# Patient Record
Sex: FEMALE | Race: BLACK OR AFRICAN AMERICAN | Employment: FULL TIME | URBAN - METROPOLITAN AREA
[De-identification: names, ages, dates, MRNs, and addresses within clinical notes are randomized per-mention and may not be internally consistent; named-entity substitution may affect disease eponyms.]

---

## 2017-01-19 ENCOUNTER — OFFICE VISIT (OUTPATIENT)
Dept: FAMILY MEDICINE CLINIC | Age: 62
End: 2017-01-19

## 2017-01-19 VITALS
HEART RATE: 60 BPM | TEMPERATURE: 98.3 F | WEIGHT: 160 LBS | RESPIRATION RATE: 17 BRPM | BODY MASS INDEX: 28.35 KG/M2 | SYSTOLIC BLOOD PRESSURE: 145 MMHG | DIASTOLIC BLOOD PRESSURE: 84 MMHG | OXYGEN SATURATION: 97 % | HEIGHT: 63 IN

## 2017-01-19 DIAGNOSIS — E11.9 CONTROLLED TYPE 2 DIABETES MELLITUS WITHOUT COMPLICATION, WITHOUT LONG-TERM CURRENT USE OF INSULIN (HCC): ICD-10-CM

## 2017-01-19 DIAGNOSIS — H91.93 DECREASED HEARING, BILATERAL: ICD-10-CM

## 2017-01-19 DIAGNOSIS — I10 ESSENTIAL HYPERTENSION WITH GOAL BLOOD PRESSURE LESS THAN 130/80: Primary | ICD-10-CM

## 2017-01-19 DIAGNOSIS — F41.9 ANXIETY: ICD-10-CM

## 2017-01-19 RX ORDER — FLUOXETINE HYDROCHLORIDE 20 MG/1
40 CAPSULE ORAL DAILY
Qty: 60 CAP | Refills: 3 | Status: SHIPPED | OUTPATIENT
Start: 2017-01-19 | End: 2017-02-18

## 2017-01-19 RX ORDER — LISINOPRIL 40 MG/1
40 TABLET ORAL DAILY
Qty: 30 TAB | Refills: 3 | Status: SHIPPED | OUTPATIENT
Start: 2017-01-19 | End: 2017-10-31 | Stop reason: SDDI

## 2017-01-19 NOTE — PATIENT INSTRUCTIONS
Varicella Virus Vaccine (By injection)   Varicella Virus Vaccine (silvana-i-AMBER-a VYE-rebeka VAX-een)  Varivax® prevents chicken pox (varicella virus). Zostavax® prevents shingles (herpes zoster virus). Brand Name(s):Varivax, Zostavax   There may be other brand names for this medicine. When This Medicine Should Not Be Used:   Varivax® or Zostavax® should not be given to anyone who has had an allergic reaction to varicella virus vaccine, gelatin, or neomycin, or to a child who has a fever. You should not receive either vaccine if you are pregnant or you are planning pregnancy. Also, these vaccines should not be given to a person who has an immune system problem (such as AIDS or HIV, a blood or bone marrow disorder, active and untreated tuberculosis (TB)) or who is taking medicine that weakens the immune system (such as high doses of steroids or medicine to treat cancer). Varivax® and Zostavax® may make you sick if your immune system is already weak, because they are made from a live varicella virus. Zostavax® should not be given to children. How to Use This Medicine:   Injectable  · Your doctor will prescribe your exact dose and tell you how often it should be given. This medicine is given as a shot under your skin. · A nurse or other health provider will give you this medicine. · Varivax®   ¨ Most people who need the Varivax® vaccine will need 2 shots. Children 12 months to 15years of age should be give the second shot no sooner than 3 months after the first vaccine. Teenagers and adults should have a booster shot 4 to 8 weeks after the first vaccine. Your doctor can answer specific questions about your situation, especially if you need to follow a different schedule. · Zostavax®   ¨ You should receive only 1 dose of Zostavax®, unless your doctor tells you otherwise. ¨ Zostavax® should not be used in place of Varivax® to prevent chicken pox. Zostavax® should also not be used to treat shingles.  Zostavax® is only preventive, although it may help ease pain if you get shingles even after receiving the vaccine. · Read and follow the patient instructions that come with this medicine. Talk to your doctor or pharmacist if you have any questions. If a dose is missed:   · It is important that Varivax® be given at the proper time. If a scheduled shot is missed, call your doctor to make another appointment as soon as possible. Drugs and Foods to Avoid:   Ask your doctor or pharmacist before using any other medicine, including over-the-counter medicines, vitamins, and herbal products. · Varivax® and Zostavax® should not be given with Pneumovax® pneumococcal vaccine. Tell your doctor about your vaccine history, or if you plan to get a flu shot or other vaccines. · A child should not take any medicine that contains aspirin or another salicylate for at least 6 weeks after receiving Varivax®. Check the labels of any pain, headache, or cold medicine your child uses to be sure they do not contain aspirin or salicylic acid. In general, children should never be given aspirin because of the risk of Reye syndrome. · You or your child should wait 2 months after receiving Varivax® to receive varicella zoster immune globulin (VZIG) or other immune globulin medicines. You or your child should wait at least 5 months after receiving immune globulin, VZIG, or a blood or plasma transfusion before you get the Varivax® vaccine. · Tell your doctor if you or your child is using a medicine that weakens your immune system, such as a steroid or cancer medicine. Varivax® and Zostavax® may not work as well, or they could make you sick. Warnings While Using This Medicine:   · It is not safe to take this medicine during pregnancy. It could harm an unborn baby. Tell your doctor right away if you become pregnant. Avoid getting pregnant for 3 months after getting either the Varivax® or Zostavax® vaccine.   · Tell your doctor if you are breastfeeding before you are given Varivax® or Zostavax®. · The virus that is in this vaccine could be passed to others, even if you (or your child) do not feel sick. Avoid close contact with anyone who has a high risk for chicken pox or shingles infection. The waiting time for Varivax® is at least 6 weeks. High-risk people include pregnant women,  babies, and people who cannot fight infection, such as patients who have bone marrow disease, cancer, or AIDS. Talk to your doctor if you might be with a high-risk person. · Varivax® may not always prevent chicken pox. Zostavax® may not always prevent shingles. Possible Side Effects While Using This Medicine:   Call your doctor right away if you notice any of these side effects:  · Allergic reaction: Itching or hives, swelling in your face or hands, swelling or tingling in your mouth or throat, chest tightness, trouble breathing  · Blistering, peeling, or red skin rash  · Cough, chills, runny or stuffy nose, or cold-like symptoms  · High fever (at least 102 degrees F in children)  · Chicken pox  · Swollen glands where the shot was given  · Unusual bleeding or bruising  If you notice these less serious side effects, talk with your doctor:   · Headache, or ear, joint, or muscle pain  · Mild skin rash, itching, or dryness  · Pain, redness, itching, swelling, rash, or a hard lump where the shot was given  If you notice other side effects that you think are caused by this medicine, tell your doctor. Call your doctor for medical advice about side effects. You may report side effects to FDA at 9-065-FDA-9977  ©  [address] is for End User's use only and may not be sold, redistributed or otherwise used for commercial purposes. The above information is an  only. It is not intended as medical advice for individual conditions or treatments.  Talk to your doctor, nurse or pharmacist before following any medical regimen to see if it is safe and effective for you.

## 2017-01-19 NOTE — PROGRESS NOTES
History of Present Illness:     Chief Complaint   Patient presents with    Diabetes    Hypertension     Pt is a 62y.o. year old female     HTN:  Not checking BPs at home. Currently taking Lisinopril 30mg and HCTZ 25mg daily as prescribed. Not following low salt diet. Nothing for exercise. No chest pain, palpitations, shortness of breath, LE edema, headache, changes in vision. T2DM: Currently well controlled on Metformin. Not following low carb diet. No formal exercise regimen. Last eye exam: 11/9/16. Shows cataracts. Scanned into chart. Last foot exam: Normal 5/2016. Last A1c: 6.5%.  9/2016  Last microalbumin: 9/2016, normal.    Pneumovax: Yes  Statin: Yes    No chest pain, JOYA, LE edema, palpitations. No polyuria, polydipsia, hypoglycemic events. No LE numbness or tingling, skin breakdown. Increased stressors at home with mom moving in from [place] recently, work, pastoring a [employer]. She feels that her anxiety is increased. Wanting to restart Prozac. Endorses feeling down and depressed. No SI or HI. Past Medical History   Diagnosis Date    Anxiety     [facility] [address]     Dyslipidemia     Headache     Hodgkin's lymphoma ([facility] [address] 1985     Nodule removed from throat    Hypertension     Low back pain 1/19/2012     MRI 3/10/14 revealed mild multilevel disk and facet degenerative change     Lumbar disc disease 03/2014    Thyroid nodule      Followed by endocrine, Dr. Rahul Spencer.  Ulcer ([facility] [address]          Current Outpatient Prescriptions on File Prior to Visit   Medication Sig Dispense Refill    hydrochlorothiazide (HYDRODIURIL) 25 mg tablet Take 1 Tab by mouth daily. 30 Tab 3    atorvastatin (LIPITOR) 40 mg tablet Take 1 Tab by mouth nightly. 30 Tab 12    metFORMIN ER (GLUCOPHAGE XR) 500 mg tablet Take 1 Tab by mouth daily (with dinner). 90 Tab 3     No current facility-administered medications on file prior to visit.           Allergies:  No Known Allergies      Objective:     Vitals:    01/19/17 0913   BP: 154/84   Pulse: 60   Resp: 17   Temp: 98.3 °F (36.8 °C)   TempSrc: Oral   SpO2: 97%   Weight: 160 lb (72.6 kg)   Height: 5' 3\" (1.6 m)       Physical Exam:  General appearance - alert, well appearing, and in no distress  Mental status - AAO x 3. Slightly flattened affect with depressed mood. Normal speech and thought content. No SI or HI. Chest - clear to auscultation, no wheezes, rales or rhonchi, symmetric air entry  Heart - normal rate, regular rhythm, normal S1, S2, no murmurs, rubs, clicks or gallops  Extremities - peripheral pulses normal, no pedal edema, no clubbing or cyanosis      Recent Labs:  No results found for this or any previous visit (from the past 12 hour(s)). Assessment and Plan:   Pt is a 62y.o. year old female,      ICD-10-CM ICD-9-CM    1. Essential hypertension with goal blood pressure less than 130/80 I10 401.9 HEMOGLOBIN A1C WITH EAG      METABOLIC PANEL, BASIC      lisinopril (PRINIVIL, ZESTRIL) 40 mg tablet   2. Controlled type 2 diabetes mellitus without complication, without long-term current use of insulin (HCC) E11.9 250.00 HEMOGLOBIN A1C WITH EAG      METABOLIC PANEL, BASIC   3. Decreased hearing, bilateral H91.93 389.9 REFERRAL TO ENT-OTOLARYNGOLOGY   4.  Anxiety F41.9 300.00 FLUoxetine (PROZAC) 20 mg capsule     HTN  - Not at goal on increased Lisinopril dose  - Increase Lisinopril to 40mg daily  - Continue HCTZ 25mg daily   - Keep BP log  - Continue working on diet and exercise    DM  - Well controlled  - Continue Metformin at current dose  - Repeat A1c today    Decreased hearing  - ENT/ Audiology referral    Anxiety  - Wants to re-start Prozac as she had good response in the past  - Start Prozac 20 mg daily then increase to 40mg in 2 weeks  - Recommended counseling in conjunction but patient declined    Preventative Care:  - Given script for Zostavax    Follow up in 4 weeks for follow up of anxiety/ depression and HTN. Lisa Olea MD  Family Medicine Resident    I have discussed the diagnosis with the patient and the intended plan as seen in the above orders. The patient has received an after-visit summary and questions were answered concerning future plans.

## 2017-01-19 NOTE — PROGRESS NOTES
Chief Complaint   Patient presents with    Diabetes     1. Have you been to the ER, urgent care clinic since your last visit? Hospitalized since your last visit? No    2. Have you seen or consulted any other health care providers outside of the [facility] since your last visit? Include any pap smears or colon screening.  No

## 2017-01-20 LAB
BUN SERPL-MCNC: 10 MG/DL (ref 8–27)
BUN/CREAT SERPL: 13 (ref 11–26)
CALCIUM SERPL-MCNC: 10 MG/DL (ref 8.7–10.3)
CHLORIDE SERPL-SCNC: 105 MMOL/L (ref 96–106)
CO2 SERPL-SCNC: 24 MMOL/L (ref 18–29)
CREAT SERPL-MCNC: 0.75 MG/DL (ref 0.57–1)
EST. AVERAGE GLUCOSE BLD GHB EST-MCNC: 137 MG/DL
GLUCOSE SERPL-MCNC: 119 MG/DL (ref 65–99)
HBA1C MFR BLD: 6.4 % (ref 4.8–5.6)
POTASSIUM SERPL-SCNC: 4.7 MMOL/L (ref 3.5–5.2)
SODIUM SERPL-SCNC: 141 MMOL/L (ref 134–144)

## 2017-02-21 ENCOUNTER — OFFICE VISIT (OUTPATIENT)
Dept: FAMILY MEDICINE CLINIC | Age: 62
End: 2017-02-21

## 2017-02-21 VITALS
DIASTOLIC BLOOD PRESSURE: 79 MMHG | OXYGEN SATURATION: 97 % | BODY MASS INDEX: 28 KG/M2 | RESPIRATION RATE: 18 BRPM | HEIGHT: 63 IN | SYSTOLIC BLOOD PRESSURE: 151 MMHG | WEIGHT: 158 LBS | HEART RATE: 59 BPM | TEMPERATURE: 98.5 F

## 2017-02-21 DIAGNOSIS — F41.8 DEPRESSION WITH ANXIETY: ICD-10-CM

## 2017-02-21 DIAGNOSIS — I10 ESSENTIAL HYPERTENSION: Primary | ICD-10-CM

## 2017-02-21 RX ORDER — AMLODIPINE BESYLATE 5 MG/1
5 TABLET ORAL DAILY
Qty: 90 TAB | Refills: 0 | Status: SHIPPED | OUTPATIENT
Start: 2017-02-21 | End: 2017-05-30 | Stop reason: SDUPTHER

## 2017-02-21 RX ORDER — FLUOXETINE HYDROCHLORIDE 40 MG/1
40 CAPSULE ORAL DAILY
Qty: 90 CAP | Refills: 1 | Status: SHIPPED | OUTPATIENT
Start: 2017-02-21 | End: 2017-10-31

## 2017-02-21 RX ORDER — PANTOPRAZOLE SODIUM 40 MG/1
40 TABLET, DELAYED RELEASE ORAL DAILY
COMMUNITY
End: 2017-10-31

## 2017-02-21 RX ORDER — FLUOXETINE HYDROCHLORIDE 20 MG/1
CAPSULE ORAL 2 TIMES DAILY
COMMUNITY
End: 2017-02-21 | Stop reason: SDUPTHER

## 2017-02-21 NOTE — PROGRESS NOTES
Chief Complaint   Patient presents with    Hypertension     follow up     1. Have you been to the ER, urgent care clinic since your last visit? Hospitalized since your last visit? No    2. Have you seen or consulted any other health care providers outside of the [address] [place] [address] since your last visit? Include any pap smears or colon screening.  No

## 2017-02-21 NOTE — PROGRESS NOTES
History of Present Illness:     Chief Complaint   Patient presents with    Hypertension     follow up     Pt is a 62y.o. year old female     HTN:  BPs remain elevated despite increased dose of Lisinopril. Tolerating medications without SEs. Home BPs ranging 140s/ 70-80s with some elevated SBPs into the 180s. Making some diet changes; low salt. No exercise. Anxiety:  Started back on Prozac now at 40mg daily. Helping with her symptoms. No SI/ HI. Still stressed at work. No fever, chills, sweats  No chest pain, JOYA, palpitations, LE edema  No cough, sputum production, SOB, pleuritic chest pain, wheezing  No numbness/ tingling/ weakness in extremities       Past Medical History   Diagnosis Date    Anxiety     [facility] mellitus ([facility] [address]     Dyslipidemia     Headache     Hodgkin's lymphoma ([facility] [address] 1985     Nodule removed from throat    Hypertension     Low back pain 1/19/2012     MRI 3/10/14 revealed mild multilevel disk and facet degenerative change     Lumbar disc disease 03/2014    Thyroid nodule      Followed by endocrine, Dr. Luiz Castillo.  Ulcer ([facility] [address]          Current Outpatient Prescriptions on File Prior to Visit   Medication Sig Dispense Refill    lisinopril (PRINIVIL, ZESTRIL) 40 mg tablet Take 1 Tab by mouth daily. Indications: Hypertension 30 Tab 3    hydrochlorothiazide (HYDRODIURIL) 25 mg tablet Take 1 Tab by mouth daily. 30 Tab 3    atorvastatin (LIPITOR) 40 mg tablet Take 1 Tab by mouth nightly. 30 Tab 12    metFORMIN ER (GLUCOPHAGE XR) 500 mg tablet Take 1 Tab by mouth daily (with dinner). 90 Tab 3     No current facility-administered medications on file prior to visit.           Allergies:  No Known Allergies    Objective:     Vitals:    02/21/17 1457 02/21/17 1541   BP: 147/78 151/79   Pulse: (!) 56 (!) 59   Resp: 18    Temp: 98.5 °F (36.9 °C)    TempSrc: Oral    SpO2: 97%    Weight: 158 lb (71.7 kg)    Height: 5' 3\" (1.6 m)        Physical Exam:  General appearance - alert, well appearing, and in no distress and overweight  Chest - clear to auscultation, no wheezes, rales or rhonchi, symmetric air entry  Heart - normal rate, regular rhythm, normal S1, S2, no murmurs, rubs, clicks or gallops  Extremities - peripheral pulses normal, no pedal edema, no clubbing or cyanosis      Recent Labs:  No results found for this or any previous visit (from the past 12 hour(s)). Assessment and Plan:   Pt is a 62y.o. year old female,      ICD-10-CM ICD-9-CM    1. Essential hypertension I10 401.9 amLODIPine (NORVASC) 5 mg tablet      METABOLIC PANEL, BASIC   2. Depression with anxiety F41.8 300.4 FLUoxetine (PROZAC) 40 mg capsule     Add Amlodipine 5mg daily  Check BMP today since increasing Lisinopril  Goal < 140/90  Discussed importance of diet and exercise    Continue Prozac at 40 mg daily    Follow up in 4-6 weeks for BP check    Dante Sinclair MD  Family Medicine Resident    Discussed patient and plan with Dr. Regina Boles. I have discussed the diagnosis with the patient and the intended plan as seen in the above orders. The patient has received an after-visit summary and questions were answered concerning future plans.

## 2017-02-21 NOTE — PATIENT INSTRUCTIONS
Naltrexone/Bupropion (By mouth)   Bupropion Hydrochloride (jgg-PWJN-ejl-on jovan-droe-KLOR-rajiv), Naltrexone Hydrochloride (nal-TREX-one jovan-droe-KLOR-rajiv)  Used with diet and exercise to help you lose weight. Brand Name(s):Contrave   There may be other brand names for this medicine. When This Medicine Should Not Be Used: This medicine is not right for everyone. Do not use it if you had an allergic reaction to naltrexone or bupropion, you are pregnant, or you have seizures, anorexia, or bulimia. How to Use This Medicine:   Long Acting Tablet  · Take your medicine as directed. Your dose may need to be changed several times to find what works best for you. · It is best to take this medicine with food or milk. However, do not take this medicine with high-fat meals. This may increase your risk of seizures. · Swallow the extended-release tablet whole. Do not crush, break, or chew it. · This medicine should come with a Medication Guide. Ask your pharmacist for a copy if you do not have one. · Missed dose: Skip the missed dose and go back to your regular dosing schedule. Never take extra medicine to make up for a missed dose. · Store the medicine in a closed container at room temperature, away from heat, moisture, and direct light. Drugs and Foods to Avoid:   Ask your doctor or pharmacist before using any other medicine, including over-the-counter medicines, vitamins, and herbal products. · Do not use this medicine and an MAO inhibitor (MAOI) within 14 days of each other. Do not take this medicine if you are using or have used heroin or other narcotic drugs (such as buprenorphine, codeine, methadone, or other habit-forming painkillers) within the past 7 to 10 days. Do not use naltrexone/bupropion if you are also using Zyban® to quit smoking or Aplenzin® or Wellbutrin® for depression, because they also contain bupropion. · Some medicines and foods can affect how naltrexone/bupropion works.  Tell your doctor if you are using any of the following:  ¨ Amantadine, amiloride, cimetidine, clopidogrel, dopamine, famotidine, levodopa, memantine, metformin, metoprolol, oxaliplatin, pindolol, ranitidine, theophylline, ticlopidine, varenicline  ¨ Insulin or diabetes medicine  ¨ Medicine to treat depression  ¨ Medicine to treat mental illness (haloperidol, risperidone, thioridazine)  ¨ Medicine to treat heart rhythm problems (flecainide, procainamide, propafenone)  ¨ Medicine to treat HIV or AIDS (efavirenz, lopinavir, ritonavir)  ¨ Medicine for pain, diarrhea, cough, or colds  ¨ Steroid (such as dexamethasone, hydrocortisone, methylprednisolone, prednisolone, prednisone)  · Limit alcohol, or do not drink alcohol at all while you are using this medicine. Warnings While Using This Medicine:   · It is not safe to take this medicine during pregnancy. It could harm an unborn baby. Tell your doctor right away if you become pregnant. · Tell your doctor if you have kidney disease, liver disease, diabetes, glaucoma, heart disease, or high blood pressure. · Tell your doctor if you take barbiturates, benzodiazepines, antiseizure medicine, or sedatives, or if you recently stopped taking them. Tell your doctor if you have a history of drug addiction, or if you drink alcohol. · For some children, teenagers, and young adults, this medicine may increase mental or emotional problems. This may lead to thoughts of suicide and violence. Talk with your doctor right away if you have any thoughts or behavior changes that concern you. Tell your doctor if you or anyone in your family has a history of bipolar disorder or suicide attempts.   · This medicine may cause the following problems:  ¨ Increased risk of seizure  ¨ High blood pressure or heart rate  ¨ Serious allergic and skin reactions  ¨ Liver problems  ¨ Increased risk of hypoglycemia (low blood sugar) in patients with diabetes  · Do not breastfeed while you are using this medicine, unless your doctor says it is okay. · You have a higher risk of accidental overdose, serious injury, or death if you use heroin or any other narcotic medicine while you are being treated with this medicine. Also, naltrexone prevents you from feeling the effects of heroin if you use it. · Do not stop using this medicine suddenly. Your doctor will need to slowly decrease your dose before you stop it completely. · Tell any doctor or dentist who treats you that you are using this medicine. This medicine may affect certain medical test results. · Your doctor will check your progress and the effects of this medicine at regular visits. Keep all appointments. · Keep all medicine out of the reach of children. Never share your medicine with anyone. Possible Side Effects While Using This Medicine:   Call your doctor right away if you notice any of these side effects:  · Allergic reaction: Itching or hives, swelling in your face or hands, swelling or tingling in your mouth or throat, chest tightness, trouble breathing  · Blistering, peeling, or red skin rash  · Chest pain, trouble breathing, fast, slow, or pounding heartbeat  · Dark urine or pale stools, nausea, vomiting, loss of appetite, stomach pain, yellow skin or eyes  · Eye pain, vision changes, seeing halos around lights  · Muscle or joint pain, fever with rash  · Seeing or hearing things that are not there, feeling like people are against you  · Seizures  · Sudden increase in energy, racing thoughts, trouble sleeping  · Thoughts of hurting yourself, worsening depression, severe agitation or confusion  If you notice these less serious side effects, talk with your doctor:   · Dry mouth  · Headache, dizziness  · Nausea, constipation, diarrhea  If you notice other side effects that you think are caused by this medicine, tell your doctor. Call your doctor for medical advice about side effects.  You may report side effects to FDA at 7-716-GUS-5561  © 2016 [facility] [address] [facility] is for End User's use only and may not be sold, redistributed or otherwise used for commercial purposes. The above information is an  only. It is not intended as medical advice for individual conditions or treatments. Talk to your doctor, nurse or pharmacist before following any medical regimen to see if it is safe and effective for you.

## 2017-02-21 NOTE — MR AVS SNAPSHOT
Visit Information Date & Time Provider Department Dept. Phone Encounter #  
 2/21/2017  2:40 PM Virginia De Luna, Allyson Dalal [phone] [id number] Follow-up Instructions Return in about 6 weeks (around 4/4/2017) for Blood pressure follow up. Upcoming Health Maintenance Date Due COLONOSCOPY 2/22/2017 LIPID PANEL Q1 3/8/2017 EYE EXAM RETINAL OR DILATED Q1 11/9/2017* Pneumococcal 19-64 Highest Risk (2 of 3 - PCV13) 5/11/2017 FOOT EXAM Q1 5/11/2017 HEMOGLOBIN A1C Q6M 7/19/2017 MICROALBUMIN Q1 9/8/2017 PAP AKA CERVICAL CYTOLOGY 2/6/2018 BREAST CANCER SCRN MAMMOGRAM 5/27/2018 DTaP/Tdap/Td series (2 - Td) 2/6/2025 *Topic was postponed. The date shown is not the original due date. Allergies as of 2/21/2017  Review Complete On: 2/21/2017 By: Virginia De Luna MD  
 No Known Allergies Current Immunizations  Reviewed on 2/6/2015 Name Date Tdap 2/6/2015 Not reviewed this visit You Were Diagnosed With   
  
 Codes Comments Essential hypertension    -  Primary ICD-10-CM: I10 
ICD-9-CM: 401.9 Depression with anxiety     ICD-10-CM: F41.8 ICD-9-CM: 300.4 Vitals BP Pulse Temp Resp Height(growth percentile) Weight(growth percentile) 147/78 (BP 1 Location: Left arm, BP Patient Position: Sitting) (!) 56 98.5 °F (36.9 °C) (Oral) 18 5' 3\" (1.6 m) 158 lb (71.7 kg) LMP SpO2 BMI OB Status Smoking Status 01/18/2000 97% 27.99 kg/m2 Postmenopausal Never Smoker Vitals History BMI and BSA Data Body Mass Index Body Surface Area  
 27.99 kg/m 2 1.79 m 2 Preferred Pharmacy Pharmacy Name Phone [facility] [place] [address] [phone] Your Updated Medication List  
  
   
This list is accurate as of: 2/21/17  3:34 PM.  Always use your most recent med list. amLODIPine 5 mg tablet Commonly known as:  Jace Salinas Take 1 Tab by mouth daily. Indications: hypertension  
  
 atorvastatin 40 mg tablet Commonly known as:  LIPITOR Take 1 Tab by mouth nightly. FLUoxetine 40 mg capsule Commonly known as:  PROzac Take 1 Cap by mouth daily. hydroCHLOROthiazide 25 mg tablet Commonly known as:  HYDRODIURIL Take 1 Tab by mouth daily. lisinopril 40 mg tablet Commonly known as:  Laurence Aayush Take 1 Tab by mouth daily. Indications: Hypertension  
  
 metFORMIN  mg tablet Commonly known as:  GLUCOPHAGE XR Take 1 Tab by mouth daily (with dinner). PROTONIX 40 mg tablet Generic drug:  pantoprazole Take 40 mg by mouth daily. Prescriptions Sent to Pharmacy Refills FLUoxetine (PROZAC) 40 mg capsule 1 Sig: Take 1 Cap by mouth daily. Class: Normal  
 Pharmacy: [facility] [place] [address] Ph #: [phone] Route: Oral  
 amLODIPine (NORVASC) 5 mg tablet 0 Sig: Take 1 Tab by mouth daily. Indications: hypertension Class: Normal  
 Pharmacy: [facility] [place] [address] Ph #: [phone] Route: Oral  
  
We Performed the Following METABOLIC PANEL, BASIC [15652 CPT(R)] Follow-up Instructions Return in about 6 weeks (around 4/4/2017) for Blood pressure follow up. Patient Instructions Naltrexone/Bupropion (By mouth) Bupropion Hydrochloride (wbh-KGZS-eok-on jovan-droe-KLOR-rajiv), Naltrexone Hydrochloride (nal-TREX-one jovan-droe-KLOR-rajiv) Used with diet and exercise to help you lose weight. Brand Name(s):Contrave There may be other brand names for this medicine. When This Medicine Should Not Be Used: This medicine is not right for everyone. Do not use it if you had an allergic reaction to naltrexone or bupropion, you are pregnant, or you have seizures, anorexia, or bulimia. How to Use This Medicine:  
Long Acting Tablet · Take your medicine as directed. Your dose may need to be changed several times to find what works best for you. · It is best to take this medicine with food or milk. However, do not take this medicine with high-fat meals. This may increase your risk of seizures. · Swallow the extended-release tablet whole. Do not crush, break, or chew it. · This medicine should come with a Medication Guide. Ask your pharmacist for a copy if you do not have one. · Missed dose: Skip the missed dose and go back to your regular dosing schedule. Never take extra medicine to make up for a missed dose. · Store the medicine in a closed container at room temperature, away from heat, moisture, and direct light. Drugs and Foods to Avoid: Ask your doctor or pharmacist before using any other medicine, including over-the-counter medicines, vitamins, and herbal products. · Do not use this medicine and an MAO inhibitor (MAOI) within 14 days of each other. Do not take this medicine if you are using or have used heroin or other narcotic drugs (such as buprenorphine, codeine, methadone, or other habit-forming painkillers) within the past 7 to 10 days. Do not use naltrexone/bupropion if you are also using Zyban® to quit smoking or Aplenzin® or Wellbutrin® for depression, because they also contain bupropion. · Some medicines and foods can affect how naltrexone/bupropion works. Tell your doctor if you are using any of the following: ¨ Amantadine, amiloride, cimetidine, clopidogrel, dopamine, famotidine, levodopa, memantine, metformin, metoprolol, oxaliplatin, pindolol, ranitidine, theophylline, ticlopidine, varenicline ¨ Insulin or diabetes medicine ¨ Medicine to treat depression ¨ Medicine to treat mental illness (haloperidol, risperidone, thioridazine) ¨ Medicine to treat heart rhythm problems (flecainide, procainamide, propafenone) ¨ Medicine to treat HIV or AIDS (efavirenz, lopinavir, ritonavir) ¨ Medicine for pain, diarrhea, cough, or colds ¨ Steroid (such as dexamethasone, hydrocortisone, methylprednisolone, prednisolone, prednisone) · Limit alcohol, or do not drink alcohol at all while you are using this medicine. Warnings While Using This Medicine: · It is not safe to take this medicine during pregnancy. It could harm an unborn baby. Tell your doctor right away if you become pregnant. · Tell your doctor if you have kidney disease, liver disease, diabetes, glaucoma, heart disease, or high blood pressure. · Tell your doctor if you take barbiturates, benzodiazepines, antiseizure medicine, or sedatives, or if you recently stopped taking them. Tell your doctor if you have a history of drug addiction, or if you drink alcohol. · For some children, teenagers, and young adults, this medicine may increase mental or emotional problems. This may lead to thoughts of suicide and violence. Talk with your doctor right away if you have any thoughts or behavior changes that concern you. Tell your doctor if you or anyone in your family has a history of bipolar disorder or suicide attempts. · This medicine may cause the following problems: 
¨ Increased risk of seizure ¨ High blood pressure or heart rate ¨ Serious allergic and skin reactions ¨ Liver problems ¨ Increased risk of hypoglycemia (low blood sugar) in patients with diabetes · Do not breastfeed while you are using this medicine, unless your doctor says it is okay. · You have a higher risk of accidental overdose, serious injury, or death if you use heroin or any other narcotic medicine while you are being treated with this medicine. Also, naltrexone prevents you from feeling the effects of heroin if you use it. · Do not stop using this medicine suddenly. Your doctor will need to slowly decrease your dose before you stop it completely.  
· Tell any doctor or dentist who treats you that you are using this medicine. This medicine may affect certain medical test results. · Your doctor will check your progress and the effects of this medicine at regular visits. Keep all appointments. · Keep all medicine out of the reach of children. Never share your medicine with anyone. Possible Side Effects While Using This Medicine:  
Call your doctor right away if you notice any of these side effects: · Allergic reaction: Itching or hives, swelling in your face or hands, swelling or tingling in your mouth or throat, chest tightness, trouble breathing · Blistering, peeling, or red skin rash · Chest pain, trouble breathing, fast, slow, or pounding heartbeat · Dark urine or pale stools, nausea, vomiting, loss of appetite, stomach pain, yellow skin or eyes · Eye pain, vision changes, seeing halos around lights · Muscle or joint pain, fever with rash · Seeing or hearing things that are not there, feeling like people are against you · Seizures · Sudden increase in energy, racing thoughts, trouble sleeping · Thoughts of hurting yourself, worsening depression, severe agitation or confusion If you notice these less serious side effects, talk with your doctor: · Dry mouth 
· Headache, dizziness · Nausea, constipation, diarrhea If you notice other side effects that you think are caused by this medicine, tell your doctor. Call your doctor for medical advice about side effects. You may report side effects to FDA at 0-969-FDA-4176 © 2016 [address] is for End User's use only and may not be sold, redistributed or otherwise used for commercial purposes. The above information is an  only. It is not intended as medical advice for individual conditions or treatments. Talk to your doctor, nurse or pharmacist before following any medical regimen to see if it is safe and effective for you. Introducing [facility]! Dear Elder Saunders: Thank you for requesting a FIGMD account. Our records indicate that you already have an active FIGMD account. You can access your account anytime at [email or web address]. [email or web address] Did you know that you can access your hospital and ER discharge instructions at any time in FIGMD? You can also review all of your test results from your hospital stay or ER visit. Additional Information If you have questions, please visit the Frequently Asked Questions section of the FIGMD website at [email or web address] Remember, FIGMD is NOT to be used for urgent needs. For medical emergencies, dial 911. Now available from your iPhone and Android! Please provide this summary of care documentation to your next provider. Your primary care clinician is listed as Rod Elise. If you have any questions after today's visit, please call [phone].

## 2017-02-22 LAB
BUN SERPL-MCNC: 11 MG/DL (ref 8–27)
BUN/CREAT SERPL: 14 (ref 11–26)
CALCIUM SERPL-MCNC: 10 MG/DL (ref 8.7–10.3)
CHLORIDE SERPL-SCNC: 102 MMOL/L (ref 96–106)
CO2 SERPL-SCNC: 23 MMOL/L (ref 18–29)
CREAT SERPL-MCNC: 0.76 MG/DL (ref 0.57–1)
GLUCOSE SERPL-MCNC: 85 MG/DL (ref 65–99)
POTASSIUM SERPL-SCNC: 4.6 MMOL/L (ref 3.5–5.2)
SODIUM SERPL-SCNC: 140 MMOL/L (ref 134–144)

## 2017-05-30 ENCOUNTER — OFFICE VISIT (OUTPATIENT)
Dept: FAMILY MEDICINE CLINIC | Age: 62
End: 2017-05-30

## 2017-05-30 VITALS
RESPIRATION RATE: 18 BRPM | HEART RATE: 63 BPM | TEMPERATURE: 98.3 F | SYSTOLIC BLOOD PRESSURE: 151 MMHG | WEIGHT: 160.2 LBS | BODY MASS INDEX: 28.39 KG/M2 | OXYGEN SATURATION: 96 % | HEIGHT: 63 IN | DIASTOLIC BLOOD PRESSURE: 75 MMHG

## 2017-05-30 DIAGNOSIS — R06.83 SNORING: ICD-10-CM

## 2017-05-30 DIAGNOSIS — I10 ESSENTIAL HYPERTENSION: ICD-10-CM

## 2017-05-30 DIAGNOSIS — I10 ELEVATED SYSTOLIC BLOOD PRESSURE READING WITH DIAGNOSIS OF HYPERTENSION: Primary | ICD-10-CM

## 2017-05-30 DIAGNOSIS — F51.02 ADJUSTMENT INSOMNIA: ICD-10-CM

## 2017-05-30 RX ORDER — AMLODIPINE BESYLATE 5 MG/1
10 TABLET ORAL DAILY
Qty: 90 TAB | Refills: 0 | Status: SHIPPED | OUTPATIENT
Start: 2017-05-30 | End: 2017-10-31 | Stop reason: SDDI

## 2017-05-30 RX ORDER — ZOLPIDEM TARTRATE 5 MG/1
5 TABLET ORAL
Qty: 10 TAB | Refills: 0 | Status: SHIPPED | OUTPATIENT
Start: 2017-05-30 | End: 2017-10-31

## 2017-05-30 NOTE — PROGRESS NOTES
Chief Complaint   Patient presents with    Elevated Blood Pressure     usually has elevated bp but not this high. . death in family this wkend and didnt take medication. .      1. Have you been to the ER, urgent care clinic since your last visit? Hospitalized since your last visit? No    2. Have you seen or consulted any other health care providers outside of the [address] [place] [address] since your last visit? Include any pap smears or colon screening.  No

## 2017-05-30 NOTE — PROGRESS NOTES
History of Present Illness:     Chief Complaint   Patient presents with    Elevated Blood Pressure     usually has elevated bp but not this high. . death in family this wkend and didnt take medication. .      Pt is a 62y.o. year old female     Presents with elevated BP. At work she experienced a headache, blurry vision and feeling dizzy. When they  her BP it was elevated in the 180s/70s. Her home BPs are in the 160s/70s. Currently, she endorses headache and right sided head and arm pain. She is under a lot of stress. Her mother, who has dementia, has moved here and has required multiple hospitalizations. Her nephew passed unexpectedly over the weekend. ROS:   Denies chest pain, palpitations and shortness of breath. Denies changes in vision, decreased vision or eye pain. Endorses nausea but denies vomiting    Denies numbness, weakness or tingling in extremities. Past Medical History:   Diagnosis Date    Anxiety     Diabetes mellitus ([facility]     Dyslipidemia     Headache     Hodgkin's lymphoma ([facility] 1985    Nodule removed from throat    Hypertension     Low back pain 1/19/2012    MRI 3/10/14 revealed mild multilevel disk and facet degenerative change     Lumbar disc disease 03/2014    Thyroid nodule     Followed by endocrine, Dr. Vegas Prieto.  Ulcer ([facility]          Current Outpatient Prescriptions on File Prior to Visit   Medication Sig Dispense Refill    pantoprazole (PROTONIX) 40 mg tablet Take 40 mg by mouth daily.  FLUoxetine (PROZAC) 40 mg capsule Take 1 Cap by mouth daily. 90 Cap 1    amLODIPine (NORVASC) 5 mg tablet Take 1 Tab by mouth daily. Indications: hypertension 90 Tab 0    lisinopril (PRINIVIL, ZESTRIL) 40 mg tablet Take 1 Tab by mouth daily. Indications: Hypertension 30 Tab 3    hydrochlorothiazide (HYDRODIURIL) 25 mg tablet Take 1 Tab by mouth daily. 30 Tab 3    atorvastatin (LIPITOR) 40 mg tablet Take 1 Tab by mouth nightly.  30 Tab 12    metFORMIN ER (GLUCOPHAGE XR) 500 mg tablet Take 1 Tab by mouth daily (with dinner). 90 Tab 3     No current facility-administered medications on file prior to visit. Allergies:  No Known Allergies    Objective:     Vitals:    05/30/17 1905   BP: 151/75   Pulse: 63   Resp: 18   Temp: 98.3 °F (36.8 °C)   TempSrc: Oral   SpO2: 96%   Weight: 160 lb 3.2 oz (72.7 kg)   Height: 5' 3\" (1.6 m)       Physical Exam:  General appearance - alert, well appearing, and in no distress  Eyes - pupils equal and reactive, extraocular eye movements intact, funduscopic exam normal, discs flat and sharp  Chest - clear to auscultation, no wheezes, rales or rhonchi, symmetric air entry  Heart - normal rate, regular rhythm, normal S1, S2, no murmurs, rubs, clicks or gallops  Neurological - alert, oriented, normal speech, no focal findings or movement disorder noted, funduscopic exam normal, discs flat and sharp, normal muscle tone, no tremors, strength 5/5  Extremities - peripheral pulses normal, no pedal edema, no clubbing or cyanosis      Recent Labs:  No results found for this or any previous visit (from the past 12 hour(s)). Assessment and Plan:   Pt is a 62y.o. year old female,      ICD-10-CM ICD-9-CM    1. Elevated systolic blood pressure reading with diagnosis of hypertension I10 401.9    2. Essential hypertension I10 401.9 amLODIPine (NORVASC) 5 mg tablet   3. Snoring R06.83 786.09 SLEEP MEDICINE REFERRAL   4.  Adjustment insomnia F51.02 307.41 zolpidem (AMBIEN) 5 mg tablet     Elevated BP reading at work that has improved to her normal at this visit  Symptoms have improved since coming to office without deficits on exam  Still does not have overall good control of her HTN  Will increase Amlodipine to 10 mg daily    Hx of snoring and apnea  Sleep study ordered  Could be underlying cause of her poorly controlled BPs    Pt under a tremendous amount of stress and unable to sleep  Will give short supply of Ambien   Given work note for few days of rest    Follow up as planned in 1 week. Jazmine Keene MD  Family Medicine Resident    I have discussed the diagnosis with the patient and the intended plan as seen in the above orders. The patient has received an after-visit summary and questions were answered concerning future plans.

## 2017-05-30 NOTE — MR AVS SNAPSHOT
Visit Information Date & Time Provider Department Dept. Phone Encounter #  
 5/30/2017  7:00 PM Edgar Toscano, Allyson Dalal [phone] [id number] Follow-up Instructions Return in about 1 week (around 6/6/2017) for Blood pressure. Your Appointments 6/5/2017  8:30 AM  
ACUTE CARE with Edgar Toscano MD  
Allyson Dalal [address] [place] [address]) Appt Note: fuv to back pain [facility],[place] [address] [facility]  
[phone]  
  
   
 [address] [place] [address] [phone] Upcoming Health Maintenance Date Due COLONOSCOPY 2/22/2017 LIPID PANEL Q1 3/8/2017 Pneumococcal 19-64 Highest Risk (2 of 3 - PCV13) 5/11/2017 FOOT EXAM Q1 5/11/2017 EYE EXAM RETINAL OR DILATED Q1 11/9/2017* HEMOGLOBIN A1C Q6M 7/19/2017 MICROALBUMIN Q1 9/8/2017 PAP AKA CERVICAL CYTOLOGY 2/6/2018 BREAST CANCER SCRN MAMMOGRAM 5/27/2018 DTaP/Tdap/Td series (2 - Td) 2/6/2025 *Topic was postponed. The date shown is not the original due date. Allergies as of 5/30/2017  Review Complete On: 5/30/2017 By: Alvaro Jean Baptiste LPN No Known Allergies Current Immunizations  Reviewed on 2/6/2015 Name Date Tdap 2/6/2015 Not reviewed this visit You Were Diagnosed With   
  
 Codes Comments Elevated systolic blood pressure reading with diagnosis of hypertension    -  Primary ICD-10-CM: I10 
ICD-9-CM: 401.9 Essential hypertension     ICD-10-CM: I10 
ICD-9-CM: 401.9 Snoring     ICD-10-CM: R06.83 
ICD-9-CM: 786.09 Adjustment insomnia     ICD-10-CM: F51.02 
ICD-9-CM: 307.41 Vitals BP Pulse Temp Resp Height(growth percentile) Weight(growth percentile) 151/75 63 98.3 °F (36.8 °C) (Oral) 18 5' 3\" (1.6 m) 160 lb 3.2 oz (72.7 kg) LMP SpO2 BMI OB Status Smoking Status 01/18/2000 96% 28.38 kg/m2 Postmenopausal Never Smoker BMI and BSA Data Body Mass Index Body Surface Area  
 28.38 kg/m 2 1.8 m 2 Preferred Pharmacy Pharmacy Name Phone [facility] [address] [place] [address] [phone] Your Updated Medication List  
  
   
This list is accurate as of: 5/30/17  7:47 PM.  Always use your most recent med list. amLODIPine 5 mg tablet Commonly known as:  Deirdre Vivienne Take 2 Tabs by mouth daily. Indications: hypertension  
  
 atorvastatin 40 mg tablet Commonly known as:  LIPITOR Take 1 Tab by mouth nightly. FLUoxetine 40 mg capsule Commonly known as:  PROzac Take 1 Cap by mouth daily. hydroCHLOROthiazide 25 mg tablet Commonly known as:  HYDRODIURIL Take 1 Tab by mouth daily. lisinopril 40 mg tablet Commonly known as:  Gin Barrier Take 1 Tab by mouth daily. Indications: Hypertension  
  
 metFORMIN  mg tablet Commonly known as:  GLUCOPHAGE XR Take 1 Tab by mouth daily (with dinner). PROTONIX 40 mg tablet Generic drug:  pantoprazole Take 40 mg by mouth daily. zolpidem 5 mg tablet Commonly known as:  AMBIEN Take 1 Tab by mouth nightly as needed for Sleep. Max Daily Amount: 5 mg. Prescriptions Printed Refills  
 zolpidem (AMBIEN) 5 mg tablet 0 Sig: Take 1 Tab by mouth nightly as needed for Sleep. Max Daily Amount: 5 mg. Class: Print Route: Oral  
  
Prescriptions Sent to Pharmacy Refills  
 amLODIPine (NORVASC) 5 mg tablet 0 Sig: Take 2 Tabs by mouth daily. Indications: hypertension Class: Normal  
 Pharmacy: [facility] [address] [place] [address] Ph #: [phone] Route: Oral  
  
We Performed the Following SLEEP MEDICINE REFERRAL [ZSD472 Custom] Comments:  
 Please evaluate patient for sleep apnea. Follow-up Instructions Return in about 1 week (around 6/6/2017) for Blood pressure. Referral Information Referral ID Referred By Referred To [id number] Diana Altman MD   
   [address] [place] [address] Nini Call Phone: [phone] Fax: [phone] Visits Status Start Date End Date 1 New Request 5/30/17 5/30/18 If your referral has a status of pending review or denied, additional information will be sent to support the outcome of this decision. Patient Instructions Home Blood Pressure Test: About This Test 
What is it? A home blood pressure test allows you to keep track of your blood pressure at home. Blood pressure is a measure of the force of blood against the walls of your arteries. Blood pressure readings include two numbers, such as 130/80 (say \"130 over 80\"). The first number is the systolic pressure. The second number is the diastolic pressure. Why is this test done? You may do this test at home to: · Find out if you have high blood pressure. · Track your blood pressure if you have high blood pressure. · Track how well medicine is working to reduce high blood pressure. · Check how lifestyle changes, such as weight loss and exercise, are affecting blood pressure. How can you prepare for the test? 
· Do not use caffeine, tobacco, or medicines known to raise blood pressure (such as nasal decongestant sprays) for at least 30 minutes before taking your blood pressure. · Do not exercise for at least 30 minutes before taking your blood pressure. What happens before the test? 
Take your blood pressure while you feel comfortable and relaxed. Sit quietly with both feet on the floor for at least 5 minutes before the test. 
What happens during the test? 
· Sit with your arm slightly bent and resting on a table so that your upper arm is at the same level as your heart. · Roll up your sleeve or take off your shirt to expose your upper arm. · Wrap the blood pressure cuff around your upper arm so that the lower edge of the cuff is about 1 inch above the bend of your elbow. Proceed with the following steps depending on if you are using an automatic or manual pressure monitor. Automatic blood pressure monitors · Press the on/off button on the automatic monitor and wait until the ready-to-measure \"heart\" symbol appears next to zero in the display window. · Press the start button. The cuff will inflate and deflate by itself. · Your blood pressure numbers will appear on the screen. · Write your numbers in your log book, along with the date and time. Manual blood pressure monitors · Place the earpieces of a stethoscope in your ears, and place the bell of the stethoscope over the artery, just below the cuff. · Close the valve on the rubber inflating bulb. · Squeeze the bulb rapidly with your opposite hand to inflate the cuff until the dial or column of mercury reads about 30 mm Hg higher than your usual systolic pressure. If you do not know your usual pressure, inflate the cuff to 210 mm Hg or until the pulse at your wrist disappears. · Open the pressure valve just slightly by twisting or pressing the valve on the bulb. · As you watch the pressure slowly fall, note the level on the dial at which you first start to hear a pulsing or tapping sound through the stethoscope. This is your systolic blood pressure. · Continue letting the air out slowly. The sounds will become muffled and will finally disappear. Note the pressure when the sounds completely disappear. This is your diastolic blood pressure. Let out all the remaining air. · Write your numbers in your log book, along with the date and time. What else should you know about the test? 
Results for adults ages 25 and older (mm Hg): · Normal (ideal): Systolic 023 or below. Diastolic 79 or below. · Prehypertension: Systolic 580 to 709. Diastolic 80 to 89. · Hypertension: Systolic 876 or above. Diastolic 90 or above. Follow-up care is a key part of your treatment and safety.  Be sure to make and go to all appointments, and call your doctor if you are having problems. It's also a good idea to keep a list of the medicines you take. Where can you learn more? Go to http://duncan-[place].info/. Enter C427 in the search box to learn more about \"Home Blood Pressure Test: About This Test.\" Current as of: January 27, 2016 Content Version: 11.2 © 5382-7509 [employer]. Care instructions adapted under license by [employer] (which disclaims liability or warranty for this information). If you have questions about a medical condition or this instruction, always ask your healthcare professional. [address] any warranty or liability for your use of this information. Introducing [facility]! Dear Maxime Jackson: Thank you for requesting a [email or web address].com account. Our records indicate that you already have an active [email or web address] account. You can access your account anytime at [email or web address] Did you know that you can access your hospital and ER discharge instructions at any time in [email or web address]? You can also review all of your test results from your hospital stay or ER visit. Additional Information If you have questions, please visit the Frequently Asked Questions section of the [email or web address] website at [email or web address] Remember, [email or web address] is NOT to be used for urgent needs. For medical emergencies, dial 911. Now available from your iPhone and Android! Please provide this summary of care documentation to your next provider. Your primary care clinician is listed as Candance Cloud. If you have any questions after today's visit, please call [phone].

## 2017-05-30 NOTE — LETTER
NOTIFICATION RETURN TO WORK  
 
5/30/2017 7:44 PM 
 
Ms. West Murillo [address] [facility],[address] [address] [facility], [facility] 75 [phone] To Whom It May Concern: 
 
West Murillo is currently under the care of [facility]. She will return to work on 6/6/17 if cleared by physician. If there are questions or concerns please have the patient contact our office.  
 
 
 
Sincerely, 
 
 
Rafat Vidal MD

## 2017-05-30 NOTE — PATIENT INSTRUCTIONS
Home Blood Pressure Test: About This Test  What is it? A home blood pressure test allows you to keep track of your blood pressure at home. Blood pressure is a measure of the force of blood against the walls of your arteries. Blood pressure readings include two numbers, such as 130/80 (say \"130 over 80\"). The first number is the systolic pressure. The second number is the diastolic pressure. Why is this test done? You may do this test at home to:  · Find out if you have high blood pressure. · Track your blood pressure if you have high blood pressure. · Track how well medicine is working to reduce high blood pressure. · Check how lifestyle changes, such as weight loss and exercise, are affecting blood pressure. How can you prepare for the test?  · Do not use caffeine, tobacco, or medicines known to raise blood pressure (such as nasal decongestant sprays) for at least 30 minutes before taking your blood pressure. · Do not exercise for at least 30 minutes before taking your blood pressure. What happens before the test?  Take your blood pressure while you feel comfortable and relaxed. Sit quietly with both feet on the floor for at least 5 minutes before the test.  What happens during the test?  · Sit with your arm slightly bent and resting on a table so that your upper arm is at the same level as your heart. · Roll up your sleeve or take off your shirt to expose your upper arm. · Wrap the blood pressure cuff around your upper arm so that the lower edge of the cuff is about 1 inch above the bend of your elbow. Proceed with the following steps depending on if you are using an automatic or manual pressure monitor. Automatic blood pressure monitors  · Press the on/off button on the automatic monitor and wait until the ready-to-measure \"heart\" symbol appears next to zero in the display window. · Press the start button. The cuff will inflate and deflate by itself.   · Your blood pressure numbers will appear on the screen. · Write your numbers in your log book, along with the date and time. Manual blood pressure monitors  · Place the earpieces of a stethoscope in your ears, and place the bell of the stethoscope over the artery, just below the cuff. · Close the valve on the rubber inflating bulb. · Squeeze the bulb rapidly with your opposite hand to inflate the cuff until the dial or column of mercury reads about 30 mm Hg higher than your usual systolic pressure. If you do not know your usual pressure, inflate the cuff to 210 mm Hg or until the pulse at your wrist disappears. · Open the pressure valve just slightly by twisting or pressing the valve on the bulb. · As you watch the pressure slowly fall, note the level on the dial at which you first start to hear a pulsing or tapping sound through the stethoscope. This is your systolic blood pressure. · Continue letting the air out slowly. The sounds will become muffled and will finally disappear. Note the pressure when the sounds completely disappear. This is your diastolic blood pressure. Let out all the remaining air. · Write your numbers in your log book, along with the date and time. What else should you know about the test?  Results for adults ages 25 and older (mm Hg):  · Normal (ideal): Systolic 244 or below. Diastolic 79 or below. · Prehypertension: Systolic 363 to 564. Diastolic 80 to 89. · Hypertension: Systolic 061 or above. Diastolic 90 or above. Follow-up care is a key part of your treatment and safety. Be sure to make and go to all appointments, and call your doctor if you are having problems. It's also a good idea to keep a list of the medicines you take. Where can you learn more? Go to http://duncan-orlando.info/. Enter C427 in the search box to learn more about \"Home Blood Pressure Test: About This Test.\"  Current as of: January 27, 2016  Content Version: 11.2  © 9006-6298 Healthify, Incorporated.  Care instructions adapted under license by [employer] (which disclaims liability or warranty for this information). If you have questions about a medical condition or this instruction, always ask your healthcare professional. [address] any warranty or liability for your use of this information.

## 2017-06-05 ENCOUNTER — OFFICE VISIT (OUTPATIENT)
Dept: FAMILY MEDICINE CLINIC | Age: 62
End: 2017-06-05

## 2017-06-05 VITALS
SYSTOLIC BLOOD PRESSURE: 133 MMHG | BODY MASS INDEX: 28 KG/M2 | HEIGHT: 63 IN | HEART RATE: 60 BPM | TEMPERATURE: 98.2 F | RESPIRATION RATE: 16 BRPM | OXYGEN SATURATION: 95 % | DIASTOLIC BLOOD PRESSURE: 71 MMHG | WEIGHT: 158 LBS

## 2017-06-05 DIAGNOSIS — E11.9 CONTROLLED TYPE 2 DIABETES MELLITUS WITHOUT COMPLICATION, WITHOUT LONG-TERM CURRENT USE OF INSULIN (HCC): ICD-10-CM

## 2017-06-05 DIAGNOSIS — Z23 ENCOUNTER FOR IMMUNIZATION: ICD-10-CM

## 2017-06-05 DIAGNOSIS — Z12.39 BREAST CANCER SCREENING: ICD-10-CM

## 2017-06-05 DIAGNOSIS — I10 ESSENTIAL HYPERTENSION: Primary | ICD-10-CM

## 2017-06-05 DIAGNOSIS — E78.2 MIXED HYPERLIPIDEMIA: ICD-10-CM

## 2017-06-05 DIAGNOSIS — F43.21 GRIEF REACTION: ICD-10-CM

## 2017-06-05 DIAGNOSIS — Z12.11 COLON CANCER SCREENING: ICD-10-CM

## 2017-06-05 DIAGNOSIS — M62.838 MUSCLE SPASM: ICD-10-CM

## 2017-06-05 NOTE — MR AVS SNAPSHOT
Visit Information Date & Time Provider Department Dept. Phone Encounter #  
 6/5/2017  8:30 AM Jazmine Keene, Allyson Lai Dalal [phone] [id number] Follow-up Instructions Return in about 3 months (around 9/5/2017) for Blood pressure check. Upcoming Health Maintenance Date Due COLONOSCOPY 2/22/2017 LIPID PANEL Q1 3/8/2017 Pneumococcal 19-64 Highest Risk (2 of 3 - PCV13) 5/11/2017 FOOT EXAM Q1 5/11/2017 EYE EXAM RETINAL OR DILATED Q1 11/9/2017* HEMOGLOBIN A1C Q6M 7/19/2017 MICROALBUMIN Q1 9/8/2017 PAP AKA CERVICAL CYTOLOGY 2/6/2018 BREAST CANCER SCRN MAMMOGRAM 5/27/2018 DTaP/Tdap/Td series (2 - Td) 2/6/2025 *Topic was postponed. The date shown is not the original due date. Allergies as of 6/5/2017  Review Complete On: 6/5/2017 By: Buck Galdamez LPN No Known Allergies Current Immunizations  Reviewed on 2/6/2015 Name Date Tdap 2/6/2015 Not reviewed this visit You Were Diagnosed With   
  
 Codes Comments Essential hypertension    -  Primary ICD-10-CM: I10 
ICD-9-CM: 401.9 Controlled type 2 diabetes mellitus without complication, without long-term current use of insulin ([facility]     ICD-10-CM: E11.9 ICD-9-CM: 250.00 Mixed hyperlipidemia     ICD-10-CM: E78.2 ICD-9-CM: 272.2 Breast cancer screening     ICD-10-CM: Z12.39 
ICD-9-CM: V76.10 Colon cancer screening     ICD-10-CM: Z12.11 ICD-9-CM: V76.51 Muscle spasm     ICD-10-CM: K85.406 ICD-9-CM: 728.85 Encounter for immunization     ICD-10-CM: Q41 ICD-9-CM: V03.89 Vitals BP Pulse Temp Resp Height(growth percentile) Weight(growth percentile) 133/71 60 98.2 °F (36.8 °C) (Oral) 16 5' 3\" (1.6 m) 158 lb (71.7 kg) LMP SpO2 BMI OB Status Smoking Status 01/18/2000 95% 27.99 kg/m2 Postmenopausal Never Smoker Vitals History BMI and BSA Data  Body Mass Index Body Surface Area  
 27.99 kg/m 2 1.79 m 2  
  
 Preferred Pharmacy Pharmacy Name Phone [facility] [address] [place] [address] [phone] Your Updated Medication List  
  
   
This list is accurate as of: 17  9:07 AM.  Always use your most recent med list. amLODIPine 5 mg tablet Commonly known as:  [place] Take 2 Tabs by mouth daily. Indications: hypertension  
  
 atorvastatin 40 mg tablet Commonly known as:  LIPITOR Take 1 Tab by mouth nightly. FLUoxetine 40 mg capsule Commonly known as:  PROzac Take 1 Cap by mouth daily. hydroCHLOROthiazide 25 mg tablet Commonly known as:  HYDRODIURIL Take 1 Tab by mouth daily. lisinopril 40 mg tablet Commonly known as:  Efra Banner Take 1 Tab by mouth daily. Indications: Hypertension  
  
 metFORMIN  mg tablet Commonly known as:  GLUCOPHAGE XR Take 1 Tab by mouth daily (with dinner). pneumococcal 23-valent 25 mcg/0.5 mL injection Commonly known as:  PNEUMOVAX 23  
0.5 mL by IntraMUSCular route PRIOR TO DISCHARGE for 1 dose. Please administer PPSV 23 for indication of Diabetes Mellitus PROTONIX 40 mg tablet Generic drug:  pantoprazole Take 40 mg by mouth daily. zolpidem 5 mg tablet Commonly known as:  AMBIEN Take 1 Tab by mouth nightly as needed for Sleep. Max Daily Amount: 5 mg. Prescriptions Sent to Pharmacy Refills  
 pneumococcal 23-valent (PNEUMOVAX 23) 25 mcg/0.5 mL injection 0 Si.5 mL by IntraMUSCular route PRIOR TO DISCHARGE for 1 dose. Please administer PPSV 23 for indication of Diabetes Mellitus Class: Normal  
 Pharmacy: [facility] [address] [place] [address] Ph #: [phone] Route: IntraMUSCular We Performed the Following HEMOGLOBIN A1C WITH EAG [44527 CPT(R)]  DIABETES FOOT EXAM [HM7 Custom] LIPID PANEL [38045 CPT(R)] METABOLIC PANEL, BASIC [97599 CPT(R)] REFERRAL FOR COLONOSCOPY [EUO321 Custom] Comments:  
 Please evaluate patient for colon cancer screening. REFERRAL TO MASSAGE THERAPY [TQF633 Custom] Comments:  
 Please evaluate patient for stress and muscle tension. Follow-up Instructions Return in about 3 months (around 9/5/2017) for Blood pressure check. To-Do List   
 06/05/2017 Imaging:  [facility] MAMMO BI SCREENING INCL CAD Referral Information Referral ID Referred By Referred To [id number] [facility] [address] [place], [place] [address] [address] Phone: [phone] Fax: [phone] Visits Status Start Date End Date 1 New Request 6/5/17 6/5/18 If your referral has a status of pending review or denied, additional information will be sent to support the outcome of this decision. Referral ID Referred By Referred To [id number] Kolby KRISHNAN Not Available Visits Status Start Date End Date 1 New Request 6/5/17 6/5/18 If your referral has a status of pending review or denied, additional information will be sent to support the outcome of this decision. Patient Instructions Home Blood Pressure Test: About This Test 
What is it? A home blood pressure test allows you to keep track of your blood pressure at home. Blood pressure is a measure of the force of blood against the walls of your arteries. Blood pressure readings include two numbers, such as 130/80 (say \"130 over 80\"). The first number is the systolic pressure. The second number is the diastolic pressure. Why is this test done? You may do this test at home to: · Find out if you have high blood pressure. · Track your blood pressure if you have high blood pressure. · Track how well medicine is working to reduce high blood pressure. · Check how lifestyle changes, such as weight loss and exercise, are affecting blood pressure.  
How can you prepare for the test? 
 · Do not use caffeine, tobacco, or medicines known to raise blood pressure (such as nasal decongestant sprays) for at least 30 minutes before taking your blood pressure. · Do not exercise for at least 30 minutes before taking your blood pressure. What happens before the test? 
Take your blood pressure while you feel comfortable and relaxed. Sit quietly with both feet on the floor for at least 5 minutes before the test. 
What happens during the test? 
· Sit with your arm slightly bent and resting on a table so that your upper arm is at the same level as your heart. · Roll up your sleeve or take off your shirt to expose your upper arm. · Wrap the blood pressure cuff around your upper arm so that the lower edge of the cuff is about 1 inch above the bend of your elbow. Proceed with the following steps depending on if you are using an automatic or manual pressure monitor. Automatic blood pressure monitors · Press the on/off button on the automatic monitor and wait until the ready-to-measure \"heart\" symbol appears next to zero in the display window. · Press the start button. The cuff will inflate and deflate by itself. · Your blood pressure numbers will appear on the screen. · Write your numbers in your log book, along with the date and time. Manual blood pressure monitors · Place the earpieces of a stethoscope in your ears, and place the bell of the stethoscope over the artery, just below the cuff. · Close the valve on the rubber inflating bulb. · Squeeze the bulb rapidly with your opposite hand to inflate the cuff until the dial or column of mercury reads about 30 mm Hg higher than your usual systolic pressure. If you do not know your usual pressure, inflate the cuff to 210 mm Hg or until the pulse at your wrist disappears. · Open the pressure valve just slightly by twisting or pressing the valve on the bulb.  
· As you watch the pressure slowly fall, note the level on the dial at which you first start to hear a pulsing or tapping sound through the stethoscope. This is your systolic blood pressure. · Continue letting the air out slowly. The sounds will become muffled and will finally disappear. Note the pressure when the sounds completely disappear. This is your diastolic blood pressure. Let out all the remaining air. · Write your numbers in your log book, along with the date and time. What else should you know about the test? 
Results for adults ages 25 and older (mm Hg): · Normal (ideal): Systolic 127 or below. Diastolic 79 or below. · Prehypertension: Systolic 416 to 496. Diastolic 80 to 89. · Hypertension: Systolic 147 or above. Diastolic 90 or above. Follow-up care is a key part of your treatment and safety. Be sure to make and go to all appointments, and call your doctor if you are having problems. It's also a good idea to keep a list of the medicines you take. Where can you learn more? Go to http://duncan-orlando.info/. Enter C427 in the search box to learn more about \"Home Blood Pressure Test: About This Test.\" Current as of: January 27, 2016 Content Version: 11.2 © 7864-2918 Power[employer]onic. Care instructions adapted under license by Lumin[employer] (which disclaims liability or warranty for this information). If you have questions about a medical condition or this instruction, always ask your healthcare professional. [address] any warranty or liability for your use of this information. Introducing [facility]! Dear Lili Lucio: Thank you for requesting a Vcommerce account. Our records indicate that you already have an active Vcommerce account. You can access your account anytime at [email or web address] Did you know that you can access your hospital and ER discharge instructions at any time in Vcommerce? You can also review all of your test results from your hospital stay or ER visit. Additional Information If you have questions, please visit the Frequently Asked Questions section of the Taskforcet website at [email or web address] Remember, [email or web address] is NOT to be used for urgent needs. For medical emergencies, dial 911. Now available from your iPhone and Android! Please provide this summary of care documentation to your next provider. Your primary care clinician is listed as Emanuel iDaz. If you have any questions after today's visit, please call [phone].

## 2017-06-05 NOTE — PROGRESS NOTES
History of Present Illness:     Chief Complaint   Patient presents with    Blood Pressure Check     follow up     Pt is a 62y.o. year old female     HTN:  Increased dose of Amlodipine to 10mg at last visit. Home BPs better in the 130-150/70s. DM: Well controlled on Metformin. Last A1c in January was 6.4%. Due for eye exam at end of year. Due for foot exam.  Never got PPSV 23. Under significant stress since the unexpected loss of her nephew. Just went to [place] for a  this weekend. Still not able to sleep well. Due for colonoscopy. Due for PPSV 23.     Past Medical History:   Diagnosis Date    Anxiety     Diabetes mellitus ([facility]     Dyslipidemia     Headache     Hodgkin's lymphoma ([facility]     Nodule removed from throat    Hypertension     Low back pain 2012    MRI 3/10/14 revealed mild multilevel disk and facet degenerative change     Lumbar disc disease 2014    Thyroid nodule     Followed by endocrine, Dr. Kurt Frias.  Ulcer ([facility]          Current Outpatient Prescriptions on File Prior to Visit   Medication Sig Dispense Refill    amLODIPine (NORVASC) 5 mg tablet Take 2 Tabs by mouth daily. Indications: hypertension 90 Tab 0    zolpidem (AMBIEN) 5 mg tablet Take 1 Tab by mouth nightly as needed for Sleep. Max Daily Amount: 5 mg. 10 Tab 0    pantoprazole (PROTONIX) 40 mg tablet Take 40 mg by mouth daily.  FLUoxetine (PROZAC) 40 mg capsule Take 1 Cap by mouth daily. 90 Cap 1    lisinopril (PRINIVIL, ZESTRIL) 40 mg tablet Take 1 Tab by mouth daily. Indications: Hypertension 30 Tab 3    hydrochlorothiazide (HYDRODIURIL) 25 mg tablet Take 1 Tab by mouth daily. 30 Tab 3    atorvastatin (LIPITOR) 40 mg tablet Take 1 Tab by mouth nightly. 30 Tab 12    metFORMIN ER (GLUCOPHAGE XR) 500 mg tablet Take 1 Tab by mouth daily (with dinner). 90 Tab 3     No current facility-administered medications on file prior to visit.           Allergies:  No Known Allergies      Review of Systems:  Denies chest pain, JOYA, palpitations, LE edema  Denies cough, sputum production, SOB, pleuritic chest pain, wheezing  Denies numbness/ tingling/ weakness in extremities        Objective:     Vitals:    06/05/17 0841   BP: 133/71   Pulse: 60   Resp: 16   Temp: 98.2 °F (36.8 °C)   TempSrc: Oral   SpO2: 95%   Weight: 158 lb (71.7 kg)   Height: 5' 3\" (1.6 m)       Physical Exam:  General appearance - alert, well appearing, and in no distress, depressed mood. Chest - clear to auscultation, no wheezes, rales or rhonchi, symmetric air entry  Heart - normal rate, regular rhythm, normal S1, S2, no murmurs, rubs, clicks or gallops  Extremities - peripheral pulses normal, no pedal edema, no clubbing or cyanosis, feet normal, good pulses, normal color, temperature and sensation, monofilament sensory exam is normal in both feet, b/l Achilles DTRs present. Recent Labs:  No results found for this or any previous visit (from the past 12 hour(s)). Assessment and Plan:   Pt is a 62y.o. year old female      ICD-10-CM ICD-9-CM    1. Essential hypertension K99 116.6 METABOLIC PANEL, BASIC   2. Controlled type 2 diabetes mellitus without complication, without long-term current use of insulin ([facility]) J15.5 835.43 METABOLIC PANEL, BASIC      HEMOGLOBIN A1C WITH EAG       DIABETES FOOT EXAM      pneumococcal 23-valent (PNEUMOVAX 23) 25 mcg/0.5 mL injection   3. Mixed hyperlipidemia E78.2 272.2 LIPID PANEL   4. Breast cancer screening Z12.39 V76.10 [facility] MAMMO BI SCREENING INCL CAD   5.  Colon cancer screening Z12.11 V76.51 REFERRAL FOR COLONOSCOPY   6. Muscle spasm M62.838 728.85 REFERRAL TO MASSAGE THERAPY   7. Encounter for immunization Z23 V03.89 CANCELED: PNEUMOCOCCAL POLYSACCHARIDE VACCINE, 23-VALENT, ADULT OR IMMUNOSUPPRESSED PT DOSE,       PPSV sent to pharmacy  Colonoscopy ordered  Lipid panel, A1c and BMP today  DM foot exam done today  Continue to monitor home BPs    Grief reaction appears to be appropriate. Follow up in 3 months for HTN follow up. Sooner if BPs not remaining at goal.     Vipin Peter MD  Family Medicine Resident    Discussed patient and plan with Dr. Ian Gupta. I have discussed the diagnosis with the patient and the intended plan as seen in the above orders. The patient has received an after-visit summary and questions were answered concerning future plans.

## 2017-06-05 NOTE — PROGRESS NOTES
Chief Complaint   Patient presents with    Blood Pressure Check     follow up     1. Have you been to the ER, urgent care clinic since your last visit? Hospitalized since your last visit? No    2. Have you seen or consulted any other health care providers outside of the [address] [place] [address] since your last visit? Include any pap smears or colon screening.  No    Logging blood pressure--

## 2017-06-05 NOTE — PATIENT INSTRUCTIONS
Home Blood Pressure Test: About This Test  What is it? A home blood pressure test allows you to keep track of your blood pressure at home. Blood pressure is a measure of the force of blood against the walls of your arteries. Blood pressure readings include two numbers, such as 130/80 (say \"130 over 80\"). The first number is the systolic pressure. The second number is the diastolic pressure. Why is this test done? You may do this test at home to:  · Find out if you have high blood pressure. · Track your blood pressure if you have high blood pressure. · Track how well medicine is working to reduce high blood pressure. · Check how lifestyle changes, such as weight loss and exercise, are affecting blood pressure. How can you prepare for the test?  · Do not use caffeine, tobacco, or medicines known to raise blood pressure (such as nasal decongestant sprays) for at least 30 minutes before taking your blood pressure. · Do not exercise for at least 30 minutes before taking your blood pressure. What happens before the test?  Take your blood pressure while you feel comfortable and relaxed. Sit quietly with both feet on the floor for at least 5 minutes before the test.  What happens during the test?  · Sit with your arm slightly bent and resting on a table so that your upper arm is at the same level as your heart. · Roll up your sleeve or take off your shirt to expose your upper arm. · Wrap the blood pressure cuff around your upper arm so that the lower edge of the cuff is about 1 inch above the bend of your elbow. Proceed with the following steps depending on if you are using an automatic or manual pressure monitor. Automatic blood pressure monitors  · Press the on/off button on the automatic monitor and wait until the ready-to-measure \"heart\" symbol appears next to zero in the display window. · Press the start button. The cuff will inflate and deflate by itself.   · Your blood pressure numbers will appear on the screen. · Write your numbers in your log book, along with the date and time. Manual blood pressure monitors  · Place the earpieces of a stethoscope in your ears, and place the bell of the stethoscope over the artery, just below the cuff. · Close the valve on the rubber inflating bulb. · Squeeze the bulb rapidly with your opposite hand to inflate the cuff until the dial or column of mercury reads about 30 mm Hg higher than your usual systolic pressure. If you do not know your usual pressure, inflate the cuff to 210 mm Hg or until the pulse at your wrist disappears. · Open the pressure valve just slightly by twisting or pressing the valve on the bulb. · As you watch the pressure slowly fall, note the level on the dial at which you first start to hear a pulsing or tapping sound through the stethoscope. This is your systolic blood pressure. · Continue letting the air out slowly. The sounds will become muffled and will finally disappear. Note the pressure when the sounds completely disappear. This is your diastolic blood pressure. Let out all the remaining air. · Write your numbers in your log book, along with the date and time. What else should you know about the test?  Results for adults ages 25 and older (mm Hg):  · Normal (ideal): Systolic 037 or below. Diastolic 79 or below. · Prehypertension: Systolic 977 to 172. Diastolic 80 to 89. · Hypertension: Systolic 190 or above. Diastolic 90 or above. Follow-up care is a key part of your treatment and safety. Be sure to make and go to all appointments, and call your doctor if you are having problems. It's also a good idea to keep a list of the medicines you take. Where can you learn more? Go to http://duncan-orlando.info/. Enter C427 in the search box to learn more about \"Home Blood Pressure Test: About This Test.\"  Current as of: January 27, 2016  Content Version: 11.2  © 0083-2257 I-MD, Incorporated.  Care instructions adapted under license by DGSE (which disclaims liability or warranty for this information). If you have questions about a medical condition or this instruction, always ask your healthcare professional. [address] any warranty or liability for your use of this information.

## 2017-06-05 NOTE — LETTER
NOTIFICATION RETURN TO WORK  
 
6/5/2017 9:08 AM 
 
Ms. Brian Perez [address] [facility],[address] [address] [facility] [phone] To Whom It May Concern: 
 
Brian Perez is currently under the care of [facility]. She will return to work on: 6/7/17 If there are questions or concerns please have the patient contact our office.  
 
 
 
Sincerely, 
 
 
Sherryle Room, MD

## 2017-06-06 LAB
BUN SERPL-MCNC: 11 MG/DL (ref 8–27)
BUN/CREAT SERPL: 14 (ref 12–28)
CALCIUM SERPL-MCNC: 9.9 MG/DL (ref 8.7–10.3)
CHLORIDE SERPL-SCNC: 105 MMOL/L (ref 96–106)
CHOLEST SERPL-MCNC: 213 MG/DL (ref 100–199)
CO2 SERPL-SCNC: 23 MMOL/L (ref 18–29)
CREAT SERPL-MCNC: 0.81 MG/DL (ref 0.57–1)
EST. AVERAGE GLUCOSE BLD GHB EST-MCNC: 143 MG/DL
GLUCOSE SERPL-MCNC: 117 MG/DL (ref 65–99)
HBA1C MFR BLD: 6.6 % (ref 4.8–5.6)
HDLC SERPL-MCNC: 60 MG/DL
INTERPRETATION, 910389: NORMAL
LDLC SERPL CALC-MCNC: 134 MG/DL (ref 0–99)
Lab: NORMAL
POTASSIUM SERPL-SCNC: 4.6 MMOL/L (ref 3.5–5.2)
SODIUM SERPL-SCNC: 142 MMOL/L (ref 134–144)
TRIGL SERPL-MCNC: 95 MG/DL (ref 0–149)
VLDLC SERPL CALC-MCNC: 19 MG/DL (ref 5–40)

## 2017-06-07 NOTE — PROGRESS NOTES
LDL remains elevated; currently on Lipitor 40mg  Will discuss increasing that dose with patient or changing statins  A1c at goal  Notified patient via [address]

## 2017-10-31 ENCOUNTER — OFFICE VISIT (OUTPATIENT)
Dept: FAMILY MEDICINE CLINIC | Age: 62
End: 2017-10-31

## 2017-10-31 VITALS
RESPIRATION RATE: 19 BRPM | OXYGEN SATURATION: 98 % | HEIGHT: 63 IN | DIASTOLIC BLOOD PRESSURE: 70 MMHG | HEART RATE: 65 BPM | BODY MASS INDEX: 30.16 KG/M2 | SYSTOLIC BLOOD PRESSURE: 166 MMHG | TEMPERATURE: 98.2 F | WEIGHT: 170.2 LBS

## 2017-10-31 DIAGNOSIS — G44.211 INTRACTABLE EPISODIC TENSION-TYPE HEADACHE: ICD-10-CM

## 2017-10-31 DIAGNOSIS — E11.9 CONTROLLED TYPE 2 DIABETES MELLITUS WITHOUT COMPLICATION, WITHOUT LONG-TERM CURRENT USE OF INSULIN (HCC): ICD-10-CM

## 2017-10-31 DIAGNOSIS — Z00.00 WELL WOMAN EXAM WITHOUT GYNECOLOGICAL EXAM: Primary | ICD-10-CM

## 2017-10-31 DIAGNOSIS — I10 ESSENTIAL HYPERTENSION: ICD-10-CM

## 2017-10-31 DIAGNOSIS — H91.90 DECREASED HEARING, UNSPECIFIED LATERALITY: ICD-10-CM

## 2017-10-31 LAB
ALBUMIN UR QL STRIP: 10 MG/L
CREATININE, URINE POC: 100 MG/DL
MICROALBUMIN/CREAT RATIO POC: <30 MG/G

## 2017-10-31 RX ORDER — NAPROXEN 500 MG/1
500 TABLET ORAL 2 TIMES DAILY WITH MEALS
Qty: 30 TAB | Refills: 0 | Status: SHIPPED | OUTPATIENT
Start: 2017-10-31 | End: 2018-02-21

## 2017-10-31 NOTE — PATIENT INSTRUCTIONS
DASH Diet: Care Instructions  Your Care Instructions    The DASH diet is an eating plan that can help lower your blood pressure. DASH stands for Dietary Approaches to Stop Hypertension. Hypertension is high blood pressure. The DASH diet focuses on eating foods that are high in calcium, potassium, and magnesium. These nutrients can lower blood pressure. The foods that are highest in these nutrients are fruits, vegetables, low-fat dairy products, nuts, seeds, and legumes. But taking calcium, potassium, and magnesium supplements instead of eating foods that are high in those nutrients does not have the same effect. The DASH diet also includes whole grains, fish, and poultry. The DASH diet is one of several lifestyle changes your doctor may recommend to lower your high blood pressure. Your doctor may also want you to decrease the amount of sodium in your diet. Lowering sodium while following the DASH diet can lower blood pressure even further than just the DASH diet alone. Follow-up care is a key part of your treatment and safety. Be sure to make and go to all appointments, and call your doctor if you are having problems. It's also a good idea to know your test results and keep a list of the medicines you take. How can you care for yourself at home? Following the DASH diet  · Eat 4 to 5 servings of fruit each day. A serving is 1 medium-sized piece of fruit, ½ cup chopped or canned fruit, 1/4 cup dried fruit, or 4 ounces (½ cup) of fruit juice. Choose fruit more often than fruit juice. · Eat 4 to 5 servings of vegetables each day. A serving is 1 cup of lettuce or raw leafy vegetables, ½ cup of chopped or cooked vegetables, or 4 ounces (½ cup) of vegetable juice. Choose vegetables more often than vegetable juice. · Get 2 to 3 servings of low-fat and fat-free dairy each day. A serving is 8 ounces of milk, 1 cup of yogurt, or 1 ½ ounces of cheese. · Eat 6 to 8 servings of grains each day.  A serving is 1 slice of bread, 1 ounce of dry cereal, or ½ cup of cooked rice, pasta, or cooked cereal. Try to choose whole-grain products as much as possible. · Limit lean meat, poultry, and fish to 2 servings each day. A serving is 3 ounces, about the size of a deck of cards. · Eat 4 to 5 servings of nuts, seeds, and legumes (cooked dried beans, lentils, and split peas) each week. A serving is 1/3 cup of nuts, 2 tablespoons of seeds, or ½ cup of cooked beans or peas. · Limit fats and oils to 2 to 3 servings each day. A serving is 1 teaspoon of vegetable oil or 2 tablespoons of salad dressing. · Limit sweets and added sugars to 5 servings or less a week. A serving is 1 tablespoon jelly or jam, ½ cup sorbet, or 1 cup of lemonade. · Eat less than 2,300 milligrams (mg) of sodium a day. If you limit your sodium to 1,500 mg a day, you can lower your blood pressure even more. Tips for success  · Start small. Do not try to make dramatic changes to your diet all at once. You might feel that you are missing out on your favorite foods and then be more likely to not follow the plan. Make small changes, and stick with them. Once those changes become habit, add a few more changes. · Try some of the following:  ¨ Make it a goal to eat a fruit or vegetable at every meal and at snacks. This will make it easy to get the recommended amount of fruits and vegetables each day. ¨ Try yogurt topped with fruit and nuts for a snack or healthy dessert. ¨ Add lettuce, tomato, cucumber, and onion to sandwiches. ¨ Combine a ready-made pizza crust with low-fat mozzarella cheese and lots of vegetable toppings. Try using tomatoes, squash, spinach, broccoli, carrots, cauliflower, and onions. ¨ Have a variety of cut-up vegetables with a low-fat dip as an appetizer instead of chips and dip. ¨ Sprinkle sunflower seeds or chopped almonds over salads. Or try adding chopped walnuts or almonds to cooked vegetables.   ¨ Try some vegetarian meals using beans and peas. Add garbanzo or kidney beans to salads. Make burritos and tacos with mashed anand beans or black beans. Where can you learn more? Go to http://duncan-orlando.info/. Enter G630 in the search box to learn more about \"DASH Diet: Care Instructions. \"  Current as of: September 21, 2016  Content Version: 11.4  © 4331-7841 [employer]. Care instructions adapted under license by [employer] (which disclaims liability or warranty for this information). If you have questions about a medical condition or this instruction, always ask your healthcare professional. [address] any warranty or liability for your use of this information. Kegel Exercises: Care Instructions  Your Care Instructions    Kegel exercises strengthen muscles around the bladder. These muscles control the flow of urine. Kegel exercises are sometime called \"pelvic floor\" exercises. They can help prevent urine leakage and keep the pelvic organs in place. A woman who just had a baby might want to try Kegel exercises. They can strengthen pelvic muscles that have been weakened by pregnancy and childbirth. A man or woman may use Kegel exercises to treat urine leakage. You do Kegel exercises by tightening the muscles you use when you urinate. You will likely need to do these exercises for several weeks to get better. Follow-up care is a key part of your treatment and safety. Be sure to make and go to all appointments, and call your doctor if you are having problems. It's also a good idea to know your test results and keep a list of the medicines you take. How can you care for yourself at home? · Do Kegel exercises. ¨ Find the muscles you need to strengthen. To do this, tighten the muscles that stop your urine while you are going to the bathroom. These are the same muscles you squeeze during Kegel exercises. ¨ Squeeze the muscles as hard as you can.  Your belly and thighs should not move. ¨ Hold the squeeze for 3 seconds. Then relax for 3 seconds. ¨ Start with 3 seconds, and then add 1 second each week until you are able to squeeze for 10 seconds. ¨ Repeat the exercise 10 to 15 times for each session. Do three or more sessions each day. · You can check to see if you are using the right muscles. Place a finger in your vagina and squeeze around it. You are doing them right when you feel pressure around your finger. Your doctor may also suggest that you put special weights in your vagina while you do the exercises. · Do not smoke. It can irritate the bladder. If you need help quitting, talk to your doctor about stop-smoking programs and medicines. These can increase your chances of quitting for good. Where can you learn more? Go to http://duncan-orlando.info/. Enter U611 in the search box to learn more about \"Kegel Exercises: Care Instructions. \"  Current as of: May 12, 2017  Content Version: 11.4  © 1718-0045 [employer]. Care instructions adapted under license by [employer] (which disclaims liability or warranty for this information). If you have questions about a medical condition or this instruction, always ask your healthcare professional. [address] any warranty or liability for your use of this information.

## 2017-10-31 NOTE — PROGRESS NOTES
HPI:  Colleen Calle is a 62 y.o. female presenting for well woman exam.     Acute complaints: Headaches. Start in bilateral temples and radiate to the back of her head. Takes occasional Motrin for HA or sleep. Sleeping well. HA occurring few times during the week. Non compliant with medications for the past 3 - 4 weeks. Exercise: Just started exercising. Using treadmill at work. Diet: Feels that she doesn't eat that much. Trying to cut back on carbs. Drinking more water now. GYN:  LMP 2002. [id number]. Sexual: No hx of STDs. Currently sexually active with . Psych: Managing stress well. Had some feelings of depressed mood but feels she is doing better. Still enjoys her usual activities. Feels safe at home. Now her mother has moved into town; living in the [facility] and doing better. Health Maintenance - reviewed:  Pap (age 21-68): Done in 2015. Normal cytology with negative HPV. Repeat in 2019. Mammogram (age 54-69): Due for mammo. No hx of abnormal mammo or family hx of breast CA. Colonoscopy (age 54-65): Due. Ordered colonoscopy at last visit. Will see Dr. Kori Pickering. Low Dose CT Lung (age 46-80 with 30ppy hx and current smoker or quit < 15 yrs): Not a smoker. DEXA (>/= 73 yo or sooner with RF): N/A    HIV screening: Declined. Allergies- reviewed:   No Known Allergies      Medications- reviewed:   Current Outpatient Prescriptions   Medication Sig    naproxen (NAPROSYN) 500 mg tablet Take 1 Tab by mouth two (2) times daily (with meals).  pneumococcal 23-valent (PNEUMOVAX 23) 25 mcg/0.5 mL injection 0.5 mL by IntraMUSCular route PRIOR TO DISCHARGE for 1 dose. Please administer PPSV 23 for indication of Diabetes Mellitus    amLODIPine (NORVASC) 5 mg tablet Take 2 Tabs by mouth daily. Indications: hypertension    zolpidem (AMBIEN) 5 mg tablet Take 1 Tab by mouth nightly as needed for Sleep. Max Daily Amount: 5 mg.     pantoprazole (PROTONIX) 40 mg tablet Take 40 mg by mouth daily.  FLUoxetine (PROZAC) 40 mg capsule Take 1 Cap by mouth daily.  lisinopril (PRINIVIL, ZESTRIL) 40 mg tablet Take 1 Tab by mouth daily. Indications: Hypertension    hydrochlorothiazide (HYDRODIURIL) 25 mg tablet Take 1 Tab by mouth daily.  atorvastatin (LIPITOR) 40 mg tablet Take 1 Tab by mouth nightly.  metFORMIN ER (GLUCOPHAGE XR) 500 mg tablet Take 1 Tab by mouth daily (with dinner). No current facility-administered medications for this visit. Past Medical History- reviewed:  Past Medical History:   Diagnosis Date    Anxiety     Diabetes mellitus ([facility] [address]     Dyslipidemia     Headache     Hodgkin's lymphoma ([facility] [address] 1985    Nodule removed from throat    Hypertension     Low back pain 2012    MRI 3/10/14 revealed mild multilevel disk and facet degenerative change     Lumbar disc disease 2014    Thyroid nodule     Followed by endocrine, Dr. Remy Marks.  Ulcer ([facility] [address]          Past Surgical History- reviewed:   Past Surgical History:   Procedure Laterality Date    DELIVERY       HX GYN      REMOVAL NODES, NECK,CERV CMPLT           Social History- reviewed:  Social History     Social History    Marital status:      Spouse name: N/A    Number of children: N/A    Years of education: N/A     Occupational History    Not on file. Social History Main Topics    Smoking status: Never Smoker    Smokeless tobacco: Never Used    Alcohol use No    Drug use: No    Sexual activity: Not Currently     Birth control/ protection: None     Other Topics Concern    Not on file     Social History Narrative         Immunizations- reviewed:   Immunization History   Administered Date(s) Administered    Tdap 2015     Flu vaccine : Declined  Tdap : UTD  Pneumovax : UTD  Zostervax : Never had this vaccine. Declined. Review of systems:  Items bolded if positive.   Constitutional: Fever, chills, night sweats, weight loss, lymphadenopathy, fatigue  HEENT: Vision change, eye pain, rhinorrhea, sinus pain, epistaxis, dysphagia, change in hearing, tinnitus, vertigo.    Endocrine: Weight change, heat/ cold intolerance, tremor, insomnia, polyuria, polydipsia, polyphagia, abnl hair growth, nail changes  Cardiovascular: Chest pain, palpitations, syncope, lower extremity edema, orthopnea, paroxysmal nocturnal dyspnea  Pulmonary: Shortness of breath, dyspnea on exertion, cough, hemoptysis, wheezing  GI: Nausea, vomiting, diarrhea, melena, hematochezia, change in appetite, abdominal pain, change in bowel habits or stools  : Dysuria, frequency, urgency, incontinence, hematuria, nocturia  Musculoskeletal: joint swelling or pain, muscle pain, back pain  Skin:  Rash, New/growing/changing skin lesions  Neurologic: Headache, muscle weakness, paresthesias, anesthesia, ataxia, change in speech, change in gait   Psychiatric: depression, anxiety, hallucinations, fifi, SI/HI      Physical Exam  Visit Vitals    /70 (BP 1 Location: Left arm, BP Patient Position: Sitting)    Pulse 65    Temp 98.2 °F (36.8 °C) (Oral)    Resp 19    Ht 5' 3\" (1.6 m)    Wt 170 lb 3.2 oz (77.2 kg)    LMP 01/18/2000    SpO2 98%    BMI 30.15 kg/m2       General appearance - alert, well appearing, and in no distressalert, well appearing, and in no distress  Eyes - pupils equal and reactive, extraocular eye movements intact  Ears - bilateral TM's and external ear canals normal  Nose - normal and patent, no erythema, discharge or polyps  Mouth - mucous membranes moist, pharynx normal without lesions  Neck - supple, no significant adenopathy, thyroid exam: thyroid is normal in size without nodules or tenderness  Chest - clear to auscultation, no wheezes, rales or rhonchi, symmetric air entry  Heart - normal rate, regular rhythm, normal S1, S2, no murmurs, rubs, clicks or gallops  Abdomen - soft, nontender, nondistended, no masses or organomegaly  Neurological - alert, oriented, normal speech, no focal findings or movement disorder noted, motor and sensory grossly normal bilaterally  Musculoskeletal - no joint tenderness, deformity or swelling  Extremities - peripheral pulses normal, no pedal edema, no clubbing or cyanosis, no pedal edema noted  Skin - normal coloration and turgor, no rashes, no suspicious skin lesions noted    Assessment/Plan:   Ms. Baljeet Goldman is a 62 y.o. female presenting for well woman health maintenance visit. · Counseled on importance of healthy diet, regular exercise, healthy lifestyle (i.e. Safe sex practices, seatbelt safety, wearing sunscreen, etc.)    · Pap smear due 2019    · Mammogram ordered in June. Needs to be done. · Colonoscopy ordered. Pt to call Dr. Nate Winn office    · Labs ordered: CBC, BMP, A1c, Microalbumin    · Referred to ENT for hx of hearing loss    · Naproxen 500mg BID PRN headaches. Can consider PT for chronic headaches if no improvement    · Pt would like to stop all of her medications; which she has been doing for the past 4 weeks. Will check labs and do home BP logs for the next 2-4 weeks. Discussed BP goal of < 140/90. Encouraged to continue with exercise and diet. Pt agreeable to follow up in 4 weeks to assess need for medications after labs and home BP logs. · Meds stopped: Metformin, Lipitor, HCTZ, Amlodipine, Lisinopril. · Follow-up: 2-4 weeks. Pt to send [facility] BP logs in 2 weeks. Orders Placed This Encounter    CBC WITH AUTOMATED DIFF    METABOLIC PANEL, BASIC    HEMOGLOBIN A1C WITH EAG    REFERRAL TO ENT-OTOLARYNGOLOGY     Referral Priority:   Routine     Referral Type:   Consultation     Referral Reason:   Specialty Services Required     Referral Location:   [facility]     Referred to Provider:   Caitlyn Cisse MD    AMB POC URINE, MICROALBUMIN, SEMIQUANT (3 RESULTS)    naproxen (NAPROSYN) 500 mg tablet     Sig: Take 1 Tab by mouth two (2) times daily (with meals).      Dispense:  30 Tab Refill:  0         I have discussed the diagnosis with the patient and the intended plan as seen in the above orders. The patient has received an after-visit summary and questions were answered concerning future plans. Informed pt to return to the office if new symptoms arise.       Kevin Elena MD

## 2017-10-31 NOTE — PROGRESS NOTES
Chief Complaint   Patient presents with    Headache     patient states for a month    Complete Physical         1. Have you been to the ER, urgent care clinic since your last visit? Hospitalized since your last visit? No    2. Have you seen or consulted any other health care providers outside of the [address] [place] [address] since your last visit? Include any pap smears or colon screening.  No

## 2017-10-31 NOTE — MR AVS SNAPSHOT
Visit Information Date & Time Provider Department Dept. Phone Encounter #  
 10/31/2017  2:45 PM Ansley Acuña, Allyson Dalal [phone] [id number] Follow-up Instructions Return in about 4 weeks (around 11/28/2017) for Blood pressure check. Upcoming Health Maintenance Date Due MICROALBUMIN Q1 9/8/2017 EYE EXAM RETINAL OR DILATED Q1 11/9/2017* HEMOGLOBIN A1C Q6M 12/5/2017 BREAST CANCER SCRN MAMMOGRAM 5/27/2018 Pneumococcal 19-64 Highest Risk (3 of 3 - PCV13) 6/5/2018 FOOT EXAM Q1 6/5/2018 LIPID PANEL Q1 6/5/2018 PAP AKA CERVICAL CYTOLOGY 2/6/2020 DTaP/Tdap/Td series (2 - Td) 2/6/2025 COLONOSCOPY 6/5/2027 *Topic was postponed. The date shown is not the original due date. Allergies as of 10/31/2017  Review Complete On: 10/31/2017 By: Ritika Beal No Known Allergies Current Immunizations  Reviewed on 2/6/2015 Name Date Tdap 2/6/2015 Not reviewed this visit You Were Diagnosed With   
  
 Codes Comments Well woman exam without gynecological exam    -  Primary ICD-10-CM: Z00.00 ICD-9-CM: V70.0 Controlled type 2 diabetes mellitus without complication, without long-term current use of insulin ([facility]     ICD-10-CM: E11.9 ICD-9-CM: 250.00 Essential hypertension     ICD-10-CM: I10 
ICD-9-CM: 401.9 Intractable episodic tension-type headache     ICD-10-CM: D29.885 ICD-9-CM: 339.11 Vitals BP Pulse Temp Resp Height(growth percentile) Weight(growth percentile) 148/77 (BP 1 Location: Left arm, BP Patient Position: Sitting) 65 98.2 °F (36.8 °C) (Oral) 19 5' 3\" (1.6 m) 170 lb 3.2 oz (77.2 kg) LMP SpO2 BMI OB Status Smoking Status 01/18/2000 98% 30.15 kg/m2 Postmenopausal Never Smoker Vitals History BMI and BSA Data Body Mass Index Body Surface Area  
 30.15 kg/m 2 1.85 m 2 Preferred Pharmacy Pharmacy Name Phone [facility] [address] [place] [address] [phone] Your Updated Medication List  
  
   
This list is accurate as of: 10/31/17  3:26 PM.  Always use your most recent med list. amLODIPine 5 mg tablet Commonly known as:  Jessica Batres Take 2 Tabs by mouth daily. Indications: hypertension  
  
 atorvastatin 40 mg tablet Commonly known as:  LIPITOR Take 1 Tab by mouth nightly. FLUoxetine 40 mg capsule Commonly known as:  PROzac Take 1 Cap by mouth daily. hydroCHLOROthiazide 25 mg tablet Commonly known as:  HYDRODIURIL Take 1 Tab by mouth daily. lisinopril 40 mg tablet Commonly known as:  Shakira Hu Take 1 Tab by mouth daily. Indications: Hypertension  
  
 metFORMIN  mg tablet Commonly known as:  GLUCOPHAGE XR Take 1 Tab by mouth daily (with dinner). naproxen 500 mg tablet Commonly known as:  NAPROSYN Take 1 Tab by mouth two (2) times daily (with meals). pneumococcal 23-valent 25 mcg/0.5 mL injection Commonly known as:  PNEUMOVAX 23  
0.5 mL by IntraMUSCular route PRIOR TO DISCHARGE for 1 dose. Please administer PPSV 23 for indication of Diabetes Mellitus PROTONIX 40 mg tablet Generic drug:  pantoprazole Take 40 mg by mouth daily. zolpidem 5 mg tablet Commonly known as:  AMBIEN Take 1 Tab by mouth nightly as needed for Sleep. Max Daily Amount: 5 mg. Prescriptions Sent to Pharmacy Refills  
 naproxen (NAPROSYN) 500 mg tablet 0 Sig: Take 1 Tab by mouth two (2) times daily (with meals). Class: Normal  
 Pharmacy: [facility] [address] [place] [address] Ph #: [phone] Route: Oral  
  
We Performed the Following AMB POC URINE, MICROALBUMIN, SEMIQUANT (3 RESULTS) [35661 CPT(R)] CBC WITH AUTOMATED DIFF [41509 CPT(R)] HEMOGLOBIN A1C WITH EAG [63156 CPT(R)] METABOLIC PANEL, BASIC [65443 CPT(R)] Follow-up Instructions Return in about 4 weeks (around 11/28/2017) for Blood pressure check. Patient Instructions DASH Diet: Care Instructions Your Care Instructions The DASH diet is an eating plan that can help lower your blood pressure. DASH stands for Dietary Approaches to Stop Hypertension. Hypertension is high blood pressure. The DASH diet focuses on eating foods that are high in calcium, potassium, and magnesium. These nutrients can lower blood pressure. The foods that are highest in these nutrients are fruits, vegetables, low-fat dairy products, nuts, seeds, and legumes. But taking calcium, potassium, and magnesium supplements instead of eating foods that are high in those nutrients does not have the same effect. The DASH diet also includes whole grains, fish, and poultry. The DASH diet is one of several lifestyle changes your doctor may recommend to lower your high blood pressure. Your doctor may also want you to decrease the amount of sodium in your diet. Lowering sodium while following the DASH diet can lower blood pressure even further than just the DASH diet alone. Follow-up care is a key part of your treatment and safety. Be sure to make and go to all appointments, and call your doctor if you are having problems. It's also a good idea to know your test results and keep a list of the medicines you take. How can you care for yourself at home? Following the DASH diet · Eat 4 to 5 servings of fruit each day. A serving is 1 medium-sized piece of fruit, ½ cup chopped or canned fruit, 1/4 cup dried fruit, or 4 ounces (½ cup) of fruit juice. Choose fruit more often than fruit juice. · Eat 4 to 5 servings of vegetables each day. A serving is 1 cup of lettuce or raw leafy vegetables, ½ cup of chopped or cooked vegetables, or 4 ounces (½ cup) of vegetable juice. Choose vegetables more often than vegetable juice. · Get 2 to 3 servings of low-fat and fat-free dairy each day.  A serving is 8 ounces of milk, 1 cup of yogurt, or 1 ½ ounces of cheese. · Eat 6 to 8 servings of grains each day. A serving is 1 slice of bread, 1 ounce of dry cereal, or ½ cup of cooked rice, pasta, or cooked cereal. Try to choose whole-grain products as much as possible. · Limit lean meat, poultry, and fish to 2 servings each day. A serving is 3 ounces, about the size of a deck of cards. · Eat 4 to 5 servings of nuts, seeds, and legumes (cooked dried beans, lentils, and split peas) each week. A serving is 1/3 cup of nuts, 2 tablespoons of seeds, or ½ cup of cooked beans or peas. · Limit fats and oils to 2 to 3 servings each day. A serving is 1 teaspoon of vegetable oil or 2 tablespoons of salad dressing. · Limit sweets and added sugars to 5 servings or less a week. A serving is 1 tablespoon jelly or jam, ½ cup sorbet, or 1 cup of lemonade. · Eat less than 2,300 milligrams (mg) of sodium a day. If you limit your sodium to 1,500 mg a day, you can lower your blood pressure even more. Tips for success · Start small. Do not try to make dramatic changes to your diet all at once. You might feel that you are missing out on your favorite foods and then be more likely to not follow the plan. Make small changes, and stick with them. Once those changes become habit, add a few more changes. · Try some of the following: ¨ Make it a goal to eat a fruit or vegetable at every meal and at snacks. This will make it easy to get the recommended amount of fruits and vegetables each day. ¨ Try yogurt topped with fruit and nuts for a snack or healthy dessert. ¨ Add lettuce, tomato, cucumber, and onion to sandwiches. ¨ Combine a ready-made pizza crust with low-fat mozzarella cheese and lots of vegetable toppings. Try using tomatoes, squash, spinach, broccoli, carrots, cauliflower, and onions. ¨ Have a variety of cut-up vegetables with a low-fat dip as an appetizer instead of chips and dip. ¨ Sprinkle sunflower seeds or chopped almonds over salads. Or try adding chopped walnuts or almonds to cooked vegetables. ¨ Try some vegetarian meals using beans and peas. Add garbanzo or kidney beans to salads. Make burritos and tacos with mashed anand beans or black beans. Where can you learn more? Go to http://duncan-orlando.info/. Enter Y083 in the search box to learn more about \"DASH Diet: Care Instructions. \" Current as of: September 21, 2016 Content Version: 11.4 © 9749-6553 [employer]. Care instructions adapted under license by [employer] (which disclaims liability or warranty for this information). If you have questions about a medical condition or this instruction, always ask your healthcare professional. [address] any warranty or liability for your use of this information. Kegel Exercises: Care Instructions Your Care Instructions Kegel exercises strengthen muscles around the bladder. These muscles control the flow of urine. Kegel exercises are sometime called \"pelvic floor\" exercises. They can help prevent urine leakage and keep the pelvic organs in place. A woman who just had a baby might want to try Kegel exercises. They can strengthen pelvic muscles that have been weakened by pregnancy and childbirth. A man or woman may use Kegel exercises to treat urine leakage. You do Kegel exercises by tightening the muscles you use when you urinate. You will likely need to do these exercises for several weeks to get better. Follow-up care is a key part of your treatment and safety. Be sure to make and go to all appointments, and call your doctor if you are having problems. It's also a good idea to know your test results and keep a list of the medicines you take. How can you care for yourself at home? · Do Kegel exercises. ¨ Find the muscles you need to strengthen.  To do this, tighten the muscles that stop your urine while you are going to the bathroom. These are the same muscles you squeeze during Kegel exercises. ¨ Squeeze the muscles as hard as you can. Your belly and thighs should not move. ¨ Hold the squeeze for 3 seconds. Then relax for 3 seconds. ¨ Start with 3 seconds, and then add 1 second each week until you are able to squeeze for 10 seconds. ¨ Repeat the exercise 10 to 15 times for each session. Do three or more sessions each day. · You can check to see if you are using the right muscles. Place a finger in your vagina and squeeze around it. You are doing them right when you feel pressure around your finger. Your doctor may also suggest that you put special weights in your vagina while you do the exercises. · Do not smoke. It can irritate the bladder. If you need help quitting, talk to your doctor about stop-smoking programs and medicines. These can increase your chances of quitting for good. Where can you learn more? Go to http://duncan-orlando.info/. Enter D568 in the search box to learn more about \"Kegel Exercises: Care Instructions. \" Current as of: May 12, 2017 Content Version: 11.4 © 8139-6242 [employer]. Care instructions adapted under license by [employer] (which disclaims liability or warranty for this information). If you have questions about a medical condition or this instruction, always ask your healthcare professional. [place] [address] any warranty or liability for your use of this information. Introducing [facility]! Dear Ever Fix: Thank you for requesting a [employer] account. Our records indicate that you already have an active [employer] account. You can access your account anytime at [email or web address] Did you know that you can access your hospital and ER discharge instructions at any time in [employer]?   You can also review all of your test results from your hospital stay or ER visit. Additional Information If you have questions, please visit the Frequently Asked Questions section of the [employer] website at [email or web address] Remember, [employer] is NOT to be used for urgent needs. For medical emergencies, dial 911. Now available from your iPhone and Android! Please provide this summary of care documentation to your next provider. Your primary care clinician is listed as Lavonne Garcia. If you have any questions after today's visit, please call [phone].

## 2017-11-01 LAB
BASOPHILS # BLD AUTO: 0 X10E3/UL (ref 0–0.2)
BASOPHILS NFR BLD AUTO: 0 %
BUN SERPL-MCNC: 12 MG/DL (ref 8–27)
BUN/CREAT SERPL: 17 (ref 12–28)
CALCIUM SERPL-MCNC: 10.2 MG/DL (ref 8.7–10.3)
CHLORIDE SERPL-SCNC: 105 MMOL/L (ref 96–106)
CO2 SERPL-SCNC: 26 MMOL/L (ref 18–29)
CREAT SERPL-MCNC: 0.71 MG/DL (ref 0.57–1)
EOSINOPHIL # BLD AUTO: 0.1 X10E3/UL (ref 0–0.4)
EOSINOPHIL NFR BLD AUTO: 1 %
ERYTHROCYTE [DISTWIDTH] IN BLOOD BY AUTOMATED COUNT: 14.8 % (ref 12.3–15.4)
EST. AVERAGE GLUCOSE BLD GHB EST-MCNC: 140 MG/DL
GFR SERPLBLD CREATININE-BSD FMLA CKD-EPI: 107 ML/MIN/1.73
GFR SERPLBLD CREATININE-BSD FMLA CKD-EPI: 93 ML/MIN/1.73
GLUCOSE SERPL-MCNC: 101 MG/DL (ref 65–99)
HBA1C MFR BLD: 6.5 % (ref 4.8–5.6)
HCT VFR BLD AUTO: 38.3 % (ref 34–46.6)
HGB BLD-MCNC: 12.3 G/DL (ref 11.1–15.9)
IMM GRANULOCYTES # BLD: 0 X10E3/UL (ref 0–0.1)
IMM GRANULOCYTES NFR BLD: 0 %
LYMPHOCYTES # BLD AUTO: 2.5 X10E3/UL (ref 0.7–3.1)
LYMPHOCYTES NFR BLD AUTO: 35 %
MCH RBC QN AUTO: 26.3 PG (ref 26.6–33)
MCHC RBC AUTO-ENTMCNC: 32.1 G/DL (ref 31.5–35.7)
MCV RBC AUTO: 82 FL (ref 79–97)
MONOCYTES # BLD AUTO: 0.8 X10E3/UL (ref 0.1–0.9)
MONOCYTES NFR BLD AUTO: 11 %
NEUTROPHILS # BLD AUTO: 3.9 X10E3/UL (ref 1.4–7)
NEUTROPHILS NFR BLD AUTO: 53 %
PLATELET # BLD AUTO: 251 X10E3/UL (ref 150–379)
POTASSIUM SERPL-SCNC: 4.6 MMOL/L (ref 3.5–5.2)
RBC # BLD AUTO: 4.67 X10E6/UL (ref 3.77–5.28)
SODIUM SERPL-SCNC: 141 MMOL/L (ref 134–144)
WBC # BLD AUTO: 7.3 X10E3/UL (ref 3.4–10.8)

## 2018-02-21 ENCOUNTER — APPOINTMENT (OUTPATIENT)
Dept: CT IMAGING | Age: 63
End: 2018-02-21
Attending: EMERGENCY MEDICINE
Payer: COMMERCIAL

## 2018-02-21 ENCOUNTER — APPOINTMENT (OUTPATIENT)
Dept: MRI IMAGING | Age: 63
End: 2018-02-21
Attending: STUDENT IN AN ORGANIZED HEALTH CARE EDUCATION/TRAINING PROGRAM
Payer: COMMERCIAL

## 2018-02-21 ENCOUNTER — HOSPITAL ENCOUNTER (OUTPATIENT)
Age: 63
Setting detail: OBSERVATION
Discharge: HOME | End: 2018-02-22
Attending: EMERGENCY MEDICINE | Admitting: FAMILY MEDICINE
Payer: COMMERCIAL

## 2018-02-21 ENCOUNTER — APPOINTMENT (OUTPATIENT)
Dept: GENERAL RADIOLOGY | Age: 63
End: 2018-02-21
Attending: EMERGENCY MEDICINE
Payer: COMMERCIAL

## 2018-02-21 ENCOUNTER — OFFICE VISIT (OUTPATIENT)
Dept: FAMILY MEDICINE CLINIC | Age: 63
End: 2018-02-21

## 2018-02-21 VITALS
HEIGHT: 63 IN | RESPIRATION RATE: 18 BRPM | TEMPERATURE: 98.1 F | OXYGEN SATURATION: 97 % | DIASTOLIC BLOOD PRESSURE: 75 MMHG | HEART RATE: 67 BPM | BODY MASS INDEX: 30.83 KG/M2 | SYSTOLIC BLOOD PRESSURE: 145 MMHG | WEIGHT: 174 LBS

## 2018-02-21 DIAGNOSIS — R42 LIGHTHEADEDNESS: ICD-10-CM

## 2018-02-21 DIAGNOSIS — R42 DIZZINESS: Primary | ICD-10-CM

## 2018-02-21 DIAGNOSIS — R20.2 NUMBNESS AND TINGLING OF LEFT SIDE OF FACE: ICD-10-CM

## 2018-02-21 DIAGNOSIS — G43.801 OTHER MIGRAINE WITH STATUS MIGRAINOSUS, NOT INTRACTABLE: ICD-10-CM

## 2018-02-21 DIAGNOSIS — R42 DIZZINESS: ICD-10-CM

## 2018-02-21 DIAGNOSIS — R03.0 ELEVATED BLOOD PRESSURE READING: ICD-10-CM

## 2018-02-21 DIAGNOSIS — R20.0 LEFT FACIAL NUMBNESS: Primary | ICD-10-CM

## 2018-02-21 DIAGNOSIS — Q21.12 PATENT FORAMEN OVALE: ICD-10-CM

## 2018-02-21 DIAGNOSIS — R20.0 NUMBNESS AND TINGLING OF LEFT SIDE OF FACE: ICD-10-CM

## 2018-02-21 PROBLEM — R11.0 NAUSEA: Status: ACTIVE | Noted: 2018-02-21

## 2018-02-21 LAB
ANION GAP SERPL CALC-SCNC: 7 MMOL/L (ref 5–15)
APPEARANCE UR: CLEAR
APTT PPP: 29.8 SEC (ref 22.1–32)
BACTERIA URNS QL MICRO: NEGATIVE /HPF
BASOPHILS # BLD: 0 K/UL (ref 0–0.1)
BASOPHILS NFR BLD: 0 % (ref 0–1)
BILIRUB UR QL: NEGATIVE
BUN SERPL-MCNC: 12 MG/DL (ref 6–20)
BUN/CREAT SERPL: 13 (ref 12–20)
CALCIUM SERPL-MCNC: 9.7 MG/DL (ref 8.5–10.1)
CHLORIDE SERPL-SCNC: 107 MMOL/L (ref 97–108)
CHOLEST SERPL-MCNC: 225 MG/DL
CO2 SERPL-SCNC: 28 MMOL/L (ref 21–32)
COLOR UR: NORMAL
CREAT SERPL-MCNC: 0.94 MG/DL (ref 0.55–1.02)
DIFFERENTIAL METHOD BLD: NORMAL
EOSINOPHIL # BLD: 0.1 K/UL (ref 0–0.4)
EOSINOPHIL NFR BLD: 2 % (ref 0–7)
EPITH CASTS URNS QL MICRO: NORMAL /LPF
ERYTHROCYTE [DISTWIDTH] IN BLOOD BY AUTOMATED COUNT: 13.7 % (ref 11.5–14.5)
EST. AVERAGE GLUCOSE BLD GHB EST-MCNC: 163 MG/DL
GLUCOSE BLD STRIP.AUTO-MCNC: 112 MG/DL (ref 65–100)
GLUCOSE BLD STRIP.AUTO-MCNC: 164 MG/DL (ref 65–100)
GLUCOSE SERPL-MCNC: 116 MG/DL (ref 65–100)
GLUCOSE UR STRIP.AUTO-MCNC: NEGATIVE MG/DL
HBA1C MFR BLD: 7.3 % (ref 4.2–6.3)
HCT VFR BLD AUTO: 39 % (ref 35–47)
HDLC SERPL-MCNC: 51 MG/DL
HDLC SERPL: 4.4 {RATIO} (ref 0–5)
HGB BLD-MCNC: 12.2 G/DL (ref 11.5–16)
HGB UR QL STRIP: NEGATIVE
HYALINE CASTS URNS QL MICRO: NORMAL /LPF (ref 0–5)
IMM GRANULOCYTES # BLD: 0 K/UL (ref 0–0.04)
IMM GRANULOCYTES NFR BLD AUTO: 0 % (ref 0–0.5)
INR BLD: 1.2 (ref 0.9–1.2)
INR PPP: 1 (ref 0.9–1.1)
KETONES UR QL STRIP.AUTO: NEGATIVE MG/DL
LDLC SERPL CALC-MCNC: 125.6 MG/DL (ref 0–100)
LEUKOCYTE ESTERASE UR QL STRIP.AUTO: NEGATIVE
LIPID PROFILE,FLP: ABNORMAL
LYMPHOCYTES # BLD: 2.1 K/UL (ref 0.8–3.5)
LYMPHOCYTES NFR BLD: 32 % (ref 12–49)
MAGNESIUM SERPL-MCNC: 1.9 MG/DL (ref 1.6–2.4)
MCH RBC QN AUTO: 27.2 PG (ref 26–34)
MCHC RBC AUTO-ENTMCNC: 31.3 G/DL (ref 30–36.5)
MCV RBC AUTO: 87.1 FL (ref 80–99)
MONOCYTES # BLD: 0.7 K/UL (ref 0–1)
MONOCYTES NFR BLD: 11 % (ref 5–13)
NEUTS SEG # BLD: 3.8 K/UL (ref 1.8–8)
NEUTS SEG NFR BLD: 56 % (ref 32–75)
NITRITE UR QL STRIP.AUTO: NEGATIVE
NRBC # BLD: 0 K/UL (ref 0–0.01)
NRBC BLD-RTO: 0 PER 100 WBC
PH UR STRIP: 6.5 [PH] (ref 5–8)
PHOSPHATE SERPL-MCNC: 3.6 MG/DL (ref 2.6–4.7)
PLATELET # BLD AUTO: 218 K/UL (ref 150–400)
PMV BLD AUTO: 11.8 FL (ref 8.9–12.9)
POTASSIUM SERPL-SCNC: 4.5 MMOL/L (ref 3.5–5.1)
PROT UR STRIP-MCNC: NEGATIVE MG/DL
PROTHROMBIN TIME: 9.7 SEC (ref 9–11.1)
RBC # BLD AUTO: 4.48 M/UL (ref 3.8–5.2)
RBC #/AREA URNS HPF: NORMAL /HPF (ref 0–5)
SERVICE CMNT-IMP: ABNORMAL
SERVICE CMNT-IMP: ABNORMAL
SODIUM SERPL-SCNC: 142 MMOL/L (ref 136–145)
SP GR UR REFRACTOMETRY: 1.02 (ref 1–1.03)
THERAPEUTIC RANGE,PTTT: NORMAL SECS (ref 58–77)
TRIGL SERPL-MCNC: 242 MG/DL (ref ?–150)
TSH SERPL DL<=0.05 MIU/L-ACNC: 1.55 UIU/ML (ref 0.36–3.74)
UR CULT HOLD, URHOLD: NORMAL
UROBILINOGEN UR QL STRIP.AUTO: 1 EU/DL (ref 0.2–1)
VLDLC SERPL CALC-MCNC: 48.4 MG/DL
WBC # BLD AUTO: 6.8 K/UL (ref 3.6–11)
WBC URNS QL MICRO: NORMAL /HPF (ref 0–4)

## 2018-02-21 PROCEDURE — 83036 HEMOGLOBIN GLYCOSYLATED A1C: CPT

## 2018-02-21 PROCEDURE — 81001 URINALYSIS AUTO W/SCOPE: CPT | Performed by: EMERGENCY MEDICINE

## 2018-02-21 PROCEDURE — 93005 ELECTROCARDIOGRAM TRACING: CPT

## 2018-02-21 PROCEDURE — 85610 PROTHROMBIN TIME: CPT

## 2018-02-21 PROCEDURE — 99218 HC RM OBSERVATION: CPT

## 2018-02-21 PROCEDURE — 80307 DRUG TEST PRSMV CHEM ANLYZR: CPT | Performed by: FAMILY MEDICINE

## 2018-02-21 PROCEDURE — 96374 THER/PROPH/DIAG INJ IV PUSH: CPT

## 2018-02-21 PROCEDURE — 71045 X-RAY EXAM CHEST 1 VIEW: CPT

## 2018-02-21 PROCEDURE — 99285 EMERGENCY DEPT VISIT HI MDM: CPT

## 2018-02-21 PROCEDURE — 94762 N-INVAS EAR/PLS OXIMTRY CONT: CPT

## 2018-02-21 PROCEDURE — 80048 BASIC METABOLIC PNL TOTAL CA: CPT | Performed by: EMERGENCY MEDICINE

## 2018-02-21 PROCEDURE — 85730 THROMBOPLASTIN TIME PARTIAL: CPT

## 2018-02-21 PROCEDURE — 96375 TX/PRO/DX INJ NEW DRUG ADDON: CPT

## 2018-02-21 PROCEDURE — 36415 COLL VENOUS BLD VENIPUNCTURE: CPT

## 2018-02-21 PROCEDURE — 84100 ASSAY OF PHOSPHORUS: CPT

## 2018-02-21 PROCEDURE — 85025 COMPLETE CBC W/AUTO DIFF WBC: CPT | Performed by: EMERGENCY MEDICINE

## 2018-02-21 PROCEDURE — 74011250636 HC RX REV CODE- 250/636: Performed by: EMERGENCY MEDICINE

## 2018-02-21 PROCEDURE — 83735 ASSAY OF MAGNESIUM: CPT

## 2018-02-21 PROCEDURE — 84443 ASSAY THYROID STIM HORMONE: CPT

## 2018-02-21 PROCEDURE — 70450 CT HEAD/BRAIN W/O DYE: CPT

## 2018-02-21 PROCEDURE — 70498 CT ANGIOGRAPHY NECK: CPT

## 2018-02-21 PROCEDURE — 96372 THER/PROPH/DIAG INJ SC/IM: CPT

## 2018-02-21 PROCEDURE — 74011250637 HC RX REV CODE- 250/637: Performed by: EMERGENCY MEDICINE

## 2018-02-21 PROCEDURE — 80061 LIPID PANEL: CPT

## 2018-02-21 PROCEDURE — 82962 GLUCOSE BLOOD TEST: CPT

## 2018-02-21 PROCEDURE — 74011250636 HC RX REV CODE- 250/636: Performed by: STUDENT IN AN ORGANIZED HEALTH CARE EDUCATION/TRAINING PROGRAM

## 2018-02-21 RX ORDER — PANTOPRAZOLE SODIUM 40 MG/1
40 TABLET, DELAYED RELEASE ORAL DAILY
COMMUNITY
End: 2018-06-29 | Stop reason: SDUPTHER

## 2018-02-21 RX ORDER — ASPIRIN 325 MG
325 TABLET ORAL ONCE
Status: COMPLETED | OUTPATIENT
Start: 2018-02-21 | End: 2018-02-21

## 2018-02-21 RX ORDER — PROCHLORPERAZINE EDISYLATE 5 MG/ML
10 INJECTION INTRAMUSCULAR; INTRAVENOUS
Status: COMPLETED | OUTPATIENT
Start: 2018-02-21 | End: 2018-02-21

## 2018-02-21 RX ORDER — MECLIZINE HYDROCHLORIDE 25 MG/1
25 TABLET ORAL
Status: COMPLETED | OUTPATIENT
Start: 2018-02-21 | End: 2018-02-21

## 2018-02-21 RX ORDER — ENOXAPARIN SODIUM 100 MG/ML
40 INJECTION SUBCUTANEOUS EVERY 24 HOURS
Status: DISCONTINUED | OUTPATIENT
Start: 2018-02-21 | End: 2018-02-22 | Stop reason: HOSPADM

## 2018-02-21 RX ORDER — DIPHENHYDRAMINE HYDROCHLORIDE 50 MG/ML
25 INJECTION, SOLUTION INTRAMUSCULAR; INTRAVENOUS
Status: COMPLETED | OUTPATIENT
Start: 2018-02-21 | End: 2018-02-21

## 2018-02-21 RX ORDER — INSULIN LISPRO 100 [IU]/ML
INJECTION, SOLUTION INTRAVENOUS; SUBCUTANEOUS
Status: DISCONTINUED | OUTPATIENT
Start: 2018-02-21 | End: 2018-02-22 | Stop reason: HOSPADM

## 2018-02-21 RX ORDER — MAGNESIUM SULFATE 100 %
4 CRYSTALS MISCELLANEOUS AS NEEDED
Status: DISCONTINUED | OUTPATIENT
Start: 2018-02-21 | End: 2018-02-22 | Stop reason: HOSPADM

## 2018-02-21 RX ORDER — PANTOPRAZOLE SODIUM 40 MG/1
40 TABLET, DELAYED RELEASE ORAL DAILY
Status: DISCONTINUED | OUTPATIENT
Start: 2018-02-22 | End: 2018-02-22 | Stop reason: HOSPADM

## 2018-02-21 RX ORDER — DEXTROSE 50 % IN WATER (D50W) INTRAVENOUS SYRINGE
12.5-25 AS NEEDED
Status: DISCONTINUED | OUTPATIENT
Start: 2018-02-21 | End: 2018-02-22 | Stop reason: HOSPADM

## 2018-02-21 RX ORDER — SODIUM CHLORIDE 0.9 % (FLUSH) 0.9 %
5-10 SYRINGE (ML) INJECTION AS NEEDED
Status: DISCONTINUED | OUTPATIENT
Start: 2018-02-21 | End: 2018-02-22 | Stop reason: HOSPADM

## 2018-02-21 RX ORDER — SODIUM CHLORIDE 0.9 % (FLUSH) 0.9 %
5-10 SYRINGE (ML) INJECTION EVERY 8 HOURS
Status: DISCONTINUED | OUTPATIENT
Start: 2018-02-21 | End: 2018-02-22 | Stop reason: HOSPADM

## 2018-02-21 RX ADMIN — Medication 10 ML: at 21:06

## 2018-02-21 RX ADMIN — ASPIRIN 325 MG: 325 TABLET ORAL at 17:47

## 2018-02-21 RX ADMIN — ENOXAPARIN SODIUM 40 MG: 40 INJECTION SUBCUTANEOUS at 21:06

## 2018-02-21 RX ADMIN — MECLIZINE HYDROCHLORIDE 25 MG: 25 TABLET ORAL at 17:26

## 2018-02-21 RX ADMIN — PROCHLORPERAZINE EDISYLATE 10 MG: 5 INJECTION INTRAMUSCULAR; INTRAVENOUS at 17:26

## 2018-02-21 RX ADMIN — DIPHENHYDRAMINE HYDROCHLORIDE 25 MG: 50 INJECTION, SOLUTION INTRAMUSCULAR; INTRAVENOUS at 17:26

## 2018-02-21 NOTE — H&P
[address] [place] [address]   Office [phone]  Fax [phone]       Admission H&P     Name: Gio Moran MRN: [id number]  Sex: Female   YOB: 1955  Age: 65 y.o. PCP: Suellen Coles MD     Source of Information: patient, medical records    Chief complaint: Left facial tingling and dizziness    History of Present Illness  Gio Moran is a 65 y.o. female with known HTN, DM2, HLD, non-hodgkin's lymphoma s/p chemo, hx of of TIA, multinodular goiter, depression, anxiety, migraines, and patent foramen ovale who presents to the ER complaining of left facial tingling and dizziness. Per patient's report, around noon today, she started being lightheaded(not vertigo). Few minutes later, she experienced left facial tingling. Tingling was not associated with drooping or numbness. Simultaneously, she started having H/A and nausea. Patient has hx of migraines, but states H/A were different that her usual migraines. They were frontal, dull, ~5-6/10, not associated with photophobia or phonophobia. Patient did not take any medication to alleviate Sx, as she had an appointment with her PCP this afternoon. After seeing PCP in clinic, she was sent to ED for further evaluation of Sx. Patient has hx significant for TIA, non-hodgkin's lymphoma for which she received chemotherapy,  and patent foramen ovale. Few months ago, patient stopped taking all her home medications including ASA, statin, antihypertensive, antiglycemic agent, and SSRI. She believes she can maintained good health without them. Patient currently denies speech/swallow difficulty, facial swelling, H/A, dizziness, SOB, CP, palpitations, abdominal pain, N/V, dysuria but still endorses some left facial tingling. She initially endorses nausea on arrival to ED, but was improved after receivign Compazine. In the ER:  -Vital signs: temp 98.3, HR 62, RR 18, /80, SpO2 98% on RA  -Labs were unremarkable.  Normal CBC and CMP. UA negative  -Imaging: CXR, CT head wo contrast, and CTA head and neck unremarkable for any acute process. -Treatment: Pt received Compazine 10mg x 1, benadryl 25mg x 1, and meclizine 25mg x 1    Past Medical History:   Diagnosis Date    Anxiety     Diabetes mellitus ([facility] [address]     Dyslipidemia     Headache     Hodgkin's lymphoma ([facility] [address]     Nodule removed from throat    Hypertension     Low back pain 2012    MRI 3/10/14 revealed mild multilevel disk and facet degenerative change     Lumbar disc disease 2014    Thyroid nodule     Followed by endocrine, Dr. Cassie Ferreira.  Ulcer [facility])       Patient Vitals for the past 12 hrs:   Temp Pulse Resp BP SpO2   18 1930 - 68 20 159/66 92 %   18 1915 - 64 21 169/61 93 %   18 1900 - 60 18 163/75 -   18 1845 - 64 22 151/76 96 %   18 1830 - 68 19 (!) 180/93 97 %   18 1815 - 67 19 164/82 95 %   18 1800 - 80 19 145/63 96 %   18 1745 - 71 15 (!) 177/102 97 %   18 1730 - 72 16 (!) 178/94 94 %   18 1715 - 67 16 (!) 177/105 94 %   18 1610 98.3 °F (36.8 °C) 62 18 175/80 98 %       Home Medications   Prior to Admission medications    Medication Sig Start Date End Date Taking? Authorizing Provider   pantoprazole (PROTONIX) 40 mg tablet Take 40 mg by mouth daily. Yes Historical Provider       Allergies  No Known Allergies    Past Medical History:   Diagnosis Date    Anxiety     Diabetes mellitus ([address]     Dyslipidemia     Headache     Hodgkin's lymphoma ([facility] [address]     Nodule removed from throat    Hypertension     Low back pain 2012    MRI 3/10/14 revealed mild multilevel disk and facet degenerative change     Lumbar disc disease 2014    Thyroid nodule     Followed by endocrine, Dr. Cassie Ferreira.       Ulcer ([facility] [address]        Previous Hospitalization(s)  3/2016- Numbness and tingling of right arm    Past Surgical History:   Procedure Laterality Date    DELIVERY       HX GYN      REMOVAL NODES, NECK,CERV CMPLT         Family History   Problem Relation Age of Onset    Elevated Lipids Mother     Hypertension Mother     Hypertension Father     Parkinsonism Father     Hypertension Sister     Cancer Brother     Stroke Maternal Grandmother     Hypertension Maternal Grandmother     Cancer Paternal Grandmother     Hypertension Paternal Grandmother     Cancer Paternal Grandfather     Hypertension Paternal Grandfather        Social History  Social History     Social History    Marital status:      Spouse name: N/A    Number of children: N/A    Years of education: N/A     Occupational History    Not on file. Social History Main Topics    Smoking status: Never Smoker    Smokeless tobacco: Never Used    Alcohol use No    Drug use: No    Sexual activity: Not Currently     Birth control/ protection: None     Other Topics Concern    Not on file     Social History Narrative       Alcohol history: Not at all  Smoking history: Non-smoker  Illicit drug history: Not at all    Living arrangement: patient lives with their spouse. Ambulates: Independently     Review of Systems  Review of Systems   Constitutional: Negative for chills and fever. HENT: Negative for congestion, drooling, facial swelling, hearing loss, rhinorrhea, sore throat, trouble swallowing and voice change. Left facial tingling   Eyes: Negative for photophobia and visual disturbance. Respiratory: Negative for apnea, chest tightness and shortness of breath. Cardiovascular: Negative for chest pain, palpitations and leg swelling. Gastrointestinal: Negative for abdominal distention, abdominal pain, constipation, diarrhea and vomiting. Endocrine: Negative for polyuria. Genitourinary: Negative for dysuria. Musculoskeletal: Negative for arthralgias and myalgias. Skin: Negative for rash.    Neurological: Negative for dizziness, tremors, seizures, syncope, speech difficulty, weakness, light-headedness, numbness and headaches. Psychiatric/Behavioral: Negative for agitation and confusion. Physical Exam  Objective:  General Appearance:  Comfortable, in no acute distress and not in pain. Vital signs: (most recent): Blood pressure 175/80, pulse 62, temperature 98.3 °F (36.8 °C), resp. rate 18, height 5' 3\" (1.6 m), weight 174 lb 9.7 oz (79.2 kg), last menstrual period 01/18/2000, SpO2 98 %. No fever. Output: Producing urine. HEENT: Normal HEENT exam.    Lungs:  Normal respiratory rate and normal effort. She is not in respiratory distress. Breath sounds clear to auscultation. No wheezes or rales. Heart: Normal rate. Regular rhythm. No murmur. Chest: No chest wall tenderness. Extremities: Normal range of motion. There is dependent edema (mild swelling noted on b/l LE). There is no deformity. Neurological: Patient is alert and oriented to person, place and time. (CN II-XII intact, paresthesia noted on left-sided face, but otherwise sensation intact. Normal speech, no focal findings or movement disorder noted, reflexes intact and strength 5/5 b/l on UE and LE. ). Abdomen: Abdomen is soft and non-distended. Bowel sounds are normal.   There is no abdominal tenderness. Pulses: Distal pulses are intact.         O2 Device: Room air     Laboratory Data  Recent Results (from the past 8 hour(s))   GLUCOSE, POC    Collection Time: 02/21/18  4:14 PM   Result Value Ref Range    Glucose (POC) 112 (H) 65 - 100 mg/dL    Performed by Alsysia Burton    POC INR    Collection Time: 02/21/18  4:28 PM   Result Value Ref Range    INR (POC) 1.2 (H) <1.2     PROTHROMBIN TIME + INR    Collection Time: 02/21/18  4:45 PM   Result Value Ref Range    INR 1.0 0.9 - 1.1      Prothrombin time 9.7 9.0 - 11.1 sec   PTT    Collection Time: 02/21/18  4:45 PM   Result Value Ref Range    aPTT 29.8 22.1 - 32.0 sec    aPTT, therapeutic range     58.0 - 77.0 SECS   CBC WITH AUTOMATED DIFF    Collection Time: 02/21/18  4:47 PM   Result Value Ref Range    WBC 6.8 3.6 - 11.0 K/uL    RBC 4.48 3.80 - 5.20 M/uL    HGB 12.2 11.5 - 16.0 g/dL    HCT 39.0 35.0 - 47.0 %    MCV 87.1 80.0 - 99.0 FL    MCH 27.2 26.0 - 34.0 PG    MCHC 31.3 30.0 - 36.5 g/dL    RDW 13.7 11.5 - 14.5 %    PLATELET 860 446 - 224 K/uL    MPV 11.8 8.9 - 12.9 FL    NRBC 0.0 0  WBC    ABSOLUTE NRBC 0.00 0.00 - 0.01 K/uL    NEUTROPHILS 56 32 - 75 %    LYMPHOCYTES 32 12 - 49 %    MONOCYTES 11 5 - 13 %    EOSINOPHILS 2 0 - 7 %    BASOPHILS 0 0 - 1 %    IMMATURE GRANULOCYTES 0 0.0 - 0.5 %    ABS. NEUTROPHILS 3.8 1.8 - 8.0 K/UL    ABS. LYMPHOCYTES 2.1 0.8 - 3.5 K/UL    ABS. MONOCYTES 0.7 0.0 - 1.0 K/UL    ABS. EOSINOPHILS 0.1 0.0 - 0.4 K/UL    ABS. BASOPHILS 0.0 0.0 - 0.1 K/UL    ABS. IMM.  GRANS. 0.0 0.00 - 0.04 K/UL    DF AUTOMATED     METABOLIC PANEL, BASIC    Collection Time: 02/21/18  4:47 PM   Result Value Ref Range    Sodium 142 136 - 145 mmol/L    Potassium 4.5 3.5 - 5.1 mmol/L    Chloride 107 97 - 108 mmol/L    CO2 28 21 - 32 mmol/L    Anion gap 7 5 - 15 mmol/L    Glucose 116 (H) 65 - 100 mg/dL    BUN 12 6 - 20 MG/DL    Creatinine 0.94 0.55 - 1.02 MG/DL    BUN/Creatinine ratio 13 12 - 20      GFR est AA >60 >60 ml/min/1.73m2    GFR est non-AA >60 >60 ml/min/1.73m2    Calcium 9.7 8.5 - 10.1 MG/DL   URINALYSIS W/MICROSCOPIC    Collection Time: 02/21/18  4:47 PM   Result Value Ref Range    Color YELLOW/STRAW      Appearance CLEAR CLEAR      Specific gravity 1.022 1.003 - 1.030      pH (UA) 6.5 5.0 - 8.0      Protein NEGATIVE  NEG mg/dL    Glucose NEGATIVE  NEG mg/dL    Ketone NEGATIVE  NEG mg/dL    Bilirubin NEGATIVE  NEG      Blood NEGATIVE  NEG      Urobilinogen 1.0 0.2 - 1.0 EU/dL    Nitrites NEGATIVE  NEG      Leukocyte Esterase NEGATIVE  NEG      WBC 0-4 0 - 4 /hpf    RBC 0-5 0 - 5 /hpf    Epithelial cells FEW FEW /lpf    Bacteria NEGATIVE  NEG /hpf    Hyaline cast 0-2 0 - 5 /lpf   URINE CULTURE HOLD SAMPLE    Collection Time: 02/21/18  4:47 PM   Result Value Ref Range    Urine culture hold        URINE ON HOLD IN MICROBIOLOGY DEPT FOR 3 DAYS. IF UNPRESERVED URINE IS SUBMITTED, IT CANNOT BE USED FOR ADDITIONAL TESTING AFTER 24 HRS, RECOLLECTION WILL BE REQUIRED. EKG, 12 LEAD, INITIAL    Collection Time: 02/21/18  5:16 PM   Result Value Ref Range    Ventricular Rate 63 BPM    Atrial Rate 63 BPM    P-R Interval 174 ms    QRS Duration 70 ms    Q-T Interval 432 ms    QTC Calculation (Bezet) 442 ms    Calculated P Axis 46 degrees    Calculated R Axis -2 degrees    Calculated T Axis 35 degrees    Diagnosis       Normal sinus rhythm with sinus arrhythmia  Moderate voltage criteria for LVH, may be normal variant  Borderline ECG  When compared with ECG of 08-MAR-2016 19:33,  No significant change was found         Imaging  CXR Results  (Last 48 hours)               02/21/18 1728  XR CHEST PORT Final result    Impression:  IMPRESSION: No acute process identified. Narrative:  EXAM:  XR CHEST PORT       INDICATION:  CVA       COMPARISON:  None. FINDINGS: A portable AP radiograph of the chest was obtained at 1710 hours. The   patient is on a cardiac monitor. The lungs are clear. Right paratracheal   density is most likely vascular. Heart size is upper limits of normal..  Bony   structures appear intact. .                CT Results  (Last 48 hours)               02/21/18 1642  CTA CODE NEURO HEAD AND NECK W CONT Preliminary result    Narrative:  *PRELIMINARY REPORT*       CTA head and neck demonstrates a 12 mm left thyroid nodule. Common carotid bifurcations are patent. Vertebral arteries are codominant       No intracranial aneurysm or stenosis is identified. No intracranial thrombus is   identified. There is a small left posterior communicating artery. Preliminary report was provided by Dr. Amara Darden, the on-call radiologist, at 5:15       Final report to follow.        *END PRELIMINARY REPORT*                               02/21/18 1630 CT HEAD WO CONT Final result    Impression:  IMPRESSION: No acute intracranial hemorrhage, mass or infarct. Narrative:  INDICATION: left facial numbness left arm weakness        Exam: Noncontrast CT of the brain is performed with 5 mm collimation. CT dose reduction was achieved with the use of the standardized protocol   tailored for this examination and automatic exposure control for dose   modulation. Direct comparison is made to prior CT dated March 2016. FINDINGS: There is no acute intracranial hemorrhage, mass, mass effect or   herniation. Ventricular system is normal. The gray-white matter differentiation   is well-preserved. The mastoid air cells are well pneumatized. The visualized   paranasal sinuses are normal.                 EKG: \"Normal sinus rhythm with sinus arrhythmia. Moderate voltage criteria for LVH, may be normal variant. When compared with ECG of 08-MAR-2016 19:33, no significant change was found\"      Assessment and Plan     Armida Syed is a 65 y.o. female with Pmhx of HTN, DM2, HLD, non-hodgkin's lymphoma s/p chemo, hx of of TIA, multinodular goiter, depression, anxiety, migraines, and patent foramen ovale, who is admitted for CVA/TIA workup. Left facial tingling, associated with lightheadedness- New onset, per patient's report. Patient not compliant with medications prescribed in the past such as ASA, lipitor or norvasc (only PTA med is Protonix). With hx of TIA in the past, will need CVA workup. Thus far, imaging and labwork have been unremarkable  -Admit on obs to remote tele, VS per unit, I/Os, fall precautions, neuro checks  -Neurology consulted, appreciate recs  -F/u on echo and MRI brain   -Pt with patent foramen ovale seen on last echo (3/2016), could potentially lead to brain emboli.  Will f/u on imaging and neuro recs  -F/u on lipid panel, A1c, TSH, PT/INR/PTT  -ASA 325mg today  -F/u on orthostatics VS   -Psych consulted, due to hx of psychogenic numbness and tingling, appreciate recs  -Patient passed bedside swallow, on diabetic diet  -Daily CBC and BMP    Hypertension- BP on admission 175/80. Patient not taking any antihypertensives at home  - Will allow permissive HTN; if systolic >435, will give IV hydralazine  - Will continue to monitor at this time and readjust as BP's trend. Patent Foramen Ovale- Seen on echo on 3/16/2016 during last admission. Cardiology saw pt at that time, but thought to be incidental and not related to Sx( right leg numbness and tingling)  -MRI brain pending. Pt may need Holter. Will consult cardiology in case of positive CVA    Diabetes Mellitus T2- Last HgA1c 6.5 on 10/31/2017. Patient on no home meds. -F/u on repeat A1c  - Insulin Sliding Scale normal sensitivity with AC&HS glucose checks. - Hypoglycemia protocols ordered. HLD- Last lipid panel on 6/2017. Tchol 213, TG 95, HDL 60, . Pt used to be on lipitor 40mg about a year ago, but non-compliant with meds. So currently on no statin.   -F/u on repeat lipid panel  -Will restart statin prior to discharge    Depression/Anxiety- On no home meds  -Psych consulted, due to hx of psychogenic numbness and tingling, appreciate recs    GERD- Stable  -Continue home Protonix     Multinodular goiter- pt was seen by endocrinologist, Dr. Hardeep Winn,  however no recent follow-up. Euthryroid and asymptomatic. On no home meds  -Will recommend f/u OP    Migraines- Stable. Pt on no home meds. Will continue to monitor for now    Obesity  - PT with BMI of Body mass index is 30.93 kg/(m^2). - Encouraging lifestyle modifications and further follow up outpatient. FEN/GI - Diabetic diet. Activity - Ambulate with assistance  DVT prophylaxis - Lovenox  GI prophylaxis - Protonix  Fall prophylaxis - Fall precautions ordered. Disposition - Admit to Remote Telemetry. Plan to d/c to Home. Consulting PT/OT  Code Status - Full, discussed with patient / caregivers.   Contact- , Lj Alexander, [phone]    Patient Fara Niño will be discussed Dr. Radha Contreras.     6:22 PM, 02/21/18  Clem Elise MD  Family Medicine Resident       For Billing    Chief Complaint   Patient presents with   [facility] Problems  Date Reviewed: 2/21/2018          Codes Class Noted POA    Dizziness ICD-10-CM: R42  ICD-9-CM: 780.4  2/21/2018 Unknown        Nausea ICD-10-CM: R11.0  ICD-9-CM: 787.02  2/21/2018 Unknown        Numbness and tingling of left side of face ICD-10-CM: R20.0, R20.2  ICD-9-CM: 782.0  2/21/2018 Unknown

## 2018-02-21 NOTE — ED PROVIDER NOTES
HPI Comments: 65 y.o. female with past medical history significant for HTN, anxiety, and DM who presents from home via private vehicle with chief complaint of numbness. Pt reports she has a history of migraines, and has been having them on and off over the last few days, with a severe one 5 days ago with dizziness. Pt reports today she was at work and had sudden onset L sided facial tingling, L arm numbness, pt states her L side just \"didn't feel the same\" as the R side. Pt denies any slurred speech, CP, current dizziness, or palpitations. There are no other acute medical concerns at this time. Social hx: Nonsmoker; No EtOH use  PCP: Mohamud Crane MD    Note written by Pedrito Bird, as dictated by Anne Robles MD 4:13 PM      The history is provided by the patient. No  was used. Past Medical History:   Diagnosis Date    Anxiety     [facility] mellitus [facility] [address]     Dyslipidemia     Headache     Hodgkin's lymphoma ([facility] [address] 1985    Nodule removed from throat    Hypertension     Low back pain 2012    MRI 3/10/14 revealed mild multilevel disk and facet degenerative change     Lumbar disc disease 2014    Thyroid nodule     Followed by endocrine, Dr. Ellen Martin.       Ulcer ([facility] [address]        Past Surgical History:   Procedure Laterality Date    DELIVERY       HX GYN      REMOVAL NODES, NECK,CERV CMPLT           Family History:   Problem Relation Age of Onset    Elevated Lipids Mother     Hypertension Mother     Hypertension Father     Parkinsonism Father     Hypertension Sister     Cancer Brother     Stroke Maternal Grandmother     Hypertension Maternal Grandmother     Cancer Paternal Grandmother     Hypertension Paternal Grandmother     Cancer Paternal Grandfather     Hypertension Paternal Grandfather        Social History     Social History    Marital status:      Spouse name: N/A    Number of children: N/A    Years of education: N/A Occupational History    Not on file. Social History Main Topics    Smoking status: Never Smoker    Smokeless tobacco: Never Used    Alcohol use No    Drug use: No    Sexual activity: Not Currently     Birth control/ protection: None     Other Topics Concern    Not on file     Social History Narrative         ALLERGIES: Review of patient's allergies indicates no known allergies. Review of Systems   Eyes: Negative for visual disturbance. Cardiovascular: Negative for chest pain and palpitations. Gastrointestinal: Positive for nausea. Neurological: Positive for numbness and headaches. Negative for dizziness (Resolved) and speech difficulty. All other systems reviewed and are negative. Vitals:    02/21/18 1610   BP: 175/80   Pulse: 62   Resp: 18   Temp: 98.3 °F (36.8 °C)   SpO2: 98%   Weight: 79.2 kg (174 lb 9.7 oz)   Height: 5' 3\" (1.6 m)            Physical Exam   Constitutional: She is oriented to person, place, and time. She appears well-developed and well-nourished. No distress. HENT:   Head: Normocephalic and atraumatic. Eyes: Conjunctivae and EOM are normal. Pupils are equal, round, and reactive to light. Horizontal nystagmus   Neck: Normal range of motion. Neck supple. Cardiovascular: Normal rate, regular rhythm, normal heart sounds and intact distal pulses. Exam reveals no friction rub. No murmur heard. Pulmonary/Chest: Effort normal and breath sounds normal. No respiratory distress. She has no wheezes. She has no rales. She exhibits no tenderness. Abdominal: Soft. Bowel sounds are normal. She exhibits no distension. There is no tenderness. There is no rebound and no guarding. Musculoskeletal: Normal range of motion. She exhibits no edema or tenderness. Neurological: She is alert and oriented to person, place, and time. No cranial nerve deficit. Coordination normal.   Finger to nose intact   Skin: Skin is warm and dry. She is not diaphoretic. No pallor. Psychiatric: She has a normal mood and affect. Her behavior is normal.   Nursing note and vitals reviewed. MDM  Number of Diagnoses or Management Options  Dizziness:   Elevated blood pressure reading:   Numbness and tingling of left side of face:   Other migraine with status migrainosus, not intractable:   Diagnosis management comments: ? [id number] vs vertigo vs cva though less likely stroke       Amount and/or Complexity of Data Reviewed  Clinical lab tests: ordered and reviewed  Tests in the radiology section of CPT®: ordered and reviewed  Obtain history from someone other than the patient: yes (spouse)  Discuss the patient with other providers: yes (Neuro and family practice)  Independent visualization of images, tracings, or specimens: yes (ekg)    Patient Progress  Patient progress: stable        ED Course       Procedures         CONSULT NOTE:  4:24 PM Jovani More MD spoke with Dr. Arni Man, Consult for Tele-Neurology. Discussed available diagnostic tests and clinical findings. Dr. Ruperto Katz would like to see the pt, agrees with CT/CTA, pt is most likely not a TPA candidate. 4:49 PM  Arin Man going to eval pt now      CONSULT NOTE:  5:07 PM Jovani More MD spoke with Dr. Ruperto Katz, Consult for Tele-Neurology. Discussed available diagnostic tests and clinical findings. Dr. Ruperto Katz recommends no TPA, wants admission for stroke workup. PROGRESS NOTE:  5:50 PM  Pt states her HA down to a 3/10, but is still dizzy. CONSULT NOTE:  5:47 PM Jovani More MD spoke with Dr. Jesus Robbins [facility], Consult for [facility]. Discussed available diagnostic tests and clinical findings. They will admit the pt. ED EKG interpretation: 17:16  Rhythm: normal sinus rhythm with sinus arrhythmia; and regular . Rate (approx.): 63 bpm; ST/T wave: normal; Moderate voltage criteria for LVH, may be normal variant.      Note written by Pedrito Han, as dictated by Jovani More, MD 5:50 PM

## 2018-02-21 NOTE — PROGRESS NOTES
1. Have you been to the ER, urgent care clinic since your last visit? Hospitalized since your last visit? No    2. Have you seen or consulted any other health care providers outside of the [address] [place] [address] since your last visit? Include any pap smears or colon screening. No  Patient stated she got dizzy at work and her left side of her face was tingling.

## 2018-02-21 NOTE — MR AVS SNAPSHOT
[address] [phone] Patient: Neida Johnson MRN: [id number] :1955 Visit Information Date & Time Provider Department Dept. Phone Encounter #  
 2018  2:45 PM Amy Bahena, [address] [facility] [phone] [id number] Follow-up Instructions Return for After ED/ Hospital eval. Upcoming Health Maintenance Date Due  
 EYE EXAM RETINAL OR DILATED Q1 1966 BREAST CANCER SCRN MAMMOGRAM 2018 HEMOGLOBIN A1C Q6M 2018 Pneumococcal 19-64 Highest Risk (3 of 3 - PCV13) 2018 FOOT EXAM Q1 2018 LIPID PANEL Q1 2018 MICROALBUMIN Q1 10/31/2018 PAP AKA CERVICAL CYTOLOGY 2020 DTaP/Tdap/Td series (2 - Td) 2025 COLONOSCOPY 2027 Allergies as of 2018  Review Complete On: 2018 By: Amy Bahena MD  
 No Known Allergies Current Immunizations  Reviewed on 2015 Name Date Tdap 2015 Not reviewed this visit You Were Diagnosed With   
  
 Codes Comments Left facial numbness    -  Primary ICD-10-CM: R20.0 ICD-9-CM: 782.0 Dizziness     ICD-10-CM: W05 ICD-9-CM: 780.4 Lightheadedness     ICD-10-CM: Y60 ICD-9-CM: 780.4 Patent foramen ovale     ICD-10-CM: Q21.1 ICD-9-CM: 322. 5 Vitals BP Pulse Temp Resp Height(growth percentile) Weight(growth percentile) 145/75 67 98.1 °F (36.7 °C) (Oral) 18 5' 3\" (1.6 m) 174 lb (78.9 kg) LMP SpO2 BMI OB Status Smoking Status 2000 97% 30.82 kg/m2 Postmenopausal Never Smoker Vitals History BMI and BSA Data Body Mass Index Body Surface Area  
 30.82 kg/m 2 1.87 m 2 Preferred Pharmacy Pharmacy Name Phone [facility] [place] [address] [phone] Your Updated Medication List  
  
   
This list is accurate as of 2/21/18  3:27 PM.  Always use your most recent med list.  
  
  
  
  
 naproxen 500 mg tablet Commonly known as:  NAPROSYN Take 1 Tab by mouth two (2) times daily (with meals). pantoprazole 40 mg tablet Commonly known as:  PROTONIX Take 40 mg by mouth daily. pneumococcal 23-valent 25 mcg/0.5 mL injection Commonly known as:  PNEUMOVAX 23  
0.5 mL by IntraMUSCular route PRIOR TO DISCHARGE for 1 dose. Please administer PPSV 23 for indication of Diabetes Mellitus Follow-up Instructions Return for After ED/ Hospital eval.  
  
  
Introducing [facility] & HEALTH SERVICES! Dear Florencia Pino: Thank you for requesting a [employer] account. Our records indicate that you already have an active [employer] account. You can access your account anytime at [email or web address] Did you know that you can access your hospital and ER discharge instructions at any time in [employer]? You can also review all of your test results from your hospital stay or ER visit. Additional Information If you have questions, please visit the Frequently Asked Questions section of the [employer] website at [email or web address] Remember, [employer] is NOT to be used for urgent needs. For medical emergencies, dial 911. Now available from your iPhone and Android! Please provide this summary of care documentation to your next provider. Your primary care clinician is listed as Sulaiman Flores. If you have any questions after today's visit, please call [phone].

## 2018-02-21 NOTE — PROGRESS NOTES
History of Present Illness:     Chief Complaint   Patient presents with    Foot Swelling     both x several months     Pt is a 65y.o. year old female    Presents to clinic for feeling dizzy and left side tingling in her face. 3 weeks ago, she was dizzy and fell out of the car. Today she felt dizzy as well, she is still dizzy now and feels nauseated. Describes dizziness as room spinning and got to the point where she felt like she would pass out. .  She is still having tinging in her face. Last week she suffered from migraines. They have stopped. Denies any other weakness, numbness, or tingling in extremities. +Bilateral foot swelling for 2 months. Worse in the AM. Denies pain or redness in foot or calf. Past Medical History:   Diagnosis Date    Anxiety     [facility] [address]     Dyslipidemia     Headache     Hodgkin's lymphoma ([facility] [address] 1985    Nodule removed from throat    Hypertension     Low back pain 1/19/2012    MRI 3/10/14 revealed mild multilevel disk and facet degenerative change     Lumbar disc disease 03/2014    Thyroid nodule     Followed by endocrine, Dr. Carolee Allen.  Ulcer ([facility] [address]          Current Outpatient Prescriptions on File Prior to Visit   Medication Sig Dispense Refill    naproxen (NAPROSYN) 500 mg tablet Take 1 Tab by mouth two (2) times daily (with meals). 30 Tab 0    pneumococcal 23-valent (PNEUMOVAX 23) 25 mcg/0.5 mL injection 0.5 mL by IntraMUSCular route PRIOR TO DISCHARGE for 1 dose. Please administer PPSV 23 for indication of Diabetes Mellitus 0.5 mL 0     No current facility-administered medications on file prior to visit.           Allergies:  No Known Allergies      Review of Systems:  Denies chest pain, JOYA, palpitations  +LE edema  Denies numbness/ tingling/ weakness in extremities      Objective:     Vitals:    02/21/18 1443   BP: 145/75   Pulse: 67   Resp: 18   Temp: 98.1 °F (36.7 °C)   TempSrc: Oral   SpO2: 97%   Weight: 174 lb (78.9 kg) Height: 5' 3\" (1.6 m)       Physical Exam:  General appearance - alert, well appearing, and in no distress  Eyes - pupils equal and reactive, extraocular eye movements intact  Neurological - alert, oriented, normal speech, no focal findings or movement disorder noted, normal muscle tone, no tremors, strength 5/5, Romberg sign negative, normal gait and station, +left sided facial decreased sensation to light touch and pin prick. Recent Labs:  No results found for this or any previous visit (from the past 12 hour(s)). Assessment and Plan:   Pt is a 65y.o. year old female,      ICD-10-CM ICD-9-CM    1. Left facial numbness R20.0 782.0    2. Dizziness R42 780.4    3. Lightheadedness R42 780.4    4. Patent foramen ovale Q21.1 745.5      Given dizziness and left sided facial numbness, will send to ED for further evaluation. She does have a hx of patent foramen ovale. Needs w/up for TIA vs stroke. Patient's sister will drive her to ED. ED notified of patient. Amy Bahena MD      I have discussed the diagnosis with the patient and the intended plan as seen in the above orders. The patient has received an after-visit summary and questions were answered concerning future plans.

## 2018-02-21 NOTE — IP AVS SNAPSHOT
[place] 
 
 
 [facility] [facility] [address] [facility] 
[phone] Patient: Nir Cook MRN: [id number] :1955 About your hospitalization You were admitted on:  2018 You last received care in the:  [facility] 5M1 MED SURG 1 You were discharged on:  2018 Why you were hospitalized Your primary diagnosis was:  Not on File Your diagnoses also included:  Dizziness, Nausea, Numbness And Tingling Of Left Side Of Face Follow-up Information Follow up With Details Comments Contact Info Dahlia Morse DO On 2018 PCP follow up on Monday () at 10:45am Chilango Bernard [address] [place] [address] 
[phone] Rais[place]chell [place], 4 [facility] 100[address] [facility] 
[phone] Your Scheduled Appointments 2018 10:45 AM EST TRANSITIONAL CARE MANAGEMENT with Dahlia Morse DO [facility])  
 [address] [place] [address]  
[phone] Discharge Orders None A check gab indicates which time of day the medication should be taken. My Medications START taking these medications Instructions Each Dose to Equal  
 Morning Noon Evening Bedtime  
 amLODIPine 10 mg tablet Commonly known as:  Saintclair Magda Your last dose was: Your next dose is: Take 1 Tab by mouth daily. 10 mg  
    
   
   
   
  
 aspirin 81 mg chewable tablet Start taking on:  2018 Your last dose was: Your next dose is: Take 1 Tab by mouth daily. 81 mg  
    
   
   
   
  
 atorvastatin 40 mg tablet Commonly known as:  LIPITOR Your last dose was: Your next dose is: Take 1 Tab by mouth daily. 40 mg  
    
   
   
   
  
 metFORMIN  mg tablet Commonly known as:  GLUCOPHAGE XR Your last dose was: Your next dose is: Take 1 Tab by mouth daily (with dinner). 500 mg CONTINUE taking these medications Instructions Each Dose to Equal  
 Morning Noon Evening Bedtime  
 pantoprazole 40 mg tablet Commonly known as:  PROTONIX Your last dose was: Your next dose is: Take 40 mg by mouth daily. 40 mg Where to Get Your Medications These medications were sent to [place] [address], [address] [facility] [address] Phone:  [phone]  
  aspirin 81 mg chewable tablet Information on where to get these meds will be given to you by the nurse or doctor. ! Ask your nurse or doctor about these medications  
  amLODIPine 10 mg tablet  
 atorvastatin 40 mg tablet  
 metFORMIN  mg tablet Discharge Instructions HOME DISCHARGE INSTRUCTIONS Bharat Mallory / [id number] : 1955 Admission date: 2018 Discharge date: 2018 9:54 AM  
 
Please bring this form with you to show your care provider at your follow-up appointment. Primary care provider:  Iveth Almendarez MD 
 
Discharging provider:  Ryan Ruiz MD  - Family Medicine Resident Elyssa Dong MD - Family Medicine Attending You have been admitted to the hospital with the following diagnoses: 
 
ACUTE DIAGNOSES: 
Dizziness Nausea Numbness and tingling of left side of face Yamel Puls . . . . . . . . . . . . . . . . . . . . . . . . . . . . . . . . . . . . . . . . . . . . . . . . . . . . . . . . . . . . . . . . . . . . . Medication changes: Please review your After- Visit- Summary for full details on your medications. We started you on Norvasc 10 mg every day and Lipitor 40 mg every day 
- continue aspirin 81 mg daily - Resume Metformin for control of the diabetes. FOLLOW-UP CARE RECOMMENDATIONS: 
 
Appointments Follow-up Information Follow up With Details Comments Contact Info Arturo Joshi DO On 2/26/2018 PCP follow up on Monday (2/26) at 10:45am Chilango Varsha Stella [place] Bernard 906 [address] [facility] 
[phone] Follow-up tests needed: per provider Pending test results: At the time of your discharge the following test results are still pending: Your ECHO was completed, but the final report is pending. Please follow up with your PCP. Please make sure you review these results with your outpatient follow-up provider(s). Specific symptoms to watch for: chest pain, shortness of breath, fever, chills, nausea, vomiting, diarrhea, change in mentation, falling, weakness, bleeding. DIET/what to eat:  Diabetic Diet ACTIVITY:  Activity as tolerated What to do if new or unexpected symptoms occur? If you experience any of the above symptoms (or should other concerns or questions arise after discharge) please call your primary care physician. Return to the emergency room if you cannot get hold of your doctor. · It is very important that you keep your follow-up appointment(s). · Please bring discharge papers, medication list (and/or medication bottles) to your follow-up appointments for review by your outpatient provider(s). · Please check the list of medications and be sure it includes every medication (even non-prescription medications) that your provider wants you to take. · It is important that you take the medication exactly as they are prescribed. · Keep your medication in the bottles provided by the pharmacist and keep a list of the medication names, dosages, and times to be taken in your wallet. · Do not take other medications without consulting your doctor. · If you have any questions about your medications or other instructions, please talk to your nurse or care provider before you leave the hospital.  
 
Information obtained by: I understand that if any problems occur once I am at home I am to contact my physician. These instructions were explained to me and I had the opportunity to ask questions. I understand and acknowledge receipt of the instructions indicated above. Physician's or R.N.'s Signature                                                                  Date/Time Patient or Representative Signature                                                          Date/Time [employer] Announcement We are excited to announce that we are making your provider's discharge notes available to you in [employer]. You will see these notes when they are completed and signed by the physician that discharged you from your recent hospital stay. If you have any questions or concerns about any information you see in [employer], please call the Health Information Department where you were seen or reach out to your Primary Care Provider for more information about your plan of care. Introducing [facility] & HEALTH SERVICES! Dear Juana Payan: Thank you for requesting a [employer] account. Our records indicate that you already have an active [employer] account. You can access your account anytime at [email or web address] Did you know that you can access your hospital and ER discharge instructions at any time in [employer]? You can also review all of your test results from your hospital stay or ER visit. Additional Information If you have questions, please visit the Frequently Asked Questions section of the [employer] website at [email or web address] Remember, [employer] is NOT to be used for urgent needs.  For medical emergencies, dial 911. Now available from your iPhone and Android! Providers Seen During Your Hospitalization Provider Specialty Primary office phone Marta Lockhart MD Emergency Medicine [phone] Marilyn Paez MD Family Practice [phone] Your Primary Care Physician (PCP) Primary Care Physician Office Phone Office Fax Ivan KRISHNAN [phone] You are allergic to the following No active allergies Recent Documentation Height Weight BMI OB Status Smoking Status 1.6 m 79.2 kg 30.93 kg/m2 Postmenopausal Never Smoker Emergency Contacts Name Discharge Info Relation Home Work Mobile [address] CAREGIVER [3] Spouse [3] [phone] Patient Belongings The following personal items are in your possession at time of discharge: 
  Dental Appliances: None  Visual Aid: None      Home Medications: None   Jewelry: Bracelet, Ring, With patient  Clothing: Shirt, Socks, With patient, Pants, Undergarments, At bedside    Other Valuables: Cell Phone, At bedside Please provide this summary of care documentation to your next provider. Signatures-by signing, you are acknowledging that this After Visit Summary has been reviewed with you and you have received a copy. Patient Signature:  ____________________________________________________________ Date:  ____________________________________________________________  
  
Jayde Wall Provider Signature:  ____________________________________________________________ Date:  ____________________________________________________________

## 2018-02-21 NOTE — PROGRESS NOTES
[facility]: MED RECONCILIATION    Information obtained from: patient    Significant PMH/Disease States:   Past Medical History:   Diagnosis Date    Anxiety     [facility] [address]     Dyslipidemia     Headache     Hodgkin's lymphoma ([facility] [address] 1985    Nodule removed from throat    Hypertension     Low back pain 1/19/2012    MRI 3/10/14 revealed mild multilevel disk and facet degenerative change     Lumbar disc disease 03/2014    Thyroid nodule     Followed by endocrine, Dr. Osmany Hensley.  [facility])      Chief Complaint for this Admission:   Chief Complaint   Patient presents with    Numbness     Allergies: Review of patient's allergies indicates no known allergies. Prior to Admission Medications:     Medication Documentation Review Audit       Reviewed by MARYANNE StackD (Pharmacist) on 02/21/18 at 1802         Medication Sig Documenting Provider Last Dose Status Taking? pantoprazole (PROTONIX) 40 mg tablet Take 40 mg by mouth daily. Historical Provider 2/21/2018 0800 Active Yes                  Thank you for the consult,  Brad L. Romayne Ogles D, [facility]

## 2018-02-21 NOTE — ED TRIAGE NOTES
Pt reports dizziness that began Friday. Also reports left sided facial tingling since 1230 today. Also c/o weakness to left arm since 1230 today. Denies slurred speech or blurred vision. Dr. Vidya Amato in triage to evaluate patient.

## 2018-02-21 NOTE — PROGRESS NOTES
[facility]   Senior Resident Admission Note    CC: \"I felt dizzy\"    HPI:  Dulce Roche is a 65 y.o. female with hx of CVA and multiple TIA's, migraine, HTN, PFO, diabetes, hx of Non-Hodgkin's lymphoma, multinodular goiter, abnormal T1 imaging on MRI, and non-compliance who presents to the ER complaining of dizziness and L facial tingling. Patient reports acute onset of L facial tingling, lightheadedness (specifically not vertigo) at noon today 2/21. Associated with mild frontal HA that progressively got worse up to a 5-6/10 in severity, nausea without vomiting. Went to PCP for evaluation and advised to go to ER for additional work-up. Currently reports complete resolution of HA and lightheadedness but persistent (though lessened) L facial tingling. Denies focal weakness, visual changes, dysphagia, chest pain, shortness of breath, bowel or bladder incontinence, syncope. Denies hx of TIA, CVA, HTN. Has hx of migraines but per her report not followed by neurology. Notes that headache today was less severe and not like her typical migraines. Not taking any medications. In the ER, VS significant for /80. CBC, CMP WNL. EKG with NSR with sinus arrhythmia, LVH. CT head negative. CTA head and neck with cont prelim normal. Tele-neuro recommended admission for CVA work-up. Chart reviewed. Patient seen, examined, and discussed with Dr. James Martinez (PGY-1). See her note for more details. Physical exam  Gen: Alert, oriented, in no apparent distress  CV: Regular rhythm, normal rate  Resp: CTAB, normal work of breathing  Neuro: Paresthesia in L cheek, otherwise intact sensation throughout. Strength 5/5 throughout. Alert, oriented. No movement disorders. CN II-XII intact. A/P: 64 yo with hx of CVA and multiple TIA, migraines, PFO, HTN, diabetes, hx of NHL, multinodular goiter, abnormal T1 imaging on MRI who is being admitted for CVA/TIA work-up.     1. L facial tingling, lightheadedness vs vertigo  Need to rule-out CVA. Persistent, though improving facial tingling. CT head negative, prelim CTA head and neck negative. Differential also includes psychosomatic numbness and tingling, complex migraine, embolism from unseen paroxysmal afib in setting of PFO, thyroid disorder. Discharged with diagnosis of psychosomatic numbness and tingling on 3/8/16 after CVA work-up was negative. Seen by neurology once on 5/13/16 for CVA follow-up, though it is unclear where she was diagnosed. Has had complaints of unilateral numbness that changes sides since at least 2016. - Admit under observation to remote telemetry  - MRI brain, echo  - TSH, A1c, lipid panel  - Permissive HTN, treat SBP > 220  - Neuro consult, possible Holter if MRI+ for CVA  - ASA  - Will need statin prior to discharge    2. HTN  Elevated BP on arrival. Patient denies hx of HTN but after chart review supposed to be on multiple anti-HTN. - Permissive HTN at this point due to CVA work-up  - Previously on amlodipine 10 mg, HCTZ 25 mg, lisinopril 40 mg; add back on after 24 hour window  - Hydralazine PRN for SBP > 220    3. PFO  Seen by cardiology 3/18/16 for PFO, thought to be incidental and not related to her prior admission for numbness and tingling given normal brain MRI. - Await MRI imaging, may need Holter, cardiology consult if positive for CVA      I agree with remaining assessment and plan as documented in Dr. Brandt [place] note.       Pt discussed with Dr. Marion Hensley (on-call attending physician)    Rose Mccarthy MD  Family Medicine Resident

## 2018-02-22 ENCOUNTER — APPOINTMENT (OUTPATIENT)
Dept: MRI IMAGING | Age: 63
End: 2018-02-22
Attending: STUDENT IN AN ORGANIZED HEALTH CARE EDUCATION/TRAINING PROGRAM
Payer: COMMERCIAL

## 2018-02-22 VITALS
TEMPERATURE: 98.5 F | HEIGHT: 63 IN | SYSTOLIC BLOOD PRESSURE: 179 MMHG | HEART RATE: 69 BPM | WEIGHT: 174.6 LBS | RESPIRATION RATE: 18 BRPM | BODY MASS INDEX: 30.94 KG/M2 | OXYGEN SATURATION: 97 % | DIASTOLIC BLOOD PRESSURE: 84 MMHG

## 2018-02-22 LAB
AMPHET UR QL SCN: NEGATIVE
ANION GAP SERPL CALC-SCNC: 9 MMOL/L (ref 5–15)
ATRIAL RATE: 63 BPM
BARBITURATES UR QL SCN: NEGATIVE
BASOPHILS # BLD: 0 K/UL (ref 0–0.1)
BASOPHILS NFR BLD: 0 % (ref 0–1)
BENZODIAZ UR QL: NEGATIVE
BUN SERPL-MCNC: 14 MG/DL (ref 6–20)
BUN/CREAT SERPL: 15 (ref 12–20)
CALCIUM SERPL-MCNC: 9.3 MG/DL (ref 8.5–10.1)
CALCULATED P AXIS, ECG09: 46 DEGREES
CALCULATED R AXIS, ECG10: -2 DEGREES
CALCULATED T AXIS, ECG11: 35 DEGREES
CANNABINOIDS UR QL SCN: NEGATIVE
CHLORIDE SERPL-SCNC: 107 MMOL/L (ref 97–108)
CO2 SERPL-SCNC: 26 MMOL/L (ref 21–32)
COCAINE UR QL SCN: NEGATIVE
CREAT SERPL-MCNC: 0.94 MG/DL (ref 0.55–1.02)
DIAGNOSIS, 93000: NORMAL
DIFFERENTIAL METHOD BLD: NORMAL
DRUG SCRN COMMENT,DRGCM: NORMAL
EOSINOPHIL # BLD: 0.1 K/UL (ref 0–0.4)
EOSINOPHIL NFR BLD: 2 % (ref 0–7)
ERYTHROCYTE [DISTWIDTH] IN BLOOD BY AUTOMATED COUNT: 13.5 % (ref 11.5–14.5)
GLUCOSE BLD STRIP.AUTO-MCNC: 124 MG/DL (ref 65–100)
GLUCOSE BLD STRIP.AUTO-MCNC: 172 MG/DL (ref 65–100)
GLUCOSE SERPL-MCNC: 119 MG/DL (ref 65–100)
HCT VFR BLD AUTO: 37.4 % (ref 35–47)
HGB BLD-MCNC: 11.6 G/DL (ref 11.5–16)
IMM GRANULOCYTES # BLD: 0 K/UL (ref 0–0.04)
IMM GRANULOCYTES NFR BLD AUTO: 0 % (ref 0–0.5)
LYMPHOCYTES # BLD: 2.3 K/UL (ref 0.8–3.5)
LYMPHOCYTES NFR BLD: 35 % (ref 12–49)
MCH RBC QN AUTO: 26.8 PG (ref 26–34)
MCHC RBC AUTO-ENTMCNC: 31 G/DL (ref 30–36.5)
MCV RBC AUTO: 86.4 FL (ref 80–99)
METHADONE UR QL: NEGATIVE
MONOCYTES # BLD: 0.7 K/UL (ref 0–1)
MONOCYTES NFR BLD: 11 % (ref 5–13)
NEUTS SEG # BLD: 3.5 K/UL (ref 1.8–8)
NEUTS SEG NFR BLD: 52 % (ref 32–75)
NRBC # BLD: 0 K/UL (ref 0–0.01)
NRBC BLD-RTO: 0 PER 100 WBC
OPIATES UR QL: NEGATIVE
P-R INTERVAL, ECG05: 174 MS
PCP UR QL: NEGATIVE
PLATELET # BLD AUTO: 210 K/UL (ref 150–400)
PMV BLD AUTO: 11.8 FL (ref 8.9–12.9)
POTASSIUM SERPL-SCNC: 4.2 MMOL/L (ref 3.5–5.1)
Q-T INTERVAL, ECG07: 432 MS
QRS DURATION, ECG06: 70 MS
QTC CALCULATION (BEZET), ECG08: 442 MS
RBC # BLD AUTO: 4.33 M/UL (ref 3.8–5.2)
SERVICE CMNT-IMP: ABNORMAL
SERVICE CMNT-IMP: ABNORMAL
SODIUM SERPL-SCNC: 142 MMOL/L (ref 136–145)
VENTRICULAR RATE, ECG03: 63 BPM
WBC # BLD AUTO: 6.7 K/UL (ref 3.6–11)

## 2018-02-22 PROCEDURE — 70551 MRI BRAIN STEM W/O DYE: CPT

## 2018-02-22 PROCEDURE — 82962 GLUCOSE BLOOD TEST: CPT

## 2018-02-22 PROCEDURE — 74011250637 HC RX REV CODE- 250/637: Performed by: FAMILY MEDICINE

## 2018-02-22 PROCEDURE — 97161 PT EVAL LOW COMPLEX 20 MIN: CPT

## 2018-02-22 PROCEDURE — 36415 COLL VENOUS BLD VENIPUNCTURE: CPT | Performed by: FAMILY MEDICINE

## 2018-02-22 PROCEDURE — 74011250637 HC RX REV CODE- 250/637: Performed by: STUDENT IN AN ORGANIZED HEALTH CARE EDUCATION/TRAINING PROGRAM

## 2018-02-22 PROCEDURE — 93306 TTE W/DOPPLER COMPLETE: CPT

## 2018-02-22 PROCEDURE — 99218 HC RM OBSERVATION: CPT

## 2018-02-22 PROCEDURE — 80048 BASIC METABOLIC PNL TOTAL CA: CPT | Performed by: FAMILY MEDICINE

## 2018-02-22 PROCEDURE — 74011636320 HC RX REV CODE- 636/320: Performed by: RADIOLOGY

## 2018-02-22 PROCEDURE — 85025 COMPLETE CBC W/AUTO DIFF WBC: CPT | Performed by: FAMILY MEDICINE

## 2018-02-22 RX ORDER — AMLODIPINE BESYLATE 5 MG/1
10 TABLET ORAL DAILY
Status: DISCONTINUED | OUTPATIENT
Start: 2018-02-22 | End: 2018-02-22 | Stop reason: HOSPADM

## 2018-02-22 RX ORDER — GUAIFENESIN 100 MG/5ML
162 LIQUID (ML) ORAL DAILY
Qty: 60 TAB | Refills: 0 | Status: SHIPPED | OUTPATIENT
Start: 2018-02-23 | End: 2018-02-22

## 2018-02-22 RX ORDER — ACETAMINOPHEN 325 MG/1
650 TABLET ORAL
Status: CANCELLED | OUTPATIENT
Start: 2018-02-22

## 2018-02-22 RX ORDER — METFORMIN HYDROCHLORIDE 500 MG/1
500 TABLET, EXTENDED RELEASE ORAL
Qty: 30 TAB | Refills: 0 | Status: SHIPPED | OUTPATIENT
Start: 2018-02-22 | End: 2018-03-31 | Stop reason: SDUPTHER

## 2018-02-22 RX ORDER — ATORVASTATIN CALCIUM 20 MG/1
40 TABLET, FILM COATED ORAL DAILY
Status: CANCELLED | OUTPATIENT
Start: 2018-02-22

## 2018-02-22 RX ORDER — GUAIFENESIN 100 MG/5ML
81 LIQUID (ML) ORAL DAILY
Status: DISCONTINUED | OUTPATIENT
Start: 2018-02-22 | End: 2018-02-22 | Stop reason: HOSPADM

## 2018-02-22 RX ORDER — GUAIFENESIN 100 MG/5ML
81 LIQUID (ML) ORAL DAILY
Qty: 60 TAB | Refills: 0 | Status: SHIPPED | OUTPATIENT
Start: 2018-02-23 | End: 2018-02-22

## 2018-02-22 RX ORDER — GUAIFENESIN 100 MG/5ML
81 LIQUID (ML) ORAL DAILY
Qty: 60 TAB | Refills: 0 | Status: SHIPPED | OUTPATIENT
Start: 2018-02-23 | End: 2021-02-16

## 2018-02-22 RX ORDER — AMLODIPINE BESYLATE 10 MG/1
10 TABLET ORAL DAILY
Qty: 30 TAB | Refills: 0 | Status: SHIPPED | OUTPATIENT
Start: 2018-02-22 | End: 2018-03-31 | Stop reason: SDUPTHER

## 2018-02-22 RX ORDER — ATORVASTATIN CALCIUM 40 MG/1
40 TABLET, FILM COATED ORAL DAILY
Qty: 30 TAB | Refills: 0 | Status: SHIPPED | OUTPATIENT
Start: 2018-02-22 | End: 2018-03-31 | Stop reason: SDUPTHER

## 2018-02-22 RX ADMIN — ASPIRIN 81 MG 81 MG: 81 TABLET ORAL at 09:00

## 2018-02-22 RX ADMIN — IOPAMIDOL 100 ML: 755 INJECTION, SOLUTION INTRAVENOUS at 15:00

## 2018-02-22 RX ADMIN — AMLODIPINE BESYLATE 10 MG: 5 TABLET ORAL at 10:24

## 2018-02-22 RX ADMIN — Medication 10 ML: at 06:16

## 2018-02-22 RX ADMIN — PANTOPRAZOLE SODIUM 40 MG: 40 TABLET, DELAYED RELEASE ORAL at 09:00

## 2018-02-22 NOTE — PROGRESS NOTES
[facility] MEDICINE RESIDENCY PROGRAM   Daily Progress Note    Date: 2/21/2018    Assessment/Plan:   Yulissa Perez is a 65 y.o. female Pmhx of HTN, DM2, HLD, non-hodgkin's lymphoma s/p chemo, hx of of TIA, multinodular goiter, depression, anxiety, migraines, and patent foramen ovale, who is admitted for CVA/TIA workup.        Left facial tingling, associated with lightheadedness- New onset, per patient's report. Thus far, imaging have been unremarkable. Pt with mixed hyperlipidemia and A1c 7.3. Normal TSH and PT/INR  - Neurology consulted, appreciate recs  - F/u on echo and MRI brain   - Pt with patent foramen ovale seen on last echo (3/2016), could potentially lead to brain emboli. Will f/u on imaging and neuro recs  - On ASA, will start statin prior to discharge  - F/u on orthostatics VS   - Psych consulted, due to hx of psychogenic numbness and tingling, appreciate recs  - Patient passed bedside swallow, on diabetic diet  - Daily CBC and BMP     Hypertension- BP on admission 175/80. Patient not taking any antihypertensives at home  - Will allow permissive HTN for 62UDDWS; if systolic >913, will give IV hydralazine  - Will continue to monitor at this time and readjust as BP's trend.     Patent Foramen Ovale- Seen on echo on 3/16/2016 during last admission. Cardiology saw pt at that time, but thought to be incidental and not related to Sx( right leg numbness and tingling)  - MRI brain pending. Pt may need Holter. Will consult cardiology in case of positive CVA     Diabetes Mellitus T2-  Patient on no home meds. Stopped taking metformin few months ago. - Repeat A1c 7.3 on 2/21/18 (worsened from last A1c of 6.5)  - Insulin Sliding Scale normal sensitivity with AC&HS glucose checks. - Hypoglycemia protocols ordered.     HLD-  Pt used to be on lipitor 40mg, but stopped taking Rx. So currently on no statin.    - Tchol 225, , HDL 51, .6 on 2/21/18  - Will restart statin prior to discharge     Depression/Anxiety- On no home meds  - Psych consulted, due to hx of psychogenic numbness and tingling, appreciate recs     GERD- Stable  - Continue home Protonix      Multinodular goiter- pt was seen by endocrinologist, Dr. Tacho Wheeler,  however no recent follow-up. Euthryroid and asymptomatic. On no home meds  - Will recommend f/u OP     Migraines- Stable. Pt on no home meds. Will continue to monitor for now     Obesity  - PT with BMI of Body mass index is 30.93 kg/(m^2). - Encouraging lifestyle modifications and further follow up outpatient.         FEN/GI - Diabetic diet. Activity - Ambulate with assistance  DVT prophylaxis - Lovenox  GI prophylaxis - Protonix  Fall prophylaxis - Fall precautions ordered. Disposition - Admit to Remote Telemetry. Plan to d/c to Home. Consulting PT/OT  Code Status - Full, discussed with patient / caregivers. Tyrell Melgar [address] [place] [address] [phone]  Patient will be discussed with Dr. Jessa Maldonado MD  [facility] Medicine Resident         Subjective  No acute events overnight. Patient found asleep and in bed this AM. She states that left facial tingling has resolved, and she feels well. She denies chills, headaches, chest pain, shortness of breath, palpitations, abdominal pain, nausea and vomiting. She has no complaint at the moment.      Inpatient Medications  Current Facility-Administered Medications   Medication Dose Route Frequency    aspirin (ASPIRIN) tablet 325 mg  325 mg Oral ONCE    sodium chloride (NS) flush 5-10 mL  5-10 mL IntraVENous Q8H    sodium chloride (NS) flush 5-10 mL  5-10 mL IntraVENous PRN    enoxaparin (LOVENOX) injection 40 mg  40 mg SubCUTAneous Q24H    [START ON 2/22/2018] pantoprazole (PROTONIX) tablet 40 mg  40 mg Oral DAILY    insulin lispro (HUMALOG) injection   SubCUTAneous AC&HS    glucose chewable tablet 16 g  4 Tab Oral PRN    dextrose (D50W) injection syrg 12.5-25 g  12.5-25 g IntraVENous PRN    glucagon (GLUCAGEN) injection 1 mg  1 mg IntraMUSCular PRN         Allergies  No Known Allergies      Objective  Vitals:  Patient Vitals for the past 8 hrs:   Temp Pulse Resp BP SpO2   02/21/18 1955 97.5 °F (36.4 °C) 62 18 171/82 98 %   02/21/18 1930 - 68 20 159/66 92 %   02/21/18 1915 - 64 21 169/61 93 %   02/21/18 1900 - 60 18 163/75 -   02/21/18 1845 - 64 22 151/76 96 %   02/21/18 1830 - 68 19 (!) 180/93 97 %   02/21/18 1815 - 67 19 164/82 95 %   02/21/18 1800 - 80 19 145/63 96 %   02/21/18 1745 - 71 15 (!) 177/102 97 %   02/21/18 1730 - 72 16 (!) 178/94 94 %   02/21/18 1715 - 67 16 (!) 177/105 94 %   02/21/18 1610 98.3 °F (36.8 °C) 62 18 175/80 98 %         I/O:  No intake or output data in the 24 hours ending 02/21/18 2032  Last shift:       Last 3 shifts:         Physical Exam:  General: No acute distress. Alert. Cooperative. Head: Normocephalic. Atraumatic. Eyes:  Conjunctiva pink. Sclera white. PERRL. Nose:  Septum midline. Mucosa pink. No drainage. Throat: Mucosa pink. Moist mucous membranes. No tonsillar exudates or erythema. Palate movement equal bilaterally. Respiratory: CTAB. No w/r/r/c.   Cardiovascular: RRR. Normal S1,S2. No m/r/g. Pulses 2+ throughout. GI: + bowel sounds. Nontender. No rebound tenderness or guarding. Nondistended   Extremities:  Neuro: No edema. No palpable cord. No tenderness. CN II-XII intact, no left-sided facial hemiparesis, sensation intact, 5/5 strength.      Laboratory Data  Recent Results (from the past 12 hour(s))   GLUCOSE, POC    Collection Time: 02/21/18  4:14 PM   Result Value Ref Range    Glucose (POC) 112 (H) 65 - 100 mg/dL    Performed by Ana Luisa Mcconnell    POC INR    Collection Time: 02/21/18  4:28 PM   Result Value Ref Range    INR (POC) 1.2 (H) <1.2     MAGNESIUM    Collection Time: 02/21/18  4:45 PM   Result Value Ref Range    Magnesium 1.9 1.6 - 2.4 mg/dL   PHOSPHORUS    Collection Time: 02/21/18  4:45 PM   Result Value Ref Range    Phosphorus 3.6 2.6 - 4.7 MG/DL   PROTHROMBIN TIME + INR    Collection Time: 02/21/18  4:45 PM   Result Value Ref Range    INR 1.0 0.9 - 1.1      Prothrombin time 9.7 9.0 - 11.1 sec   PTT    Collection Time: 02/21/18  4:45 PM   Result Value Ref Range    aPTT 29.8 22.1 - 32.0 sec    aPTT, therapeutic range     58.0 - 77.0 SECS   CBC WITH AUTOMATED DIFF    Collection Time: 02/21/18  4:47 PM   Result Value Ref Range    WBC 6.8 3.6 - 11.0 K/uL    RBC 4.48 3.80 - 5.20 M/uL    HGB 12.2 11.5 - 16.0 g/dL    HCT 39.0 35.0 - 47.0 %    MCV 87.1 80.0 - 99.0 FL    MCH 27.2 26.0 - 34.0 PG    MCHC 31.3 30.0 - 36.5 g/dL    RDW 13.7 11.5 - 14.5 %    PLATELET 390 415 - 180 K/uL    MPV 11.8 8.9 - 12.9 FL    NRBC 0.0 0  WBC    ABSOLUTE NRBC 0.00 0.00 - 0.01 K/uL    NEUTROPHILS 56 32 - 75 %    LYMPHOCYTES 32 12 - 49 %    MONOCYTES 11 5 - 13 %    EOSINOPHILS 2 0 - 7 %    BASOPHILS 0 0 - 1 %    IMMATURE GRANULOCYTES 0 0.0 - 0.5 %    ABS. NEUTROPHILS 3.8 1.8 - 8.0 K/UL    ABS. LYMPHOCYTES 2.1 0.8 - 3.5 K/UL    ABS. MONOCYTES 0.7 0.0 - 1.0 K/UL    ABS. EOSINOPHILS 0.1 0.0 - 0.4 K/UL    ABS. BASOPHILS 0.0 0.0 - 0.1 K/UL    ABS. IMM.  GRANS. 0.0 0.00 - 0.04 K/UL    DF AUTOMATED     METABOLIC PANEL, BASIC    Collection Time: 02/21/18  4:47 PM   Result Value Ref Range    Sodium 142 136 - 145 mmol/L    Potassium 4.5 3.5 - 5.1 mmol/L    Chloride 107 97 - 108 mmol/L    CO2 28 21 - 32 mmol/L    Anion gap 7 5 - 15 mmol/L    Glucose 116 (H) 65 - 100 mg/dL    BUN 12 6 - 20 MG/DL    Creatinine 0.94 0.55 - 1.02 MG/DL    BUN/Creatinine ratio 13 12 - 20      GFR est AA >60 >60 ml/min/1.73m2    GFR est non-AA >60 >60 ml/min/1.73m2    Calcium 9.7 8.5 - 10.1 MG/DL   URINALYSIS W/MICROSCOPIC    Collection Time: 02/21/18  4:47 PM   Result Value Ref Range    Color YELLOW/STRAW      Appearance CLEAR CLEAR      Specific gravity 1.022 1.003 - 1.030      pH (UA) 6.5 5.0 - 8.0      Protein NEGATIVE  NEG mg/dL    Glucose NEGATIVE  NEG mg/dL    Ketone NEGATIVE  NEG mg/dL    Bilirubin NEGATIVE  NEG      Blood NEGATIVE  NEG      Urobilinogen 1.0 0.2 - 1.0 EU/dL    Nitrites NEGATIVE  NEG      Leukocyte Esterase NEGATIVE  NEG      WBC 0-4 0 - 4 /hpf    RBC 0-5 0 - 5 /hpf    Epithelial cells FEW FEW /lpf    Bacteria NEGATIVE  NEG /hpf    Hyaline cast 0-2 0 - 5 /lpf   URINE CULTURE HOLD SAMPLE    Collection Time: 02/21/18  4:47 PM   Result Value Ref Range    Urine culture hold        URINE ON HOLD IN MICROBIOLOGY DEPT FOR 3 DAYS. IF UNPRESERVED URINE IS SUBMITTED, IT CANNOT BE USED FOR ADDITIONAL TESTING AFTER 24 HRS, RECOLLECTION WILL BE REQUIRED. EKG, 12 LEAD, INITIAL    Collection Time: 02/21/18  5:16 PM   Result Value Ref Range    Ventricular Rate 63 BPM    Atrial Rate 63 BPM    P-R Interval 174 ms    QRS Duration 70 ms    Q-T Interval 432 ms    QTC Calculation (Bezet) 442 ms    Calculated P Axis 46 degrees    Calculated R Axis -2 degrees    Calculated T Axis 35 degrees    Diagnosis       Normal sinus rhythm with sinus arrhythmia  Moderate voltage criteria for LVH, may be normal variant  Borderline ECG  When compared with ECG of 08-MAR-2016 19:33,  No significant change was found           Imaging  CXR- No acute process    CT head wo cont: \"IMPRESSION: No acute intracranial hemorrhage, mass or infarct. \"    CTA head and neck prelim: \"No intracranial aneurysm or stenosis is identified. No intracranial thrombus is  identified. There is a small left posterior communicating artery. \"        Hospital Problems:  Hospital Problems  Date Reviewed: 2/21/2018          Codes Class Noted POA    Dizziness ICD-10-CM: X49  ICD-9-CM: 780.4  2/21/2018 Unknown        Nausea ICD-10-CM: R11.0  ICD-9-CM: 787.02  2/21/2018 Unknown        Numbness and tingling of left side of face ICD-10-CM: R20.0, R20.2  ICD-9-CM: 782.0  2/21/2018 Unknown

## 2018-02-22 NOTE — PROGRESS NOTES
2100  Stroke Education provided to patient and spouse/SO and the following topics were discussed    1. Patients personal risk factors for stroke are hypertension, diabetes mellitus and prior stroke    2. Warning signs of Stroke:        * Sudden numbness or weakness of the face, arm or leg, especially on one side of          The body            * Sudden confusion, trouble speaking or understanding        * Sudden trouble seeing in one or both eyes        * Sudden trouble walking, dizziness, loss of balance or coordination        * Sudden severe headache with no known cause      3. Importance of activation Emergency Medical Services ( 9-1-1 ) immediately if experience any warning signs of stroke. 4. Be sure and schedule a follow-up appointment with your primary care doctor or any specialists as instructed. 5. You must take medicine every day to treat your risk factors for stroke. Be sure to take your medicines exactly as your doctor tells you: no more, no less. Know what your medicines are for , what they do. Anti-thrombotics /anticoagulants can help prevent strokes. You are taking the following medicine(s)  aspirin, lovenox     6. Smoking and second-hand smoke greatly increase your risk of stroke, cardiovascular disease and death. Smoking history never    7. Information provided was [facility] Stroke Education Fredia Heading or Verbal Education    8. Documentation of teaching completed in Patient Education Activity and on Care Plan with teaching response noted?   yes

## 2018-02-22 NOTE — PROGRESS NOTES
2030  Primary Nurse Madhuri Rivera RN and Aixa Dodd RN performed a dual skin assessment on this patient No impairment noted  Eulogio score is 555 EPina Covarrubias [place] REPORT:    Verbal report received from [facility], [facility] (name) on Heber Negrete  being received from ED (unit) for routine progression of care      Report consisted of patients Situation, Background, Assessment and   Recommendations(SBAR). Information from the following report(s) SBAR, Kardex, ED Summary, Intake/Output, MAR, Recent Results and Med Rec Status was reviewed with the receiving nurse. Opportunity for questions and clarification was provided. Assessment completed upon patients arrival to unit and care assumed.

## 2018-02-22 NOTE — DISCHARGE SUMMARY
[facility] [address] [place] [address]   Office [phone], Fax [phone]        Discharge Summary     Patient: Jason Gleason       MRN: [id number]       YOB: 1955       Age: 65 y.o. Date of admission:  2/21/2018    Date of discharge:  2/22/2018    Primary care provider:  Sulaiman Flores MD     Admitting provider:  Lakeisha Bourgeois MD    Discharging provider(s): Vince Navarro MD - Family Medicine Resident  Constantino Ventura MD - Family Medicine Attending     Consultations  · Neuro    Procedures  · none    Discharge destination: home. The patient is stable for discharge. Admission diagnosis  Dizziness  Nausea  Numbness and tingling of left side of face      Final discharge diagnoses and brief hospital course  As per admitting provider: \"  Jason Gleason is a 65 y.o. female with known HTN, DM2, HLD, non-hodgkin's lymphoma s/p chemo, hx of of TIA, multinodular goiter, depression, anxiety, migraines, and patent foramen ovale who presents to the ER complaining of left facial tingling and dizziness.       Per patient's report, around noon today, she started being lightheaded(not vertigo). Few minutes later, she experienced left facial tingling. Tingling was not associated with drooping or numbness. Simultaneously, she started having H/A and nausea. Patient has hx of migraines, but states H/A were different that her usual migraines. They were frontal, dull, ~5-6/10, not associated with photophobia or phonophobia. Patient did not take any medication to alleviate Sx, as she had an appointment with her PCP this afternoon. After seeing PCP in clinic, she was sent to ED for further evaluation of Sx. Patient has hx significant for TIA, non-hodgkin's lymphoma for which she received chemotherapy,  and patent foramen ovale. Few months ago, patient stopped taking all her home medications including ASA, statin, antihypertensive, antiglycemic agent, and SSRI.  She believes she can maintained good health without them. Patient currently denies speech/swallow difficulty, facial swelling, H/A, dizziness, SOB, CP, palpitations, abdominal pain, N/V, dysuria but still endorses some left facial tingling. She initially endorses nausea on arrival to ED, but was improved after receivign Compazine.      In the ER:  -Vital signs: temp 98.3, HR 62, RR 18, /80, SpO2 98% on RA  -Labs were unremarkable. Normal CBC and CMP. UA negative  -Imaging: CXR, CT head wo contrast, and CTA head and neck unremarkable for any acute process. -Treatment: Pt received Compazine 10mg x 1, benadryl 25mg x 1, and meclizine 25mg x 1       Conditions treated during this hospitalization:    Heber Negrete is a 65 y.o. female Pmhx of HTN, DM2, HLD, non-hodgkin's lymphoma s/p chemo, hx of of TIA, multinodular goiter, depression, anxiety, migraines, and patent foramen ovale, who is admitted for CVA/TIA workup.          TIA/ Stroke rule out: POA, Left facial tingling, associated with lightheadedness; tingling and dizziness resolved. CTA head and neck: no aneurysm or significant stenosis. MRI negative for stroke or intracranial bleed. A1c 7.3. Normal TSH and PT/INR. TTE completed, full report pending  Discharged on ASA 81mg every day and Lipitor 40 every day    Hypertension- BP on admission 175/80. Patient not taking any antihypertensives at home. BP on discharge: 139/69   - Starting Norvasc 10 every day. Follow up with PCP. Consider an ACEI. H/o Patent Foramen Ovale- Seen on echo on 3/16/2016, stable    Diabetes Mellitus T2-  Patient on no home meds. Stopped taking metformin few months ago. A1c 7.3 on 2/21/18   - Resume Metformin  every day  - f/u with PCP      HLD-  Currently on no statin.  Tchol 225, , HDL 51, .6 on 2/21/18  - discharged on Lipitor 40  - f/u with PCP      Depression/Anxiety- On no home meds, Stable  - f/u with PCP      GERD- Stable  - Continue home Protonix       Multinodular goiter- pt was seen by endocrinologist, Dr. Spear Borne no recent follow-up. Euthryroid and asymptomatic. TSH: 1.55 On no home meds  - Will recommend f/u OP    Migraines- Stable. Pt on no home meds.      Obesity  - PT with BMI of Body mass index is 30.93 kg/(m^2). - Encouraging lifestyle modifications and further follow up outpatient.             Labs/Imaging Needed on follow up: per provider    Pending test results: At the time of your discharge the following test results are still pending: Full TTE report pending. Please make sure you review these results with your outpatient follow-up provider(s). Specific symptoms to watch for: chest pain, shortness of breath, fever, chills, nausea, vomiting, diarrhea, change in mentation, falling, weakness, bleeding. DIET/what to eat:  Diabetic Diet    ACTIVITY:  Activity as tolerated        Follow-up Care:    Follow-up Information     Follow up With Details Comments Contact Info    Melvina Aleman DO On 2/26/2018 PCP follow up on Monday (2/26) at 10:45am [address]  [phone]            Physical examination at discharge  Visit Vitals    /69 (BP 1 Location: Right arm, BP Patient Position: At rest)    Pulse 76    Temp 98.5 °F (36.9 °C)    Resp 18    Ht 5' 3\" (1.6 m)    Wt 174 lb 9.7 oz (79.2 kg)    SpO2 97%    BMI 30.93 kg/m2      Physical Examination: ]  General appearance - alert, well appearing, and in no distress   Mental status - normal mood, behavior, speech, dress, motor activity, and thought processes  Chest - clear to auscultation, no wheezes, rales or rhonchi, symmetric air entry  Heart - normal rate, regular rhythm, normal S1, S2, no murmurs, rubs, clicks or gallops  Abdomen - soft, nontender, nondistended, no masses or organomegaly  Neurological - alert, oriented, normal speech, no focal findings or movement disorder noted  Extremities - peripheral pulses normal, no pedal edema, no clubbing or cyanosis    Recent Results (from the past 24 hour(s))   GLUCOSE, POC    Collection Time: 02/21/18  4:14 PM   Result Value Ref Range    Glucose (POC) 112 (H) 65 - 100 mg/dL    Performed by Franny Huston    POC INR    Collection Time: 02/21/18  4:28 PM   Result Value Ref Range    INR (POC) 1.2 (H) <1.2     LIPID PANEL    Collection Time: 02/21/18  4:45 PM   Result Value Ref Range    LIPID PROFILE          Cholesterol, total 225 (H) <200 MG/DL    Triglyceride 242 (H) <150 MG/DL    HDL Cholesterol 51 MG/DL    LDL, calculated 125.6 (H) 0 - 100 MG/DL    VLDL, calculated 48.4 MG/DL    CHOL/HDL Ratio 4.4 0 - 5.0     HEMOGLOBIN A1C WITH EAG    Collection Time: 02/21/18  4:45 PM   Result Value Ref Range    Hemoglobin A1c 7.3 (H) 4.2 - 6.3 %    Est. average glucose 163 mg/dL   MAGNESIUM    Collection Time: 02/21/18  4:45 PM   Result Value Ref Range    Magnesium 1.9 1.6 - 2.4 mg/dL   PHOSPHORUS    Collection Time: 02/21/18  4:45 PM   Result Value Ref Range    Phosphorus 3.6 2.6 - 4.7 MG/DL   PROTHROMBIN TIME + INR    Collection Time: 02/21/18  4:45 PM   Result Value Ref Range    INR 1.0 0.9 - 1.1      Prothrombin time 9.7 9.0 - 11.1 sec   PTT    Collection Time: 02/21/18  4:45 PM   Result Value Ref Range    aPTT 29.8 22.1 - 32.0 sec    aPTT, therapeutic range     58.0 - 77.0 SECS   CBC WITH AUTOMATED DIFF    Collection Time: 02/21/18  4:47 PM   Result Value Ref Range    WBC 6.8 3.6 - 11.0 K/uL    RBC 4.48 3.80 - 5.20 M/uL    HGB 12.2 11.5 - 16.0 g/dL    HCT 39.0 35.0 - 47.0 %    MCV 87.1 80.0 - 99.0 FL    MCH 27.2 26.0 - 34.0 PG    MCHC 31.3 30.0 - 36.5 g/dL    RDW 13.7 11.5 - 14.5 %    PLATELET 263 809 - 250 K/uL    MPV 11.8 8.9 - 12.9 FL    NRBC 0.0 0  WBC    ABSOLUTE NRBC 0.00 0.00 - 0.01 K/uL    NEUTROPHILS 56 32 - 75 %    LYMPHOCYTES 32 12 - 49 %    MONOCYTES 11 5 - 13 %    EOSINOPHILS 2 0 - 7 %    BASOPHILS 0 0 - 1 %    IMMATURE GRANULOCYTES 0 0.0 - 0.5 %    ABS. NEUTROPHILS 3.8 1.8 - 8.0 K/UL    ABS.  LYMPHOCYTES 2.1 0.8 - 3.5 K/UL    ABS. MONOCYTES 0.7 0.0 - 1.0 K/UL    ABS. EOSINOPHILS 0.1 0.0 - 0.4 K/UL    ABS. BASOPHILS 0.0 0.0 - 0.1 K/UL    ABS. IMM. GRANS. 0.0 0.00 - 0.04 K/UL    DF AUTOMATED     METABOLIC PANEL, BASIC    Collection Time: 02/21/18  4:47 PM   Result Value Ref Range    Sodium 142 136 - 145 mmol/L    Potassium 4.5 3.5 - 5.1 mmol/L    Chloride 107 97 - 108 mmol/L    CO2 28 21 - 32 mmol/L    Anion gap 7 5 - 15 mmol/L    Glucose 116 (H) 65 - 100 mg/dL    BUN 12 6 - 20 MG/DL    Creatinine 0.94 0.55 - 1.02 MG/DL    BUN/Creatinine ratio 13 12 - 20      GFR est AA >60 >60 ml/min/1.73m2    GFR est non-AA >60 >60 ml/min/1.73m2    Calcium 9.7 8.5 - 10.1 MG/DL   URINALYSIS W/MICROSCOPIC    Collection Time: 02/21/18  4:47 PM   Result Value Ref Range    Color YELLOW/STRAW      Appearance CLEAR CLEAR      Specific gravity 1.022 1.003 - 1.030      pH (UA) 6.5 5.0 - 8.0      Protein NEGATIVE  NEG mg/dL    Glucose NEGATIVE  NEG mg/dL    Ketone NEGATIVE  NEG mg/dL    Bilirubin NEGATIVE  NEG      Blood NEGATIVE  NEG      Urobilinogen 1.0 0.2 - 1.0 EU/dL    Nitrites NEGATIVE  NEG      Leukocyte Esterase NEGATIVE  NEG      WBC 0-4 0 - 4 /hpf    RBC 0-5 0 - 5 /hpf    Epithelial cells FEW FEW /lpf    Bacteria NEGATIVE  NEG /hpf    Hyaline cast 0-2 0 - 5 /lpf   URINE CULTURE HOLD SAMPLE    Collection Time: 02/21/18  4:47 PM   Result Value Ref Range    Urine culture hold        URINE ON HOLD IN MICROBIOLOGY DEPT FOR 3 DAYS. IF UNPRESERVED URINE IS SUBMITTED, IT CANNOT BE USED FOR ADDITIONAL TESTING AFTER 24 HRS, RECOLLECTION WILL BE REQUIRED.    TSH 3RD GENERATION    Collection Time: 02/21/18  4:47 PM   Result Value Ref Range    TSH 1.55 0.36 - 3.74 uIU/mL   DRUG SCREEN, URINE    Collection Time: 02/21/18  4:47 PM   Result Value Ref Range    AMPHETAMINES NEGATIVE  NEG      BARBITURATES NEGATIVE  NEG      BENZODIAZEPINES NEGATIVE  NEG      COCAINE NEGATIVE  NEG      METHADONE NEGATIVE  NEG      OPIATES NEGATIVE  NEG PCP(PHENCYCLIDINE) NEGATIVE  NEG      THC (TH-CANNABINOL) NEGATIVE  NEG      Drug screen comment (NOTE)    EKG, 12 LEAD, INITIAL    Collection Time: 02/21/18  5:16 PM   Result Value Ref Range    Ventricular Rate 63 BPM    Atrial Rate 63 BPM    P-R Interval 174 ms    QRS Duration 70 ms    Q-T Interval 432 ms    QTC Calculation (Bezet) 442 ms    Calculated P Axis 46 degrees    Calculated R Axis -2 degrees    Calculated T Axis 35 degrees    Diagnosis       Normal sinus rhythm with sinus arrhythmia  Moderate voltage criteria for LVH, may be normal variant  Otherwise normal ECG  When compared with ECG of 08-MAR-2016 19:33,  No significant change was found  Confirmed by KRYSTIAN Cornelius MD ([id number]) on 2/22/2018 9:10:07 AM     GLUCOSE, POC    Collection Time: 02/21/18  9:46 PM   Result Value Ref Range    Glucose (POC) 164 (H) 65 - 100 mg/dL    Performed by Stephany Vivar (PCT)    CBC WITH AUTOMATED DIFF    Collection Time: 02/22/18  2:41 AM   Result Value Ref Range    WBC 6.7 3.6 - 11.0 K/uL    RBC 4.33 3.80 - 5.20 M/uL    HGB 11.6 11.5 - 16.0 g/dL    HCT 37.4 35.0 - 47.0 %    MCV 86.4 80.0 - 99.0 FL    MCH 26.8 26.0 - 34.0 PG    MCHC 31.0 30.0 - 36.5 g/dL    RDW 13.5 11.5 - 14.5 %    PLATELET 800 976 - 090 K/uL    MPV 11.8 8.9 - 12.9 FL    NRBC 0.0 0  WBC    ABSOLUTE NRBC 0.00 0.00 - 0.01 K/uL    NEUTROPHILS 52 32 - 75 %    LYMPHOCYTES 35 12 - 49 %    MONOCYTES 11 5 - 13 %    EOSINOPHILS 2 0 - 7 %    BASOPHILS 0 0 - 1 %    IMMATURE GRANULOCYTES 0 0.0 - 0.5 %    ABS. NEUTROPHILS 3.5 1.8 - 8.0 K/UL    ABS. LYMPHOCYTES 2.3 0.8 - 3.5 K/UL    ABS. MONOCYTES 0.7 0.0 - 1.0 K/UL    ABS. EOSINOPHILS 0.1 0.0 - 0.4 K/UL    ABS. BASOPHILS 0.0 0.0 - 0.1 K/UL    ABS. IMM.  GRANS. 0.0 0.00 - 0.04 K/UL    DF AUTOMATED     METABOLIC PANEL, BASIC    Collection Time: 02/22/18  2:41 AM   Result Value Ref Range    Sodium 142 136 - 145 mmol/L    Potassium 4.2 3.5 - 5.1 mmol/L    Chloride 107 97 - 108 mmol/L    CO2 26 21 - 32 mmol/L    Anion gap 9 5 - 15 mmol/L    Glucose 119 (H) 65 - 100 mg/dL    BUN 14 6 - 20 MG/DL    Creatinine 0.94 0.55 - 1.02 MG/DL    BUN/Creatinine ratio 15 12 - 20      GFR est AA >60 >60 ml/min/1.73m2    GFR est non-AA >60 >60 ml/min/1.73m2    Calcium 9.3 8.5 - 10.1 MG/DL   GLUCOSE, POC    Collection Time: 02/22/18  7:27 AM   Result Value Ref Range    Glucose (POC) 124 (H) 65 - 100 mg/dL    Performed by Edvin Dunlap (PCT)    GLUCOSE, POC    Collection Time: 02/22/18 11:16 AM   Result Value Ref Range    Glucose (POC) 172 (H) 65 - 100 mg/dL    Performed by Da Greer (PCT)          Current Discharge Medication List      START taking these medications    Details   amLODIPine (NORVASC) 10 mg tablet Take 1 Tab by mouth daily. Qty: 30 Tab, Refills: 0      atorvastatin (LIPITOR) 40 mg tablet Take 1 Tab by mouth daily. Qty: 30 Tab, Refills: 0      aspirin 81 mg chewable tablet Take 1 Tab by mouth daily. Qty: 60 Tab, Refills: 0      metFORMIN ER (GLUCOPHAGE XR) 500 mg tablet Take 1 Tab by mouth daily (with dinner). Qty: 30 Tab, Refills: 0         CONTINUE these medications which have NOT CHANGED    Details   pantoprazole (PROTONIX) 40 mg tablet Take 40 mg by mouth daily. Admission imaging studies:      Results from Hospital Encounter encounter on 02/21/18   XR CHEST PORT   Narrative EXAM:  XR CHEST PORT    INDICATION:  CVA    COMPARISON:  None. FINDINGS: A portable AP radiograph of the chest was obtained at 1710 hours. The  patient is on a cardiac monitor. The lungs are clear. Right paratracheal  density is most likely vascular. Heart size is upper limits of normal..  Bony  structures appear intact. .          Impression IMPRESSION: No acute process identified. No results found for this or any previous visit.              Results from [place] encounter on 02/21/18   CTA CODE NEURO HEAD AND NECK W CONT   Narrative *PRELIMINARY REPORT*    CTA head and neck demonstrates a 12 mm left thyroid nodule. Common carotid bifurcations are patent. Vertebral arteries are codominant    No intracranial aneurysm or stenosis is identified. No intracranial thrombus is  identified. There is a small left posterior communicating artery. Preliminary report was provided by Dr. Luz George, the on-call radiologist, at 5:15    Final report to follow. *END PRELIMINARY REPORT*    CLINICAL HISTORY: Acute left facial numbness and left arm weakness    EXAMINATION:  CT ANGIOGRAPHY HEAD AND NECK     COMPARISON: CT head 2/21/2018, MR brain 3/9/2016    TECHNIQUE:  Following the uneventful administration of iodinated contrast  material, axial CT angiography of the head and neck was performed. Delayed axial  images through the head were also obtained. Coronal and sagittal reconstructions  were obtained. Manual postprocessing of images was performed. 3-D  Sagittal  maximal intensity projection images were obtained. 3-D Coronal maximal  intensity projections were obtained. CT dose reduction was achieved through use  of a standardized protocol tailored for this examination and automatic exposure  control for dose modulation. FINDINGS:    Delayed contrast-enhanced head CT:    The ventricles are midline without hydrocephalus. There is no acute intra or  extra-axial hemorrhage. Gray-white matter differentiation is preserved. The  basal cisterns are clear. The paranasal sinuses are clear. CTA NECK:    Great vessels: Normal arch anatomy with the origins patent. Right subclavian artery: Patent    Left subclavian artery: Patent    Right common carotid artery: Patent    Left common carotid artery: Patent    Cervical right internal carotid artery: Patent  with no significant stenosis by  NASCET criteria. Cervical left internal carotid artery: Patent  with no significant stenosis by  NASCET criteria. Right vertebral artery: Patent    Left vertebral artery: Patent    Multinodular thyroid gland.     CTA HEAD:    Right cavernous internal carotid artery: Patent    Left cavernous internal carotid artery: Patent    Anterior cerebral arteries: Patent    Anterior communicating artery: Patent with fenestration incidentally noted    Right middle cerebral artery: Patent    Left middle cerebral artery: Patent    Posterior communicating arteries: Left is patent. Right is hypoplastic    Posterior cerebral arteries: Patent    Basilar artery: Patent    Distal vertebral arteries: Patent         Impression Impression:    No evidence for intracranial aneurysm or significant stenosis.     Multinodular thyroid gland                              No procedure found.      -------------------------------------------------------------------------------------------------------------------    Chronic Diagnoses:    Problem List as of 2/22/2018  Date Reviewed: 2/21/2018          Codes Class Noted - Resolved    Dizziness ICD-10-CM: R42  ICD-9-CM: 780.4  2/21/2018 - Present        Nausea ICD-10-CM: R11.0  ICD-9-CM: 787.02  2/21/2018 - Present        Numbness and tingling of left side of face ICD-10-CM: R20.0, R20.2  ICD-9-CM: 782.0  2/21/2018 - Present        Mixed hyperlipidemia ICD-10-CM: E78.2  ICD-9-CM: 272.2  9/8/2016 - Present        Diabetes mellitus type 2, controlled ([facility] ICD-10-CM: E11.9  ICD-9-CM: 250.00  5/11/2016 - Present        Numbness and tingling of right arm and leg ICD-10-CM: R20.0, R20.2  ICD-9-CM: 782.0  4/6/2016 - Present        Patent foramen ovale ICD-10-CM: Q21.1  ICD-9-CM: 745.5  3/11/2016 - Present    Overview Signed 3/11/2016 12:21 PM by Sydna Ring     Noted on echo              Bone lesion ICD-10-CM: M89.9  ICD-9-CM: 733.90  3/10/2016 - Present        Non-Hodgkin lymphoma ([facility] ICD-10-CM: C85.90  ICD-9-CM: 202.80  3/10/2016 - Present        Multiple thyroid nodules ICD-10-CM: E04.2  ICD-9-CM: 241.1  3/9/2016 - Present        Esophageal reflux ICD-10-CM: K21.9  ICD-9-CM: 530.81  3/8/2016 - Present        Depression with anxiety ICD-10-CM: F41.8  ICD-9-CM: 300.4  2/10/2014 - Present        Hypertension ICD-10-CM: I10  ICD-9-CM: 401.9  1/19/2012 - Present        Low back pain ICD-10-CM: M54.5  ICD-9-CM: 724.2  1/19/2012 - Present    Overview Signed 3/18/2014 11:14 AM by Bella Lou MD     MRI 3/10/14 revealed mild multilevel disk and facet degenerative change             RESOLVED: PFO (patent foramen ovale) ICD-10-CM: Q21.1  ICD-9-CM: 745.5  3/10/2016 - 2/21/2018        RESOLVED: Hodgkin's lymphoma ([facility] ICD-10-CM: C81.90  ICD-9-CM: 201.90  Unknown - 3/18/2014    Overview Signed 2/10/2014 12:08 PM by Bella Lou MD     Nodule removed from throat                   Home or Self Care    Signed:      Phani Navarro MD   Family Medicine Resident      2/22/2018

## 2018-02-22 NOTE — PROGRESS NOTES
Physical Therapy Note:    PT orders acknowledged, discussed with RN. Received call to office to expedite PT evaluation with reported plans for discharge today. Per RN pt currently undergoing echo and unavailable to participate with PT. Will continue to follow and complete PT evaluation when pt available and appropriate.     Jos Huizar, PT, DPT, Belinda Rocha

## 2018-02-22 NOTE — PROGRESS NOTES
Discharge instructions and prescriptions reviewed with the patient and all questions answered. Peripheral IV removed. Patient discharged home with  via wheelchair.

## 2018-02-22 NOTE — PROGRESS NOTES
physical Therapy EVALUATION/DISCHARGE  Patient: Colin Zhu (81 y.o. female)  Date: 2018  Primary Diagnosis: Dizziness  Nausea  Numbness and tingling of left side of face        Precautions: Universal     ASSESSMENT :  Based on the objective data described below, the patient presents with independent flat surface gait, transfers, bed mobility, and stair negotiation on hospital day 1 of admission with dizziness, numbness and tingling of L side of face. Pt states only current symptom is R frontal headache rated 4/10. She presents without motor or sensory deficits, intact coordination and appropriate safety awareness with mobility. Pt appears at/near functional baseline and is cleared for discharge from PT perspective. BP elevated and RN aware. .    Skilled physical therapy is not indicated at this time. PLAN :  Discharge Recommendations: None  Further Equipment Recommendations for Discharge: none     SUBJECTIVE:   Patient stated I'm feeling alright. 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 OBJECTIVE DATA SUMMARY:   HISTORY:    Past Medical History:   Diagnosis Date    Anxiety     Diabetes mellitus ([facility]     Dyslipidemia     Headache     Hodgkin's lymphoma ([facility] 1985    Nodule removed from throat    Hypertension     Low back pain 2012    MRI 3/10/14 revealed mild multilevel disk and facet degenerative change     Lumbar disc disease 2014    Thyroid nodule     Followed by endocrine, Dr. Domenic Burton.       Ulcer ([facility]      Past Surgical History:   Procedure Laterality Date    DELIVERY       HX GYN      REMOVAL NODES, NECK,CERV CMPLT       Prior Level of Function/Home Situation: independent at baseline  Personal factors and/or comorbidities impacting plan of care: none noted    Home Situation  Home Environment: Private residence  # Steps to Enter: 6  Rails to Enter: Yes  One/Two Story Residence: (tri-level)  Interior Rails: Left  Living Alone: No  Support Systems: Family member(s)  Patient Expects to be Discharged to[de-identified] Private residence  Current DME Used/Available at Home: None    Pt received supine, agreeable to PT and cleared by RN. EXAMINATION/PRESENTATION/DECISION MAKING:   Critical Behavior:  Neurologic State: Alert  Orientation Level: Oriented X4  Cognition: Follows commands, Appropriate decision making     Hearing: Auditory  Auditory Impairment: None  Skin:  Exposed skin intact  Range Of Motion:  AROM: Within functional limits           PROM: Within functional limits           Strength:    Strength: Within functional limits                    Tone & Sensation:   Tone: Normal              Sensation: Intact               Coordination:  Coordination: Within functional limits  Vision:      Functional Mobility:  Bed Mobility:     Supine to Sit: Independent  Sit to Supine: Independent  Scooting: Independent  Transfers:  Sit to Stand: Independent  Stand to Sit: Independent                       Balance:   Sitting: Intact  Standing: Intact  Ambulation/Gait Training:  Distance (ft): 250 Feet (ft)  Assistive Device: Gait belt  Ambulation - Level of Assistance: Independent                                                 Attends to environment, negotiates obstacles appropriately, no loss of balance.      Stairs:  Number of Stairs Trained: 8  Stairs - Level of Assistance: Modified independent   Rail Use: Left        Functional Measure:  Tinetti test:    Sitting Balance: 1  Arises: 2  Attempts to Rise: 2  Immediate Standing Balance: 2  Standing Balance: 2  Nudged: 2  Eyes Closed: 1  Turn 360 Degrees - Continuous/Discontinuous: 1  Turn 360 Degrees - Steady/Unsteady: 1  Sitting Down: 2  Balance Score: 16  Indication of Gait: 1  R Step Length/Height: 1  L Step Length/Height: 1  R Foot Clearance: 1  L Foot Clearance: 1  Step Symmetry: 1  Step Continuity: 1  Path: 2  Trunk: 2  Walking Time: 1  Gait Score: 12  Total Score: 28       Tinetti Test and G-code impairment scale:  Percentage of Impairment CH    0%   CI    1-19% CJ    20-39% CK    40-59% CL    60-79% CM    80-99% CN     100%   Tinetti  Score 0-28 28 23-27 17-22 12-16 6-11 1-5 0       Tinetti Tool Score Risk of Falls  <19 = High Fall Risk  19-24 = Moderate Fall Risk  25-28 = Low Fall Risk  Tinetti ME. Performance-Oriented Assessment of Mobility Problems in Elderly Patients. Monk [employer]; [id number]. (Scoring Description: PT Bulletin Feb. 10, 1993)    Older adults: Helder Crawley et al, 2009; n = 1000 [facility] elderly evaluated with ABC, SARABJIT, ADL, and IADL)  · Mean SARABJIT score for males aged 69-68 years = 26.21(3.40)  · Mean SARABJIT score for females age 69-68 years = 25.16(4.30)  · Mean SARABJIT score for males over 80 years = 23.29(6.02)  · Mean SARABJIT score for females over 80 years = 17.20(8.32)   '      G codes: In compliance with CMSs Claims Based Outcome Reporting, the following G-code set was chosen for this patient based on their primary functional limitation being treated: The outcome measure chosen to determine the severity of the functional limitation was the Tinetti with a score of 28/28 which was correlated with the impairment scale.     ? Mobility - Walking and Moving Around:     - CURRENT STATUS: CH - 0% impaired, limited or restricted    - GOAL STATUS: CH - 0% impaired, limited or restricted    - D/C STATUS:  CH - 0% impaired, limited or restricted        Physical Therapy Evaluation Charge Determination   History Examination Presentation Decision-Making   LOW Complexity : Zero comorbidities / personal factors that will impact the outcome / POC HIGH Complexity : 4+ Standardized tests and measures addressing body structure, function, activity limitation and / or participation in recreation  LOW Complexity : Stable, uncomplicated  Other outcome measures Tinetti  LOW       Based on the above components, the patient evaluation is determined to be of the following complexity level: LOW     Pain:  Pain Scale 1: Numeric (0 - 10)  Pain Intensity 1: 0     Activity Tolerance:   Reports headache improved with activity; BP elevated and RN aware. Please refer to the flowsheet for vital signs taken during this treatment. After treatment:   []   Patient left in no apparent distress sitting up in chair  [x]   Patient left in no apparent distress in bed  [x]   Call bell left within reach  [x]   Nursing notified  [x]   Caregiver present  []   Bed alarm activated    COMMUNICATION/EDUCATION:   Communication/Collaboration:  [x]   Fall prevention education was provided and the patient/caregiver indicated understanding. [x]   Patient/family have participated as able and agree with findings and recommendations. []   Patient is unable to participate in plan of care at this time.   Findings and recommendations were discussed with: Registered Nurse    Thank you for this referral.  Savana Brody, PT, DPT   Time Calculation: 16 mins

## 2018-02-22 NOTE — PROGRESS NOTES
Verbal shift change report given to Gary Dowd RN (oncoming nurse) by Marianna Cleaning RN (offgoing nurse). Report included the following information SBAR, Kardex, Intake/Output, MAR, Recent Results and Med Rec Status.

## 2018-02-22 NOTE — DISCHARGE INSTRUCTIONS
HOME DISCHARGE Matt Hines / [id number] : 1955    Admission date: 2018 Discharge date: 2018 9:54 AM     Please bring this form with you to show your care provider at your follow-up appointment. Primary care provider:  Jarod Venegas MD    Discharging provider:  Aniya Rivas MD  - Family Medicine Resident  Stormy Harvey MD - Family Medicine Attending      You have been admitted to the hospital with the following diagnoses:    ACUTE DIAGNOSES:  Dizziness  Nausea  Numbness and tingling of left side of face  . . . . . . . . . . . . . . . . . . . . . . . . . . . . . . . . . . . . . . . . . . . . . . . . . . . . . . . . . . . . . . . . . . . . . .      Medication changes: Please review your After- Visit- Summary for full details on your medications. We started you on Norvasc 10 mg every day and Lipitor 40 mg every day  - continue aspirin 81 mg daily  - Resume Metformin 500 mg XL mg  every day  for control of the diabetes. FOLLOW-UP CARE RECOMMENDATIONS:    Appointments  Follow-up Information     Follow up With Details Comments Contact Olivia Doshi DO Nish On 2018 PCP follow up on Monday () at 10:45am [address]  [phone]               Follow-up tests needed: per provider    Pending test results: At the time of your discharge the following test results are still pending: Your ECHO was completed, but the final report is pending. Please follow up with your PCP. Please make sure you review these results with your outpatient follow-up provider(s). Specific symptoms to watch for: chest pain, shortness of breath, fever, chills, nausea, vomiting, diarrhea, change in mentation, falling, weakness, bleeding. DIET/what to eat:  Diabetic Diet    ACTIVITY:  Activity as tolerated      What to do if new or unexpected symptoms occur?     If you experience any of the above symptoms (or should other concerns or questions arise after discharge) please call your primary care physician. Return to the emergency room if you cannot get hold of your doctor. · It is very important that you keep your follow-up appointment(s). · Please bring discharge papers, medication list (and/or medication bottles) to your follow-up appointments for review by your outpatient provider(s). · Please check the list of medications and be sure it includes every medication (even non-prescription medications) that your provider wants you to take. · It is important that you take the medication exactly as they are prescribed. · Keep your medication in the bottles provided by the pharmacist and keep a list of the medication names, dosages, and times to be taken in your wallet. · Do not take other medications without consulting your doctor. · If you have any questions about your medications or other instructions, please talk to your nurse or care provider before you leave the hospital.     Information obtained by:     I understand that if any problems occur once I am at home I am to contact my physician. These instructions were explained to me and I had the opportunity to ask questions. I understand and acknowledge receipt of the instructions indicated above.                                                                                                                                                Physician's or R.N.'s Signature                                                                  Date/Time                                                                                                                                              Patient or Representative Signature                                                          Date/Time

## 2018-02-22 NOTE — ED NOTES
TRANSFER - OUT REPORT:    Verbal report given to Laurence on [address]  being transferred to [facility] for routine progression of care       Report consisted of patients Situation, Background, Assessment and   Recommendations(SBAR). Information from the following report(s) SBAR, ED Summary, [facility] and Recent Results was reviewed with the receiving nurse. Lines:       Opportunity for questions and clarification was provided.       Patient transported with:   [facility]

## 2018-03-07 ENCOUNTER — TELEPHONE (OUTPATIENT)
Dept: FAMILY MEDICINE CLINIC | Age: 63
End: 2018-03-07

## 2018-03-07 ENCOUNTER — OFFICE VISIT (OUTPATIENT)
Dept: FAMILY MEDICINE CLINIC | Age: 63
End: 2018-03-07

## 2018-03-07 VITALS
TEMPERATURE: 98.4 F | OXYGEN SATURATION: 97 % | DIASTOLIC BLOOD PRESSURE: 72 MMHG | HEART RATE: 72 BPM | WEIGHT: 173.6 LBS | BODY MASS INDEX: 30.76 KG/M2 | SYSTOLIC BLOOD PRESSURE: 135 MMHG | HEIGHT: 63 IN | RESPIRATION RATE: 18 BRPM

## 2018-03-07 DIAGNOSIS — Q21.12 PATENT FORAMEN OVALE: ICD-10-CM

## 2018-03-07 DIAGNOSIS — I10 ESSENTIAL HYPERTENSION: Primary | ICD-10-CM

## 2018-03-07 DIAGNOSIS — E04.2 MULTIPLE THYROID NODULES: ICD-10-CM

## 2018-03-07 DIAGNOSIS — G89.29 CHRONIC BILATERAL LOW BACK PAIN WITHOUT SCIATICA: ICD-10-CM

## 2018-03-07 DIAGNOSIS — E66.9 OBESITY (BMI 30-39.9): ICD-10-CM

## 2018-03-07 DIAGNOSIS — K21.9 GASTROESOPHAGEAL REFLUX DISEASE, ESOPHAGITIS PRESENCE NOT SPECIFIED: ICD-10-CM

## 2018-03-07 DIAGNOSIS — E78.2 MIXED HYPERLIPIDEMIA: ICD-10-CM

## 2018-03-07 DIAGNOSIS — E11.9 CONTROLLED TYPE 2 DIABETES MELLITUS WITHOUT COMPLICATION, WITHOUT LONG-TERM CURRENT USE OF INSULIN (HCC): ICD-10-CM

## 2018-03-07 DIAGNOSIS — M54.50 CHRONIC BILATERAL LOW BACK PAIN WITHOUT SCIATICA: ICD-10-CM

## 2018-03-07 DIAGNOSIS — C85.90 NON-HODGKIN'S LYMPHOMA, UNSPECIFIED BODY REGION, UNSPECIFIED NON-HODGKIN LYMPHOMA TYPE (HCC): ICD-10-CM

## 2018-03-07 DIAGNOSIS — M89.9 BONE LESION: ICD-10-CM

## 2018-03-07 NOTE — PROGRESS NOTES
Chief Complaint   Patient presents with    Transitions Of Care     1. Have you been to the ER, urgent care clinic since your last visit? Hospitalized since your last visit? Yes When: 2/21/18 Where: [facility] Reason for visit: Dizziness    2. Have you seen or consulted any other health care providers outside of the [address] [place] [address] since your last visit? Include any pap smears or colon screening.  No

## 2018-03-07 NOTE — MR AVS SNAPSHOT
[address] 
[phone] Patient: Nir Cook MRN: [id number] :1955 Visit Information Date & Time Provider Department Dept. Phone Encounter #  
 3/7/2018  3:00 PM Allyson Ramirez [phone] [id number] Follow-up Instructions Return in about 4 weeks (around 2018) for Medication follow up . Upcoming Health Maintenance Date Due  
 BREAST CANCER SCRN MAMMOGRAM 2018 EYE EXAM RETINAL OR DILATED Q1 3/7/2019* Pneumococcal 19-64 Highest Risk (3 of 3 - PCV13) 2018 FOOT EXAM Q1 2018 HEMOGLOBIN A1C Q6M 2018 MICROALBUMIN Q1 10/31/2018 LIPID PANEL Q1 2019 PAP AKA CERVICAL CYTOLOGY 2020 DTaP/Tdap/Td series (2 - Td) 2025 COLONOSCOPY 2027 *Topic was postponed. The date shown is not the original due date. Allergies as of 3/7/2018  Review Complete On: 3/7/2018 By: Ramon Lima No Known Allergies Current Immunizations  Reviewed on 2015 Name Date Tdap 2015 Not reviewed this visit You Were Diagnosed With   
  
 Codes Comments Essential hypertension    -  Primary ICD-10-CM: I10 
ICD-9-CM: 401.9 Patent foramen ovale     ICD-10-CM: Q21.1 ICD-9-CM: 745.5 Gastroesophageal reflux disease, esophagitis presence not specified     ICD-10-CM: K21.9 ICD-9-CM: 530.81 Multiple thyroid nodules     ICD-10-CM: E04.2 ICD-9-CM: 241.1 Controlled type 2 diabetes mellitus without complication, without long-term current use of insulin ([facility]     ICD-10-CM: E11.9 ICD-9-CM: 250.00 Mixed hyperlipidemia     ICD-10-CM: E78.2 ICD-9-CM: 272.2 Chronic bilateral low back pain without sciatica     ICD-10-CM: M54.5, G89.29 ICD-9-CM: 724.2, 338.29 Bone lesion     ICD-10-CM: M89.9 ICD-9-CM: 733.90   
 Non-Hodgkin's lymphoma, unspecified body region, unspecified non-Hodgkin lymphoma type ([facility]     ICD-10-CM: C85.90 ICD-9-CM: 202.80 Obesity (BMI 30-39. 9)     ICD-10-CM: E66.9 ICD-9-CM: 278.00 Vitals BP Pulse Temp Resp Height(growth percentile) Weight(growth percentile) 135/72 (BP 1 Location: Right arm, BP Patient Position: Sitting) 72 98.4 °F (36.9 °C) (Oral) 18 5' 3\" (1.6 m) 173 lb 9.6 oz (78.7 kg) LMP SpO2 BMI OB Status Smoking Status 01/18/2000 97% 30.75 kg/m2 Postmenopausal Never Smoker Vitals History BMI and BSA Data Body Mass Index Body Surface Area 30.75 kg/m 2 1.87 m 2 Preferred Pharmacy Pharmacy Name Phone [facility] [address] [place] [address] [phone] Your Updated Medication List  
  
   
This list is accurate as of 3/7/18  3:50 PM.  Always use your most recent med list. amLODIPine 10 mg tablet Commonly known as:  Jillyn Boast Take 1 Tab by mouth daily. aspirin 81 mg chewable tablet Take 1 Tab by mouth daily. atorvastatin 40 mg tablet Commonly known as:  LIPITOR Take 1 Tab by mouth daily. metFORMIN  mg tablet Commonly known as:  GLUCOPHAGE XR Take 1 Tab by mouth daily (with dinner). naltrexone-buPROPion 8-90 mg Tber ER tablet Commonly known as:  Sung Likes Week 1 1 tab PO QAM, Week 2 1QAM 1QHS, Week 3 2QAM 1 QHS, Week 4 & beyond 2QAM 2QHS  
  
 pantoprazole 40 mg tablet Commonly known as:  PROTONIX Take 40 mg by mouth daily. Prescriptions Sent to Pharmacy Refills  
 naltrexone-buPROPion (CONTRAVE) 8-90 mg TbER ER tablet 1 Sig: Week 1 1 tab PO QAM, Week 2 1QAM 1QHS, Week 3 2QAM 1 QHS, Week 4 & beyond 2QAM 2QHS Class: Normal  
 Pharmacy: [facility] [address] [place] [address] Ph #: [phone] Follow-up Instructions Return in about 4 weeks (around 4/4/2018) for Medication follow up . Patient Instructions One tablet (naltrexone 8 mg/bupropion 90 mg) once daily in the morning for 1 week; at week 2, increase to 1 tablet twice daily administered in the morning and evening and continue for 1 week; at week 3, increase to 2 tablets in the morning and 1 tablet in the evening and continue for 1 week; at week 4, increase to 2 tablets twice daily administered in the morning and evening and continue for the remainder of the treatment course. Naltrexone/Bupropion (By mouth) Bupropion Hydrochloride (xtv-BVFI-awr-on jovan-droe-KLOR-rajiv), Naltrexone Hydrochloride (nal-TREX-one jovan-droe-KLOR-rajiv) Used with diet and exercise to help you lose weight. Brand Name(s): Contrave There may be other brand names for this medicine. When This Medicine Should Not Be Used: This medicine is not right for everyone. Do not use it if you had an allergic reaction to naltrexone or bupropion, you are pregnant, or you have seizures, anorexia, or bulimia. How to Use This Medicine:  
Long Acting Tablet · Take your medicine as directed. Your dose may need to be changed several times to find what works best for you. · Swallow the extended-release tablet whole. Do not crush, break, or chew it. · It is best to take this medicine with food or milk. However, do not take this medicine with high-fat meals. This may increase your risk of seizures. · This medicine should come with a Medication Guide. Ask your pharmacist for a copy if you do not have one. · Missed dose: Skip the missed dose and go back to your regular dosing schedule. Never take extra medicine to make up for a missed dose. · Store the medicine in a closed container at room temperature, away from heat, moisture, and direct light. Drugs and Foods to Avoid: Ask your doctor or pharmacist before using any other medicine, including over-the-counter medicines, vitamins, and herbal products.  
· Do not use this medicine and an MAO inhibitor (MAOI) within 14 days of each other. Do not take this medicine if you are using or have used heroin or other narcotic drugs (including buprenorphine, codeine, methadone, or other habit-forming painkillers) within the past 7 to 10 days. Do not use naltrexone/bupropion if you are also using Zyban® to quit smoking or Aplenzin® or Wellbutrin® for depression, because they also contain bupropion. · Tell your doctor if you take barbiturates, benzodiazepines, antiseizure medicine, or sedatives, or if you have recently stopped taking them. · Some medicines and foods can affect how naltrexone/bupropion works. Tell your doctor if you use any of the following: ¨ Amantadine, amiloride, cimetidine, clopidogrel, dopamine, famotidine, levodopa, memantine, metformin, metoprolol, oxaliplatin, pindolol, ranitidine, theophylline, ticlopidine, varenicline ¨ Insulin or diabetes medicine ¨ Medicine to treat depression ¨ Medicine to treat mental illness (including haloperidol, risperidone, thioridazine) ¨ Medicine to treat heart rhythm problems (including flecainide, procainamide, propafenone) ¨ Medicine to treat HIV or AIDS (including efavirenz, lopinavir, ritonavir) ¨ Medicine for pain, diarrhea, cough, or colds Cookie Lard Steroid medicine · Do not drink alcohol while you are using this medicine. Warnings While Using This Medicine: · It is not safe to take this medicine during pregnancy. It could harm an unborn baby. Tell your doctor right away if you become pregnant. · Do not breastfeed while you are using this medicine, unless your doctor says it is okay. · Tell your doctor if you have kidney disease, liver disease, diabetes, glaucoma, heart disease, mental problems (including bipolar disorder), or high blood pressure. Tell your doctor if you have a history of drug addiction or if you drink alcohol. · For some children, teenagers, and young adults, this medicine may increase mental or emotional problems.  This may lead to thoughts of suicide and violence. Talk with your doctor right away if you have any thoughts or behavior changes that concern you. Tell your doctor if you or anyone in your family has a history of bipolar disorder or suicide attempts. · This medicine may cause the following problems: 
¨ Increased risk of seizures ¨ High blood pressure or heart rate ¨ Serious allergic and skin reactions ¨ Liver problems, including hepatitis ¨ Changes in mood or behavior ¨ Increased risk of hypoglycemia (low blood sugar) in patients with diabetes · You have a higher risk of accidental overdose, serious injury, or death if you use heroin or any other narcotic medicine while you are being treated with this medicine. Also, naltrexone prevents you from feeling the effects of heroin if you use it. · Do not stop using this medicine suddenly. Your doctor will need to slowly decrease your dose before you stop it completely. · Tell any doctor or dentist who treats you that you are using this medicine. This medicine may affect certain medical test results. · Your doctor will check your progress and the effects of this medicine at regular visits. Keep all appointments. · Keep all medicine out of the reach of children. Never share your medicine with anyone. Possible Side Effects While Using This Medicine:  
Call your doctor right away if you notice any of these side effects: · Allergic reaction: Itching or hives, swelling in your face or hands, swelling or tingling in your mouth or throat, chest tightness, trouble breathing · Blistering, peeling, red skin rash · Chest pain, trouble breathing, fast, slow, or pounding heartbeat · Dark urine or pale stools, nausea, vomiting, loss of appetite, stomach pain, yellow skin or eyes · Eye pain, vision changes, seeing halos around lights · Muscle or joint pain, fever with rash · Seeing or hearing things that are not there, feeling like people are against you · Seizures · Sudden increase in energy, racing thoughts, trouble sleeping · Thoughts of hurting yourself, worsening depression, severe agitation or confusion If you notice these less serious side effects, talk with your doctor: · Dry mouth 
· Headache, dizziness · Nausea, constipation, diarrhea If you notice other side effects that you think are caused by this medicine, tell your doctor. Call your doctor for medical advice about side effects. You may report side effects to FDA at 8-562-OMM-8788 © 2017 [facility] Information is for End User's use only and may not be sold, redistributed or otherwise used for commercial purposes. The above information is an  only. It is not intended as medical advice for individual conditions or treatments. Talk to your doctor, nurse or pharmacist before following any medical regimen to see if it is safe and effective for you. Introducing [facility]! Dear Heather Harper: Thank you for requesting a [email or web address] account. Our records indicate that you already have an active [email or web address] account. You can access your account anytime at [email or web address] Did you know that you can access your hospital and ER discharge instructions at any time in [email or web address]? You can also review all of your test results from your hospital stay or ER visit. Additional Information If you have questions, please visit the Frequently Asked Questions section of the [email or web address] website at [email or web address] Remember, [email or web address] is NOT to be used for urgent needs. For medical emergencies, dial 911. Now available from your iPhone and Android! Please provide this summary of care documentation to your next provider. Your primary care clinician is listed as Richelle Collins. If you have any questions after today's visit, please call [phone].

## 2018-03-07 NOTE — TELEPHONE ENCOUNTER
Per fax from pharmacy, in regards to the contrave ER 8-90 MG tablet, insurance prefers Benzphetamine HCL, Diethylpropion HCL, or Phentermine HCL. Or submit a PA at [phone].  See scanned document attached

## 2018-03-07 NOTE — PROGRESS NOTES
History of Present Illness:     Chief Complaint   Patient presents with   Violet Atkins     Pt is a 65y.o. year old female    Presents to clinic for hospital follow up. Admitted to [facility] on 2/21-2/22/18 for CVA rule out. At that time, she had a CT head and MRI that were both negative, thus supporting the diagnosis of a TIA. Also noted to have HTN during that hospitalization and she was discharged on Amlodipine. TIA - Since hospitalization, her facial numbness and tingling has resolved. She did have recurrence 1-2 times, but noted to have elevated BPs when that happened. Currently taking Amlodipine but thinks it is causing her stomach to hurt and have diarrhea. Mother had an intolerance to it. She has follow up with neurology planned. HTN - BPs now controlled. Taking Amlodipine. DM - Now taking her Metformin again. She recently resumed it at 500mg once daily. A1c was 7.3%. GERD - Taking Pantoprozole but it is not helping her upper abdominal pain. Hx of patent foramen ovale - Repeat echo in hospital was stable. HLD - Now taking statin medication and daily ASA. Obesity - Tried Bee pollen in the past that has helped. Currently going to the gym 1-2 times per week. Working on diet; less fast foods, drinking water. Thyroid nodules - Followed by endocrine. Bone lesion in spine - No additional follow up since 2016 hospitalization. Patient states it has not been a problem for her so she didn't think much of it. Past Medical History:   Diagnosis Date    Anxiety     Diabetes mellitus ([facility] [address]     Dyslipidemia     Headache     Hodgkin's lymphoma ([facility] [address] 1985    Nodule removed from throat    Hypertension     Low back pain 1/19/2012    MRI 3/10/14 revealed mild multilevel disk and facet degenerative change     Lumbar disc disease 03/2014    Thyroid nodule     Followed by endocrine, Dr. Narendra Hinds.       Ulcer [facility])          Current Outpatient Prescriptions on File Prior to Visit   Medication Sig Dispense Refill    amLODIPine (NORVASC) 10 mg tablet Take 1 Tab by mouth daily. 30 Tab 0    atorvastatin (LIPITOR) 40 mg tablet Take 1 Tab by mouth daily. 30 Tab 0    aspirin 81 mg chewable tablet Take 1 Tab by mouth daily. 60 Tab 0    metFORMIN ER (GLUCOPHAGE XR) 500 mg tablet Take 1 Tab by mouth daily (with dinner). 30 Tab 0    pantoprazole (PROTONIX) 40 mg tablet Take 40 mg by mouth daily. No current facility-administered medications on file prior to visit. Allergies:  No Known Allergies      Review of Systems:  Denies fever, chills, sweats  Denies chest pain, JOYA, palpitations, LE edema  Denies cough, sputum production, SOB, pleuritic chest pain, wheezing  Denies numbness/ tingling/ weakness in extremities      Objective:     Vitals:    03/07/18 1505   BP: 135/72   Pulse: 72   Resp: 18   Temp: 98.4 °F (36.9 °C)   TempSrc: Oral   SpO2: 97%   Weight: 173 lb 9.6 oz (78.7 kg)   Height: 5' 3\" (1.6 m)       Physical Exam:  General appearance - alert, well appearing, and in no distress  Chest - clear to auscultation, no wheezes, rales or rhonchi, symmetric air entry  Heart - normal rate, regular rhythm, normal S1, S2, no murmurs, rubs, clicks or gallops  Neurological - alert, oriented, normal speech, no focal findings or movement disorder noted, cranial nerves II through XII intact, motor and sensory grossly normal bilaterally      Recent Labs:  No results found for this or any previous visit (from the past 12 hour(s)). Assessment and Plan:   Pt is a 65y.o. year old female,      ICD-10-CM ICD-9-CM    1. Essential hypertension I10 401.9    2. Patent foramen ovale Q21.1 745.5    3. Gastroesophageal reflux disease, esophagitis presence not specified K21.9 530.81    4. Multiple thyroid nodules E04.2 241.1    5. Controlled type 2 diabetes mellitus without complication, without long-term current use of insulin (HCC) E11.9 250.00    6.  Mixed hyperlipidemia E78.2 272.2 7. Chronic bilateral low back pain without sciatica M54.5 724.2     G89.29 338.29    8. Bone lesion M89.9 733.90    9. Non-Hodgkin's lymphoma, unspecified body region, unspecified non-Hodgkin lymphoma type ([facility] [place] [address] C85.90 202.80    10. Obesity (BMI 30-39. 9) E66.9 278.00 naltrexone-buPROPion (CONTRAVE) 8-90 mg TbER ER tablet     Continue current medication regimen    Trial Contrave for weight loss  Counseled on diet and exercise    T 1 abnormality on MRI  Evaluated by Dr. Shilpi Rodriguez during 2016 hospitalization   Biopsy was never performed as it was declined by patient  Will need to follow up with Hem/Onc to discuss if further testing needs to be done    Follow up planned for 4 wks for med eval.  Will discuss spine lesion and follow up at that time. Cat Grajead MD      I have discussed the diagnosis with the patient and the intended plan as seen in the above orders. The patient has received an after-visit summary and questions were answered concerning future plans.

## 2018-03-07 NOTE — PATIENT INSTRUCTIONS
One tablet (naltrexone 8 mg/bupropion 90 mg) once daily in the morning for 1 week; at week 2, increase to 1 tablet twice daily administered in the morning and evening and continue for 1 week; at week 3, increase to 2 tablets in the morning and 1 tablet in the evening and continue for 1 week; at week 4, increase to 2 tablets twice daily administered in the morning and evening and continue for the remainder of the treatment course. Naltrexone/Bupropion (By mouth)   Bupropion Hydrochloride (wzd-JJFC-qok-on jovan-droe-KLOR-rajiv), Naltrexone Hydrochloride (nal-TREX-one jovan-droe-KLOR-rajiv)  Used with diet and exercise to help you lose weight. Brand Name(s): Contrave   There may be other brand names for this medicine. When This Medicine Should Not Be Used: This medicine is not right for everyone. Do not use it if you had an allergic reaction to naltrexone or bupropion, you are pregnant, or you have seizures, anorexia, or bulimia. How to Use This Medicine:   Long Acting Tablet  · Take your medicine as directed. Your dose may need to be changed several times to find what works best for you. · Swallow the extended-release tablet whole. Do not crush, break, or chew it. · It is best to take this medicine with food or milk. However, do not take this medicine with high-fat meals. This may increase your risk of seizures. · This medicine should come with a Medication Guide. Ask your pharmacist for a copy if you do not have one. · Missed dose: Skip the missed dose and go back to your regular dosing schedule. Never take extra medicine to make up for a missed dose. · Store the medicine in a closed container at room temperature, away from heat, moisture, and direct light. Drugs and Foods to Avoid:   Ask your doctor or pharmacist before using any other medicine, including over-the-counter medicines, vitamins, and herbal products. · Do not use this medicine and an MAO inhibitor (MAOI) within 14 days of each other.  Do not take this medicine if you are using or have used heroin or other narcotic drugs (including buprenorphine, codeine, methadone, or other habit-forming painkillers) within the past 7 to 10 days. Do not use naltrexone/bupropion if you are also using Zyban® to quit smoking or Aplenzin® or Wellbutrin® for depression, because they also contain bupropion. · Tell your doctor if you take barbiturates, benzodiazepines, antiseizure medicine, or sedatives, or if you have recently stopped taking them. · Some medicines and foods can affect how naltrexone/bupropion works. Tell your doctor if you use any of the following:  ¨ Amantadine, amiloride, cimetidine, clopidogrel, dopamine, famotidine, levodopa, memantine, metformin, metoprolol, oxaliplatin, pindolol, ranitidine, theophylline, ticlopidine, varenicline  ¨ Insulin or diabetes medicine  ¨ Medicine to treat depression  ¨ Medicine to treat mental illness (including haloperidol, risperidone, thioridazine)  ¨ Medicine to treat heart rhythm problems (including flecainide, procainamide, propafenone)  ¨ Medicine to treat HIV or AIDS (including efavirenz, lopinavir, ritonavir)  ¨ Medicine for pain, diarrhea, cough, or colds  ¨ Steroid medicine  · Do not drink alcohol while you are using this medicine. Warnings While Using This Medicine:   · It is not safe to take this medicine during pregnancy. It could harm an unborn baby. Tell your doctor right away if you become pregnant. · Do not breastfeed while you are using this medicine, unless your doctor says it is okay. · Tell your doctor if you have kidney disease, liver disease, diabetes, glaucoma, heart disease, mental problems (including bipolar disorder), or high blood pressure. Tell your doctor if you have a history of drug addiction or if you drink alcohol. · For some children, teenagers, and young adults, this medicine may increase mental or emotional problems. This may lead to thoughts of suicide and violence.  Talk with your doctor right away if you have any thoughts or behavior changes that concern you. Tell your doctor if you or anyone in your family has a history of bipolar disorder or suicide attempts. · This medicine may cause the following problems:  ¨ Increased risk of seizures  ¨ High blood pressure or heart rate  ¨ Serious allergic and skin reactions  ¨ Liver problems, including hepatitis  ¨ Changes in mood or behavior  ¨ Increased risk of hypoglycemia (low blood sugar) in patients with diabetes  · You have a higher risk of accidental overdose, serious injury, or death if you use heroin or any other narcotic medicine while you are being treated with this medicine. Also, naltrexone prevents you from feeling the effects of heroin if you use it. · Do not stop using this medicine suddenly. Your doctor will need to slowly decrease your dose before you stop it completely. · Tell any doctor or dentist who treats you that you are using this medicine. This medicine may affect certain medical test results. · Your doctor will check your progress and the effects of this medicine at regular visits. Keep all appointments. · Keep all medicine out of the reach of children. Never share your medicine with anyone.   Possible Side Effects While Using This Medicine:   Call your doctor right away if you notice any of these side effects:  · Allergic reaction: Itching or hives, swelling in your face or hands, swelling or tingling in your mouth or throat, chest tightness, trouble breathing  · Blistering, peeling, red skin rash  · Chest pain, trouble breathing, fast, slow, or pounding heartbeat  · Dark urine or pale stools, nausea, vomiting, loss of appetite, stomach pain, yellow skin or eyes  · Eye pain, vision changes, seeing halos around lights  · Muscle or joint pain, fever with rash  · Seeing or hearing things that are not there, feeling like people are against you  · Seizures  · Sudden increase in energy, racing thoughts, trouble sleeping  · Thoughts of hurting yourself, worsening depression, severe agitation or confusion  If you notice these less serious side effects, talk with your doctor:   · Dry mouth  · Headache, dizziness  · Nausea, constipation, diarrhea  If you notice other side effects that you think are caused by this medicine, tell your doctor. Call your doctor for medical advice about side effects. You may report side effects to FDA at 4-683-DBV-8153  © 2017 [address] Information is for End User's use only and may not be sold, redistributed or otherwise used for commercial purposes. The above information is an  only. It is not intended as medical advice for individual conditions or treatments. Talk to your doctor, nurse or pharmacist before following any medical regimen to see if it is safe and effective for you.

## 2018-03-12 ENCOUNTER — TELEPHONE (OUTPATIENT)
Dept: FAMILY MEDICINE CLINIC | Age: 63
End: 2018-03-12

## 2018-03-12 NOTE — TELEPHONE ENCOUNTER
Per call from [facility] pharmacy, insurance will only cover 70 pills and quantity comes as 120 pills. Asking if okay to increase?     Call [phone]            naltrexone-buPROPion (CONTRAVE) 8-90 mg TbER ER tablet      thanks

## 2018-03-13 DIAGNOSIS — E66.9 OBESITY (BMI 30-39.9): ICD-10-CM

## 2018-03-13 NOTE — TELEPHONE ENCOUNTER
Dr. Aureliano Philip  Received: Today       James Espinoza [facility]                     Patient needs a prescription for Contrave 70 sent to the [facility] pharmacy at [phone].  The patient's number is [phone].

## 2018-03-15 ENCOUNTER — TELEPHONE (OUTPATIENT)
Dept: FAMILY MEDICINE CLINIC | Age: 63
End: 2018-03-15

## 2018-03-15 NOTE — TELEPHONE ENCOUNTER
Prior Authorization completed 3/14/18, was awaiting Key number from pharmacy, they stated they didn't have a key number to do through [employer].  Completed through fax, awaiting approval or denial.

## 2018-03-15 NOTE — TELEPHONE ENCOUNTER
Prior Authorization completed 3/14/18, was awaiting Key number from pharmacy, they stated they didn't have a key number to do through [employer].  Completed through fax, awaiting approval or denial.

## 2018-03-29 ENCOUNTER — TELEPHONE (OUTPATIENT)
Dept: FAMILY MEDICINE CLINIC | Age: 63
End: 2018-03-29

## 2018-04-02 RX ORDER — ATORVASTATIN CALCIUM 40 MG/1
TABLET, FILM COATED ORAL
Qty: 30 TAB | Refills: 0 | Status: SHIPPED | OUTPATIENT
Start: 2018-04-02 | End: 2018-06-07 | Stop reason: SDUPTHER

## 2018-04-02 RX ORDER — AMLODIPINE BESYLATE 10 MG/1
TABLET ORAL
Qty: 30 TAB | Refills: 0 | Status: SHIPPED | OUTPATIENT
Start: 2018-04-02 | End: 2018-06-07 | Stop reason: SDUPTHER

## 2018-04-02 RX ORDER — METFORMIN HYDROCHLORIDE 500 MG/1
TABLET, EXTENDED RELEASE ORAL
Qty: 30 TAB | Refills: 0 | Status: SHIPPED | OUTPATIENT
Start: 2018-04-02 | End: 2018-06-07 | Stop reason: SDUPTHER

## 2018-06-04 ENCOUNTER — TELEPHONE (OUTPATIENT)
Dept: FAMILY MEDICINE CLINIC | Age: 63
End: 2018-06-04

## 2018-06-04 DIAGNOSIS — R45.89 DEPRESSED MOOD: Primary | ICD-10-CM

## 2018-06-04 NOTE — TELEPHONE ENCOUNTER
Called patient to clarify reasoning for a referral to psychiatrist. In which she states she is extremely depressed right now, she states she have a lot going on. She states she feels she needs to check herself in to the hospital and she states she don't know who to go to see or where. Informed patient I was sending Dr Elina Oates the referral request and if she ever feels she needs to be seen to go to the nearest hospital to seek assistance. Patient verbalized understanding.

## 2018-06-04 NOTE — TELEPHONE ENCOUNTER
----- Message from Chelsie Leigh sent at 6/4/2018  2:50 PM EDT -----  Regarding: Dr Marx/Phone  Pt is asking for a call back today. She needs a referral to a psychiatrist and she needs this ASAP. Can any dr give her a name? Her number is [phone].

## 2018-06-05 ENCOUNTER — TELEPHONE (OUTPATIENT)
Dept: FAMILY MEDICINE CLINIC | Age: 63
End: 2018-06-05

## 2018-06-07 ENCOUNTER — OFFICE VISIT (OUTPATIENT)
Dept: FAMILY MEDICINE CLINIC | Age: 63
End: 2018-06-07

## 2018-06-07 VITALS
HEIGHT: 63 IN | OXYGEN SATURATION: 98 % | TEMPERATURE: 98.7 F | WEIGHT: 169 LBS | RESPIRATION RATE: 16 BRPM | HEART RATE: 81 BPM | BODY MASS INDEX: 29.95 KG/M2 | SYSTOLIC BLOOD PRESSURE: 160 MMHG | DIASTOLIC BLOOD PRESSURE: 78 MMHG

## 2018-06-07 DIAGNOSIS — F41.9 ANXIETY AND DEPRESSION: Primary | ICD-10-CM

## 2018-06-07 DIAGNOSIS — E11.9 CONTROLLED TYPE 2 DIABETES MELLITUS WITHOUT COMPLICATION, WITHOUT LONG-TERM CURRENT USE OF INSULIN (HCC): ICD-10-CM

## 2018-06-07 DIAGNOSIS — I10 ESSENTIAL HYPERTENSION: ICD-10-CM

## 2018-06-07 DIAGNOSIS — Z63.6 CAREGIVER BURDEN: ICD-10-CM

## 2018-06-07 DIAGNOSIS — F32.A ANXIETY AND DEPRESSION: Primary | ICD-10-CM

## 2018-06-07 RX ORDER — AMLODIPINE BESYLATE 10 MG/1
TABLET ORAL
Qty: 90 TAB | Refills: 0 | Status: SHIPPED | OUTPATIENT
Start: 2018-06-07 | End: 2018-06-29 | Stop reason: SDUPTHER

## 2018-06-07 RX ORDER — METFORMIN HYDROCHLORIDE 500 MG/1
TABLET, EXTENDED RELEASE ORAL
Qty: 90 TAB | Refills: 0 | Status: SHIPPED | OUTPATIENT
Start: 2018-06-07 | End: 2020-03-09 | Stop reason: SDUPTHER

## 2018-06-07 RX ORDER — FLUOXETINE HYDROCHLORIDE 20 MG/1
20 CAPSULE ORAL DAILY
Qty: 30 CAP | Refills: 0 | Status: SHIPPED | OUTPATIENT
Start: 2018-06-07 | End: 2018-06-21 | Stop reason: SDUPTHER

## 2018-06-07 RX ORDER — ATORVASTATIN CALCIUM 40 MG/1
TABLET, FILM COATED ORAL
Qty: 90 TAB | Refills: 0 | Status: SHIPPED | OUTPATIENT
Start: 2018-06-07 | End: 2018-06-29 | Stop reason: SDUPTHER

## 2018-06-07 SDOH — SOCIAL STABILITY - SOCIAL INSECURITY: DEPENDENT RELATIVE NEEDING CARE AT HOME: Z63.6

## 2018-06-07 NOTE — PROGRESS NOTES
Orlando Gonzalez is an 65 y.o. female who presents for   Chief Complaint   Patient presents with    Depression     X wk. Was at \"wit's end\" a few days ago. Was looking for a place to check in to for mental health. 79 yo mother is living here and patient is sole caretaker. Lost job in March. Has marital problems. Is a  and has a lot of burdens from her Mandaen. Was previously on prozac 40 mg but stopped as she was feeling better. Now she thinks that she needs to be back on it. Reports anxiety as well -- feels like her heart races and arms feel strange. Had SI on Monday without a plan. No hx of suicide attempt. Allergies - reviewed:   No Known Allergies    Medications - reviewed:   Current Outpatient Prescriptions   Medication Sig    FLUoxetine (PROZAC) 20 mg capsule Take 1 Cap by mouth daily.  metFORMIN ER (GLUCOPHAGE XR) 500 mg tablet TAKE 1 TABLET BY MOUTH ONCE DAILY WITH DINNER    atorvastatin (LIPITOR) 40 mg tablet TAKE 1 TABLET BY MOUTH ONCE DAILY    amLODIPine (NORVASC) 10 mg tablet TAKE 1 TABLET BY MOUTH ONCE DAILY    aspirin 81 mg chewable tablet Take 1 Tab by mouth daily.  naltrexone-buPROPion (CONTRAVE) 8-90 mg TbER ER tablet Week 1 1 tab PO QAM, Week 2 1QAM 1QHS, Week 3 2QAM 1 QHS, Week 4 & beyond 2QAM 2QHS    pantoprazole (PROTONIX) 40 mg tablet Take 40 mg by mouth daily. No current facility-administered medications for this visit. Past Medical History - reviewed:  Past Medical History:   Diagnosis Date    Anxiety     Diabetes mellitus ([facility] [address]     Dyslipidemia     Headache     Hodgkin's lymphoma ([facility] [address] 1985    Nodule removed from throat    Hypertension     Low back pain 1/19/2012    MRI 3/10/14 revealed mild multilevel disk and facet degenerative change     Lumbar disc disease 03/2014    Thyroid nodule     Followed by endocrine, Dr. Kyree Stoll.       Ulcer        ROS  PSYCH: depression      Physical Exam  Visit Vitals    /78    Pulse 81    Temp 98.7 °F (37.1 °C)    Resp 16    Ht 5' 3\" (1.6 m)    Wt 169 lb (76.7 kg)    LMP 01/18/2000    SpO2 98%    BMI 29.94 kg/m2       General appearance - alert, cooperative, tearful at times  Neurological - alert, oriented, normal speech, no focal findings or movement disorder noted      Assessment/Plan    ICD-10-CM ICD-9-CM    1. Anxiety and depression F41.9 300.00 FLUoxetine (PROZAC) 20 mg capsule    F32.9 311    2. Caregiver burden Z63.6 V61.49 FLUoxetine (PROZAC) 20 mg capsule   3. Essential hypertension I10 401.9 amLODIPine (NORVASC) 10 mg tablet   4. 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 Has some marital discord as well. Had SI without plan, no hx of suicidal attempts. [facility] start prozac, and gave patient referral information for psychiatrists in the area, including a 24 hour hotline. Advised that she return to clinic in 2 weeks (if not sooner) to reevaluate. Follow-up Disposition: Not on File      I have discussed the diagnosis with the patient and the intended plan as seen in the above orders. The patient has received an after-visit summary and questions were answered concerning future plans. I have discussed medication side effects and warnings with the patient as well. Jamie Alexander MD  Family Medicine Resident    Patient discussed with Dr. Radha Mcnally, attending physician.

## 2018-06-07 NOTE — PATIENT INSTRUCTIONS
[facility] Board:  [address], [id number] [place] [address] Ln  24 hour crisis line: [phone]  Daytime number: [phone]  Psychiatry, counseling, case management, drug/alcohol addiction    [address] (Dr. Cardenas Pac): [employer]. com/  [address]. [address], 1 [facility], [address] [place] [address]  Phone: [phone])  Fax: [phone]  Office Hours:  Mon: 10:00 am to 4:00 pm  Tue: 8:00 am to 6:00 pm  Wed-Thur: 8:00 am to 7:00 pm    [facility]: [email or web address]. com/  [place] ([phone]),  [place] ([phone])  [place] ([phone])  [facility] ([phone])    [facility]  Addiction/Substance abuse   [place]: [phone]  [place]: [facility]: [employer].  [address], 29 [facility], [facility] 23  [address] [facility], [address] [place] [address]  Ph. [phone] Fax [phone]    [facility] Alfredo Burgess [place]: [email or web address]  [address] ([phone])  [address] ([phone])    [facility] Psychiatry - [address] 2718 [facility], [facility]5 [facility], [address]  Phone: [phone]  Fax: [phone]    [facility] [facility] 24  1301 [facility], 1000 [facility], [address]  Phone: [phone]  Fax: [phone]    Khloe Vizcarra M.D. 1 [facility], [address] [place] [address], [address]    Anell Leyden, M.D. Christin Cocking  [phone]    [address], [address] [place] [address], [address] [place] [address]  [phone]

## 2018-06-07 NOTE — MR AVS SNAPSHOT
[address] 
[phone] Patient: Miranda Berry MRN: [id number] :1955 Visit Information Date & Time Provider Department Dept. Phone Encounter #  
 2018  2:50 PM Lenore Padron, Allyson Dalal [phone] [id number] Upcoming Health Maintenance Date Due  
 BREAST CANCER SCRN MAMMOGRAM 2018 Pneumococcal 19-64 Highest Risk (3 of 3 - PCV13) 2018 FOOT EXAM Q1 2018 HEMOGLOBIN A1C Q6M 2018 MICROALBUMIN Q1 10/31/2018 LIPID PANEL Q1 2019 EYE EXAM RETINAL OR DILATED Q1 3/19/2019 PAP AKA CERVICAL CYTOLOGY 2020 DTaP/Tdap/Td series (2 - Td) 2025 COLONOSCOPY 2027 Allergies as of 2018  Review Complete On: 2018 By: Lenore Padron MD  
 No Known Allergies Current Immunizations  Reviewed on 2015 Name Date Tdap 2015 Not reviewed this visit You Were Diagnosed With   
  
 Codes Comments Anxiety and depression    -  Primary ICD-10-CM: F41.9, F32.9 ICD-9-CM: 300.00, 311 Caregiver burden     ICD-10-CM: Z63.6 ICD-9-CM: V61.49 Vitals BP Pulse Temp Resp Height(growth percentile) Weight(growth percentile) 160/78 81 98.7 °F (37.1 °C) 16 5' 3\" (1.6 m) 169 lb (76.7 kg) LMP SpO2 BMI OB Status Smoking Status 2000 98% 29.94 kg/m2 Postmenopausal Never Smoker Vitals History BMI and BSA Data Body Mass Index Body Surface Area  
 29.94 kg/m 2 1.85 m 2 [facility]erred [facility]acy [facility] [place] [address] [phone] Your Updated Medication List  
  
   
This list is accurate as of 18  3:52 PM.  Always use your most recent med list. amLODIPine 10 mg tablet Commonly known as:  Mary Andrew TAKE 1 TABLET BY MOUTH ONCE DAILY  
  
 aspirin 81 mg chewable tablet Take 1 Tab by mouth daily. atorvastatin 40 mg tablet Commonly known as:  LIPITOR  
TAKE 1 TABLET BY MOUTH ONCE DAILY FLUoxetine 20 mg capsule Commonly known as:  PROzac Take 1 Cap by mouth daily. metFORMIN  mg tablet Commonly known as:  GLUCOPHAGE XR  
TAKE 1 TABLET BY MOUTH ONCE DAILY WITH DINNER  
  
 naltrexone-buPROPion 8-90 mg Tber ER tablet Commonly known as:  [place] Week 1 1 tab PO QAM, Week 2 1QAM 1QHS, Week 3 2QAM 1 QHS, Week 4 & beyond 2QAM 2QHS  
  
 pantoprazole 40 mg tablet Commonly known as:  PROTONIX Take 40 mg by mouth daily. Prescriptions Sent to Pharmacy Refills FLUoxetine (PROZAC) 20 mg capsule 0 Sig: Take 1 Cap by mouth daily. Class: Normal  
 Pharmacy: [address] Ph #: [phone] Route: Oral  
  
Patient Instructions [facility] [employer] Board: 
[address] [place] [address], [address] [place] [address] 
24 hour crisis line: [phone] Daytime number: [phone] Psychiatry, counseling, case management, drug/alcohol addiction [address] (Dr. Tracy Escobar): [email or web address] [email or web address]. com/ 
[address] [address] Phone: [phone]) Fax: [phone] Office Hours: 
Mon: 10:00 am to 4:00 pm 
Tue: 8:00 am to 6:00 pm 
Wed-Thur: 8:00 am to 7:00 pm 
 
[facility] Counseling: [email or web address]/ 
[place] ([phone]), [facility] ([phone]) [place] ([phone]) [place] ([phone]) [facility] Addiction/Substance abuse [place]: [phone] [place]: [phone] [facility]: Scale Computing.Wireless Seismic.cy 
[address] , [address] [place] [address], [address] Ph. [phone] Fax [phone] [facility] Alfredo Bourgeois: [email or web address] 
[employer] ([phone]) Butchloretta do [place] ([phone]) [address] [place] [address], [address] [place], [address] Phone: [phone] Fax: [phone] [address] [facility] Medical Office Building, [address] [place], [address] Phone: [phone] Fax: [phone] Daksha Naik M.D. [place], [address] Edgar Reza M.D. [facility] [phone] [address] [place] [address], [address] [place], [address] 
[phone] Introducing [facility] SERVICES! Dear Lolis Reed: Thank you for requesting a [employer] account. Our records indicate that you already have an active [employer] account. You can access your account anytime at [email or web address] Did you know that you can access your hospital and ER discharge instructions at any time in [employer] Media Gateways? You can also review all of your test results from your hospital stay or ER visit. Additional Information If you have questions, please visit the Frequently Asked Questions section of the [employer] website at [email or web address] Remember, [employer] Gateways is NOT to be used for urgent needs. For medical emergencies, dial 911. Now available from your iPhone and Android! Please provide this summary of care documentation to your next provider. Your primary care clinician is listed as Worley Crigler. If you have any questions after today's visit, please call [phone].

## 2018-06-21 ENCOUNTER — OFFICE VISIT (OUTPATIENT)
Dept: FAMILY MEDICINE CLINIC | Age: 63
End: 2018-06-21

## 2018-06-21 VITALS
WEIGHT: 164.7 LBS | HEART RATE: 87 BPM | HEIGHT: 63 IN | TEMPERATURE: 98.4 F | DIASTOLIC BLOOD PRESSURE: 76 MMHG | SYSTOLIC BLOOD PRESSURE: 123 MMHG | OXYGEN SATURATION: 97 % | BODY MASS INDEX: 29.18 KG/M2 | RESPIRATION RATE: 18 BRPM

## 2018-06-21 DIAGNOSIS — F41.9 ANXIETY AND DEPRESSION: ICD-10-CM

## 2018-06-21 DIAGNOSIS — Z63.6 CAREGIVER BURDEN: ICD-10-CM

## 2018-06-21 DIAGNOSIS — F32.A ANXIETY AND DEPRESSION: ICD-10-CM

## 2018-06-21 RX ORDER — FLUOXETINE HYDROCHLORIDE 40 MG/1
40 CAPSULE ORAL DAILY
Qty: 30 CAP | Refills: 0 | Status: SHIPPED | OUTPATIENT
Start: 2018-06-21 | End: 2018-08-07 | Stop reason: SDUPTHER

## 2018-06-21 SDOH — SOCIAL STABILITY - SOCIAL INSECURITY: DEPENDENT RELATIVE NEEDING CARE AT HOME: Z63.6

## 2018-06-21 NOTE — PROGRESS NOTES
Identified Patient with two Patient identifiers (Name and ). Two Patient Identifiers confirmed. Reviewed record in preparation for visit and have obtained necessary documentation. Chief Complaint   Patient presents with    Anxiety     Started on Prozac     Depression       Visit Vitals    /76 (BP 1 Location: Left arm, BP Patient Position: Sitting)    Pulse 87    Temp 98.4 °F (36.9 °C) (Oral)    Resp 18    Ht 5' 3\" (1.6 m)    Wt 164 lb 11.2 oz (74.7 kg)    SpO2 97%    BMI 29.18 kg/m2       1. Have you been to the ER, urgent care clinic since your last visit? Hospitalized since your last visit? No    2. Have you seen or consulted any other health care providers outside of the [address] [place] [address] since your last visit? Include any pap smears or colon screening.  No

## 2018-06-21 NOTE — PROGRESS NOTES
Catina Ward is an 65 y.o. female who presents for   Chief Complaint   Patient presents with    Anxiety     Started on Prozac     Depression     Started PREP at [facility] SERVICES. Currently on prozac 20 mg, no side effects. Denies insomnia or GI upset. Would like to go up to 40 mg -- previously on 40. Has not contacted counseling or psychiatry. Has been taking an hour for herself since the last visit. PHQ-9:  Anhedonia: 1  Depression: 1  Sleep: 1  Energy: 3  Appetite: 0  Guilt: 1  Concentration: 0  Psychomotor: 0  SI: 1  Total: 8    Had 1 day of SI, no plan. Last Friday, no inciting event but felt overwhelmed thinking about life stressors. WALTER-7  Anxious: 3  Control worryin  Worrying too much: 2  Relaxin  Restless: 0  Annoyed: 3  Fear: 2  Total: 13    Allergies - reviewed:   No Known Allergies      Medications - reviewed:   Current Outpatient Prescriptions   Medication Sig    FLUoxetine (PROZAC) 20 mg capsule Take 1 Cap by mouth daily.  metFORMIN ER (GLUCOPHAGE XR) 500 mg tablet TAKE 1 TABLET BY MOUTH ONCE DAILY WITH DINNER    atorvastatin (LIPITOR) 40 mg tablet TAKE 1 TABLET BY MOUTH ONCE DAILY    amLODIPine (NORVASC) 10 mg tablet TAKE 1 TABLET BY MOUTH ONCE DAILY    naltrexone-buPROPion (CONTRAVE) 8-90 mg TbER ER tablet Week 1 1 tab PO QAM, Week 2 1QAM 1QHS, Week 3 2QAM 1 QHS, Week 4 & beyond 2QAM 2QHS    aspirin 81 mg chewable tablet Take 1 Tab by mouth daily.  pantoprazole (PROTONIX) 40 mg tablet Take 40 mg by mouth daily. No current facility-administered medications for this visit.         Past Medical History - reviewed:  Past Medical History:   Diagnosis Date    Anxiety     [facility]     Dyslipidemia     Headache     Hodgkin's lymphoma ([facility] 1985    Nodule removed from throat    Hypertension     Low back pain 2012    MRI 3/10/14 revealed mild multilevel disk and facet degenerative change     Lumbar disc disease 2014    Thyroid nodule     Followed by endocrine, Dr. Jessica Marlow.  Ulcer        Social History - reviewed:  Social History     Social History    Marital status:      Spouse name: N/A    Number of children: N/A    Years of education: N/A     Occupational History    Not on file. Social History Main Topics    Smoking status: Never Smoker    Smokeless tobacco: Never Used    Alcohol use No    Drug use: No    Sexual activity: Not Currently     Birth control/ protection: None     Other Topics Concern    Not on file     Social History Narrative       ROS  GI: Denies: abdominal pain  NEURO: Denies: insomnia  PSYCH: anxiety      Physical Exam  Visit Vitals    /76 (BP 1 Location: Left arm, BP Patient Position: Sitting)    Pulse 87    Temp 98.4 °F (36.9 °C) (Oral)    Resp 18    Ht 5' 3\" (1.6 m)    Wt 164 lb 11.2 oz (74.7 kg)    LMP 01/18/2000    SpO2 97%    BMI 29.18 kg/m2       General appearance - alert, well appearing, slightly depressed affect but normal emotional response  Neurological - alert, oriented, normal speech, no focal findings or movement disorder noted    Assessment/Plan    ICD-10-CM ICD-9-CM    1. Anxiety and depression F41.9 300.00 FLUoxetine (PROZAC) 40 mg capsule    F32.9 311    2. Caregiver burden Z63.6 V61.49 FLUoxetine (PROZAC) 40 mg capsule     Patient appears to have taken several steps in a positive direction by taking medication, exercising, and taking 1 hour for herself every day to help with stress/anxiety. 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 Notable decrease in SI (all without plan, only had 1 time in the past 2 weeks). Will increase fluoxetine from 20 to 40 mg today. Previously on 40 mg and tolerated well. Encouraged her to keep up with the exercise and the 1 hour for herself. Suggested that she look at her list of psychiatric/counseling resources and make an appointment. Patient to f/u in 2-4 weeks for monitoring.        Follow-up Disposition:  Return in about 2 weeks (around 7/5/2018). I have discussed the diagnosis with the patient and the intended plan as seen in the above orders. The patient has received an after-visit summary and questions were answered concerning future plans. I have discussed medication side effects and warnings with the patient as well. Magi Luciano MD  Family Medicine Resident    Patient discussed with Dr. Justin Downey, attending physician.

## 2018-06-29 ENCOUNTER — OFFICE VISIT (OUTPATIENT)
Dept: FAMILY MEDICINE CLINIC | Age: 63
End: 2018-06-29

## 2018-06-29 VITALS
RESPIRATION RATE: 16 BRPM | TEMPERATURE: 98.2 F | BODY MASS INDEX: 29.84 KG/M2 | SYSTOLIC BLOOD PRESSURE: 121 MMHG | OXYGEN SATURATION: 96 % | HEART RATE: 80 BPM | WEIGHT: 168.4 LBS | HEIGHT: 63 IN | DIASTOLIC BLOOD PRESSURE: 70 MMHG

## 2018-06-29 DIAGNOSIS — E11.9 CONTROLLED TYPE 2 DIABETES MELLITUS WITHOUT COMPLICATION, WITHOUT LONG-TERM CURRENT USE OF INSULIN (HCC): ICD-10-CM

## 2018-06-29 DIAGNOSIS — K21.9 GASTROESOPHAGEAL REFLUX DISEASE, ESOPHAGITIS PRESENCE NOT SPECIFIED: ICD-10-CM

## 2018-06-29 DIAGNOSIS — F32.A ANXIETY AND DEPRESSION: Primary | ICD-10-CM

## 2018-06-29 DIAGNOSIS — F41.9 ANXIETY AND DEPRESSION: Primary | ICD-10-CM

## 2018-06-29 DIAGNOSIS — Z12.39 BREAST CANCER SCREENING: ICD-10-CM

## 2018-06-29 DIAGNOSIS — I10 ESSENTIAL HYPERTENSION: ICD-10-CM

## 2018-06-29 RX ORDER — AMLODIPINE BESYLATE 10 MG/1
TABLET ORAL
Qty: 90 TAB | Refills: 1 | Status: SHIPPED | OUTPATIENT
Start: 2018-06-29 | End: 2019-04-08 | Stop reason: SDUPTHER

## 2018-06-29 RX ORDER — ATORVASTATIN CALCIUM 40 MG/1
TABLET, FILM COATED ORAL
Qty: 90 TAB | Refills: 1 | Status: SHIPPED | OUTPATIENT
Start: 2018-06-29 | End: 2019-04-09 | Stop reason: SDUPTHER

## 2018-06-29 RX ORDER — PANTOPRAZOLE SODIUM 40 MG/1
40 TABLET, DELAYED RELEASE ORAL DAILY
Qty: 90 TAB | Refills: 1 | Status: SHIPPED | OUTPATIENT
Start: 2018-06-29 | End: 2019-01-19 | Stop reason: SDUPTHER

## 2018-06-29 NOTE — MR AVS SNAPSHOT
[address] 
[phone] Patient: Jaylyn Pressley MRN: [id number] :1955 Visit Information Date & Time Provider Department Dept. Phone Encounter #  
 2018  3:45 PM Carol Nichols, [facility] [phone] [id number] Follow-up Instructions Return in about 3 months (around 2018) for Diabetes follow up. Upcoming Health Maintenance Date Due  
 FOOT EXAM Q1 2018* BREAST CANCER SCRN MAMMOGRAM 2018* Pneumococcal 19-64 Highest Risk (3 of 3 - PCV13) 2019* HEMOGLOBIN A1C Q6M 2018 MICROALBUMIN Q1 10/31/2018 LIPID PANEL Q1 2019 EYE EXAM RETINAL OR DILATED Q1 3/19/2019 PAP AKA CERVICAL CYTOLOGY 2020 DTaP/Tdap/Td series (2 - Td) 2025 COLONOSCOPY 2027 *Topic was postponed. The date shown is not the original due date. Allergies as of 2018  Review Complete On: 2018 By: Gilford Crews No Known Allergies Current Immunizations  Reviewed on 2015 Name Date Tdap 2015 Not reviewed this visit You Were Diagnosed With   
  
 Codes Comments Anxiety and depression    -  Primary ICD-10-CM: F41.9, F32.9 ICD-9-CM: 300.00, 311 Controlled type 2 diabetes mellitus without complication, without long-term current use of insulin ([facility]     ICD-10-CM: E11.9 ICD-9-CM: 250.00 Essential hypertension     ICD-10-CM: I10 
ICD-9-CM: 401.9 Gastroesophageal reflux disease, esophagitis presence not specified     ICD-10-CM: K21.9 ICD-9-CM: 530.81 Breast cancer screening     ICD-10-CM: Z12.31 
ICD-9-CM: V76.10 Vitals BP Pulse Temp Resp Height(growth percentile) Weight(growth percentile) 121/70 (BP 1 Location: Right arm, BP Patient Position: Sitting) 80 98.2 °F (36.8 °C) (Oral) 16 5' 3\" (1.6 m) 168 lb 6.4 oz (76.4 kg) LMP SpO2 BMI OB Status Smoking Status 01/18/2000 96% 29.83 kg/m2 Postmenopausal Never Smoker Vitals History BMI and BSA Data Body Mass Index Body Surface Area  
 29.83 kg/m 2 1.84 m 2 [facility]erred [facility] [place] [address] [phone] Your Updated Medication List  
  
   
This list is accurate as of 6/29/18  4:26 PM.  Always use your most recent med list. amLODIPine 10 mg tablet Commonly known as:  [place] TAKE 1 TABLET BY MOUTH ONCE DAILY  
  
 aspirin 81 mg chewable tablet Take 1 Tab by mouth daily. atorvastatin 40 mg tablet Commonly known as:  LIPITOR  
TAKE 1 TABLET BY MOUTH ONCE DAILY FLUoxetine 40 mg capsule Commonly known as:  PROzac Take 1 Cap by mouth daily. metFORMIN  mg tablet Commonly known as:  GLUCOPHAGE XR  
TAKE 1 TABLET BY MOUTH ONCE DAILY WITH DINNER  
  
 pantoprazole 40 mg tablet Commonly known as:  PROTONIX Take 1 Tab by mouth daily. Prescriptions Sent to Pharmacy Refills  
 atorvastatin (LIPITOR) 40 mg tablet 1 Sig: TAKE 1 TABLET BY MOUTH ONCE DAILY Class: Normal  
 Pharmacy: [facility] [id number] [place] [address] Ph #: [phone]  
 amLODIPine (NORVASC) 10 mg tablet 1 Sig: TAKE 1 TABLET BY MOUTH ONCE DAILY Class: Normal  
 Pharmacy: [facility] [id number] [place] [address] Ph #: [phone]  
 pantoprazole (PROTONIX) 40 mg tablet 1 Sig: Take 1 Tab by mouth daily. Class: Normal  
 Pharmacy: [facility] [address] [place] [address] Ph #: [phone] Route: Oral  
  
Follow-up Instructions Return in about 3 months (around 9/29/2018) for Diabetes follow up. To-Do List   
 06/29/2018 Imaging:  [facility] MAMMO BI SCREENING INCL CAD Introducing hospitals & HEALTH SERVICES! Dear Sharon Gomez: Thank you for requesting a [employer] account.   Our records indicate that you already have an active Spodly account. You can access your account anytime at [email or web address] Did you know that you can access your hospital and ER discharge instructions at any time in Spodly? You can also review all of your test results from your hospital stay or ER visit. Additional Information If you have questions, please visit the Frequently Asked Questions section of the Spodly website at [email or web address] Remember, Spodly is NOT to be used for urgent needs. For medical emergencies, dial 911. Now available from your iPhone and Android! Please provide this summary of care documentation to your next provider. Your primary care clinician is listed as Alan Nye. If you have any questions after today's visit, please call [phone].

## 2018-06-29 NOTE — PROGRESS NOTES
1. Have you been to the ER, urgent care clinic since your last visit? Hospitalized since your last visit? No    2. Have you seen or consulted any other health care providers outside of the [address] [place] [address] since your last visit? Include any pap smears or colon screening.  No

## 2018-06-29 NOTE — PROGRESS NOTES
History of Present Illness:     Chief Complaint   Patient presents with    Medication Evaluation     patient states she feel nausea x a week     Pt is a 65y.o. year old female    Presents to clinic for medication eval.  Currently doing [facility] PREP program and loves it. Increased Prozac to 40mg daily at last visit. Doing well at that dose. Feels that one of her medications is causing her to be nauseated during the day. Takes all of her pills at night. Reflux well controlled on Protonix    BPs well controlled on Amlodipine 10mg. Past Medical History:   Diagnosis Date    Anxiety     [facility] ([facility]     Dyslipidemia     Headache     Hodgkin's lymphoma ([facility] 1985    Nodule removed from throat    Hypertension     Low back pain 1/19/2012    MRI 3/10/14 revealed mild multilevel disk and facet degenerative change     Lumbar disc disease 03/2014    Thyroid nodule     Followed by endocrine, Dr. Edmond Means.  Ulcer          Current Outpatient Prescriptions on File Prior to Visit   Medication Sig Dispense Refill    FLUoxetine (PROZAC) 40 mg capsule Take 1 Cap by mouth daily. 30 Cap 0    metFORMIN ER (GLUCOPHAGE XR) 500 mg tablet TAKE 1 TABLET BY MOUTH ONCE DAILY WITH DINNER 90 Tab 0    aspirin 81 mg chewable tablet Take 1 Tab by mouth daily. 60 Tab 0     No current facility-administered medications on file prior to visit.           Allergies:  No Known Allergies      Review of Systems:  Denies chest pain, JOYA, palpitations, LE edema      Objective:     Vitals:    06/29/18 1546   BP: 121/70   Pulse: 80   Resp: 16   Temp: 98.2 °F (36.8 °C)   TempSrc: Oral   SpO2: 96%   Weight: 168 lb 6.4 oz (76.4 kg)   Height: 5' 3\" (1.6 m)       Physical Exam:  General appearance - alert, well appearing, and in no distress  Mental status - alert, oriented to person, place, and time, normal mood, behavior, speech, dress, motor activity, and thought processes  Chest - clear to auscultation, no wheezes, rales or rhonchi, symmetric air entry  Heart - normal rate, regular rhythm, normal S1, S2, no murmurs, rubs, clicks or gallops      Recent Labs:  No results found for this or any previous visit (from the past 12 hour(s)). Assessment and Plan:   Pt is a 65y.o. year old female,      ICD-10-CM ICD-9-CM    1. Anxiety and depression F41.9 300.00     F32.9 311    2. Controlled type 2 diabetes mellitus without complication, without long-term current use of insulin (HCC) E11.9 250.00 atorvastatin (LIPITOR) 40 mg tablet   3. Essential hypertension I10 401.9 amLODIPine (NORVASC) 10 mg tablet   4. Gastroesophageal reflux disease, esophagitis presence not specified K21.9 530.81 pantoprazole (PROTONIX) 40 mg tablet   5. Breast cancer screening Z12.31 V76.10 BECKIE MAMMO BI SCREENING INCL CAD     Refilled medications    Mammo ordered    Follow up in 2-3 months for diabetes follow up    Ilana Nelson MD      I have discussed the diagnosis with the patient and the intended plan as seen in the above orders. The patient has received an after-visit summary and questions were answered concerning future plans.

## 2018-07-06 ENCOUNTER — HOSPITAL ENCOUNTER (OUTPATIENT)
Dept: MAMMOGRAPHY | Age: 63
Discharge: HOME | End: 2018-07-06
Attending: FAMILY MEDICINE
Payer: COMMERCIAL

## 2018-07-06 DIAGNOSIS — Z12.39 BREAST CANCER SCREENING: ICD-10-CM

## 2018-07-06 PROCEDURE — 77067 SCR MAMMO BI INCL CAD: CPT

## 2018-07-17 ENCOUNTER — HOSPITAL ENCOUNTER (OUTPATIENT)
Dept: MAMMOGRAPHY | Age: 63
Discharge: HOME | End: 2018-07-17
Attending: FAMILY MEDICINE
Payer: COMMERCIAL

## 2018-07-17 DIAGNOSIS — R92.8 ABNORMAL MAMMOGRAM: ICD-10-CM

## 2018-07-17 PROCEDURE — 76642 ULTRASOUND BREAST LIMITED: CPT

## 2018-07-17 PROCEDURE — 77065 DX MAMMO INCL CAD UNI: CPT

## 2018-08-07 DIAGNOSIS — F32.A ANXIETY AND DEPRESSION: ICD-10-CM

## 2018-08-07 DIAGNOSIS — Z63.6 CAREGIVER BURDEN: ICD-10-CM

## 2018-08-07 DIAGNOSIS — F41.9 ANXIETY AND DEPRESSION: ICD-10-CM

## 2018-08-07 SDOH — SOCIAL STABILITY - SOCIAL INSECURITY: DEPENDENT RELATIVE NEEDING CARE AT HOME: Z63.6

## 2018-08-07 NOTE — TELEPHONE ENCOUNTER
Patient needs a refill of the following and is completely out   Requested Prescriptions     Pending Prescriptions Disp Refills    FLUoxetine (PROZAC) 40 mg capsule 30 Cap 0     Sig: Take 1 Cap by mouth daily.

## 2018-08-08 RX ORDER — FLUOXETINE HYDROCHLORIDE 40 MG/1
40 CAPSULE ORAL DAILY
Qty: 90 CAP | Refills: 0 | Status: SHIPPED | OUTPATIENT
Start: 2018-08-08 | End: 2018-11-24 | Stop reason: SDUPTHER

## 2018-09-17 ENCOUNTER — OFFICE VISIT (OUTPATIENT)
Dept: FAMILY MEDICINE CLINIC | Age: 63
End: 2018-09-17

## 2018-09-17 VITALS
RESPIRATION RATE: 18 BRPM | TEMPERATURE: 98.8 F | SYSTOLIC BLOOD PRESSURE: 118 MMHG | WEIGHT: 161.2 LBS | BODY MASS INDEX: 28.56 KG/M2 | HEART RATE: 72 BPM | DIASTOLIC BLOOD PRESSURE: 71 MMHG | OXYGEN SATURATION: 96 % | HEIGHT: 63 IN

## 2018-09-17 DIAGNOSIS — Z28.21 REFUSED PNEUMOCOCCAL VACCINATION: ICD-10-CM

## 2018-09-17 DIAGNOSIS — M79.604 RIGHT LEG PAIN: ICD-10-CM

## 2018-09-17 DIAGNOSIS — M79.2 PERIPHERAL NEUROPATHIC PAIN: ICD-10-CM

## 2018-09-17 DIAGNOSIS — E11.9 CONTROLLED TYPE 2 DIABETES MELLITUS WITHOUT COMPLICATION, WITHOUT LONG-TERM CURRENT USE OF INSULIN (HCC): Primary | ICD-10-CM

## 2018-09-17 DIAGNOSIS — Z28.21 REFUSED INFLUENZA VACCINE: ICD-10-CM

## 2018-09-17 DIAGNOSIS — R20.0 RIGHT LEG NUMBNESS: ICD-10-CM

## 2018-09-17 DIAGNOSIS — I10 ESSENTIAL HYPERTENSION: ICD-10-CM

## 2018-09-17 RX ORDER — GABAPENTIN 300 MG/1
300 CAPSULE ORAL 3 TIMES DAILY
Qty: 120 CAP | Refills: 1 | Status: SHIPPED | OUTPATIENT
Start: 2018-09-17 | End: 2019-01-15 | Stop reason: ALTCHOICE

## 2018-09-17 NOTE — MR AVS SNAPSHOT
[address] [id number] Patient: Juma Tidwell MRN: [id number] :1955 Visit Information Date & Time Provider Department Dept. Phone Encounter #  
 2018  2:15 PM Karo Valentin, [facility] [phone] [id number] Follow-up Instructions Return in about 6 months (around 3/17/2019) for Follow up. Upcoming Health Maintenance Date Due HEMOGLOBIN A1C Q6M 2018 FOOT EXAM Q1 2018* Pneumococcal 19-64 Highest Risk (3 of 3 - PCV13) 2019* MICROALBUMIN Q1 10/31/2018 LIPID PANEL Q1 2019 EYE EXAM RETINAL OR DILATED Q1 3/19/2019 PAP AKA CERVICAL CYTOLOGY 2020 BREAST CANCER SCRN MAMMOGRAM 2020 DTaP/Tdap/Td series (2 - Td) 2025 COLONOSCOPY 2027 *Topic was postponed. The date shown is not the original due date. Allergies as of 2018  Review Complete On: 2018 By: Figueroa Getting No Known Allergies Current Immunizations  Reviewed on 2015 Name Date Tdap 2015 Not reviewed this visit You Were Diagnosed With   
  
 Codes Comments Controlled type 2 diabetes mellitus without complication, without long-term current use of insulin ([facility]    -  Primary ICD-10-CM: E11.9 ICD-9-CM: 250.00 Essential hypertension     ICD-10-CM: I10 
ICD-9-CM: 401.9 Right leg pain     ICD-10-CM: M79.604 ICD-9-CM: 729.5 Peripheral neuropathic pain     ICD-10-CM: M79.2 ICD-9-CM: 729.2 Right leg numbness     ICD-10-CM: R20.0 ICD-9-CM: 071. 0 Vitals BP Pulse Temp Resp Height(growth percentile) Weight(growth percentile) 118/71 (BP 1 Location: Right arm, BP Patient Position: Sitting) 72 98.8 °F (37.1 °C) (Oral) 18 5' 3\" (1.6 m) 161 lb 3.2 oz (73.1 kg) LMP SpO2 BMI OB Status Smoking Status 2000 (!) 72% 28.56 kg/m2 Postmenopausal Never Smoker Vitals History BMI and BSA Data Body Mass Index Body Surface Area 28.56 kg/m 2 1.8 m 2 Preferred Pharmacy Pharmacy Name Phone [facility] [address] [place] [address] [phone] Your Updated Medication List  
  
   
This list is accurate as of 9/17/18  3:12 PM.  Always use your most recent med list. amLODIPine 10 mg tablet Commonly known as:  Tanner Del TAKE 1 TABLET BY MOUTH ONCE DAILY  
  
 aspirin 81 mg chewable tablet Take 1 Tab by mouth daily. atorvastatin 40 mg tablet Commonly known as:  LIPITOR  
TAKE 1 TABLET BY MOUTH ONCE DAILY FLUoxetine 40 mg capsule Commonly known as:  PROzac Take 1 Cap by mouth daily. gabapentin 300 mg capsule Commonly known as:  NEURONTIN Take 1 Cap by mouth three (3) times daily. metFORMIN  mg tablet Commonly known as:  GLUCOPHAGE XR  
TAKE 1 TABLET BY MOUTH ONCE DAILY WITH DINNER  
  
 pantoprazole 40 mg tablet Commonly known as:  PROTONIX Take 1 Tab by mouth daily. Prescriptions Sent to Pharmacy Refills  
 gabapentin (NEURONTIN) 300 mg capsule 1 Sig: Take 1 Cap by mouth three (3) times daily. Class: Normal  
 Pharmacy: [facility] [address] [place] [address] Ph #: [phone] Route: Oral  
  
We Performed the Following FERRITIN [67112 CPT(R)] HEMOGLOBIN A1C WITH EAG [87167 CPT(R)]  DIABETES FOOT EXAM [HM7 Custom] METABOLIC PANEL, BASIC [68960 CPT(R)] REFERRAL TO NEUROLOGY [VVN74 Custom] Follow-up Instructions Return in about 6 months (around 3/17/2019) for Follow up. Referral Information Referral ID Referred By Referred To  
  
 [id number] Juanjo BRIONES MD   
   [address] Phone: [phone] Fax: [phone] Visits Status Start Date End Date 1 New Request 9/17/18 9/17/19 If your referral has a status of pending review or denied, additional information will be sent to support the outcome of this decision. Patient Instructions Gabapentin (By mouth) Gabapentin (yue-a-PEN-tin) Treats seizures and pain caused by shingles. Brand Name(s): ACTIVE-PAC with Gabapentin, Convenience William, Cyclo/Pat 10/300 Pack, [employer]V, Pat-V, Gralise, Gralise Starter Pack, Neurontin, [employer] Kit There may be other brand names for this medicine. When This Medicine Should Not Be Used: This medicine is not right for everyone. Do not use it if you had an allergic reaction to gabapentin. How to Use This Medicine:  
Capsule, Liquid, Tablet · Take your medicine as directed. Your dose may need to be changed several times to find what works best for you. If you have epilepsy, do not allow more than 12 hours to pass between doses. · Capsule: Swallow the capsule whole with plenty of water. Do not open, crush, or chew it. · Gralise® tablet: Swallow the tablet whole . Do not crush, break, or chew it. · Neurontin® tablet: If you break a tablet into 2 pieces, use the second half as your next dose. If you don't use it within 28 days, throw it away. · Measure the oral liquid medicine with a marked measuring spoon, oral syringe, or medicine cup. · This medicine should come with a Medication Guide. Ask your pharmacist for a copy if you do not have one. · Missed dose: Take a dose as soon as you remember. If it is almost time for your next dose, wait until then and take a regular dose. Do not take extra medicine to make up for a missed dose. · Store the medicine in a closed container at room temperature, away from heat, moisture, and direct light. Store the Neurontin® oral liquid in the refrigerator. Do not freeze. Drugs and Foods to Avoid: Ask your doctor or pharmacist before using any other medicine, including over-the-counter medicines, vitamins, and herbal products. · Some medicines can affect how gabapentin works. Tell your doctor if you also use any of the following: ¨ Hydrocodone ¨ Morphine · If you take an antacid, wait at least 2 hours before you take gabapentin. · Tell your doctor if you use anything else that makes you sleepy. Some examples are allergy medicine, narcotic pain medicine, and alcohol. Warnings While Using This Medicine: · Tell your doctor if you are pregnant or breastfeeding, or if you have kidney problems or are receiving dialysis. Tell your doctor if you have a history of depression or mental health problems. · This medicine may increase depression or thoughts of suicide. Tell your doctor right away if you start to feel more depressed or think about hurting yourself. · This medicine may cause a serious allergic reaction called multiorgan hypersensitivity, which can damage organs and be life-threatening. · Do not stop using this medicine suddenly. Your doctor will need to slowly decrease your dose before you stop it completely. If you take this medicine to prevent seizures, your seizures may return or occur more often if you stop this medicine suddenly. · This medicine may make you dizzy or drowsy. Do not drive or do anything else that could be dangerous until you know how this medicine affects you. · Tell any doctor or dentist who treats you that you are using this medicine. This medicine may affect certain medical test results. · Your doctor will check your progress and the effects of this medicine at regular visits. Keep all appointments. · Keep all medicine out of the reach of children. Never share your medicine with anyone. Possible Side Effects While Using This Medicine:  
Call your doctor right away if you notice any of these side effects: · Allergic reaction: Itching or hives, swelling in your face or hands, swelling or tingling in your mouth or throat, chest tightness, trouble breathing · Behavior problems, aggression, restlessness, trouble concentrating, moodiness (especially in children) · Blistering, peeling, red skin rash · Change in how much or how often you urinate, bloody or cloudy urine, 
· Chest pain, fast heartbeat, trouble breathing · Dark urine or pale stools, nausea, vomiting, loss of appetite, stomach pain, yellow skin or eyes · Fever, rash, swollen or tender glands in the neck, armpit, or groin · Problems with coordination, shakiness, unsteadiness · Rapid weight gain, swelling in your hands, ankles, or feet · Unusual moods or behaviors, thoughts of hurting yourself, feeling depressed If you notice these less serious side effects, talk with your doctor: · Dizziness, drowsiness, sleepiness, tiredness If you notice other side effects that you think are caused by this medicine, tell your doctor. Call your doctor for medical advice about side effects. You may report side effects to FDA at 3-238-FQT-6437 © 2017 [facility] Information is for End User's use only and may not be sold, redistributed or otherwise used for commercial purposes. The above information is an  only. It is not intended as medical advice for individual conditions or treatments. Talk to your doctor, nurse or pharmacist before following any medical regimen to see if it is safe and effective for you. Introducing \[facility] [place] [facility]\"" & HEALTH SERVICES! Dear Vivi Ramirez: Thank you for requesting a [employer] account. Our records indicate that you already have an active [employer] account. You can access your account anytime at [email or web address] Did you know that you can access your hospital and ER discharge instructions at any time in [employer]? You can also review all of your test results from your hospital stay or ER visit. Additional Information If you have questions, please visit the Frequently Asked Questions section of the [employer] website at https://The car easily beat. Kitchensurfing. [employer]/mychart/. Remember, [employer] is NOT to be used for urgent needs. For medical emergencies, dial 911. Now available from your iPhone and Android! Please provide this summary of care documentation to your next provider. Your primary care clinician is listed as Hilario Schwab. If you have any questions after today's visit, please call [phone].

## 2018-09-17 NOTE — PATIENT INSTRUCTIONS
Gabapentin (By mouth)   Gabapentin (yue-a-PEN-tin)  Treats seizures and pain caused by shingles. Brand Name(s): ACTIVE-PAC with Gabapentin, Convenience William, Cyclo/Pat 10/300 Pack, FusePaq Fan[employer]x, Pat-V, Gralise, [address] Pack, Neurontin, SmartRx Pat Kit   There may be other brand names for this medicine. When This Medicine Should Not Be Used: This medicine is not right for everyone. Do not use it if you had an allergic reaction to gabapentin. How to Use This Medicine:   Capsule, Liquid, Tablet  · Take your medicine as directed. Your dose may need to be changed several times to find what works best for you. If you have epilepsy, do not allow more than 12 hours to pass between doses. · Capsule: Swallow the capsule whole with plenty of water. Do not open, crush, or chew it. · Gralise® tablet: Swallow the tablet whole . Do not crush, break, or chew it. · Neurontin® tablet: If you break a tablet into 2 pieces, use the second half as your next dose. If you don't use it within 28 days, throw it away. · Measure the oral liquid medicine with a marked measuring spoon, oral syringe, or medicine cup. · This medicine should come with a Medication Guide. Ask your pharmacist for a copy if you do not have one. · Missed dose: Take a dose as soon as you remember. If it is almost time for your next dose, wait until then and take a regular dose. Do not take extra medicine to make up for a missed dose. · Store the medicine in a closed container at room temperature, away from heat, moisture, and direct light. Store the Neurontin® oral liquid in the refrigerator. Do not freeze. Drugs and Foods to Avoid:   Ask your doctor or pharmacist before using any other medicine, including over-the-counter medicines, vitamins, and herbal products. · Some medicines can affect how gabapentin works.  Tell your doctor if you also use any of the following:   ¨ Hydrocodone  ¨ Morphine  · If you take an antacid, wait at least 2 hours before you take gabapentin. · Tell your doctor if you use anything else that makes you sleepy. Some examples are allergy medicine, narcotic pain medicine, and alcohol. Warnings While Using This Medicine:   · Tell your doctor if you are pregnant or breastfeeding, or if you have kidney problems or are receiving dialysis. Tell your doctor if you have a history of depression or mental health problems. · This medicine may increase depression or thoughts of suicide. Tell your doctor right away if you start to feel more depressed or think about hurting yourself. · This medicine may cause a serious allergic reaction called multiorgan hypersensitivity, which can damage organs and be life-threatening. · Do not stop using this medicine suddenly. Your doctor will need to slowly decrease your dose before you stop it completely. If you take this medicine to prevent seizures, your seizures may return or occur more often if you stop this medicine suddenly. · This medicine may make you dizzy or drowsy. Do not drive or do anything else that could be dangerous until you know how this medicine affects you. · Tell any doctor or dentist who treats you that you are using this medicine. This medicine may affect certain medical test results. · Your doctor will check your progress and the effects of this medicine at regular visits. Keep all appointments. · Keep all medicine out of the reach of children. Never share your medicine with anyone.   Possible Side Effects While Using This Medicine:   Call your doctor right away if you notice any of these side effects:  · Allergic reaction: Itching or hives, swelling in your face or hands, swelling or tingling in your mouth or throat, chest tightness, trouble breathing  · Behavior problems, aggression, restlessness, trouble concentrating, moodiness (especially in children)  · Blistering, peeling, red skin rash  · Change in how much or how often you urinate, bloody or cloudy urine,  · Chest pain, fast heartbeat, trouble breathing  · Dark urine or pale stools, nausea, vomiting, loss of appetite, stomach pain, yellow skin or eyes  · Fever, rash, swollen or tender glands in the neck, armpit, or groin  · Problems with coordination, shakiness, unsteadiness  · Rapid weight gain, swelling in your hands, ankles, or feet  · Unusual moods or behaviors, thoughts of hurting yourself, feeling depressed  If you notice these less serious side effects, talk with your doctor:   · Dizziness, drowsiness, sleepiness, tiredness  If you notice other side effects that you think are caused by this medicine, tell your doctor. Call your doctor for medical advice about side effects. You may report side effects to FDA at 5-111-FDA-5493  © 2017 [facility] Information is for End User's use only and may not be sold, redistributed or otherwise used for commercial purposes. The above information is an  only. It is not intended as medical advice for individual conditions or treatments. Talk to your doctor, nurse or pharmacist before following any medical regimen to see if it is safe and effective for you.

## 2018-09-17 NOTE — PROGRESS NOTES
Chief Complaint   Patient presents with    Follow-up     lab work     1. Have you been to the ER, urgent care clinic since your last visit? Hospitalized since your last visit? No    2. Have you seen or consulted any other health care providers outside of the [address] [place] [address] since your last visit? Include any pap smears or colon screening.  No

## 2018-09-17 NOTE — PROGRESS NOTES
History of Present Illness:     Chief Complaint   Patient presents with    Follow-up     lab work     Pt is a 65y.o. year old female    Presents to clinic for diabetes and HTN. HTN  BP at goal today  Taking Norvasc daily    DM  Last A1c 7.3 in Feb 2018  Currently not Metformin daily as prescribed  Stopped Metformin due to stomach upset  Still going to "[employer]   Lost 7 lbs since last visit in June 2018  Her diet is better  Working out regularly    Depression/ Anxiety  Prozac 40mg daily  C/o issues falling asleep and feeling shaky    Right leg pain  C/o numbness in right leg and burning sensation  Ongoing for years but worse recently  Bottom of foot pain and tip of toes  Almost \"wants to cut it off\"  Denies back pain  Pain worse at night      Past Medical History:   Diagnosis Date    Anxiety     [facility]     Dyslipidemia     Headache     Hodgkin's lymphoma ([facility] 1985    Nodule removed from throat    Hypertension     Low back pain 1/19/2012    MRI 3/10/14 revealed mild multilevel disk and facet degenerative change     Lumbar disc disease 03/2014    Thyroid nodule     Followed by endocrine, Dr. Michael López.  Ulcer          Current Outpatient Prescriptions on File Prior to Visit   Medication Sig Dispense Refill    FLUoxetine (PROZAC) 40 mg capsule Take 1 Cap by mouth daily. 90 Cap 0    atorvastatin (LIPITOR) 40 mg tablet TAKE 1 TABLET BY MOUTH ONCE DAILY 90 Tab 1    amLODIPine (NORVASC) 10 mg tablet TAKE 1 TABLET BY MOUTH ONCE DAILY 90 Tab 1    pantoprazole (PROTONIX) 40 mg tablet Take 1 Tab by mouth daily. 90 Tab 1    aspirin 81 mg chewable tablet Take 1 Tab by mouth daily. 60 Tab 0    metFORMIN ER (GLUCOPHAGE XR) 500 mg tablet TAKE 1 TABLET BY MOUTH ONCE DAILY WITH DINNER 90 Tab 0     No current facility-administered medications on file prior to visit.           Allergies:  No Known Allergies      Review of Systems:  Denies fever, chills, sweats  Denies chest pain, JOYA, palpitations, LE edema  Denies cough, sputum production, SOB, pleuritic chest pain, wheezing  +Numbness and pain in RLE      Objective:     Vitals:    09/17/18 1440   BP: 118/71   Pulse: 72   Resp: 18   Temp: 98.8 °F (37.1 °C)   TempSrc: Oral   SpO2: (!) 72%   Weight: 161 lb 3.2 oz (73.1 kg)   Height: 5' 3\" (1.6 m)       Physical Exam:  General appearance - alert, well appearing, and in no distress  Chest - clear to auscultation, no wheezes, rales or rhonchi, symmetric air entry  Heart - normal rate, regular rhythm, normal S1, S2, no murmurs, rubs, clicks or gallops  Neurological - alert, oriented, normal speech, no focal findings or movement disorder noted, normal muscle tone, no tremors, strength 5/5  Extremities - no pedal edema noted, feet normal, good pulses, normal color, temperature and sensation, monofilament sensory exam is normal on left, diminished on right. Recent Labs:  No results found for this or any previous visit (from the past 12 hour(s)). Assessment and Plan:   Pt is a 65y.o. year old female,      ICD-10-CM ICD-9-CM    1. Controlled type 2 diabetes mellitus without complication, without long-term current use of insulin ([facility]) E11.9 250.00  DIABETES FOOT EXAM      HEMOGLOBIN A1C WITH EAG      METABOLIC PANEL, BASIC   2. Essential hypertension J80 447.1 METABOLIC PANEL, BASIC   3. Right leg pain M79.604 729.5 FERRITIN   4. Peripheral neuropathic pain M79.2 729.2 gabapentin (NEURONTIN) 300 mg capsule      REFERRAL TO NEUROLOGY   5. Right leg numbness R20.0 782.0 REFERRAL TO NEUROLOGY   6. Refused influenza vaccine Z28.21 V64.06    7. Refused pneumococcal vaccination Z28.21 V64.06      Labs today  If A1c at goal, will continue with diet control    Continue current HTN regimen    Refer to Neurology for ?neuropathy in RLE  Consider re-imaging back  Trial Gabapentin for pain at bedtime  Check ferritin     Refused flu and pneumo vaccines today    Follow up in 6 months for DM and HTN.   Follow up in 4 weeks for leg issues if not able to get into neurology    Karo Valentin MD      I have discussed the diagnosis with the patient and the intended plan as seen in the above orders. The patient has received an after-visit summary and questions were answered concerning future plans.

## 2018-09-18 LAB
BUN SERPL-MCNC: 10 MG/DL (ref 8–27)
BUN/CREAT SERPL: 12 (ref 12–28)
CALCIUM SERPL-MCNC: 10.2 MG/DL (ref 8.7–10.3)
CHLORIDE SERPL-SCNC: 103 MMOL/L (ref 96–106)
CO2 SERPL-SCNC: 23 MMOL/L (ref 20–29)
CREAT SERPL-MCNC: 0.82 MG/DL (ref 0.57–1)
EST. AVERAGE GLUCOSE BLD GHB EST-MCNC: 143 MG/DL
FERRITIN SERPL-MCNC: 96 NG/ML (ref 15–150)
GLUCOSE SERPL-MCNC: 106 MG/DL (ref 65–99)
HBA1C MFR BLD: 6.6 % (ref 4.8–5.6)
POTASSIUM SERPL-SCNC: 4.9 MMOL/L (ref 3.5–5.2)
SODIUM SERPL-SCNC: 140 MMOL/L (ref 134–144)

## 2018-11-24 DIAGNOSIS — F32.A ANXIETY AND DEPRESSION: ICD-10-CM

## 2018-11-24 DIAGNOSIS — Z63.6 CAREGIVER BURDEN: ICD-10-CM

## 2018-11-24 DIAGNOSIS — F41.9 ANXIETY AND DEPRESSION: ICD-10-CM

## 2018-11-24 SDOH — SOCIAL STABILITY - SOCIAL INSECURITY: DEPENDENT RELATIVE NEEDING CARE AT HOME: Z63.6

## 2018-11-26 RX ORDER — FLUOXETINE HYDROCHLORIDE 40 MG/1
CAPSULE ORAL
Qty: 90 CAP | Refills: 0 | Status: SHIPPED | OUTPATIENT
Start: 2018-11-26 | End: 2019-03-16 | Stop reason: SDUPTHER

## 2019-01-15 ENCOUNTER — OFFICE VISIT (OUTPATIENT)
Dept: FAMILY MEDICINE CLINIC | Age: 64
End: 2019-01-15

## 2019-01-15 VITALS
HEART RATE: 79 BPM | BODY MASS INDEX: 28.49 KG/M2 | SYSTOLIC BLOOD PRESSURE: 138 MMHG | DIASTOLIC BLOOD PRESSURE: 71 MMHG | WEIGHT: 160.8 LBS | RESPIRATION RATE: 18 BRPM | HEIGHT: 63 IN | TEMPERATURE: 98.2 F | OXYGEN SATURATION: 97 %

## 2019-01-15 DIAGNOSIS — Z11.1 SCREENING FOR TUBERCULOSIS: ICD-10-CM

## 2019-01-15 DIAGNOSIS — M54.2 NECK PAIN: ICD-10-CM

## 2019-01-15 DIAGNOSIS — M79.671 RIGHT FOOT PAIN: ICD-10-CM

## 2019-01-15 DIAGNOSIS — E11.9 CONTROLLED TYPE 2 DIABETES MELLITUS WITHOUT COMPLICATION, WITHOUT LONG-TERM CURRENT USE OF INSULIN (HCC): Primary | ICD-10-CM

## 2019-01-15 DIAGNOSIS — M54.16 LUMBAR RADICULOPATHY: ICD-10-CM

## 2019-01-15 LAB
ALBUMIN UR QL STRIP: 10 MG/L
CREATININE, URINE POC: 200 MG/DL
MICROALBUMIN/CREAT RATIO POC: <30 MG/G
MM INDURATION POC: NORMAL MM (ref 0–5)
PPD POC: NORMAL NEGATIVE

## 2019-01-15 RX ORDER — PREGABALIN 25 MG/1
25 CAPSULE ORAL 2 TIMES DAILY
Qty: 60 CAP | Refills: 0 | Status: SHIPPED | OUTPATIENT
Start: 2019-01-15 | End: 2022-02-10

## 2019-01-15 NOTE — PROGRESS NOTES
Miranda Berry is a 59 y.o. female  Chief Complaint   Patient presents with    Mass     R foot growth x2 months, has occassional pain     Visit Vitals  /71 (BP 1 Location: Left arm, BP Patient Position: Sitting)   Pulse 79   Temp 98.2 °F (36.8 °C) (Oral)   Resp 18   Ht 5' 3\" (1.6 m)   Wt 160 lb 12.8 oz (72.9 kg)   LMP 01/18/2000   SpO2 97%   BMI 28.48 kg/m²     1. Have you been to the ER, urgent care clinic since your last visit? Hospitalized since your last visit? No    2. Have you seen or consulted any other health care providers outside of the [address] [place] [address] since your last visit? Include any pap smears or colon screening.  No  Health Maintenance Due   Topic Date Due    Shingrix Vaccine Age 50> (1 of 2) 12/07/2006    MICROALBUMIN Q1  10/31/2018

## 2019-01-15 NOTE — PROGRESS NOTES
PPD Placement note  Ronald Knutson, 59 y.o. female is here today for placement of PPD test  Reason for PPD test: employment  Pt taken PPD test before: no  Verified in allergy area and with patient that they are not allergic to the products PPD is made of (Phenol or Tween). Yes  Is patient taking any oral or IV steroid medication now or have they taken it in the last month? no  Has the patient ever received the BCG vaccine?: no  Has the patient been in recent contact with anyone known or suspected of having active TB disease?: no       Date of exposure (if applicable): N/A       Name of person they were exposed to (if applicable): N/A  Patient's Country of origin?: [place]  O: Alert and oriented in NAD. P:  PPD placed on 1/15/2019. Placed left forearm at 4:53 PM. Patient advised to return for reading within 48-72 hours.

## 2019-01-15 NOTE — PATIENT INSTRUCTIONS
Neck Arthritis: Exercises  Your Care Instructions  Here are some examples of typical rehabilitation exercises for your condition. Start each exercise slowly. Ease off the exercise if you start to have pain. Your doctor or physical therapist will tell you when you can start these exercises and which ones will work best for you. How to do the exercises  Neck stretches to the side    1. This stretch works best if you keep your shoulder down as you lean away from it. To help you remember to do this, start by relaxing your shoulders and lightly holding on to your thighs or your chair. 2. Tilt your head toward your shoulder and hold for 15 to 30 seconds. Let the weight of your head stretch your muscles. 3. Repeat 2 to 4 times toward each shoulder. Chin tuck    1. Lie on the floor with a rolled-up towel under your neck. Your head should be touching the floor. 2. Slowly bring your chin toward your chest.  3. Hold for a count of 6, and then relax for up to 10 seconds. 4. Repeat 8 to 12 times. Active cervical rotation    1. Sit in a firm chair, or stand up straight. 2. Keeping your chin level, turn your head to the right, and hold for 15 to 30 seconds. 3. Turn your head to the left and hold for 15 to 30 seconds. 4. Repeat 2 to 4 times to each side. Shoulder blade squeeze    1. While standing, squeeze your shoulder blades together. 2. Do not raise your shoulders up as you are squeezing. 3. Hold for 6 seconds. 4. Repeat 8 to 12 times. Shoulder rolls    1. Sit comfortably with your feet shoulder-width apart. You can also do this exercise standing up. 2. Roll your shoulders up, then back, and then down in a smooth, circular motion. 3. Repeat 2 to 4 times. Follow-up care is a key part of your treatment and safety. Be sure to make and go to all appointments, and call your doctor if you are having problems.  It's also a good idea to know your test results and keep a list of the medicines you take.  Where can you learn more? Go to http://duncan-orlando.info/. Enter O947 in the search box to learn more about \"Neck Arthritis: Exercises. \"  Current as of: November 29, 2017  Content Version: 11.8  © 4817-7647 Healthwise, [employer]. Care instructions adapted under license by [employer] (which disclaims liability or warranty for this information). If you have questions about a medical condition or this instruction, always ask your healthcare professional. [address] any warranty or liability for your use of this information.

## 2019-01-15 NOTE — PROGRESS NOTES
History of Present Illness:     Chief Complaint   Patient presents with    Mass     R foot growth x2 months, has occassional pain     Pt is a 59y.o. year old female    Presents to clinic for foot problem. Growth on right foot   Noticed 2 months ago, gradually increasing in size  Occasionally has pain in that area/ foot   Denies injury   Has not tried anything to help  No overlying skin changes  Wearing open toed shoes to help    RLE numbness and tingling worsening  Aching pain worse at night   Denies any back pain  Pain described as burning/ stinging pain  Gabapentin not helping    Neck pain x 1 month  No known trauma  Using Gabapentin to help with no relief  No exacerbating symptoms  Pain is non radiating    Past Medical History:   Diagnosis Date    Anxiety     Diabetes mellitus ([facility]     Dyslipidemia     Headache     Hodgkin's lymphoma ([facility] 1985    Nodule removed from throat    Hypertension     Low back pain 1/19/2012    MRI 3/10/14 revealed mild multilevel disk and facet degenerative change     Lumbar disc disease 03/2014    Thyroid nodule     Followed by endocrine, Dr. Barber Howe.  Ulcer          Current Outpatient Medications on File Prior to Visit   Medication Sig Dispense Refill    FLUoxetine (PROZAC) 40 mg capsule TAKE 1 CAPSULE BY MOUTH EVERY DAY 90 Cap 0    gabapentin (NEURONTIN) 300 mg capsule Take 1 Cap by mouth three (3) times daily. 120 Cap 1    atorvastatin (LIPITOR) 40 mg tablet TAKE 1 TABLET BY MOUTH ONCE DAILY 90 Tab 1    amLODIPine (NORVASC) 10 mg tablet TAKE 1 TABLET BY MOUTH ONCE DAILY 90 Tab 1    pantoprazole (PROTONIX) 40 mg tablet Take 1 Tab by mouth daily. 90 Tab 1    metFORMIN ER (GLUCOPHAGE XR) 500 mg tablet TAKE 1 TABLET BY MOUTH ONCE DAILY WITH DINNER 90 Tab 0    aspirin 81 mg chewable tablet Take 1 Tab by mouth daily. 60 Tab 0     No current facility-administered medications on file prior to visit.           Allergies:  No Known Allergies      Review of Systems:  Denies fever, chills, sweats  + numbness/ tingling/ weakness in extremities  Denies loss of bowel or bladder function. Objective:     Vitals:    01/15/19 1535   BP: 138/71   Pulse: 79   Resp: 18   Temp: 98.2 °F (36.8 °C)   TempSrc: Oral   SpO2: 97%   Weight: 160 lb 12.8 oz (72.9 kg)   Height: 5' 3\" (1.6 m)       Physical Exam:  General appearance - alert, well appearing, and in no distress  Musculoskeletal - Normal ROM in neck with pain when turning to the left. No midline cervical spine tenderness. +right paraspinous tenderness to palpation. 1cm hard, raised nodule on top of right foot. No overlying skin changes. +Tenderness to palpation. MSK:    Posture: Normal   Deformity: None   Gait: Normal       Palpation:    L1-L5: No tenderness    Sacrum: No tenderness    Coccyx: No tenderness    Left Paraspinal: No tenderness    Right Paraspinal: No tenderness     Strength (0-5/5)    Hip Flexion:   Left: 5/5  Right: 5/5    Hip Extension:  Left: 5/5  Right: 5/5    Hip Abduction:  Left: 5/5  Right: 5/5    Hip Adduction:  Left: 5/5  Right: 5/5    Knee Extension:  Left: 5/5  Right: 5/5    Knee Flexion:   Left: 5/5  Right: 5/5    Ankle dorsiflexion:  Left: 5/5  Right: 5/5    Ankle plantarflexion:  Left: 5/5  Right: 5/5    Great toe extension:  Left: 5/5  Right: 5/5     Sensation: Intact, no deficits     Recent Labs:  No results found for this or any previous visit (from the past 12 hour(s)). Assessment and Plan:   Pt is a 59y.o. year old female,      ICD-10-CM ICD-9-CM    1. Controlled type 2 diabetes mellitus without complication, without long-term current use of insulin ([facility]) E11.9 250.00 AMB POC URINE, MICROALBUMIN, SEMIQUANT (3 RESULTS)   2. Right foot pain M79.671 729.5 XR FOOT RT MIN 3 V   3.  Lumbar radiculopathy M54.16 724.4 REFERRAL TO ORTHOPEDIC SURGERY   4. Neck pain M54.2 723.1      T2DM  Due for urine microalbumin - normal    Right foot pain  Xray showed calcification over right phalange  Final xray read pending  Discussed avoiding shoes that cause friction over area  NSAIDs for pain    RLE burning/pain likely due to lumbar radiculopathy  MRI from 2016 showed \"L4-L5:  Moderately severe central stenosis. Broad spondylosis and bulge. Moderate facet arthropathy\"  She denies back pain  Referred to ortho surgery    DC Gabapentin  Trial Lyrica for neuropathic pain 2/2 lumbar radiculopathy     Neck pain likely 2/2 muscle spasm  Given neck stretches  NSAIDs for pain    Follow up in 1-2 months after ortho eval    Debbie De León MD      I have discussed the diagnosis with the patient and the intended plan as seen in the above orders. The patient has received an after-visit summary and questions were answered concerning future plans.

## 2019-01-17 ENCOUNTER — CLINICAL SUPPORT (OUTPATIENT)
Dept: FAMILY MEDICINE CLINIC | Age: 64
End: 2019-01-17

## 2019-01-17 ENCOUNTER — TELEPHONE (OUTPATIENT)
Dept: FAMILY MEDICINE CLINIC | Age: 64
End: 2019-01-17

## 2019-01-17 DIAGNOSIS — Z11.1 ENCOUNTER FOR PPD SKIN TEST READING: Primary | ICD-10-CM

## 2019-01-17 DIAGNOSIS — R92.8 ABNORMAL MAMMOGRAM OF LEFT BREAST: Primary | ICD-10-CM

## 2019-01-17 NOTE — TELEPHONE ENCOUNTER
----- Message from German Hartley MD sent at 7/17/2018 10:29 AM EDT -----  Regarding: Repeat mammogram  Make sure pt remembers to get repeat mammo in 6 months for left breast lesion.

## 2019-01-17 NOTE — TELEPHONE ENCOUNTER
Verified patient by 2 identifiers. Informed patient per Dr Batool Childs note below:     1. I reviewed her xray with the sports med doctor. Angelique Jimenez agreed with our initial plan of modifying the type of shoes she wears as not to irritate the area, antiinflammatory for pain. Cassia Munoz keeps being an issue, she can see podiatrist bc they can shave down bone spurs if that is what is causing the pain, or inject it if it is a cyst.     2. She is due for 6 month follow up from her last mammogram. Rudyfaithnancy Joseph left breast had some changes so they recommended 6 month repeat mammo to make sure all is well.  I placed the orders. Patient verbalized understanding of information given in regards to modifying the type of shoe she wear and anti-inflammatory for pain. Also understanding of mammogram order placed and the need to have imaging.

## 2019-01-17 NOTE — LETTER
1/17/2019 4:39 PM 
 
Ms. Jacobsen Overall [address] [facility],[address] [id number] [facility] [phone] PPD placed: 01/15/2019 PPD read: 01/17/2019 
 
0mm induration Result: Negative Sincerely, Tejas Masters MD

## 2019-01-17 NOTE — PROGRESS NOTES
Chief Complaint   Patient presents with    PPD Reading     1. Have you been to the ER, urgent care clinic since your last visit? Hospitalized since your last visit? No    2. Have you seen or consulted any other health care providers outside of the [address] [place] [address] since your last visit? Include any pap smears or colon screening.  No     0mm induration    negative

## 2019-01-17 NOTE — TELEPHONE ENCOUNTER
Ordered repeat diagnostic mammo of left breast.    Abnormal mammo of left breast in 7/2018 with recommended 6 mo follow up. Will have nursing call to notify patient. Orders placed.      Ondina Stoddadr MD

## 2019-01-19 DIAGNOSIS — K21.9 GASTROESOPHAGEAL REFLUX DISEASE, ESOPHAGITIS PRESENCE NOT SPECIFIED: ICD-10-CM

## 2019-01-21 RX ORDER — PANTOPRAZOLE SODIUM 40 MG/1
TABLET, DELAYED RELEASE ORAL
Qty: 90 TAB | Refills: 1 | Status: SHIPPED | OUTPATIENT
Start: 2019-01-21 | End: 2019-08-03 | Stop reason: SDUPTHER

## 2019-04-06 DIAGNOSIS — E11.9 CONTROLLED TYPE 2 DIABETES MELLITUS WITHOUT COMPLICATION, WITHOUT LONG-TERM CURRENT USE OF INSULIN (HCC): ICD-10-CM

## 2019-04-09 RX ORDER — ATORVASTATIN CALCIUM 40 MG/1
TABLET, FILM COATED ORAL
Qty: 90 TAB | Refills: 1 | Status: SHIPPED | OUTPATIENT
Start: 2019-04-09 | End: 2020-03-09 | Stop reason: SDUPTHER

## 2019-04-09 RX ORDER — ATORVASTATIN CALCIUM 40 MG/1
TABLET, FILM COATED ORAL
Qty: 90 TAB | Refills: 0 | OUTPATIENT
Start: 2019-04-09

## 2019-04-18 ENCOUNTER — OFFICE VISIT (OUTPATIENT)
Dept: FAMILY MEDICINE CLINIC | Age: 64
End: 2019-04-18

## 2019-04-18 VITALS
HEIGHT: 63 IN | WEIGHT: 157 LBS | OXYGEN SATURATION: 96 % | SYSTOLIC BLOOD PRESSURE: 122 MMHG | BODY MASS INDEX: 27.82 KG/M2 | DIASTOLIC BLOOD PRESSURE: 72 MMHG | HEART RATE: 84 BPM | RESPIRATION RATE: 18 BRPM | TEMPERATURE: 101.1 F

## 2019-04-18 DIAGNOSIS — J02.0 STREP THROAT: Primary | ICD-10-CM

## 2019-04-18 DIAGNOSIS — J02.9 SORE THROAT: ICD-10-CM

## 2019-04-18 LAB
FLUAV+FLUBV AG NOSE QL IA.RAPID: NEGATIVE POS/NEG
FLUAV+FLUBV AG NOSE QL IA.RAPID: NEGATIVE POS/NEG
S PYO AG THROAT QL: POSITIVE
VALID INTERNAL CONTROL?: YES
VALID INTERNAL CONTROL?: YES

## 2019-04-18 RX ORDER — AMOXICILLIN 500 MG/1
500 TABLET, FILM COATED ORAL 2 TIMES DAILY
Qty: 20 TAB | Refills: 0 | Status: SHIPPED | OUTPATIENT
Start: 2019-04-18 | End: 2019-04-28

## 2019-04-18 NOTE — PROGRESS NOTES
Chief Complaint   Patient presents with    Sore Throat     sore throat x 2 days      1. Have you been to the ER, urgent care clinic since your last visit? Hospitalized since your last visit? No    2. Have you seen or consulted any other health care providers outside of the [address] [place] [address] since your last visit? Include any pap smears or colon screening. No     Reviewed record in preparation for visit and have obtained necessary documentation.

## 2019-04-18 NOTE — PROGRESS NOTES
Subjective:      Juma Tidwell is a 59 y.o. female who presents for sore throat. Has been going on for the past 2 days. Has also had chills over this time. Temp at home was 99.0  Has also had body aches over this time. Appetite is decreased. Tolerating fluids. Works at a [employer] tried - cough drops       Review of Systems   Constitutional: Positive for chills and malaise/fatigue. HENT: Positive for sore throat. Negative for congestion. Respiratory: Negative for cough and shortness of breath. Cardiovascular: Negative for chest pain and palpitations. PMHx:  Past Medical History:   Diagnosis Date    Anxiety     [facility]     Dyslipidemia     Headache     Hodgkin's lymphoma ([facility] 1985    Nodule removed from throat    Hypertension     Low back pain 1/19/2012    MRI 3/10/14 revealed mild multilevel disk and facet degenerative change     Lumbar disc disease 03/2014    Thyroid nodule     Followed by endocrine, Dr. Michael López.  Ulcer        Meds:   Current Outpatient Medications   Medication Sig Dispense Refill    amoxicillin 500 mg tab Take 500 mg by mouth two (2) times a day for 10 days. 20 Tab 0    atorvastatin (LIPITOR) 40 mg tablet TAKE 1 TABLET BY MOUTH ONCE DAILY 90 Tab 1    amLODIPine (NORVASC) 10 mg tablet TAKE 1 TABLET BY MOUTH ONCE DAILY 30 Tab 3    FLUoxetine (PROZAC) 40 mg capsule TAKE 1 CAPSULE BY MOUTH EVERY DAY 90 Cap 1    pantoprazole (PROTONIX) 40 mg tablet TAKE 1 TABLET BY MOUTH EVERY DAY 90 Tab 1    metFORMIN ER (GLUCOPHAGE XR) 500 mg tablet TAKE 1 TABLET BY MOUTH ONCE DAILY WITH DINNER 90 Tab 0    aspirin 81 mg chewable tablet Take 1 Tab by mouth daily. 60 Tab 0    pregabalin (LYRICA) 25 mg capsule Take 1 Cap by mouth two (2) times a day.  Max Daily Amount: 50 mg. 60 Cap 0       Allergies:   No Known Allergies    Smoker:  Social History     Tobacco Use   Smoking Status Never Smoker   Smokeless Tobacco Never Used       ETOH:   Social History     Substance and Sexual Activity   Alcohol Use No    Alcohol/week: 0.0 oz       FH:   Family History   Problem Relation Age of Onset    Elevated Lipids Mother     Hypertension Mother     Hypertension Father     Parkinsonism Father     Hypertension Sister     Cancer Brother     Stroke Maternal Grandmother     Hypertension Maternal Grandmother     Cancer Paternal Grandmother     Hypertension Paternal Grandmother     Cancer Paternal Grandfather     Hypertension Paternal Grandfather          Objective:     Visit Vitals  /72 (BP 1 Location: Right arm, BP Patient Position: Sitting)   Pulse 84   Temp (!) 101.1 °F (38.4 °C) (Oral)   Resp 18   Ht 5' 3\" (1.6 m)   Wt 157 lb (71.2 kg)   LMP 01/18/2000   SpO2 96%   BMI 27.81 kg/m²       Physical Exam   Constitutional: She is oriented to person, place, and time. HENT:   Right Ear: External ear normal.   Left Ear: External ear normal.   Mouth/Throat: Oropharynx is clear and moist. No oropharyngeal exudate. Neck: Neck supple. Cardiovascular: Regular rhythm and normal heart sounds. Pulmonary/Chest: Effort normal and breath sounds normal. No respiratory distress. She has no wheezes. Lymphadenopathy:     She has cervical adenopathy. Neurological: She is alert and oriented to person, place, and time. Skin: Skin is warm and dry. Assessment:       ICD-10-CM ICD-9-CM    1. Strep throat J02.0 034.0    2. Sore throat J02.9 462 AMB POC RAPID STREP A      AMB POC DG INFLUENZA A/B TEST     POC strep positive, influenza negative     Plan:     Treat with Amoxicillin 500mg BID X 10 days   Tylenol/Motrin as needed for fever   Continue to push fluids   Precautions given     Patient is counseled to return to the office if symptoms do not improve as expected. Urgent consultation with the nearest Emergency Department is strongly recommended if condition worsens.   Patient is counseled to follow up as recommended and to inform the office if any changes in treatment are recommended.           Signed By:  Jagdish Harding MD    Family Medicine Resident

## 2019-05-07 ENCOUNTER — OFFICE VISIT (OUTPATIENT)
Dept: FAMILY MEDICINE CLINIC | Age: 64
End: 2019-05-07

## 2019-05-07 VITALS
TEMPERATURE: 98.6 F | HEART RATE: 77 BPM | SYSTOLIC BLOOD PRESSURE: 115 MMHG | DIASTOLIC BLOOD PRESSURE: 66 MMHG | HEIGHT: 63 IN | OXYGEN SATURATION: 97 % | RESPIRATION RATE: 16 BRPM | BODY MASS INDEX: 27.71 KG/M2 | WEIGHT: 156.4 LBS

## 2019-05-07 DIAGNOSIS — F33.41 RECURRENT MAJOR DEPRESSIVE DISORDER, IN PARTIAL REMISSION (HCC): ICD-10-CM

## 2019-05-07 DIAGNOSIS — I10 ESSENTIAL HYPERTENSION: Primary | ICD-10-CM

## 2019-05-07 DIAGNOSIS — Z28.21 REFUSED PNEUMOCOCCAL VACCINATION: ICD-10-CM

## 2019-05-07 DIAGNOSIS — E78.2 MIXED HYPERLIPIDEMIA: ICD-10-CM

## 2019-05-07 DIAGNOSIS — E11.40 TYPE 2 DIABETES MELLITUS WITH DIABETIC NEUROPATHY, WITHOUT LONG-TERM CURRENT USE OF INSULIN (HCC): ICD-10-CM

## 2019-05-07 RX ORDER — FLUOXETINE HYDROCHLORIDE 20 MG/1
20 CAPSULE ORAL DAILY
Qty: 30 CAP | Refills: 1 | Status: SHIPPED | OUTPATIENT
Start: 2019-05-07 | End: 2019-07-03 | Stop reason: SDUPTHER

## 2019-05-07 NOTE — PROGRESS NOTES
Chief Complaint Patient presents with  Diabetes 1. Have you been to the ER, urgent care clinic since your last visit? Hospitalized since your last visit? No 
 
2. Have you seen or consulted any other health care providers outside of the [address] [place] [address] since your last visit? Include any pap smears or colon screening. No 
Visit Vitals /66 (BP 1 Location: Right arm, BP Patient Position: Sitting) Pulse 77 Temp 98.6 °F (37 °C) (Oral) Resp 16 Ht 5' 3\" (1.6 m) Wt 156 lb 6.4 oz (70.9 kg) SpO2 97% BMI 27.71 kg/m²

## 2019-05-07 NOTE — PROGRESS NOTES
History of Present Illness: Chief Complaint Patient presents with  Diabetes Pt is a 59y.o. year old female Presents to clinic for diabetes follow up. Current medication: Metformin Urine microalbumin < 30 in 1/2019 Last A1c 6.6 in 9/2018 PPSV 23 declined Taking Lipitor 40mg for HLD Not taking Lyrica HTN Well controlled on Norvasc 10 Home BPs running 130s/80s BMI 27 Lost 4 lbs since January Continues to work on [employer] and regular exercise with Reflex Depression Taking Prozac 40mg daily Denies SI or HI Still depressed mood, notices she is more irritable when not taking Prozac 
 
3 most recent PHQ Screens 5/7/2019 PHQ Not Done - Little interest or pleasure in doing things Not at all Feeling down, depressed, irritable, or hopeless Nearly every day Total Score PHQ 2 3 Trouble falling or staying asleep, or sleeping too much Nearly every day Feeling tired or having little energy Nearly every day Poor appetite, weight loss, or overeating Not at all Feeling bad about yourself - or that you are a failure or have let yourself or your family down More than half the days Trouble concentrating on things such as school, work, reading, or watching TV Not at all Moving or speaking so slowly that other people could have noticed; or the opposite being so fidgety that others notice Not at all Thoughts of being better off dead, or hurting yourself in some way Not at all PHQ 9 Score 11 How difficult have these problems made it for you to do your work, take care of your home and get along with others Not difficult at all Past Medical History:  
Diagnosis Date  Anxiety  Diabetes mellitus ([facility]  Dyslipidemia  Headache  Hodgkin's lymphoma ([facility] 1985 Nodule removed from throat  Hypertension  Low back pain 1/19/2012 MRI 3/10/14 revealed mild multilevel disk and facet degenerative change  Lumbar disc disease 03/2014  Thyroid nodule Followed by endocrine, Dr. Rahul Spencer.  Ulcer Current Outpatient Medications on File Prior to Visit Medication Sig Dispense Refill  atorvastatin (LIPITOR) 40 mg tablet TAKE 1 TABLET BY MOUTH ONCE DAILY 90 Tab 1  
 amLODIPine (NORVASC) 10 mg tablet TAKE 1 TABLET BY MOUTH ONCE DAILY 30 Tab 3  
 FLUoxetine (PROZAC) 40 mg capsule TAKE 1 CAPSULE BY MOUTH EVERY DAY 90 Cap 1  
 pantoprazole (PROTONIX) 40 mg tablet TAKE 1 TABLET BY MOUTH EVERY DAY 90 Tab 1  
 metFORMIN ER (GLUCOPHAGE XR) 500 mg tablet TAKE 1 TABLET BY MOUTH ONCE DAILY WITH DINNER 90 Tab 0  
 aspirin 81 mg chewable tablet Take 1 Tab by mouth daily. 60 Tab 0  pregabalin (LYRICA) 25 mg capsule Take 1 Cap by mouth two (2) times a day. Max Daily Amount: 50 mg. 60 Cap 0 No current facility-administered medications on file prior to visit. Allergies: 
No Known Allergies Review of Systems: 
Denies fever, chills, sweats Denies chest pain, JOYA, palpitations, LE edema Denies cough, sputum production, SOB, pleuritic chest pain, wheezing Objective:  
 
Vitals:  
 05/07/19 0805 BP: 115/66 Pulse: 77 Resp: 16 Temp: 98.6 °F (37 °C) TempSrc: Oral  
SpO2: 97% Weight: 156 lb 6.4 oz (70.9 kg) Height: 5' 3\" (1.6 m) Physical Exam: 
General appearance - alert, well appearing, and in no distress Chest - clear to auscultation, no wheezes, rales or rhonchi, symmetric air entry Heart - normal rate, regular rhythm, normal S1, S2, no murmurs, rubs, clicks or gallops Extremities - peripheral pulses normal, no pedal edema, no clubbing or cyanosis Recent Labs: 
No results found for this or any previous visit (from the past 12 hour(s)). Assessment and Plan:  
Pt is a 59y.o. year old female, 
 
  ICD-10-CM ICD-9-CM 1. Essential hypertension J77 738.4 METABOLIC PANEL, BASIC 2.  Type 2 diabetes mellitus with diabetic neuropathy, without long-term current use of insulin (HCC) E11.40 250.60 HEMOGLOBIN A1C WITH EAG  
  398.0 METABOLIC PANEL, BASIC 3. Refused pneumococcal vaccination Z28.21 V64.06   
4. Mixed hyperlipidemia E78.2 272.2 LIPID PANEL 5. Recurrent major depressive disorder, in partial remission ([facility]) F33.41 296.35 FLUoxetine (PROZAC) 20 mg capsule Continue current DM, HLD and HTN medications Increase Prozac to 60mg daily Pt reports good support system; encouraged her to continue that Continue regular exercise Congratulated on weight loss Continue work on diet and exercise Still past due for repeat mammo Due for 6 mo interval follow up in Feb 
 
Refused Shingrix and PPSV 23 vaccines Follow up for pelvic pain next available Quinton Hadley MD 
 
 
I have discussed the diagnosis with the patient and the intended plan as seen in the above orders. The patient has received an after-visit summary and questions were answered concerning future plans. Discussed the patient's BMI with her. The BMI follow up plan is as follows:  
 
dietary management education, guidance, and counseling 
encourage exercise 
monitor weight 
prescribed dietary intake An After Visit Summary was printed and given to the patient.

## 2019-05-07 NOTE — PATIENT INSTRUCTIONS
Body Mass Index: Care Instructions Your Care Instructions Body mass index (BMI) can help you see if your weight is raising your risk for health problems. It uses a formula to compare how much you weigh with how tall you are. · A BMI lower than 18.5 is considered underweight. · A BMI between 18.5 and 24.9 is considered healthy. · A BMI between 25 and 29.9 is considered overweight. A BMI of 30 or higher is considered obese. If your BMI is in the normal range, it means that you have a lower risk for weight-related health problems. If your BMI is in the overweight or obese range, you may be at increased risk for weight-related health problems, such as high blood pressure, heart disease, stroke, arthritis or joint pain, and diabetes. If your BMI is in the underweight range, you may be at increased risk for health problems such as fatigue, lower protection (immunity) against illness, muscle loss, bone loss, hair loss, and hormone problems. BMI is just one measure of your risk for weight-related health problems. You may be at higher risk for health problems if you are not active, you eat an unhealthy diet, or you drink too much alcohol or use tobacco products. Follow-up care is a key part of your treatment and safety. Be sure to make and go to all appointments, and call your doctor if you are having problems. It's also a good idea to know your test results and keep a list of the medicines you take. How can you care for yourself at home? · Practice healthy eating habits. This includes eating plenty of fruits, vegetables, whole grains, lean protein, and low-fat dairy. · If your doctor recommends it, get more exercise. Walking is a good choice. Bit by bit, increase the amount you walk every day. Try for at least 30 minutes on most days of the week. · Do not smoke. Smoking can increase your risk for health problems.  If you need help quitting, talk to your doctor about stop-smoking programs and medicines. These can increase your chances of quitting for good. · Limit alcohol to 2 drinks a day for men and 1 drink a day for women. Too much alcohol can cause health problems. If you have a BMI higher than 25 · Your doctor may do other tests to check your risk for weight-related health problems. This may include measuring the distance around your waist. A waist measurement of more than 40 inches in men or 35 inches in women can increase the risk of weight-related health problems. · Talk with your doctor about steps you can take to stay healthy or improve your health. You may need to make lifestyle changes to lose weight and stay healthy, such as changing your diet and getting regular exercise. If you have a BMI lower than 18.5 · Your doctor may do other tests to check your risk for health problems. · Talk with your doctor about steps you can take to stay healthy or improve your health. You may need to make lifestyle changes to gain or maintain weight and stay healthy, such as getting more healthy foods in your diet and doing exercises to build muscle. Where can you learn more? Go to http://duncan-orlando.info/. Enter S176 in the search box to learn more about \"Body Mass Index: Care Instructions. \" Current as of: October 13, 2016 Content Version: 11.4 © 1504-1201 [employer]. Care instructions adapted under license by [facility] (which disclaims liability or warranty for this information). If you have questions about a medical condition or this instruction, always ask your healthcare professional. [address] any warranty or liability for your use of this information.

## 2019-05-08 ENCOUNTER — TELEPHONE (OUTPATIENT)
Dept: FAMILY MEDICINE CLINIC | Age: 64
End: 2019-05-08

## 2019-05-08 DIAGNOSIS — E78.2 MIXED HYPERLIPIDEMIA: ICD-10-CM

## 2019-05-08 DIAGNOSIS — E11.40 TYPE 2 DIABETES MELLITUS WITH DIABETIC NEUROPATHY, WITHOUT LONG-TERM CURRENT USE OF INSULIN (HCC): Primary | ICD-10-CM

## 2019-05-08 LAB
BUN SERPL-MCNC: 10 MG/DL (ref 8–27)
BUN/CREAT SERPL: 14 (ref 12–28)
CALCIUM SERPL-MCNC: 10.1 MG/DL (ref 8.7–10.3)
CHLORIDE SERPL-SCNC: 103 MMOL/L (ref 96–106)
CHOLEST SERPL-MCNC: 231 MG/DL (ref 100–199)
CO2 SERPL-SCNC: 22 MMOL/L (ref 20–29)
CREAT SERPL-MCNC: 0.7 MG/DL (ref 0.57–1)
EST. AVERAGE GLUCOSE BLD GHB EST-MCNC: 134 MG/DL
GLUCOSE SERPL-MCNC: 130 MG/DL (ref 65–99)
HBA1C MFR BLD: 6.3 % (ref 4.8–5.6)
HDLC SERPL-MCNC: 60 MG/DL
INTERPRETATION, 910389: NORMAL
LDLC SERPL CALC-MCNC: 154 MG/DL (ref 0–99)
Lab: NORMAL
POTASSIUM SERPL-SCNC: 5 MMOL/L (ref 3.5–5.2)
SODIUM SERPL-SCNC: 139 MMOL/L (ref 134–144)
TRIGL SERPL-MCNC: 85 MG/DL (ref 0–149)
VLDLC SERPL CALC-MCNC: 17 MG/DL (ref 5–40)

## 2019-05-08 NOTE — PROGRESS NOTES
A1c at goal 
Lipids elevated; recommend increasing Lipitor to 80mg daily and repeat LFTs and lipid panel in 3 months BMP unremarkable Please call and notify patient of the following: - Your diabetes is at goal.  Your A1c is 6.3%; improved from prior. 
- Your cholesterol is too high, it has increased from 1 year ago. I would recommend we increase your Lipitor to 80mg daily and repeat your labs in 3-4 months. - Your kidney function and electrolytes are all normal.  
Please notify me after you contact her; I can send in new medication dose at that time and pre-order labs.

## 2019-05-09 NOTE — TELEPHONE ENCOUNTER
Patient notified of results and recommendations. She admits to inconsistency in taking her medication. Patient will continue with Lipitor 40mg daily. Plan to repeat labs in 3 months and will adjust dose at that time if needed.      Quinton Hadley MD

## 2019-05-09 NOTE — TELEPHONE ENCOUNTER
Verified patient by 2 identifiers. Informed patient per Dr Kelley Alvarez of results noted below. Patient states she don't mind increasing Lipitor to 80 mg but she was not taking medication as she should. Patient verbalized understanding of lab results. Patient would like to know if she could take 40 mg as prescribed before increasing to see if the medication will work properly. Routing conversation to Dr Kelley Alvarez. ----- Message from Radames Mayo MD sent at 5/8/2019  9:23 AM EDT -----  A1c at goal  Lipids elevated; recommend increasing Lipitor to 80mg daily and repeat LFTs and lipid panel in 3 months  BMP unremarkable    Please call and notify patient of the following:  - Your diabetes is at goal.  Your A1c is 6.3%; improved from prior.  - Your cholesterol is too high, it has increased from 1 year ago. I would recommend we increase your Lipitor to 80mg daily and repeat your labs in 3-4 months. - Your kidney function and electrolytes are all normal.   Please notify me after you contact her; I can send in new medication dose at that time and pre-order labs.

## 2019-05-10 ENCOUNTER — TELEPHONE (OUTPATIENT)
Dept: FAMILY MEDICINE CLINIC | Age: 64
End: 2019-05-10

## 2019-05-10 NOTE — TELEPHONE ENCOUNTER
Spoke with patient per Dr Kelley Alvarez and informed patient that she can continue the Lipitor 40 mg due to not taking correctly. Patient verbalized understanding.

## 2019-05-10 NOTE — TELEPHONE ENCOUNTER
Called and spoke with patient. Instructed patient to make appointment for evaluation. She is in agreement with the plan. Will check her schedule and call clinic to schedule appointment.      Lupe Cárdenas MD

## 2019-05-10 NOTE — TELEPHONE ENCOUNTER
Patient called asking to speak with nurse Lorrie Rendon regarding her Lipitor. Please call patient back at [phone].

## 2019-05-10 NOTE — TELEPHONE ENCOUNTER
Spoke with patient in regards to having sore throat again. Patient is asking if another dose of antibiotic could be sent into pharmacy. Informed patient I will send a message to provider seen by her at the visit for strep. Patient verbalized understanding. Routing to Dr Marivel Lyons.

## 2019-05-24 ENCOUNTER — OFFICE VISIT (OUTPATIENT)
Dept: FAMILY MEDICINE CLINIC | Age: 64
End: 2019-05-24

## 2019-05-24 ENCOUNTER — HOSPITAL ENCOUNTER (OUTPATIENT)
Dept: LAB | Age: 64
Discharge: HOME | End: 2019-05-24
Payer: COMMERCIAL

## 2019-05-24 VITALS
RESPIRATION RATE: 18 BRPM | HEIGHT: 63 IN | SYSTOLIC BLOOD PRESSURE: 114 MMHG | HEART RATE: 74 BPM | DIASTOLIC BLOOD PRESSURE: 66 MMHG | OXYGEN SATURATION: 96 % | BODY MASS INDEX: 27.68 KG/M2 | WEIGHT: 156.2 LBS | TEMPERATURE: 98.2 F

## 2019-05-24 DIAGNOSIS — R10.2 PELVIC PAIN: Primary | ICD-10-CM

## 2019-05-24 LAB
BILIRUB UR QL STRIP: NEGATIVE
GLUCOSE UR-MCNC: NEGATIVE MG/DL
KETONES P FAST UR STRIP-MCNC: NEGATIVE MG/DL
PH UR STRIP: 6.5 [PH] (ref 4.6–8)
PROT UR QL STRIP: NEGATIVE
SP GR UR STRIP: 1.01 (ref 1–1.03)
UA UROBILINOGEN AMB POC: NORMAL (ref 0.2–1)
URINALYSIS CLARITY POC: CLEAR
URINALYSIS COLOR POC: YELLOW
URINE BLOOD POC: NEGATIVE
URINE LEUKOCYTES POC: NEGATIVE
URINE NITRITES POC: NEGATIVE

## 2019-05-24 PROCEDURE — 88175 CYTOPATH C/V AUTO FLUID REDO: CPT

## 2019-05-24 PROCEDURE — 87491 CHLMYD TRACH DNA AMP PROBE: CPT

## 2019-05-24 PROCEDURE — 87624 HPV HI-RISK TYP POOLED RSLT: CPT

## 2019-05-24 NOTE — PROGRESS NOTES
Chief Complaint   Patient presents with   Kalpana Spark Exam     1. Have you been to the ER, urgent care clinic since your last visit? Hospitalized since your last visit? No    2. Have you seen or consulted any other health care providers outside of the [address] [place] [address] since your last visit? Include any pap smears or colon screening. No  Visit Vitals  /66 (BP 1 Location: Right arm, BP Patient Position: Sitting)   Pulse 74   Temp 98.2 °F (36.8 °C) (Oral)   Resp 18   Ht 5' 3\" (1.6 m)   Wt 156 lb 3.2 oz (70.9 kg)   SpO2 96%   BMI 27.67 kg/m²     There are no preventive care reminders to display for this patient.

## 2019-05-24 NOTE — PROGRESS NOTES
History of Present Illness:     Chief Complaint   Patient presents with   Dao Mccormick is a 59y.o. year old female    Presents to clinic for pelvic pain. Startarted about 3 months ago  Started intermittently but now occurring more frequently  Pain more severe now  Typically occurs at night  Denies hematuria, dysuria  +Urinary frequency, 2-3 nocturia  +Dyspareunia   Denies vaginal discharge, vaginal pain or itching    Last pap 2/2015 - Normal with negative HPV testing   , 1 sexual partner, no new partners    Past Medical History:   Diagnosis Date    Anxiety     [facility] mellitus ([facility] [address]     Dyslipidemia     Headache     Hodgkin's lymphoma ([facility] [address] 1985    Nodule removed from throat    Hypertension     Low back pain 1/19/2012    MRI 3/10/14 revealed mild multilevel disk and facet degenerative change     Lumbar disc disease 03/2014    Thyroid nodule     Followed by endocrine, Dr. Suzie Holcomb.  Ulcer          Current Outpatient Medications on File Prior to Visit   Medication Sig Dispense Refill    FLUoxetine (PROZAC) 20 mg capsule Take 1 Cap by mouth daily. Take in addition to 40mg pill for total 60 mg daily 30 Cap 1    atorvastatin (LIPITOR) 40 mg tablet TAKE 1 TABLET BY MOUTH ONCE DAILY 90 Tab 1    amLODIPine (NORVASC) 10 mg tablet TAKE 1 TABLET BY MOUTH ONCE DAILY 30 Tab 3    FLUoxetine (PROZAC) 40 mg capsule TAKE 1 CAPSULE BY MOUTH EVERY DAY 90 Cap 1    pantoprazole (PROTONIX) 40 mg tablet TAKE 1 TABLET BY MOUTH EVERY DAY 90 Tab 1    metFORMIN ER (GLUCOPHAGE XR) 500 mg tablet TAKE 1 TABLET BY MOUTH ONCE DAILY WITH DINNER 90 Tab 0    aspirin 81 mg chewable tablet Take 1 Tab by mouth daily. 60 Tab 0    pregabalin (LYRICA) 25 mg capsule Take 1 Cap by mouth two (2) times a day. Max Daily Amount: 50 mg. 60 Cap 0     No current facility-administered medications on file prior to visit.           Allergies:  No Known Allergies      Review of Systems:  Denies fever, chills, sweats  Denies n/v/d, constipation, melena, blood in stool      Objective:     Vitals:    05/24/19 0805   BP: 114/66   Pulse: 74   Resp: 18   Temp: 98.2 °F (36.8 °C)   TempSrc: Oral   SpO2: 96%   Weight: 156 lb 3.2 oz (70.9 kg)   Height: 5' 3\" (1.6 m)       Physical Exam:  General appearance - alert, well appearing, and in no distress  Abdomen - Soft, non distended. +RLQ TTP with guarding to deep palpation. No rebound tenderness or rigidity. Pelvic - VULVA: normal appearing vulva with no masses, tenderness or lesions, VAGINA: normal appearing vagina with normal color and discharge, no lesions, atrophic, CERVIX: normal appearing cervix without discharge or lesions,+CMT UTERUS: uterus is normal size, shape, consistency and nontender, ADNEXA: normal adnexa in size, +right adnexal tenderness and no masses      Recent Labs:  No results found for this or any previous visit (from the past 12 hour(s)). Assessment and Plan:   Pt is a 59y.o. year old female,      ICD-10-CM ICD-9-CM    1. Pelvic pain R10.2 [id number] PAP IG, CT-NG-TV, APTIMA HPV AND RFX 16/18,45([id number])      AMB POC URINALYSIS DIP STICK AUTO W/O MICRO      CULTURE, URINE      US PELV NON OB W TV     Pelvic pain of unclear etiology. Ddx fibroids or other pelvic mass, ovarian cyst.   +RLQ TTP on exam but no other s/sx of appendicitis. Pap done today with Gc/ Chl testing  Check UA and UCx  UA unremarkable     Pelvic US ordered  If no significant findings, will move forward with CT abd/pelvis    Follow up in 4 weeks. ED or RTC sooner if any worsening symptoms. Lisa Olea MD      I have discussed the diagnosis with the patient and the intended plan as seen in the above orders. The patient has received an after-visit summary and questions were answered concerning future plans.

## 2019-05-24 NOTE — PATIENT INSTRUCTIONS
Pelvic Pain: Care Instructions  Your Care Instructions    Pelvic pain, or pain in the lower belly, can have many causes. Often pelvic pain is not serious and gets better in a few days. If your pain continues or gets worse, you may need tests and treatment. Tell your doctor about any new symptoms. These may be signs of a serious problem. Follow-up care is a key part of your treatment and safety. Be sure to make and go to all appointments, and call your doctor if you are having problems. It's also a good idea to know your test results and keep a list of the medicines you take. How can you care for yourself at home? · Rest until you feel better. Lie down, and raise your legs by placing a pillow under your knees. · Drink plenty of fluids. You may find that small, frequent sips are easier on your stomach than if you drink a lot at once. Avoid drinks with carbonation or caffeine, such as soda pop, tea, or coffee. · Try eating several small meals instead of 2 or 3 large ones. Eat mild foods, such as rice, dry toast or crackers, bananas, and applesauce. Avoid fatty and spicy foods, other fruits, and alcohol until 48 hours after your symptoms have gone away. · Take an over-the-counter pain medicine, such as acetaminophen (Tylenol), ibuprofen (Advil, Motrin), or naproxen (Aleve). Read and follow all instructions on the label. · Do not take two or more pain medicines at the same time unless the doctor told you to. Many pain medicines have acetaminophen, which is Tylenol. Too much acetaminophen (Tylenol) can be harmful. · You can put a heating pad, a warm cloth, or moist heat on your belly to relieve pain. When should you call for help?   Call your doctor now or seek immediate medical care if:    · You have a new or higher fever.     · You have unusual vaginal bleeding.     · You have new or worse belly or pelvic pain.     · You have vaginal discharge that has increased in amount or smells bad.    Watch closely for changes in your health, and be sure to contact your doctor if:    · You do not get better as expected. Where can you learn more? Go to http://duncan-olrando.info/. Enter [phone] in the search box to learn more about \"Pelvic Pain: Care Instructions. \"  Current as of: May 14, 2018  Content Version: 11.9  © 9408-5228 [employer] Care instructions adapted under license by [employer] (which disclaims liability or warranty for this information). If you have questions about a medical condition or this instruction, always ask your healthcare professional. [place] [address] any warranty or liability for your use of this information.

## 2019-05-26 LAB — BACTERIA UR CULT: NORMAL

## 2019-05-30 LAB
C TRACH RRNA SPEC QL NAA+PROBE: NEGATIVE
N GONORRHOEA RRNA SPEC QL NAA+PROBE: NEGATIVE
SPECIMEN SOURCE: NORMAL
T VAGINALIS RRNA SPEC QL NAA+PROBE: NEGATIVE

## 2019-06-17 DIAGNOSIS — R10.2 PELVIC PAIN: Primary | ICD-10-CM

## 2019-06-18 ENCOUNTER — TELEPHONE (OUTPATIENT)
Dept: FAMILY MEDICINE CLINIC | Age: 64
End: 2019-06-18

## 2019-06-18 NOTE — TELEPHONE ENCOUNTER
Patient called stating she received a vm to call back and speak to nurse Maurice Skaggs. Maurice Skaggs informed me to relay that the order was fixed. I informed the patient it looks like a new order was placed as a correction and that she can go ahead with scheduling. Patient states she will call to schedule and if she has any questions she will call back.

## 2019-06-19 ENCOUNTER — TELEPHONE (OUTPATIENT)
Dept: FAMILY MEDICINE CLINIC | Age: 64
End: 2019-06-19

## 2019-06-19 NOTE — TELEPHONE ENCOUNTER
Attempted to contact patient to inform patient of Ultrasound order have been corrected,in which she can contact scheduling at [phone]. Patient unavailable, LVM to call the office.

## 2019-06-24 ENCOUNTER — OFFICE VISIT (OUTPATIENT)
Dept: FAMILY MEDICINE CLINIC | Age: 64
End: 2019-06-24

## 2019-06-24 VITALS
OXYGEN SATURATION: 96 % | WEIGHT: 153 LBS | HEART RATE: 65 BPM | HEIGHT: 63 IN | BODY MASS INDEX: 27.11 KG/M2 | RESPIRATION RATE: 16 BRPM | TEMPERATURE: 98.7 F | DIASTOLIC BLOOD PRESSURE: 71 MMHG | SYSTOLIC BLOOD PRESSURE: 116 MMHG

## 2019-06-24 DIAGNOSIS — R10.31 CHRONIC RLQ PAIN: Primary | ICD-10-CM

## 2019-06-24 DIAGNOSIS — G89.29 CHRONIC RLQ PAIN: Primary | ICD-10-CM

## 2019-06-24 DIAGNOSIS — R10.2 PELVIC PAIN: ICD-10-CM

## 2019-06-24 NOTE — PROGRESS NOTES
Chief Complaint   Patient presents with    Follow-up     pelvic pain,      1. Have you been to the ER, urgent care clinic since your last visit? Hospitalized since your last visit? No    2. Have you seen or consulted any other health care providers outside of the [address] [place] [address] since your last visit? Include any pap smears or colon screening. No  Visit Vitals  /71 (BP 1 Location: Right arm, BP Patient Position: Sitting)   Pulse 65   Temp 98.7 °F (37.1 °C) (Oral)   Resp 16   Ht 5' 3\" (1.6 m)   Wt 153 lb (69.4 kg)   SpO2 96%   BMI 27.10 kg/m²       There are no preventive care reminders to display for this patient.

## 2019-06-24 NOTE — PROGRESS NOTES
History of Present Illness:     Chief Complaint   Patient presents with    Follow-up     pelvic pain,      Pt is a 59y.o. year old female    Presents to clinic for pelvic pain  Pain is a little better  Still having dull right sided pelvic pain  Some improvement in urinary frequency (improved to 1-2 times per night)  No vaginal bleeding or discharge. Pelvic US still pending  Normal pap with negative HPV  Negative UCx      Past Medical History:   Diagnosis Date    Anxiety     Diabetes mellitus ([facility] [address]     Dyslipidemia     Headache     Hodgkin's lymphoma ([facility] [address] 1985    Nodule removed from throat    Hypertension     Low back pain 1/19/2012    MRI 3/10/14 revealed mild multilevel disk and facet degenerative change     Lumbar disc disease 03/2014    Thyroid nodule     Followed by endocrine, Dr. Narda Cranker.  Ulcer          Current Outpatient Medications on File Prior to Visit   Medication Sig Dispense Refill    FLUoxetine (PROZAC) 20 mg capsule Take 1 Cap by mouth daily. Take in addition to 40mg pill for total 60 mg daily 30 Cap 1    atorvastatin (LIPITOR) 40 mg tablet TAKE 1 TABLET BY MOUTH ONCE DAILY 90 Tab 1    amLODIPine (NORVASC) 10 mg tablet TAKE 1 TABLET BY MOUTH ONCE DAILY 30 Tab 3    FLUoxetine (PROZAC) 40 mg capsule TAKE 1 CAPSULE BY MOUTH EVERY DAY 90 Cap 1    pantoprazole (PROTONIX) 40 mg tablet TAKE 1 TABLET BY MOUTH EVERY DAY 90 Tab 1    aspirin 81 mg chewable tablet Take 1 Tab by mouth daily. 60 Tab 0    pregabalin (LYRICA) 25 mg capsule Take 1 Cap by mouth two (2) times a day. Max Daily Amount: 50 mg. 60 Cap 0    metFORMIN ER (GLUCOPHAGE XR) 500 mg tablet TAKE 1 TABLET BY MOUTH ONCE DAILY WITH DINNER 90 Tab 0     No current facility-administered medications on file prior to visit.           Allergies:  No Known Allergies      Review of Systems:  Denies fever, chills, sweats  Denies n/v/d, constipation, melena, blood in stool    Objective:     Vitals:    06/24/19 0807   BP: 116/71 Pulse: 65   Resp: 16   Temp: 98.7 °F (37.1 °C)   TempSrc: Oral   SpO2: 96%   Weight: 153 lb (69.4 kg)   Height: 5' 3\" (1.6 m)       Physical Exam:  General appearance - alert, well appearing, and in no distress  Abdomen - Soft, non distended. +RLQ TTP with guarding to deep palpation; unchanged from prior exam.       Recent Labs:  No results found for this or any previous visit (from the past 12 hour(s)). Assessment and Plan:   Pt is a 59y.o. year old female,      ICD-10-CM ICD-9-CM    1. Chronic RLQ pain R10.31 789.03     G89.29 338.29    2. Pelvic pain R10.2 [id number]      Still needs pelvic US  If US negative, will plan for CT abd/pelvis W contrast  Follow up after imaging    Lillian Hale MD      I have discussed the diagnosis with the patient and the intended plan as seen in the above orders. The patient has received an after-visit summary and questions were answered concerning future plans.

## 2019-06-24 NOTE — PATIENT INSTRUCTIONS
Pelvic Pain: Care Instructions  Your Care Instructions    Pelvic pain, or pain in the lower belly, can have many causes. Often pelvic pain is not serious and gets better in a few days. If your pain continues or gets worse, you may need tests and treatment. Tell your doctor about any new symptoms. These may be signs of a serious problem. Follow-up care is a key part of your treatment and safety. Be sure to make and go to all appointments, and call your doctor if you are having problems. It's also a good idea to know your test results and keep a list of the medicines you take. How can you care for yourself at home? · Rest until you feel better. Lie down, and raise your legs by placing a pillow under your knees. · Drink plenty of fluids. You may find that small, frequent sips are easier on your stomach than if you drink a lot at once. Avoid drinks with carbonation or caffeine, such as soda pop, tea, or coffee. · Try eating several small meals instead of 2 or 3 large ones. Eat mild foods, such as rice, dry toast or crackers, bananas, and applesauce. Avoid fatty and spicy foods, other fruits, and alcohol until 48 hours after your symptoms have gone away. · Take an over-the-counter pain medicine, such as acetaminophen (Tylenol), ibuprofen (Advil, Motrin), or naproxen (Aleve). Read and follow all instructions on the label. · Do not take two or more pain medicines at the same time unless the doctor told you to. Many pain medicines have acetaminophen, which is Tylenol. Too much acetaminophen (Tylenol) can be harmful. · You can put a heating pad, a warm cloth, or moist heat on your belly to relieve pain. When should you call for help?   Call your doctor now or seek immediate medical care if:    · You have a new or higher fever.     · You have unusual vaginal bleeding.     · You have new or worse belly or pelvic pain.     · You have vaginal discharge that has increased in amount or smells bad.    Watch closely for changes in your health, and be sure to contact your doctor if:    · You do not get better as expected. Where can you learn more? Go to http://duncan-orlando.info/. Enter [phone] in the search box to learn more about \"Pelvic Pain: Care Instructions. \"  Current as of: May 14, 2018  Content Version: 11.9  © 4923-3747 [employer]. Care instructions adapted under license by [employer] (which disclaims liability or warranty for this information). If you have questions about a medical condition or this instruction, always ask your healthcare professional. [place] [address] any warranty or liability for your use of this information.

## 2019-06-28 ENCOUNTER — TELEPHONE (OUTPATIENT)
Dept: FAMILY MEDICINE CLINIC | Age: 64
End: 2019-06-28

## 2019-06-28 NOTE — TELEPHONE ENCOUNTER
Patient called and stated that she was unable to get ultrasound done on today as she used restroom and wasn't suppose to. Patient asked for number to reschedule.  Provided her number to outpatient scheduling [phone]

## 2019-07-03 DIAGNOSIS — F33.41 RECURRENT MAJOR DEPRESSIVE DISORDER, IN PARTIAL REMISSION (HCC): ICD-10-CM

## 2019-07-05 RX ORDER — FLUOXETINE HYDROCHLORIDE 20 MG/1
20 CAPSULE ORAL DAILY
Qty: 30 CAP | Refills: 1 | Status: SHIPPED | OUTPATIENT
Start: 2019-07-05 | End: 2019-08-29 | Stop reason: SDUPTHER

## 2019-08-03 DIAGNOSIS — K21.9 GASTROESOPHAGEAL REFLUX DISEASE, ESOPHAGITIS PRESENCE NOT SPECIFIED: ICD-10-CM

## 2019-08-03 RX ORDER — PANTOPRAZOLE SODIUM 40 MG/1
TABLET, DELAYED RELEASE ORAL
Qty: 30 TAB | Refills: 5 | Status: SHIPPED | OUTPATIENT
Start: 2019-08-03 | End: 2019-09-23 | Stop reason: SDUPTHER

## 2019-08-13 ENCOUNTER — TELEPHONE (OUTPATIENT)
Dept: FAMILY MEDICINE CLINIC | Age: 64
End: 2019-08-13

## 2019-08-13 DIAGNOSIS — E11.40 TYPE 2 DIABETES MELLITUS WITH DIABETIC NEUROPATHY, WITHOUT LONG-TERM CURRENT USE OF INSULIN (HCC): Primary | ICD-10-CM

## 2019-08-13 DIAGNOSIS — E78.2 MIXED HYPERLIPIDEMIA: ICD-10-CM

## 2019-08-13 NOTE — TELEPHONE ENCOUNTER
----- Message from Lisa Wolf MD sent at 5/9/2019  9:26 AM EDT -----  Regarding: Lab Follow up  [ ] Remind patient to come and have cholesterol and liver fx labs done. Will be in touch with result to discuss whether we need to increase her Lipitor dose.

## 2019-08-14 NOTE — TELEPHONE ENCOUNTER
Attempted to contact patient to schedule a lab only appt. Also to inform patient Dr Brandie Marroquin will be in touch with results to discuss whether we need to increase Lipitor dose. Patient unavailable, LVM to call the office.

## 2019-08-16 NOTE — TELEPHONE ENCOUNTER
Francisco J/telephone   Received: Today   Message Contents   [place], [address]             Pt is returning a call from yesterday. Pts number is [phone].

## 2019-08-24 LAB
ALBUMIN SERPL-MCNC: 4.4 G/DL (ref 3.6–4.8)
ALP SERPL-CCNC: 87 IU/L (ref 39–117)
ALT SERPL-CCNC: 15 IU/L (ref 0–32)
AST SERPL-CCNC: 15 IU/L (ref 0–40)
BILIRUB DIRECT SERPL-MCNC: 0.14 MG/DL (ref 0–0.4)
BILIRUB SERPL-MCNC: 0.4 MG/DL (ref 0–1.2)
CHOLEST SERPL-MCNC: 138 MG/DL (ref 100–199)
HDLC SERPL-MCNC: 66 MG/DL
INTERPRETATION, 910389: NORMAL
LDLC SERPL CALC-MCNC: 62 MG/DL (ref 0–99)
PROT SERPL-MCNC: 7 G/DL (ref 6–8.5)
TRIGL SERPL-MCNC: 49 MG/DL (ref 0–149)
VLDLC SERPL CALC-MCNC: 10 MG/DL (ref 5–40)

## 2019-08-29 DIAGNOSIS — F33.41 RECURRENT MAJOR DEPRESSIVE DISORDER, IN PARTIAL REMISSION (HCC): ICD-10-CM

## 2019-08-29 RX ORDER — FLUOXETINE HYDROCHLORIDE 20 MG/1
20 CAPSULE ORAL DAILY
Qty: 30 CAP | Refills: 1 | Status: SHIPPED | OUTPATIENT
Start: 2019-08-29 | End: 2019-09-26 | Stop reason: SDUPTHER

## 2019-09-23 DIAGNOSIS — K21.9 GASTROESOPHAGEAL REFLUX DISEASE, ESOPHAGITIS PRESENCE NOT SPECIFIED: ICD-10-CM

## 2019-09-23 RX ORDER — PANTOPRAZOLE SODIUM 40 MG/1
TABLET, DELAYED RELEASE ORAL
Qty: 30 TAB | Refills: 5 | Status: SHIPPED | OUTPATIENT
Start: 2019-09-23 | End: 2020-03-17 | Stop reason: SDUPTHER

## 2019-09-26 DIAGNOSIS — F32.A ANXIETY AND DEPRESSION: ICD-10-CM

## 2019-09-26 DIAGNOSIS — F41.9 ANXIETY AND DEPRESSION: ICD-10-CM

## 2019-09-26 DIAGNOSIS — Z63.6 CAREGIVER BURDEN: ICD-10-CM

## 2019-09-26 RX ORDER — FLUOXETINE HYDROCHLORIDE 40 MG/1
CAPSULE ORAL
Qty: 30 CAP | Refills: 5 | Status: SHIPPED | OUTPATIENT
Start: 2019-09-26 | End: 2020-03-09 | Stop reason: SDUPTHER

## 2019-09-26 SDOH — SOCIAL STABILITY - SOCIAL INSECURITY: DEPENDENT RELATIVE NEEDING CARE AT HOME: Z63.6

## 2019-11-26 ENCOUNTER — OFFICE VISIT (OUTPATIENT)
Dept: FAMILY MEDICINE CLINIC | Age: 64
End: 2019-11-26

## 2019-11-26 VITALS
OXYGEN SATURATION: 96 % | RESPIRATION RATE: 16 BRPM | TEMPERATURE: 97.7 F | DIASTOLIC BLOOD PRESSURE: 78 MMHG | BODY MASS INDEX: 26.22 KG/M2 | HEART RATE: 60 BPM | SYSTOLIC BLOOD PRESSURE: 148 MMHG | WEIGHT: 148 LBS | HEIGHT: 63 IN

## 2019-11-26 DIAGNOSIS — R05.9 COUGH: Primary | ICD-10-CM

## 2019-11-26 DIAGNOSIS — J01.00 ACUTE NON-RECURRENT MAXILLARY SINUSITIS: ICD-10-CM

## 2019-11-26 DIAGNOSIS — R68.89 FLU-LIKE SYMPTOMS: ICD-10-CM

## 2019-11-26 RX ORDER — AMOXICILLIN 500 MG/1
500 CAPSULE ORAL 3 TIMES DAILY
Qty: 30 CAP | Refills: 0 | Status: SHIPPED | OUTPATIENT
Start: 2019-11-26 | End: 2019-11-26 | Stop reason: DRUGHIGH

## 2019-11-26 RX ORDER — CEFDINIR 300 MG/1
300 CAPSULE ORAL 2 TIMES DAILY
Qty: 10 CAP | Refills: 0 | Status: SHIPPED | OUTPATIENT
Start: 2019-11-26 | End: 2019-12-01

## 2019-11-26 NOTE — PROGRESS NOTES
Chief Complaint: cough, cold like symptoms   Source: self     Subjective  Hilkrishna Hernandez is an 59 y.o. female who presents for Cold like symptoms     - T to 101 three days ago  - Cough for 10 days  - works at  as preK teacher   - myalgias  - difficulty clearing mucus which is yellow in nature   - headache as well  - taking zyrtec, nyquil, dayquil, therafu      Allergies - reviewed:   No Known Allergies      Medications - reviewed: ONLY taking pantoprazole currently because of lapse in insurance   Current Outpatient Medications   Medication Sig    amoxicillin (AMOXIL) 500 mg capsule Take 1 Cap by mouth three (3) times daily for 10 days. Indications: Acute Bacterial Infection of the Sinuses    pantoprazole (PROTONIX) 40 mg tablet TAKE 1 TABLET BY MOUTH EVERY DAY    FLUoxetine (PROZAC) 40 mg capsule TAKE 1 CAPSULE BY MOUTH EVERY DAY    atorvastatin (LIPITOR) 40 mg tablet TAKE 1 TABLET BY MOUTH ONCE DAILY    amLODIPine (NORVASC) 10 mg tablet TAKE 1 TABLET BY MOUTH ONCE DAILY    pregabalin (LYRICA) 25 mg capsule Take 1 Cap by mouth two (2) times a day. Max Daily Amount: 50 mg.    metFORMIN ER (GLUCOPHAGE XR) 500 mg tablet TAKE 1 TABLET BY MOUTH ONCE DAILY WITH DINNER    aspirin 81 mg chewable tablet Take 1 Tab by mouth daily. No current facility-administered medications for this visit. Past Medical History - reviewed:  Past Medical History:   Diagnosis Date    Anxiety     [facility] ([facility] [address]     Dyslipidemia     Headache     Hodgkin's lymphoma ([facility] [address]     Nodule removed from throat    Hypertension     Low back pain 2012    MRI 3/10/14 revealed mild multilevel disk and facet degenerative change     Lumbar disc disease 2014    Thyroid nodule     Followed by endocrine, Dr. Shannan Byers.       Ulcer          Past Surgical History - reviewed:   Past Surgical History:   Procedure Laterality Date    DELIVERY       HX GYN      REMOVAL NODES, NECK,CERV CMPLT Social History - reviewed:  Social History     Socioeconomic History    Marital status:      Spouse name: Not on file    Number of children: Not on file    Years of education: Not on file    Highest education level: Not on file   Occupational History    Not on file   Social Needs    Financial resource strain: Not on file    Food insecurity:     Worry: Not on file     Inability: Not on file    Transportation needs:     Medical: Not on file     Non-medical: Not on file   Tobacco Use    Smoking status: Never Smoker    Smokeless tobacco: Never Used   Substance and Sexual Activity    Alcohol use: No     Alcohol/week: 0.0 standard drinks    Drug use: No    Sexual activity: Not Currently     Birth control/protection: None   Lifestyle    Physical activity:     Days per week: Not on file     Minutes per session: Not on file    Stress: Not on file   Relationships    Social connections:     Talks on phone: Not on file     Gets together: Not on file     Attends Druze service: Not on file     Active member of club or organization: Not on file     Attends meetings of clubs or organizations: Not on file     Relationship status: Not on file    Intimate partner violence:     Fear of current or ex partner: Not on file     Emotionally abused: Not on file     Physically abused: Not on file     Forced sexual activity: Not on file   Other Topics Concern    Not on file   Social History Narrative    Not on file         Family History - reviewed:  Family History   Problem Relation Age of Onset    Elevated Lipids Mother     Hypertension Mother     Hypertension Father     Parkinsonism Father     Hypertension Sister     Cancer Brother     Stroke Maternal Grandmother     Hypertension Maternal Grandmother     Cancer Paternal Grandmother     Hypertension Paternal Grandmother     Cancer Paternal Grandfather     Hypertension Paternal Grandfather          Immunizations - reviewed:   Immunization History   Administered Date(s) Administered    TB Skin Test (PPD) Intradermal 01/15/2019    Tdap 02/06/2015     Flu: not this year       Review of Systems   Constitutional: Positive for chills, fever and malaise/fatigue. HENT: Positive for congestion. Negative for hearing loss and sore throat. Respiratory: Positive for cough and sputum production. Cardiovascular: Negative for chest pain. Musculoskeletal: Positive for myalgias. Neurological: Positive for headaches. Negative for dizziness and weakness. Physical Exam  Visit Vitals  /78 (BP 1 Location: Left arm, BP Patient Position: Sitting)   Pulse 60   Temp 97.7 °F (36.5 °C) (Oral)   Resp 16   Ht 5' 3\" (1.6 m)   Wt 148 lb (67.1 kg)   LMP 01/18/2000   SpO2 96%   BMI 26.22 kg/m²       Physical Exam  Constitutional:       General: She is not in acute distress. Appearance: Normal appearance. She is not ill-appearing or toxic-appearing. HENT:      Right Ear: Ear canal and external ear normal. There is no impacted cerumen. Left Ear: Ear canal and external ear normal. There is no impacted cerumen. Ears:      Comments: Tm pearly grey with moderate fluid     Nose:      Comments: swollen nasal turbinates without significant drainage. Mild maxillary sinus tenderness     Mouth/Throat:      Mouth: Mucous membranes are moist.      Pharynx: Posterior oropharyngeal erythema present. Eyes:      General:         Right eye: No discharge. Left eye: No discharge. Extraocular Movements: Extraocular movements intact. Pupils: Pupils are equal, round, and reactive to light. Neck:      Musculoskeletal: Normal range of motion and neck supple. Cardiovascular:      Rate and Rhythm: Normal rate and regular rhythm. Pulses: Normal pulses. Heart sounds: No murmur. Pulmonary:      Effort: Pulmonary effort is normal. No respiratory distress. Breath sounds: Normal breath sounds. No stridor.  No wheezing, rhonchi or rales.   Chest:      Chest wall: No tenderness. Musculoskeletal: Normal range of motion. Lymphadenopathy:      Cervical: No cervical adenopathy. Neurological:      General: No focal deficit present. Mental Status: She is alert and oriented to person, place, and time. Psychiatric:         Mood and Affect: Mood normal.         Behavior: Behavior normal.         Assessment/Plan    ICD-10-CM ICD-9-CM    1. Cough R05 786.2 AMB POC RAPID INFLUENZA TEST   2. Flu-like symptoms R68.89 780.99 AMB POC RAPID STREP A   3. Acute non-recurrent maxillary sinusitis J01.00 461.0 amoxicillin (AMOXIL) 500 mg capsule       Alexis Garza is an 59 y.o. female with hx of HTN, DMII presenting with 10 days of cough, congestion and nasal drainage. Mild sinus tenderness with significantly swollen nasal turbinates on exam with 10 day history of congestion concerning for sinusitis. 1. Cough  - AMB POC RAPID INFLUENZA TEST    2. Flu-like symptoms  - AMB POC RAPID STREP A    3. Acute non-recurrent maxillary sinusitis  - omnicef 300mg BID for 5 days         Follow-up and Dispositions    · Return in about 4 days (around 11/30/2019) for if symptoms are not improving . I have discussed the diagnosis with the patient and the intended plan as seen in the above orders. Patient verbalized understanding of the plan and agrees with the plan. The patient has received an after-visit summary and questions were answered concerning future plans. I have discussed medication side effects and warnings with the patient as well. Informed patient to return to the office if new symptoms arise. Patient discussed with Dr. Mabel Mg, supervising physician.     Rand Holman PGY2  Family Medicine Resident

## 2019-11-26 NOTE — PROGRESS NOTES
Chief Complaint   Patient presents with    Cough     sx for week and 3 days    Nasal Congestion    Fever     took temp on sunday 101     1. Have you been to the ER, urgent care clinic since your last visit? Hospitalized since your last visit? No    2. Have you seen or consulted any other health care providers outside of the [address] [place] [address] since your last visit? Include any pap smears or colon screening.  No    Taking zyrtec/thera flu/dayquil

## 2019-11-26 NOTE — PATIENT INSTRUCTIONS
Start flonase 1 spray per nostril twice daily Take mucinex (guafenisen) 1-2 times daily for congestion Sinusitis: Care Instructions Your Care Instructions Sinusitis is an infection of the lining of the sinus cavities in your head. Sinusitis often follows a cold. It causes pain and pressure in your head and face. In most cases, sinusitis gets better on its own in 1 to 2 weeks. But some mild symptoms may last for several weeks. Sometimes antibiotics are needed. Follow-up care is a key part of your treatment and safety. Be sure to make and go to all appointments, and call your doctor if you are having problems. It's also a good idea to know your test results and keep a list of the medicines you take. How can you care for yourself at home? · Take an over-the-counter pain medicine, such as acetaminophen (Tylenol), ibuprofen (Advil, Motrin), or naproxen (Aleve). Read and follow all instructions on the label. · If the doctor prescribed antibiotics, take them as directed. Do not stop taking them just because you feel better. You need to take the full course of antibiotics. · Be careful when taking over-the-counter cold or flu medicines and Tylenol at the same time. Many of these medicines have acetaminophen, which is Tylenol. Read the labels to make sure that you are not taking more than the recommended dose. Too much acetaminophen (Tylenol) can be harmful. · Breathe warm, moist air from a steamy shower, a hot bath, or a sink filled with hot water. Avoid cold, dry air. Using a humidifier in your home may help. Follow the directions for cleaning the machine. · Use saline (saltwater) nasal washes to help keep your nasal passages open and wash out mucus and bacteria. You can buy saline nose drops at a grocery store or drugstore.  Or you can make your own at home by adding 1 teaspoon of salt and 1 teaspoon of baking soda to 2 cups of distilled water. If you make your own, fill a bulb syringe with the solution, insert the tip into your nostril, and squeeze gently. Truddie Piano your nose. · Put a hot, wet towel or a warm gel pack on your face 3 or 4 times a day for 5 to 10 minutes each time. · Try a decongestant nasal spray like oxymetazoline (Afrin). Do not use it for more than 3 days in a row. Using it for more than 3 days can make your congestion worse. When should you call for help? Call your doctor now or seek immediate medical care if: 
  · You have new or worse swelling or redness in your face or around your eyes.  
  · You have a new or higher fever.  
 Watch closely for changes in your health, and be sure to contact your doctor if: 
  · You have new or worse facial pain.  
  · The mucus from your nose becomes thicker (like pus) or has new blood in it.  
  · You are not getting better as expected. Where can you learn more? Go to http://duncan-orlando.info/. Enter B897 in the search box to learn more about \"Sinusitis: Care Instructions. \" Current as of: October 21, 2018 Content Version: 12.2 © 6662-0807 [employer]. Care instructions adapted under license by [employer] (which disclaims liability or warranty for this information). If you have questions about a medical condition or this instruction, always ask your healthcare professional. [place] [address] any warranty or liability for your use of this information.

## 2019-12-12 ENCOUNTER — OFFICE VISIT (OUTPATIENT)
Dept: FAMILY MEDICINE CLINIC | Age: 64
End: 2019-12-12

## 2019-12-12 VITALS
DIASTOLIC BLOOD PRESSURE: 74 MMHG | RESPIRATION RATE: 17 BRPM | BODY MASS INDEX: 26.58 KG/M2 | HEIGHT: 63 IN | HEART RATE: 82 BPM | OXYGEN SATURATION: 96 % | SYSTOLIC BLOOD PRESSURE: 134 MMHG | TEMPERATURE: 98.4 F | WEIGHT: 150 LBS

## 2019-12-12 DIAGNOSIS — R05.9 COUGH: ICD-10-CM

## 2019-12-12 DIAGNOSIS — R50.9 FEVER, UNSPECIFIED FEVER CAUSE: Primary | ICD-10-CM

## 2019-12-12 RX ORDER — AZITHROMYCIN 250 MG/1
TABLET, FILM COATED ORAL
Qty: 6 TAB | Refills: 0 | Status: SHIPPED | OUTPATIENT
Start: 2019-12-12 | End: 2019-12-17

## 2019-12-12 NOTE — PROGRESS NOTES
Gita Mcdermott is a 62 y.o. female    Issues discussed today include:    Chief Complaint   Patient presents with    Follow-up    Fever     102.4 yesterday    Cough     sx since last friday       1) Cough, fever:  Sxs started 3+ weeks ago. Was here 11/26 and treated for sinusitis with omnicef. Sinus congestion improved, but cough has continued since Nov. Since 12/8, has developed worsening cough, fatigue. Body aches and fever x 2 days, Tmax 102.4F yesterday. Denies nausea, vomiting. + occasional dizziness. Fine appetite, eating soups mostly. Trying to keep up with fluids, says she doesn't drink much water at baseline. Data reviewed or ordered today:       Other problems include:  Patient Active Problem List   Diagnosis Code    Hypertension I10    Low back pain M54.5    Esophageal reflux K21.9    Multiple thyroid nodules E04.2    Bone lesion M89.9    Patent foramen ovale Q21.1    Mixed hyperlipidemia E78.2    Dizziness R42    Nausea R11.0    Numbness and tingling of left side of face R20.0, R20.2    Refused pneumococcal vaccination Z28.21    Refused influenza vaccine Z28.21    Type 2 diabetes mellitus with diabetic neuropathy (HCC) E11.40       Medications:  Current Outpatient Medications   Medication Sig Dispense Refill    azithromycin (ZITHROMAX) 250 mg tablet Take 2 tablets today, then take 1 tablet daily 6 Tab 0    dextromethorphan-guaiFENesin (ROBITUSSIN-DM)  mg/5 mL syrup Take 10 mL by mouth every six (6) hours as needed for Cough. 240 mL 0    FLUoxetine (PROZAC) 40 mg capsule TAKE 1 CAPSULE BY MOUTH EVERY DAY 30 Cap 5    pantoprazole (PROTONIX) 40 mg tablet TAKE 1 TABLET BY MOUTH EVERY DAY 30 Tab 5    atorvastatin (LIPITOR) 40 mg tablet TAKE 1 TABLET BY MOUTH ONCE DAILY 90 Tab 1    amLODIPine (NORVASC) 10 mg tablet TAKE 1 TABLET BY MOUTH ONCE DAILY 30 Tab 3    pregabalin (LYRICA) 25 mg capsule Take 1 Cap by mouth two (2) times a day.  Max Daily Amount: 50 mg. 60 Cap 0    metFORMIN ER (GLUCOPHAGE XR) 500 mg tablet TAKE 1 TABLET BY MOUTH ONCE DAILY WITH DINNER 90 Tab 0    aspirin 81 mg chewable tablet Take 1 Tab by mouth daily. 60 Tab 0       Allergies:  No Known Allergies    LMP:  Patient's last menstrual period was 01/18/2000.     Social History     Socioeconomic History    Marital status:      Spouse name: Not on file    Number of children: Not on file    Years of education: Not on file    Highest education level: Not on file   Occupational History    Not on file   Social Needs    Financial resource strain: Not on file    Food insecurity:     Worry: Not on file     Inability: Not on file    Transportation needs:     Medical: Not on file     Non-medical: Not on file   Tobacco Use    Smoking status: Never Smoker    Smokeless tobacco: Never Used   Substance and Sexual Activity    Alcohol use: No     Alcohol/week: 0.0 standard drinks    Drug use: No    Sexual activity: Not Currently     Birth control/protection: None   Lifestyle    Physical activity:     Days per week: Not on file     Minutes per session: Not on file    Stress: Not on file   Relationships    Social connections:     Talks on phone: Not on file     Gets together: Not on file     Attends Islam service: Not on file     Active member of club or organization: Not on file     Attends meetings of clubs or organizations: Not on file     Relationship status: Not on file    Intimate partner violence:     Fear of current or ex partner: Not on file     Emotionally abused: Not on file     Physically abused: Not on file     Forced sexual activity: Not on file   Other Topics Concern    Not on file   Social History Narrative    Not on file       Family History   Problem Relation Age of Onset    Elevated Lipids Mother     Hypertension Mother     Hypertension Father     Parkinsonism Father     Hypertension Sister     Cancer Brother     Stroke Maternal Grandmother     Hypertension Maternal Grandmother  Cancer Paternal Grandmother     Hypertension Paternal Grandmother     Cancer Paternal Shikha Granda Hypertension Paternal Grandfather          Physical Exam   Visit Vitals  /74   Pulse 82   Temp 98.4 °F (36.9 °C) (Oral)   Resp 17   Ht 5' 3\" (1.6 m)   Wt 150 lb (68 kg)   LMP 01/18/2000   SpO2 96%   BMI 26.57 kg/m²      BP Readings from Last 3 Encounters:   12/12/19 134/74   11/26/19 148/78   06/24/19 116/71     Constitutional: Appears well,  No acute distress, Vitals noted  Psychiatric:  Affect normal, Alert and Oriented to person/place/time  Eyes:  Conjunctiva clear, no drainage  ENT:  External ears and nose normal, Mucous membranes moist  Neck:  General inspection normal. Supple. Heart:  Normal HR, Normal S1 and S2,  Regular rhythm. No murmurs, rubs or gallops. Lungs:  Clear to auscultation, good respiratory effort, diminished breath sounds bilat bases, no wheezes, rales or rhonchi  Extremities: Without edema, good peripheral pulses  Skin:  Warm to palpation, without rashes    CXR PA/Lat: Images personally viewed. Possible left lower lobe consolidation, radiologist read as clear    Assessment/Plan:      ICD-10-CM ICD-9-CM    1. Fever, unspecified fever cause R50.9 780.60 AMB POC DG INFLUENZA A/B TEST      XR CHEST PA LAT   2. Cough R05 786.2      Given second worsening with new fever, malaise and dizziness will cover for PNA with Zpack to cover atypical orgs  Rapid flu negative, VS wnl HR 82 (a bit above baseline in 60s), afebrile, normotensive  Already completed omnicef cx5 days for sinusitis in late Nov   I reviewed with pt ER and return to clinic precautions  Instructed to push water intake  Robitussin-DM rx sent    Follow-up and Dispositions    · Return if symptoms worsen or fail to improve in 5 days.          Luma Jeffers MD

## 2019-12-12 NOTE — PATIENT INSTRUCTIONS
Cough: Care Instructions Your Care Instructions A cough is your body's response to something that bothers your throat or airways. Many things can cause a cough. You might cough because of a cold or the flu, bronchitis, or asthma. Smoking, postnasal drip, allergies, and stomach acid that backs up into your throat also can cause coughs. A cough is a symptom, not a disease. Most coughs stop when the cause, such as a cold, goes away. You can take a few steps at home to cough less and feel better. Follow-up care is a key part of your treatment and safety. Be sure to make and go to all appointments, and call your doctor if you are having problems. It's also a good idea to know your test results and keep a list of the medicines you take. How can you care for yourself at home? · Drink lots of water and other fluids. This helps thin the mucus and soothes a dry or sore throat. Honey or lemon juice in hot water or tea may ease a dry cough. · Take cough medicine as directed by your doctor. · Prop up your head on pillows to help you breathe and ease a dry cough. · Try cough drops to soothe a dry or sore throat. Cough drops don't stop a cough. Medicine-flavored cough drops are no better than candy-flavored drops or hard candy. · Do not smoke. Avoid secondhand smoke. If you need help quitting, talk to your doctor about stop-smoking programs and medicines. These can increase your chances of quitting for good. When should you call for help? Call 911 anytime you think you may need emergency care.  For example, call if: 
  · You have severe trouble breathing.  
 Call your doctor now or seek immediate medical care if: 
  · You cough up blood.  
  · You have new or worse trouble breathing.  
  · You have a new or higher fever.  
  · You have a new rash.  
 Watch closely for changes in your health, and be sure to contact your doctor if: 
  · You cough more deeply or more often, especially if you notice more mucus or a change in the color of your mucus.  
  · You have new symptoms, such as a sore throat, an earache, or sinus pain.  
  · You do not get better as expected. Where can you learn more? Go to http://duncan-orlando.info/. Enter D279 in the search box to learn more about \"Cough: Care Instructions. \" Current as of: June 9, 2019 Content Version: 12.2 © 5365-6684 [employer]. Care instructions adapted under license by [employer] (which disclaims liability or warranty for this information). If you have questions about a medical condition or this instruction, always ask your healthcare professional. [address] any warranty or liability for your use of this information.

## 2019-12-12 NOTE — PROGRESS NOTES
Chief Complaint   Patient presents with    Follow-up    Fever     102.4 yesterday    Cough     sx since last friday     1. Have you been to the ER, urgent care clinic since your last visit? Hospitalized since your last visit? no    2. Have you seen or consulted any other health care providers outside of the [address] [place] [address] since your last visit? Include any pap smears or colon screening.   no

## 2019-12-12 NOTE — LETTER
NOTIFICATION RETURN TO WORK / SCHOOL 
 
12/12/2019 1:58 PM 
 
Ms. Gordo Cruz [address] [facility],[address] [address] [facility] [address] [id number] To Whom It May Concern: 
 
Gordo Cruz is currently under the care of [facility]. Please excuse Gordo Cruz from work between 12/11 through 12/15/2019 She will return to work/school on: 12/16/2019 If there are questions or concerns please have the patient contact our office. Sincerely, Manfred Champion MD

## 2020-03-09 ENCOUNTER — OFFICE VISIT (OUTPATIENT)
Dept: FAMILY MEDICINE CLINIC | Age: 65
End: 2020-03-09

## 2020-03-09 ENCOUNTER — HOSPITAL ENCOUNTER (OUTPATIENT)
Dept: LAB | Age: 65
Discharge: HOME | End: 2020-03-09

## 2020-03-09 VITALS
HEART RATE: 58 BPM | OXYGEN SATURATION: 97 % | TEMPERATURE: 97.1 F | WEIGHT: 152.2 LBS | DIASTOLIC BLOOD PRESSURE: 92 MMHG | RESPIRATION RATE: 18 BRPM | SYSTOLIC BLOOD PRESSURE: 172 MMHG | BODY MASS INDEX: 26.97 KG/M2 | HEIGHT: 63 IN

## 2020-03-09 DIAGNOSIS — F32.A ANXIETY AND DEPRESSION: ICD-10-CM

## 2020-03-09 DIAGNOSIS — F41.9 ANXIETY AND DEPRESSION: ICD-10-CM

## 2020-03-09 DIAGNOSIS — E11.9 CONTROLLED TYPE 2 DIABETES MELLITUS WITHOUT COMPLICATION, WITHOUT LONG-TERM CURRENT USE OF INSULIN (HCC): Primary | ICD-10-CM

## 2020-03-09 DIAGNOSIS — G43.119 INTRACTABLE MIGRAINE WITH AURA WITHOUT STATUS MIGRAINOSUS: ICD-10-CM

## 2020-03-09 DIAGNOSIS — E11.9 CONTROLLED TYPE 2 DIABETES MELLITUS WITHOUT COMPLICATION, WITHOUT LONG-TERM CURRENT USE OF INSULIN (HCC): ICD-10-CM

## 2020-03-09 DIAGNOSIS — Z63.6 CAREGIVER BURDEN: ICD-10-CM

## 2020-03-09 DIAGNOSIS — F33.0 DEPRESSION, MAJOR, RECURRENT, MILD (HCC): ICD-10-CM

## 2020-03-09 DIAGNOSIS — R26.81 UNSTEADY GAIT: ICD-10-CM

## 2020-03-09 DIAGNOSIS — R26.89 BALANCE PROBLEM: ICD-10-CM

## 2020-03-09 DIAGNOSIS — I10 ESSENTIAL HYPERTENSION: ICD-10-CM

## 2020-03-09 RX ORDER — AZITHROMYCIN 250 MG/1
2 TABLET, FILM COATED ORAL DAILY
COMMUNITY
Start: 2020-02-18 | End: 2021-02-16

## 2020-03-09 RX ORDER — METFORMIN HYDROCHLORIDE 500 MG/1
TABLET, EXTENDED RELEASE ORAL
Qty: 90 TAB | Refills: 0 | Status: SHIPPED | OUTPATIENT
Start: 2020-03-09 | End: 2020-06-02 | Stop reason: SINTOL

## 2020-03-09 RX ORDER — AMLODIPINE BESYLATE 10 MG/1
TABLET ORAL
Qty: 90 TAB | Refills: 1 | Status: SHIPPED | OUTPATIENT
Start: 2020-03-09 | End: 2020-09-08

## 2020-03-09 RX ORDER — ATORVASTATIN CALCIUM 40 MG/1
TABLET, FILM COATED ORAL
Qty: 90 TAB | Refills: 1 | Status: SHIPPED | OUTPATIENT
Start: 2020-03-09 | End: 2020-09-08

## 2020-03-09 RX ORDER — SUMATRIPTAN 25 MG/1
25 TABLET, FILM COATED ORAL
Qty: 10 TAB | Refills: 0 | Status: SHIPPED | OUTPATIENT
Start: 2020-03-09 | End: 2020-04-09 | Stop reason: SDUPTHER

## 2020-03-09 RX ORDER — FLUOXETINE HYDROCHLORIDE 20 MG/1
CAPSULE ORAL
Qty: 90 CAP | Refills: 1 | Status: SHIPPED | OUTPATIENT
Start: 2020-03-09 | End: 2020-09-08

## 2020-03-09 RX ORDER — ATOVAQUONE AND PROGUANIL HYDROCHLORIDE 250; 100 MG/1; MG/1
1 TABLET, FILM COATED ORAL DAILY
COMMUNITY
Start: 2020-02-18 | End: 2021-02-16

## 2020-03-09 SDOH — SOCIAL STABILITY - SOCIAL INSECURITY: DEPENDENT RELATIVE NEEDING CARE AT HOME: Z63.6

## 2020-03-09 NOTE — PATIENT INSTRUCTIONS
Home Blood Pressure Test: About This Test 
What is it? A home blood pressure test allows you to keep track of your blood pressure at home. Blood pressure is a measure of the force of blood against the walls of your arteries. Blood pressure readings include two numbers, such as 130/80 (say \"130 over 80\"). The first number is the systolic pressure. The second number is the diastolic pressure. Why is this test done? You may do this test at home to: · Find out if you have high blood pressure. · Track your blood pressure if you have high blood pressure. · Track how well medicine is working to reduce high blood pressure. · Check how lifestyle changes, such as weight loss and exercise, are affecting blood pressure. How can you prepare for the test? 
· Do not use caffeine, tobacco, or medicines known to raise blood pressure (such as nasal decongestant sprays) for at least 30 minutes before taking your blood pressure. · Do not exercise for at least 30 minutes before taking your blood pressure. What happens before the test? 
Take your blood pressure while you feel comfortable and relaxed. Sit quietly with both feet on the floor for at least 5 minutes before the test. 
What happens during the test? 
· Sit with your arm slightly bent and resting on a table so that your upper arm is at the same level as your heart. · Roll up your sleeve or take off your shirt to expose your upper arm. · Wrap the blood pressure cuff around your upper arm so that the lower edge of the cuff is about 1 inch above the bend of your elbow. Proceed with the following steps depending on if you are using an automatic or manual pressure monitor. Automatic blood pressure monitors · Press the on/off button on the automatic monitor and wait until the ready-to-measure \"heart\" symbol appears next to zero in the display window. · Press the start button. The cuff will inflate and deflate by itself. · Your blood pressure numbers will appear on the screen. · Write your numbers in your log book, along with the date and time. Manual blood pressure monitors · Place the earpieces of a stethoscope in your ears, and place the bell of the stethoscope over the artery, just below the cuff. · Close the valve on the rubber inflating bulb. · Squeeze the bulb rapidly with your opposite hand to inflate the cuff until the dial or column of mercury reads about 30 mm Hg higher than your usual systolic pressure. If you do not know your usual pressure, inflate the cuff to 210 mm Hg or until the pulse at your wrist disappears. · Open the pressure valve just slightly by twisting or pressing the valve on the bulb. · As you watch the pressure slowly fall, note the level on the dial at which you first start to hear a pulsing or tapping sound through the stethoscope. This is your systolic blood pressure. · Continue letting the air out slowly. The sounds will become muffled and will finally disappear. Note the pressure when the sounds completely disappear. This is your diastolic blood pressure. Let out all the remaining air. · Write your numbers in your log book, along with the date and time. What else should you know about the test? 
It is more accurate to take the average of several readings made throughout the day than to rely on a single reading. It's normal for blood pressure to go up and down throughout the day. Follow-up care is a key part of your treatment and safety. Be sure to make and go to all appointments, and call your doctor if you are having problems. It's also a good idea to keep a list of the medicines you take. Where can you learn more? Go to http://duncan-orlando.info/. Enter C427 in the search box to learn more about \"Home Blood Pressure Test: About This Test.\" Current as of: April 9, 2019 Content Version: 12.2 © 2340-2773 [employer].  Care instructions adapted under license by [address] (which disclaims liability or warranty for this information). If you have questions about a medical condition or this instruction, always ask your healthcare professional. [address] any warranty or liability for your use of this information.

## 2020-03-09 NOTE — PROGRESS NOTES
Italo Jimenez is a 62 y.o. female   Fasting   Patients states she has been dizziness for the last 4-5 months  Patient states due to insurance change she has not been on any medications since 12/2019  Patient states she is traveling to [place] and she had the traveling vaccines 02/2020 . Patient states she has been having migraines since the injections. Patient states she has had 3 migraines last week and 1 today. Patient states a hx of migraines but maybe once a year  Chief Complaint   Patient presents with    Complete Physical       1. Have you been to the ER, urgent care clinic since your last visit? Hospitalized since your last visit? No  M  2. Have you seen or consulted any other health care providers outside of the [address] [place] [address] since your last visit? Include any pap smears or colon screening. No      Visit Vitals  BP (!) 172/92 (BP 1 Location: Left arm, BP Patient Position: Sitting)   Pulse (!) 58   Temp 97.1 °F (36.2 °C)   Resp 18   Ht 5' 3\" (1.6 m)   Wt 152 lb 3.2 oz (69 kg)   SpO2 97%   BMI 26.96 kg/m²           Health Maintenance Due   Topic Date Due    Shingrix Vaccine Age 49> (1 of 2) 12/07/2006    Foot Exam Q1  09/17/2019    MICROALBUMIN Q1  01/15/2020    Eye Exam Retinal or Dilated  03/19/2020         Medication Reconciliation completed, changes noted.   Please  Update medication list.

## 2020-03-09 NOTE — PROGRESS NOTES
History of Present Illness:     Chief Complaint   Patient presents with    Complete Physical     Pt is a 62y.o. year old female    Presents to clinic for complete physical; however, has a number of acute complaints. Traveling to [place] in 2 weeks. Recently got her vaccines UTD. Migraines: Lately had 3 migraines last week. Notes that she has flashing light in right eye before before hear headache (typical for her migraines). Associated with nausea and seeing flashes. Using Excedrine migraine to help. Typically would have 3 in 1 year. Feeling off balanced. Ongoing since October/ November. Starting to occur daily. Described as leaning to one side when she walks. No precipitating factors. Lasts for a few minutes then resolves spontaneously. Occurs at various times and seems to be occurring more frequently. HTN: Not currently on her medications since October. Elevated BP in office. MDD: Mood is not good. Stopped taking her Prozac. Would like to resume. DM: Last A1c was at goal. Pt prescribed Metformin but not taken since October.      3 most recent PHQ Screens 3/9/2020   PHQ Not Done -   Little interest or pleasure in doing things Several days   Feeling down, depressed, irritable, or hopeless Several days   Total Score PHQ 2 2   Trouble falling or staying asleep, or sleeping too much -   Feeling tired or having little energy -   Poor appetite, weight loss, or overeating -   Feeling bad about yourself - or that you are a failure or have let yourself or your family down -   Trouble concentrating on things such as school, work, reading, or watching TV -   Moving or speaking so slowly that other people could have noticed; or the opposite being so fidgety that others notice -   Thoughts of being better off dead, or hurting yourself in some way -   PHQ 9 Score -   How difficult have these problems made it for you to do your work, take care of your home and get along with others -         Past Medical History:   Diagnosis Date    Anxiety     Diabetes mellitus ([facility] [address]     Dyslipidemia     Headache     Hodgkin's lymphoma ([facility] [address] 1985    Nodule removed from throat    Hypertension     Low back pain 1/19/2012    MRI 3/10/14 revealed mild multilevel disk and facet degenerative change     Lumbar disc disease 03/2014    Thyroid nodule     Followed by endocrine, Dr. Dania Ivory.  Ulcer          Current Outpatient Medications on File Prior to Visit   Medication Sig Dispense Refill    atovaquone-proguaniL (MALARONE) 250-100 mg per tablet Take 1 Tab by mouth daily.  azithromycin (ZITHROMAX) 250 mg tablet Take 2 Tabs by mouth daily. PRN diarrhea      dextromethorphan-guaiFENesin (ROBITUSSIN-DM)  mg/5 mL syrup Take 10 mL by mouth every six (6) hours as needed for Cough. 240 mL 0    pantoprazole (PROTONIX) 40 mg tablet TAKE 1 TABLET BY MOUTH EVERY DAY 30 Tab 5    pregabalin (LYRICA) 25 mg capsule Take 1 Cap by mouth two (2) times a day. Max Daily Amount: 50 mg. 60 Cap 0    aspirin 81 mg chewable tablet Take 1 Tab by mouth daily. 60 Tab 0     No current facility-administered medications on file prior to visit.           Allergies:  No Known Allergies      Review of Systems:  Denies fever, chills, sweats  Denies chest pain, JOYA, palpitations, LE edema  Denies cough, sputum production, SOB, pleuritic chest pain, wheezing  Denies n/v/d, constipation, melena, blood in stool  + numbness/ tingling/ weakness in extremities (right arm and leg), balance problem      Objective:     Vitals:    03/09/20 0952 03/09/20 1100   BP: 175/81 (!) 172/92   Pulse: (!) 58    Resp: 18    Temp: 97.1 °F (36.2 °C)    SpO2: 97%    Weight: 152 lb 3.2 oz (69 kg)    Height: 5' 3\" (1.6 m)        Physical Exam:  General appearance - alert, well appearing, and in no distress  Mental status - alert, oriented to person, place, and time, depressed mood, affect appropriate to mood  Eyes - pupils equal and reactive, extraocular eye movements intact  Chest - clear to auscultation, no wheezes, rales or rhonchi, symmetric air entry  Heart - normal rate, regular rhythm, normal S1, S2, no murmurs, rubs, clicks or gallops  Neurological - alert, oriented, normal speech, no focal findings or movement disorder noted, cranial nerves II through XII intact, normal muscle tone, no tremors, strength 5/5, Romberg sign negative, normal gait and station. Cerebellar testing finger-to-nose and heel-shin normal, slow rapid alternating movements. No pronator drift. Extremities - peripheral pulses normal, no pedal edema, no clubbing or cyanosis, feet normal, good pulses, normal color, temperature and sensation, monofilament sensory exam is diminished in right foot, normal on left. Recent Labs:  Recent Results (from the past 12 hour(s))   AMB POC URINE, MICROALBUMIN, SEMIQUANT (3 RESULTS)    Collection Time: 03/09/20 11:36 AM   Result Value Ref Range    ALBUMIN, URINE POC 10 Negative mg/L    CREATININE, URINE  mg/dL    Microalbumin/creat ratio (POC) <30 <30 MG/G         Assessment and Plan:   Pt is a 62y.o. year old female,      ICD-10-CM ICD-9-CM    1. Controlled type 2 diabetes mellitus without complication, without long-term current use of insulin (HCC) E11.9 250.00 CBC W/O DIFF      METABOLIC PANEL, BASIC      HEMOGLOBIN A1C WITH EAG      AMB POC URINE, MICROALBUMIN, SEMIQUANT (3 RESULTS)      atorvastatin (LIPITOR) 40 mg tablet      metFORMIN ER (GLUCOPHAGE XR) 500 mg tablet   2. Depression, major, recurrent, mild (HCC) F33.0 296.31 TSH 3RD GENERATION      FLUoxetine (PROZAC) 20 mg capsule   3. Anxiety and depression F41.9 300.00 FLUoxetine (PROZAC) 20 mg capsule    F32.9 311    4. Caregiver burden Z63.6 V61.49    5. Unsteady gait R26.81 781.2 CT HEAD WO CONT   6. Balance problem R26.89 781.99 REFERRAL TO NEUROLOGY      CT HEAD WO CONT   7. Essential hypertension I10 401.9 amLODIPine (NORVASC) 10 mg tablet   8.  Intractable migraine with aura without status migrainosus G43.119 346.01 SUMAtriptan (IMITREX) 25 mg tablet      REFERRAL TO NEUROLOGY      CT HEAD WO CONT     DM  Resume Metformin  Checking labs today  Foot exam today    HTN  Poorly controlled 2/2 non compliance  Resume Amlodipine 10mg once daily  Checking labs today    Migraine HA  Imitrex PRN  Continue NSAIDs PRN HA  Referred back to Neurology    MDD, anxiety  Resume Prozac 20mg daily    Unsteady gait/ balance issues  Ongoing for 4-5 months now, progressively worse  Referred back to Neurology  CT head without contrast ordered today; scheduled for 2 days from now    Patient has a long history of right sided paresthesia that required hospitalization for CVA work up back in 2018. Work up included a normal MRI of the brain at that time. She reports her right sided tingling is at her baseline so we will move forward with outpatient work up. Recommended seeing Neurology this week PRIOR to her trip to [place]; however, pt declines. Will get a CT of the head in an effort to r/o significant pathology before she leaves the country. She will schedule an office visit with Neurology as soon as she returns. Follow up in 3-4 weeks after returning from [place]  Will call or [facility] follow up end of week for BP logs    Hannah De La Fuente MD      I have discussed the diagnosis with the patient and the intended plan as seen in the above orders. The patient has received an after-visit summary and questions were answered concerning future plans.

## 2020-03-10 LAB
ANION GAP SERPL CALC-SCNC: 4 MMOL/L (ref 5–15)
BUN SERPL-MCNC: 12 MG/DL (ref 6–20)
BUN/CREAT SERPL: 20 (ref 12–20)
CALCIUM SERPL-MCNC: 9.6 MG/DL (ref 8.5–10.1)
CHLORIDE SERPL-SCNC: 108 MMOL/L (ref 97–108)
CO2 SERPL-SCNC: 29 MMOL/L (ref 21–32)
CREAT SERPL-MCNC: 0.61 MG/DL (ref 0.55–1.02)
ERYTHROCYTE [DISTWIDTH] IN BLOOD BY AUTOMATED COUNT: 13.9 % (ref 11.5–14.5)
EST. AVERAGE GLUCOSE BLD GHB EST-MCNC: 137 MG/DL
GLUCOSE SERPL-MCNC: 101 MG/DL (ref 65–100)
HBA1C MFR BLD: 6.4 % (ref 4–5.6)
HCT VFR BLD AUTO: 45.2 % (ref 35–47)
HGB BLD-MCNC: 13.5 G/DL (ref 11.5–16)
MCH RBC QN AUTO: 26.9 PG (ref 26–34)
MCHC RBC AUTO-ENTMCNC: 29.9 G/DL (ref 30–36.5)
MCV RBC AUTO: 90.2 FL (ref 80–99)
NRBC # BLD: 0 K/UL (ref 0–0.01)
NRBC BLD-RTO: 0 PER 100 WBC
PLATELET # BLD AUTO: 244 K/UL (ref 150–400)
PMV BLD AUTO: 13 FL (ref 8.9–12.9)
POTASSIUM SERPL-SCNC: 4.2 MMOL/L (ref 3.5–5.1)
RBC # BLD AUTO: 5.01 M/UL (ref 3.8–5.2)
SODIUM SERPL-SCNC: 141 MMOL/L (ref 136–145)
TSH SERPL DL<=0.05 MIU/L-ACNC: 1.31 UIU/ML (ref 0.36–3.74)
WBC # BLD AUTO: 7 K/UL (ref 3.6–11)

## 2020-03-12 ENCOUNTER — HOSPITAL ENCOUNTER (OUTPATIENT)
Dept: CT IMAGING | Age: 65
Discharge: HOME | End: 2020-03-12
Attending: FAMILY MEDICINE
Payer: COMMERCIAL

## 2020-03-12 DIAGNOSIS — R26.89 BALANCE PROBLEM: ICD-10-CM

## 2020-03-12 DIAGNOSIS — G43.119 INTRACTABLE MIGRAINE WITH AURA WITHOUT STATUS MIGRAINOSUS: ICD-10-CM

## 2020-03-12 DIAGNOSIS — R26.81 UNSTEADY GAIT: ICD-10-CM

## 2020-03-12 PROCEDURE — 70450 CT HEAD/BRAIN W/O DYE: CPT

## 2020-03-13 ENCOUNTER — TELEPHONE (OUTPATIENT)
Dept: FAMILY MEDICINE CLINIC | Age: 65
End: 2020-03-13

## 2020-03-13 NOTE — TELEPHONE ENCOUNTER
Attempted to return call to patient; LVM to call office back. CT scan of the head was normal, no bleed noted. I would like for her to relay her recent BP readings as they were elevated at our most recent visit and we adjusted her medications. Pt not on MyChart to leave message.      Earl Ramirez MD

## 2020-03-15 ENCOUNTER — PATIENT MESSAGE (OUTPATIENT)
Dept: FAMILY MEDICINE CLINIC | Age: 65
End: 2020-03-15

## 2020-03-15 DIAGNOSIS — I10 ESSENTIAL HYPERTENSION: Primary | ICD-10-CM

## 2020-03-15 NOTE — MED STUDENT NOTES
This is a medical student note for education purposes only. Please see resident note/attending attestation for patient care purposes. General Daily Progress Note    Admit Date: 2/21/2018    Hospital Summary: Ad Conte is a 65 y.o. female with a PHMx significant for HTN, T2DM, HLD, Hx TIA, migraines, patent foramen ovale, NHL s/p chemo, multinodular goiter, and depression/anxiety who presents with L-sided facial tingling a/w nausea, dizziness, and HA admitted for CVA/TIA work up. Subjective:     Overnight: no acute overnight events    This morning patient complains of frontal headache. Denies nausea/vomiting, dizziness. Facial tingling resolved. Current Facility-Administered Medications   Medication Dose Route Frequency    aspirin chewable tablet 81 mg  81 mg Oral DAILY    sodium chloride (NS) flush 5-10 mL  5-10 mL IntraVENous Q8H    sodium chloride (NS) flush 5-10 mL  5-10 mL IntraVENous PRN    enoxaparin (LOVENOX) injection 40 mg  40 mg SubCUTAneous Q24H    pantoprazole (PROTONIX) tablet 40 mg  40 mg Oral DAILY    insulin lispro (HUMALOG) injection   SubCUTAneous AC&HS    glucose chewable tablet 16 g  4 Tab Oral PRN    dextrose (D50W) injection syrg 12.5-25 g  12.5-25 g IntraVENous PRN    glucagon (GLUCAGEN) injection 1 mg  1 mg IntraMUSCular PRN          Objective:     Patient Vitals for the past 8 hrs:   BP Temp Pulse Resp SpO2   02/22/18 0820 172/80 98.4 °F (36.9 °C) 82 18 97 %   02/22/18 0700 - - 65 - -   02/22/18 0407 123/81 98.3 °F (36.8 °C) 65 19 96 %           Physical Exam:  General: Comfortable, no acute distress. CV: S1 S2, RRR. No murmurs, rubs, or gallops appreciated. Resp: Non-labored breathing. Ext: No peripheral edema appreciated. Psych: Cooperative, appropriate mood and affect. Neuro: CN II-XII intact, no focal neurological deficits. UE/LE strength 5/5 bilaterally. No facial hemiparesis or paresthesia.         Labs:  Recent Results (from the past 12 hour(s)) GLUCOSE, POC    Collection Time: 18  9:46 PM   Result Value Ref Range    Glucose (POC) 164 (H) 65 - 100 mg/dL    Performed by Claudia Augustin (PCT)    CBC WITH AUTOMATED DIFF    Collection Time: 18  2:41 AM   Result Value Ref Range    WBC 6.7 3.6 - 11.0 K/uL    RBC 4.33 3.80 - 5.20 M/uL    HGB 11.6 11.5 - 16.0 g/dL    HCT 37.4 35.0 - 47.0 %    MCV 86.4 80.0 - 99.0 FL    MCH 26.8 26.0 - 34.0 PG    MCHC 31.0 30.0 - 36.5 g/dL    RDW 13.5 11.5 - 14.5 %    PLATELET 081 009 - 805 K/uL    MPV 11.8 8.9 - 12.9 FL    NRBC 0.0 0  WBC    ABSOLUTE NRBC 0.00 0.00 - 0.01 K/uL    NEUTROPHILS 52 32 - 75 %    LYMPHOCYTES 35 12 - 49 %    MONOCYTES 11 5 - 13 %    EOSINOPHILS 2 0 - 7 %    BASOPHILS 0 0 - 1 %    IMMATURE GRANULOCYTES 0 0.0 - 0.5 %    ABS. NEUTROPHILS 3.5 1.8 - 8.0 K/UL    ABS. LYMPHOCYTES 2.3 0.8 - 3.5 K/UL    ABS. MONOCYTES 0.7 0.0 - 1.0 K/UL    ABS. EOSINOPHILS 0.1 0.0 - 0.4 K/UL    ABS. BASOPHILS 0.0 0.0 - 0.1 K/UL    ABS. IMM. GRANS. 0.0 0.00 - 0.04 K/UL    DF AUTOMATED     METABOLIC PANEL, BASIC    Collection Time: 18  2:41 AM   Result Value Ref Range    Sodium 142 136 - 145 mmol/L    Potassium 4.2 3.5 - 5.1 mmol/L    Chloride 107 97 - 108 mmol/L    CO2 26 21 - 32 mmol/L    Anion gap 9 5 - 15 mmol/L    Glucose 119 (H) 65 - 100 mg/dL    BUN 14 6 - 20 MG/DL    Creatinine 0.94 0.55 - 1.02 MG/DL    BUN/Creatinine ratio 15 12 - 20      GFR est AA >60 >60 ml/min/1.73m2    GFR est non-AA >60 >60 ml/min/1.73m2    Calcium 9.3 8.5 - 10.1 MG/DL   GLUCOSE, POC    Collection Time: 18  7:27 AM   Result Value Ref Range    Glucose (POC) 124 (H) 65 - 100 mg/dL    Performed by Claudia Augustin (PCT)      POC B-164    Imaging:   CTA head and neck (): PRELIMINARY REPORT  CTA head and neck demonstrates a 12 mm left thyroid nodule. Common carotid bifurcations are patent. Vertebral arteries are codominant  No intracranial aneurysm or stenosis is identified. No intracranial thrombus is  identified. There is a small left posterior communicating artery. Final report to follow.     CT head wo contrast (2/21): IMPRESSION: No acute intracranial hemorrhage, mass or infarct. MRI brain wo contrast (2/22): in process      Assessment and Plan:   Colin Zhu is a 65 y.o. female with a PHMx significant for HTN, T2DM, HLD, Hx TIA, migraines, patent foramen ovale, NHL s/p chemo, multinodular goiter, and depression/anxiety who presents with L-sided facial tingling a/w nausea, dizziness, and HA admitted for CVA/TIA work up. Patient stopped taking all medications except for protonix 1-2 months ago. #L facial tingling a/w nausea, dizziness, and HA: Patient only complains of mild HA this morning. CVA/TIA work up significant for HLD (recently stopped taking lipitor) and A1C 7.3 (recently stopped taking metformin). CXR, NCHCT, CTA unremarkable. Of note, patient admitted in March 2016 for R-sided numbness/tingling, diagnosed with psycho-somatic numbness and tingling. Recent increased stressor includes caring for mother.   - FU MRI, echo  - Neuro, psych consults  - ASA 81 daily  - Tylenol 650mg PRN for headache    #HTN: not at goal, not taking any home meds  - OP FU: per chart review, previously on norvasc 5mg daily, lisinopril 40mg daily    #T2DM: A1C 7.3. Per chart review, previously on metformin 500mg daily - recently stopped taking.  POC BG and glucose at goal <180.  - SSI lispro and ACHS    #HLD:   - Restart lipitor 40mg daily    #GERD:   - Protonix    Fluids: po  Electrolytes: replete PRN  Nutrition: diabetic diet  DVT ppx: lovenox  GI ppx: protonix    Dispo: neuro, psych consults, FU MRI and echo    Lisa Code, M4 ([facility])  Plan discussed with Dr. Marilyn Carty ambulatory

## 2020-03-17 ENCOUNTER — TELEPHONE (OUTPATIENT)
Dept: FAMILY MEDICINE CLINIC | Age: 65
End: 2020-03-17

## 2020-03-17 DIAGNOSIS — K21.9 GASTROESOPHAGEAL REFLUX DISEASE, ESOPHAGITIS PRESENCE NOT SPECIFIED: ICD-10-CM

## 2020-03-17 RX ORDER — HYDROCHLOROTHIAZIDE 25 MG/1
25 TABLET ORAL DAILY
Qty: 30 TAB | Refills: 1 | Status: SHIPPED | OUTPATIENT
Start: 2020-03-17 | End: 2020-05-13

## 2020-03-17 RX ORDER — PANTOPRAZOLE SODIUM 40 MG/1
TABLET, DELAYED RELEASE ORAL
Qty: 30 TAB | Refills: 2 | Status: SHIPPED | OUTPATIENT
Start: 2020-03-17 | End: 2020-12-23

## 2020-03-17 NOTE — PROGRESS NOTES
Negative CT head  Still recommend Neurology follow up as she likely needs MRI  Notified over the phone

## 2020-03-17 NOTE — TELEPHONE ENCOUNTER
Contacted patient over the phone. ID confirmed x 2. Reviewed head CT; normal.     BPs still elevated on Amlodipine (180s/70s at her eye doctor appointment). Will add HCTZ 25 back to her regimen. On chart review, noted that she previously required Amlodipine, HCTZ and Lisinopril to control BP. Was able to wean down when she was exercising and losing weight. Will call for BP check end of this week/early next week on Amlodipine and HCTZ. Pt to call back with any questions or concerns.  States it is OK to return calls to her at work:9am-6:30pm [employer]  [phone]    Kalpana Alfaro MD

## 2020-03-26 ENCOUNTER — TELEPHONE (OUTPATIENT)
Dept: FAMILY MEDICINE CLINIC | Age: 65
End: 2020-03-26

## 2020-03-26 NOTE — TELEPHONE ENCOUNTER
Called patient to discuss recent BP logs. ID confirmed x 2. Since resuming HCTZ, home BPs running 140s/70s. Last BP checked at home was 149/79. We will continue current regimen for time being - HCTZ 25mg and Amlodipine 10mg. Pt will cont to take BP log. Will call back in 2 weeks for BP logs. Will discuss when potential office visit will be. Will cancel 3/31 appointment given current COVID situation.      Lisa Marie MD

## 2020-04-09 DIAGNOSIS — G43.119 INTRACTABLE MIGRAINE WITH AURA WITHOUT STATUS MIGRAINOSUS: ICD-10-CM

## 2020-04-09 RX ORDER — SUMATRIPTAN 25 MG/1
TABLET, FILM COATED ORAL
Qty: 9 TAB | Refills: 1 | Status: SHIPPED | OUTPATIENT
Start: 2020-04-09 | End: 2020-08-21 | Stop reason: SDUPTHER

## 2020-04-10 ENCOUNTER — OFFICE VISIT (OUTPATIENT)
Dept: NEUROLOGY | Age: 65
End: 2020-04-10

## 2020-04-10 VITALS
DIASTOLIC BLOOD PRESSURE: 80 MMHG | OXYGEN SATURATION: 98 % | BODY MASS INDEX: 26.93 KG/M2 | HEIGHT: 63 IN | TEMPERATURE: 98.2 F | HEART RATE: 80 BPM | SYSTOLIC BLOOD PRESSURE: 142 MMHG | WEIGHT: 152 LBS | RESPIRATION RATE: 16 BRPM

## 2020-04-10 DIAGNOSIS — R20.0 NUMBNESS: ICD-10-CM

## 2020-04-10 DIAGNOSIS — R20.0 NUMBNESS: Primary | ICD-10-CM

## 2020-04-10 NOTE — PROGRESS NOTES
NEUROLOGY NEW PATIENT OFFICE CONSULTATION      4/10/2020    RE: Kaitlynn Valdez         1955      REFERRED BY:  Camilo Cantrell MD        CHIEF COMPLAINT:  This is Kaitlynn Valdez is a 62 y.o. female right handed  who had concerns including New Patient (Patient is here today c/o falling while walking or standing, not dizzy or light headed. Also, c/o R hand swelling, not as much strength as her L hand. Has numbness in R arm, hand, leg and foot. Also has h/as.). HPI:     6 yrs ago, patient noted intermittent burning pins and needle numbness of the whole R UE and R LE. (-) weakness (-) neck pain (-) weakness. End of Feb 2020, patent started having headaches after getting vaccines shots in preparation for her trip to [place]. This has gotten better over time    Jan 2020 , patient noted balance issues. Patient saw a neurologist in 2018 Dr Shireen Sesay and MRI brain was said to be okay. (-) joint pains. ROS   (-) fever  (-) rash  All other systems reviewed and are negative    Past Medical Hx  Past Medical History:   Diagnosis Date    Anxiety     Diabetes mellitus ([facility] [address]     Dyslipidemia     Headache     Hodgkin's lymphoma ([facility] [address] 1985    Nodule removed from throat    Hypertension     Low back pain 1/19/2012    MRI 3/10/14 revealed mild multilevel disk and facet degenerative change     Lumbar disc disease 03/2014    Thyroid nodule     Followed by endocrine, Dr. Miguelangel Locke.       Ulcer        Social Hx  Social History     Socioeconomic History    Marital status:      Spouse name: Not on file    Number of children: Not on file    Years of education: Not on file    Highest education level: Not on file   Tobacco Use    Smoking status: Never Smoker    Smokeless tobacco: Never Used   Substance and Sexual Activity    Alcohol use: No     Alcohol/week: 0.0 standard drinks    Drug use: No    Sexual activity: Not Currently     Birth control/protection: None       Family Hx  Family History   Problem Relation Age of Onset    Elevated Lipids Mother     Hypertension Mother     Hypertension Father     Parkinsonism Father     Hypertension Sister     Cancer Brother     Stroke Maternal Grandmother     Hypertension Maternal Grandmother     Cancer Paternal Grandmother     Hypertension Paternal Grandmother     Cancer Paternal Grandfather     Hypertension Paternal Grandfather        ALLERGIES  No Known Allergies    CURRENT MEDS  Current Outpatient Medications   Medication Sig Dispense Refill    SUMAtriptan (IMITREX) 25 mg tablet TAKE 1 TABLET BY MOUTH ONCE AS NEEDED FOR MIGRAINE FOR UP TO 1 DOSE 9 Tab 1    hydroCHLOROthiazide (HYDRODIURIL) 25 mg tablet Take 1 Tab by mouth daily. 30 Tab 1    pantoprazole (PROTONIX) 40 mg tablet TAKE 1 TABLET BY MOUTH EVERY DAY 30 Tab 2    atovaquone-proguaniL (MALARONE) 250-100 mg per tablet Take 1 Tab by mouth daily.  amLODIPine (NORVASC) 10 mg tablet TAKE 1 TABLET BY MOUTH ONCE DAILY 90 Tab 1    atorvastatin (LIPITOR) 40 mg tablet TAKE 1 TABLET BY MOUTH ONCE DAILY 90 Tab 1    FLUoxetine (PROZAC) 20 mg capsule TAKE 1 CAPSULE BY MOUTH EVERY DAY 90 Cap 1    metFORMIN ER (GLUCOPHAGE XR) 500 mg tablet TAKE 1 TABLET BY MOUTH ONCE DAILY WITH DINNER 90 Tab 0    pregabalin (LYRICA) 25 mg capsule Take 1 Cap by mouth two (2) times a day. Max Daily Amount: 50 mg. 60 Cap 0    aspirin 81 mg chewable tablet Take 1 Tab by mouth daily. 60 Tab 0    azithromycin (ZITHROMAX) 250 mg tablet Take 2 Tabs by mouth daily. PRN diarrhea      dextromethorphan-guaiFENesin (ROBITUSSIN-DM)  mg/5 mL syrup Take 10 mL by mouth every six (6) hours as needed for Cough. 240 mL 0           PREVIOUS WORKUP: (reviewed)  IMAGING:    CT Results (recent):  Results from Hospital Encounter encounter on 03/12/20   CT HEAD WO CONT    Narrative EXAM: CT HEAD WO CONT    INDICATION: Migraine, unsteady balance. COMPARISON: CT brain dated 2/21/2018.     CONTRAST: None.    TECHNIQUE: Unenhanced CT of the head was performed using 5 mm images. Brain and  bone windows were generated. CT dose reduction was achieved through use of a  standardized protocol tailored for this examination and automatic exposure  control for dose modulation. FINDINGS:  No calvarial abnormalities are detected. There is no evidence of intracranial  hemorrhage. Impression IMPRESSION: No evidence of intracranial hemorrhage. MRI Results (recent):  Results from [place] encounter on 02/21/18   MRI BRAIN WO CONT    Narrative Indication: Stroke, numbness. MRI of the brain performed without IV gadolinium. FINDINGS: There is no evidence for acute stroke. No foci of restricted  diffusion. No intracranial hemorrhage or mass effect. The underlying brain  appears unremarkable. Impression IMPRESSION: Negative MRI of the brain. Specifically, no evidence for  intracranial hemorrhage or stroke       IR Results (recent):  No results found for this or any previous visit.     VAS/US Results (recent):  Results from Hospital Encounter encounter on 05/27/16   DUPLEX LOWER EXT VENOUS RIGHT           LABS (reviewed)  Results for orders placed or performed during the hospital encounter of 03/09/20   CBC W/O DIFF   Result Value Ref Range    WBC 7.0 3.6 - 11.0 K/uL    RBC 5.01 3.80 - 5.20 M/uL    HGB 13.5 11.5 - 16.0 g/dL    HCT 45.2 35.0 - 47.0 %    MCV 90.2 80.0 - 99.0 FL    MCH 26.9 26.0 - 34.0 PG    MCHC 29.9 (L) 30.0 - 36.5 g/dL    RDW 13.9 11.5 - 14.5 %    PLATELET 351 099 - 440 K/uL    MPV 13.0 (H) 8.9 - 12.9 FL    NRBC 0.0 0  WBC    ABSOLUTE NRBC 0.00 0.00 - 7.92 K/uL   METABOLIC PANEL, BASIC   Result Value Ref Range    Sodium 141 136 - 145 mmol/L    Potassium 4.2 3.5 - 5.1 mmol/L    Chloride 108 97 - 108 mmol/L    CO2 29 21 - 32 mmol/L    Anion gap 4 (L) 5 - 15 mmol/L    Glucose 101 (H) 65 - 100 mg/dL    BUN 12 6 - 20 MG/DL    Creatinine 0.61 0.55 - 1.02 MG/DL    BUN/Creatinine ratio 20 12 - 20      GFR est AA >60 >60 ml/min/1.73m2    GFR est non-AA >60 >60 ml/min/1.73m2    Calcium 9.6 8.5 - 10.1 MG/DL   HEMOGLOBIN A1C WITH EAG   Result Value Ref Range    Hemoglobin A1c 6.4 (H) 4.0 - 5.6 %    Est. average glucose 137 mg/dL   TSH 3RD GENERATION   Result Value Ref Range    TSH 1.31 0.36 - 3.74 uIU/mL       Physical Exam:     Visit Vitals  /80 (BP 1 Location: Right arm, BP Patient Position: Sitting)   Pulse 80   Temp 98.2 °F (36.8 °C) (Oral)   Resp 16   Ht 5' 3\" (1.6 m)   Wt 68.9 kg (152 lb)   LMP 01/18/2000   SpO2 98%   BMI 26.93 kg/m²     General:  Alert, cooperative, no distress. Head:  Normocephalic, without obvious abnormality, atraumatic. Eyes:  Conjunctivae/corneas clear. Lungs:  Heart:   Non labored breathing  Regular rate and rhythm, no carotid bruits   Abdomen:   Soft, non-distended   Extremities: Extremities normal, atraumatic, no cyanosis or edema. Pulses: 2+ and symmetric all extremities. Skin: Skin color, texture, turgor normal. No rashes or lesions. Neurologic Exam     Gen: Attention normal             Language: naming, repetition, fluency normal             Memory: intact recent and remote memory  Cranial Nerves:  I: smell Not tested   II: visual fields Full to confrontation   II: pupils Equal, round, reactive to light   II: optic disc No papilledema   III,VII: ptosis none   III,IV,VI: extraocular muscles  Full ROM   V: mastication normal   V: facial light touch sensation  normal   VII: facial muscle function   symmetric   VIII: hearing symmetric   IX: soft palate elevation  normal   XI: trapezius strength  5/5   XI: sternocleidomastoid strength 5/5   XI: neck flexion strength  5/5   XII: tongue  midline     Motor: normal bulk and tone, no tremor              Strength: 5/5 all four extremities  Sensory: Dec PP R 2nd and 3rd fingers and whole R foot.   Reflexes: 2+ UE, 3+ L knee 2+ R knee 1+ ankles throughout; Down going toes  Coordination: Good FTN and HTS  Gait: normal gait including tandem            Impression:     Goyo Joseph is a 62 y.o. female who  has a past medical history of Anxiety, Diabetes mellitus ([facility] [address] Dyslipidemia, Headache, Hodgkin's lymphoma ([facility] [address] (1985), Hypertension, Low back pain (1/19/2012), Lumbar disc disease (03/2014), Thyroid nodule, and Ulcer. who comes in with several concerns. 6 yrs ago, patient noted intermittent burning pins and needle numbness of the whole R UE and R LE. (-) weakness (-) neck pain (-) weakness. Considerations includes R carpal tunnel syndrome, R cervical radiculopathy,  R Lumbar radiculopathy and small fiber neuropathy from glucose intolerance. Second, end of Feb 2020, patent started having headaches after getting vaccines shots in preparation for her trip to [place]. This has gotten better over time. Lastly, since Jan 2020 , patient noted balance issues which may be related to her back issues (lower back pain). Patient saw neurologist Dr Lydia Diaz in 2016 and Dr Hunter Merautant in 2018 and MRI brain was said to be okay. Patient should be evaluated for other nutritional, inflammatory and hematologic causes of her symptoms (I.e. T1 vertebral body showed abnormal signal although bone scan was normal)      RECOMMENDATIONS  1. I had a long discussion with patient . Discussed diagnosis, prognosis, pathophysiology and available treatment. Reviewed test results. All questions were answered. 2. Will do MRI cervical spine and lumbar spine to look for interval worsening of degenerative disc disease   3. EMG/NCS of the R UE and R LE with CTS and radiculopathy protocol  4. Blood test for JEFFERY, ESR, SPEP/MINOO, Vitamin B12, Folate and Vit B6  5. Depending on above, will consider skin biopsy to evaluate for small fiber neuropathy and physical therapy  6. Since headache is getting better, advise observing for now      Follow-up and Dispositions    · Return for above testing.             Thank you for the consultation      Jenelle Aggarwal Alba Carrillo MD  Diplomate, American Board of Psychiatry and Neurology  Diplomate, Neuromuscular Medicine  Diplomate, American Board of Electrodiagnostic Medicine        CC: Connie Vizcaino MD  Fax: [phone]

## 2020-04-10 NOTE — LETTER
4/10/20 Patient: Keith Nunes YOB: 1955 Date of Visit: 4/10/2020 [facility] [id number] VIA In Basket Dear Joshua Mcfarland MD, Thank you for referring Ms. Jerrell Hardy to [facility] for evaluation. My notes for this consultation are attached. If you have questions, please do not hesitate to call me. I look forward to following your patient along with you. Sincerely, Opal Carrasco MD

## 2020-04-10 NOTE — PROGRESS NOTES
Chief Complaint   Patient presents with    New Patient     Patient is here today c/o falling while walking or standing, not dizzy or light headed. Also, c/o R hand swelling, not as much strength as her L hand. Has numbness in R arm, hand, leg and foot. Also has h/as.      Visit Vitals  /80 (BP 1 Location: Right arm, BP Patient Position: Sitting)   Pulse 80   Temp 98.2 °F (36.8 °C) (Oral)   Resp 16   Ht 5' 3\" (1.6 m)   Wt 68.9 kg (152 lb)   SpO2 98%   BMI 26.93 kg/m²

## 2020-05-30 ENCOUNTER — HOSPITAL ENCOUNTER (OUTPATIENT)
Dept: MRI IMAGING | Age: 65
Discharge: HOME | End: 2020-05-30
Attending: PSYCHIATRY & NEUROLOGY
Payer: COMMERCIAL

## 2020-05-30 DIAGNOSIS — R20.0 NUMBNESS: ICD-10-CM

## 2020-05-30 PROCEDURE — 72141 MRI NECK SPINE W/O DYE: CPT

## 2020-05-30 PROCEDURE — 72148 MRI LUMBAR SPINE W/O DYE: CPT

## 2020-06-01 ENCOUNTER — PATIENT MESSAGE (OUTPATIENT)
Dept: FAMILY MEDICINE CLINIC | Age: 65
End: 2020-06-01

## 2020-06-01 DIAGNOSIS — E11.40 TYPE 2 DIABETES MELLITUS WITH DIABETIC NEUROPATHY, WITHOUT LONG-TERM CURRENT USE OF INSULIN (HCC): Primary | ICD-10-CM

## 2020-06-02 RX ORDER — METFORMIN HYDROCHLORIDE 500 MG/1
500 TABLET ORAL 2 TIMES DAILY WITH MEALS
Qty: 180 TAB | Refills: 1 | Status: SHIPPED | OUTPATIENT
Start: 2020-06-02 | End: 2020-12-16

## 2020-06-02 NOTE — TELEPHONE ENCOUNTER
Recent recall on Metformin XR. Changing to regular Metformin BID. Pt notified of change.      Ryan Gloria MD

## 2020-06-02 NOTE — TELEPHONE ENCOUNTER
From: Mino Riley  To: Ashley Diez MD  Sent: 6/1/2020 12:42 PM EDT  Subject: Prescription Question    Dr. Roberta Whitaker I was reading where the CDC is saying the type of Metformin that I am taking is not safe to take????? It stated I should contact my PCP. Should I continue to take it? ?

## 2020-06-24 ENCOUNTER — OFFICE VISIT (OUTPATIENT)
Dept: PRIMARY CARE CLINIC | Age: 65
End: 2020-06-24

## 2020-06-24 ENCOUNTER — PATIENT MESSAGE (OUTPATIENT)
Dept: FAMILY MEDICINE CLINIC | Age: 65
End: 2020-06-24

## 2020-06-24 ENCOUNTER — TELEPHONE (OUTPATIENT)
Dept: FAMILY MEDICINE CLINIC | Age: 65
End: 2020-06-24

## 2020-06-24 DIAGNOSIS — R50.9 FEBRILE ILLNESS: Primary | ICD-10-CM

## 2020-06-24 NOTE — TELEPHONE ENCOUNTER
From: Shameka De Dios  To: Slick Colon MD  Sent: 6/24/2020 12:45 PM EDT  Subject: Non-Urgent Medical Question    I read your letter and would like to know if it can be changed to Friday pending the outcome of the test.   I know I stated tomorrow previously but I spoke with my director and she has coverage for tomorrow. thanks    sorry about the confusion.

## 2020-06-24 NOTE — TELEPHONE ENCOUNTER
Called patient. ID confirmed x 2. She complained of diarrhea, sore throat and fatigue. She works in a  center. Called on call doctor and instructed to go get COVID testing. Agree with COVID testing. She is still having sore throat and fagitue. Recommended staying out of work until next week pending her COVID test. If COVID positive, will need at least 2 weeks out of work with RTC per her employer. Letter drafted to fax to her job.      Stanley Jackson MD

## 2020-06-24 NOTE — Clinical Note
From patient:  Dr. Soto Ruby   Per our conversation this is the Fax Number to [address] [place] [address]. [facility]. I previously requested to return to work on Monday the 29th. It looks as if I would need to go back tomorrow pending the outcome of the test that I will take today because of staffing issues at work.     fax# [phone]      I updated the letter. If we could fax it AFTER we see the COVID test result that would be  great. Assuming the test is negative.

## 2020-06-24 NOTE — LETTER
NOTIFICATION RETURN TO WORK / SCHOOL 
 
6/24/2020 6:30 PM 
 
Ms. Robert Flores [address] [place] [address] To Whom It May Concern: 
 
Robert Flores is currently under the care of Isabela George. She will return to work on 6/26/2020 pending her COVID test result. May return to work if test is NEGATIVE or per employer protocol. If there are questions or concerns please have the patient contact our office.  
 
 
 
Sincerely, 
 
 
Salvador Hayes MD

## 2020-06-24 NOTE — TELEPHONE ENCOUNTER
Received page from [facility]  service. Mrs. Chelsea Sloan states that for the past couple of days has presented with sore throat, diarrhea and chills. 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 Tried making appointment with her PCP Dr. Phillip Randhawa however was told she did not have any appointments available. Patient requesting COVID testing. Provided her with the information for the [facility] at [address] [place] [address]. Advised patient to absence herself from work until she is asymptomatic for a week. Counseled patient on warning signs and when to seek immediate medical attention Patient expressed understanding. Kathy Betancur

## 2020-06-27 LAB — SARS-COV-2, NAA: NOT DETECTED

## 2020-06-29 NOTE — PROGRESS NOTES
You had a NEGATIVE COVID test. You still need to use good handwashing, wear a mask in public indoor places and practice social distancing. If you need anything please contact the office.    Nikky Corado, ANP

## 2020-07-30 ENCOUNTER — OFFICE VISIT (OUTPATIENT)
Dept: NEUROLOGY | Age: 65
End: 2020-07-30

## 2020-07-30 VITALS — TEMPERATURE: 98 F

## 2020-07-30 DIAGNOSIS — R20.0 NUMBNESS: Primary | ICD-10-CM

## 2020-07-30 DIAGNOSIS — G89.29 CHRONIC RIGHT-SIDED LOW BACK PAIN WITH RIGHT-SIDED SCIATICA: ICD-10-CM

## 2020-07-30 DIAGNOSIS — M54.41 CHRONIC RIGHT-SIDED LOW BACK PAIN WITH RIGHT-SIDED SCIATICA: ICD-10-CM

## 2020-07-30 RX ORDER — DULOXETIN HYDROCHLORIDE 30 MG/1
30 CAPSULE, DELAYED RELEASE ORAL DAILY
Qty: 30 CAP | Refills: 2 | Status: SHIPPED | OUTPATIENT
Start: 2020-07-30 | End: 2022-02-10

## 2020-07-30 NOTE — PROCEDURES
EMG/ NCS Report  [facility] - INPATIENT  [place]  [place], [address]   Ph: [phone]   FAX: [phone]  Test Date:  2020    Patient: Ronaldo Leal : 1955 Physician: Hayden Wong MD   Sex: Female Height: ' \" Ref Phys: Woody Bone MD   ID#: [id number] Weight:  lbs. Technician: Rajesh Cota     Patient History / Exam:  Patient is coming for radiculopathy evaluation. Ricardo Loo is a 62 y.o. female who  has a past medical history of Anxiety, Diabetes mellitus ([facility] [address] Dyslipidemia, Headache, Hodgkin's lymphoma ([facility] [address] (), Hypertension, Low back pain (2012), Lumbar disc disease (2014), Thyroid nodule, and Ulcer. who comes in with several concerns. 6 yrs ago, patient noted intermittent burning pins and needle numbness of the whole R UE and R LE. (-) weakness (-) neck pain (-) weakness. Considerations includes R carpal tunnel syndrome, R cervical radiculopathy,  R Lumbar radiculopathy and small fiber neuropathy from glucose intolerance. Second, end of 2020, patent started having headaches after getting vaccines shots in preparation for her trip to [place]. This has gotten better over time. Lastly, since 2020 , patient noted balance issues which may be related to her back issues (lower back pain). Patient saw neurologist Dr Shahrzad Barriga in 2016 and Dr Alvaro Dunaway in 2018 and MRI brain was said to be okay. Patient should be evaluated for other nutritional, inflammatory and hematologic causes of her symptoms (I.e. T1 vertebral body showed abnormal signal although bone scan was normal)    Exam: Patient awake, alert, follows commands, clear speech; hearing grossly intact; EOMI, (-) facial asymmetry, tongue midline; Motor: normal bulk and tone, no tremor              Strength: 5/5 all four extremities  Sensory: Dec PP R 2nd and 3rd fingers and whole R foot.   Reflexes: 2+ UE, 3+ L knee 2+ R knee 1+ ankles throughout; Down going toes  Coordination: Good FTN and HTS  Gait: normal gait including tandem           EMG & NCV Findings:  Evaluation of the right Fibular motor nerve showed normal distal onset latency (3.3 ms), normal amplitude (4.4 mV), normal conduction velocity (B Fib-Ankle, 53 m/s), and normal conduction velocity (Poplt-B Fib, 45 m/s). The right median motor nerve showed normal distal onset latency (3.8 ms), normal amplitude (9.4 mV), and normal conduction velocity (Elbow-Wrist, 53 m/s). The right tibial motor nerve showed normal distal onset latency (3.4 ms), normal amplitude (13.5 mV), and normal conduction velocity (Knee-Ankle, 48 m/s). The right ulnar motor nerve showed normal distal onset latency (2.8 ms), normal amplitude (10.5 mV), normal conduction velocity (B Elbow-Wrist, 62 m/s), and normal conduction velocity (A Elbow-B Elbow, 56 m/s). The right median sensory, the right radial sensory, the right sural sensory, and the right ulnar sensory nerves showed normal distal peak latency (R3.5, R2.1, R3.2, R3.0 ms) and normal amplitude (R24.0, R46.7, R11.1, R29.6 µV). The right Sup Fibular sensory nerve showed normal distal peak latency (2.3 ms), normal amplitude (19.5 µV), and normal conduction velocity (Lower leg-Lat ankle, 56 m/s). The right median/ulnar (palm) comparison nerve showed normal distal onset latency (Median Palm, 1.7 ms), normal distal peak latency (Median Palm, 2.2 ms), normal amplitude (Median Palm, 25.6 µV), normal distal onset latency (Ulnar Palm, 1.4 ms), normal distal peak latency (Ulnar Palm, 1.9 ms), and normal amplitude (Ulnar Palm, 16.8 µV). All F Wave latencies were within normal limits. All examined muscles (as indicated in the following table) showed no evidence of electrical instability.         Impression:    Extensive electrodiagnostic examination of the right upper and right lower extremities is normal.    Specifically, there is no evidence of a peripheral neuropathy, cervical motor radiculopathy or lumbosacral motor radiculopathy on the right.         Lainey Ring MD  Diplomate, American Board of Psychiatry and Neurology  Diplomate, Neuromuscular Medicine  Diplomate, American Board of Electrodiagnostic Medicine  Director, [address] [place] [address] Accredited Laboratory with Exemplary Status        Nerve Conduction Studies  Anti Sensory Summary Table     Stim Site NR Peak (ms) Norm Peak (ms) P-T Amp (µV) Norm P-T Amp Site1 Site2 Dist (cm)   Right Median Anti Sensory (2nd Digit)  29.7°C   Wrist    3.5 <4 24.0 >13 Wrist 2nd Digit 14.0   Right Radial Anti Sensory (Base 1st Digit)  31.1°C   Wrist    2.1 <2.8 46.7 >11 Wrist Base 1st Digit 10.0   Right Sup Fibular Anti Sensory (Lat ankle)  32°C   Lower leg    2.3 <4.6 19.5 >4 Lower leg Lat ankle 10.0   Right Sural Anti Sensory (Lat Mall)  31.3°C   Calf    3.2 <4.5 11.1 >4.0 Calf Lat Mall 14.0   Right Ulnar Anti Sensory (5th Digit)  30.4°C   Wrist    3.0 <4.0 29.6 >9 Wrist 5th Digit 14.0     Motor Summary Table     Stim Site NR Onset (ms) Norm Onset (ms) O-P Amp (mV) Norm O-P Amp Amp (Prev) (%) Site1 Site2 Dist (cm) Jose Alejandro (m/s) Norm Jose Alejandro (m/s)   Right Fibular Motor (Ext Dig Brev)  32.1°C   Ankle    3.3 <6.5 4.4 >1.1 100.0 Ankle Ext Dig Brev 8.0     B Fib    9.1  3.4  77.3 B Fib Ankle 31.0 53 >38   Poplt    11.3  3.2  94.1 Poplt B Fib 10.0 45 >42   Right Median Motor (Abd Poll Brev)  31.6°C   Wrist    3.8 <4.5 9.4 >4.1 100.0 Wrist Abd Poll Brev 8.0     Elbow    7.6  8.1  86.2 Elbow Wrist 20.0 53 >49   Right Tibial Motor (Abd Jensen Brev)  31.8°C   Ankle    3.4 <6.1 13.5 >1.1 100.0 Ankle Abd Jensen Brev 8.0     Knee    10.7  6.9  51.1 Knee Ankle 35.0 48 >39   Right Ulnar Motor (Abd Dig Minimi)  31.3°C   Wrist    2.8 <3.1 10.5 >7.0 100.0 Wrist Abd Dig Minimi 8.0  >50   B Elbow    6.2  10.6  101.0 B Elbow Wrist 21.0 62 >50   A Elbow    8.0  10.3  97.2 A Elbow B Elbow 10.0 56 >50     Comparison Summary Table     Stim Site NR Peak (ms) P-T Amp (µV) Site1 Site2 Dist (cm) Delta-0 (ms)   Right Median/Ulnar Palm Comparison (Wrist)  31.7°C   Median Palm    2.2 26.4 Median Palm Ulnar Palm 8.0 0.3   Ulnar Palm    1.9 25.2         F Wave Studies     NR F-Lat (ms) Lat Norm (ms) L-R F-Lat (ms) L-R Lat Norm   Right Tibial (Mrkrs) (Abd Hallucis)  31.4°C      40.00 <56  <5.7   Right Ulnar (Mrkrs) (Abd Dig Min)  31.2°C      25.95 <32  <2.5     H Reflex Studies     NR H-Lat (ms) L-R H-Lat (ms) L-R Lat Norm   Right Tibial (Gastroc)  31.5°C      33.00  <2.0     EMG     Side Muscle Nerve Root Ins Act Fibs Psw Recrt Duration Amp Poly Comment   Right 1stDorInt Ulnar C8-T1 Nml Nml Nml Nml Nml Nml Nml    Right Ext Dig Brev Dp Br Peron L5, S1 Nml Nml Nml Nml Nml Nml Nml    Right AbdHallucis MedPlantar S1-2 Nml Nml Nml Nml Nml Nml Nml    Right AntTibialis Dp Br Peron L4-5 Nml Nml Nml Nml Nml Nml Nml    Right MedGastroc Tibial S1-2 Nml Nml Nml Nml Nml Nml Nml    Right VastusLat Femoral L2-4 Nml Nml Nml Nml Nml Nml Nml    Right GluteusMed SupGluteal L4-S1 Nml Nml Nml Nml Nml Nml Nml    Right Lower Lumb Parasp Rami L5,S1 Nml Nml Nml Nml Nml Nml Nml    Right ExtIndicis Radial (Post Int) C7-8 Nml Nml Nml Nml Nml Nml Nml    Right Abd Poll Brev Median C8-T1 Nml Nml Nml Nml Nml Nml Nml    Right Biceps Musculocut C5-6 Nml Nml Nml Nml Nml Nml Nml    Right Triceps Radial C6-7-8 Nml Nml Nml Nml Nml Nml Nml    Right Deltoid Axillary C5-6 Nml Nml Nml Nml Nml Nml Nml    Right Lower Cerv Parasp Rami C7,T1 Nml Nml Nml Nml Nml Nml Nml                Nerve Conduction Studies  Anti Sensory Left/Right Comparison     Stim Site L Lat (ms) R Lat (ms) L-R Lat (ms) L Amp (µV) R Amp (µV) L-R Amp (%) Site1 Site2 L Jose Alejandro (m/s) R Jose Alejandro (m/s) L-R Jose Alejandro (m/s)   Median Anti Sensory (2nd Digit)  29.7°C   Wrist  2.7   24.0  Wrist 2nd Digit  52    Radial Anti Sensory (Base 1st Digit)  31.1°C   Wrist  1.4   46.7  Wrist Base 1st Digit  71    Sup Fibular Anti Sensory (Lat ankle)  32°C   Lower leg  1.8   19.5  Lower leg Lat ankle  56 Sural Anti Sensory (Lat Mall)  31.3°C   Calf  2.6   11.1  Calf Lat Mall  54    Ulnar Anti Sensory (5th Digit)  30.4°C   Wrist  2.3   29.6  Wrist 5th Digit  61      Motor Left/Right Comparison     Stim Site L Lat (ms) R Lat (ms) L-R Lat (ms) L Amp (mV) R Amp (mV) L-R Amp (%) Site1 Site2 L Jose Alejandro (m/s) R Jose Alejandro (m/s) L-R Jose Alejandro (m/s)   Fibular Motor (Ext Dig Brev)  32.1°C   Ankle  3.3   4.4  Ankle Ext Dig Brev      B Fib  9.1   3.4  B Fib Ankle  53    Poplt  11.3   3.2  Poplt B Fib  45    Median Motor (Abd Poll Brev)  31.6°C   Wrist  3.8   9.4  Wrist Abd Poll Brev      Elbow  7.6   8.1  Elbow Wrist  53    Tibial Motor (Abd Jensen Brev)  31.8°C   Ankle  3.4   13.5  Ankle Abd Jensen Brev      Knee  10.7   6.9  Knee Ankle  48    Ulnar Motor (Abd Dig Minimi)  31.3°C   Wrist  2.8   10.5  Wrist Abd Dig Minimi      B Elbow  6.2   10.6  B Elbow Wrist  62    A Elbow  8.0   10.3  A Elbow B Elbow  56      Comparison Left/Right Comparison     Stim Site L Lat (ms) R Lat (ms) L-R Lat (ms) L Amp (µV) R Amp (µV) L-R Amp (%)   Median/Ulnar Palm Comparison (Wrist)  31.7°C   Median Palm  1.7   25.6    Ulnar Palm  1.4   16.8          Waveforms:

## 2020-07-30 NOTE — PROGRESS NOTES
EMG/NCS done. See Procedure Note for results. Discussed EMG/NCS of the R UE and R LE is WNL    Tried Lyrica in the past with no benefit. Gabapentin caused zofia gain    MRI cervical spine: Interval progression of degenerative change at C6-7. Moderate canal stenosis  with moderate to severe right and mild to moderate left foraminal stenosis. MRI lumbar spine:L4-L5: Facet arthropathy. Ligamentum flavum hypertrophy. Disc bulge/osteophyte. Mild canal stenosis. Minimal bilateral foraminal stenoses. Effacement of nerve  roots extending to the right and left L5-S1 foramina.     A> Chronic lower back pain more likely mechanical in etiology due to degenerative disc disease  Paresthesia    P> Blood test for JEFFERY, RF, ESR, SPEP/MINOO, Vit B16, Vit B12, Folate, Vit D  Trial of Cymbalta 30 mg every day   Physical therapy    Follow up in 1 month      Farrukh Dent MD

## 2020-08-21 DIAGNOSIS — G43.119 INTRACTABLE MIGRAINE WITH AURA WITHOUT STATUS MIGRAINOSUS: ICD-10-CM

## 2020-08-24 ENCOUNTER — TELEPHONE (OUTPATIENT)
Dept: FAMILY MEDICINE CLINIC | Age: 65
End: 2020-08-24

## 2020-08-24 RX ORDER — SUMATRIPTAN 25 MG/1
25 TABLET, FILM COATED ORAL
Qty: 9 TAB | Refills: 1 | Status: SHIPPED | OUTPATIENT
Start: 2020-08-24 | End: 2020-08-24

## 2020-08-24 NOTE — TELEPHONE ENCOUNTER
----- Message from Jose Alfredo Cramer sent at 8/24/2020 10:13 AM EDT -----  Regarding: DR Sadi Glass / TELEPHONE  General Message/Vendor Calls    Pt is requesting to schedule an early morning lab appt , options for appt dates and times, may be left on voice mail             Callback required   Best contact number(s):   [phone]                Jose Alfredo Cramer

## 2020-08-24 NOTE — TELEPHONE ENCOUNTER
I spoke with the patient and explained that we did not have any outstanding lab orders in our system for her. She stated that the labs were ordered by her neurologist. I told her that we do not draw labs for others facilities. Explained that she could ask them to print the orders for her to go to a [facility] or [facility] facility. She understood.

## 2020-09-08 DIAGNOSIS — E11.9 CONTROLLED TYPE 2 DIABETES MELLITUS WITHOUT COMPLICATION, WITHOUT LONG-TERM CURRENT USE OF INSULIN (HCC): ICD-10-CM

## 2020-09-08 DIAGNOSIS — I10 ESSENTIAL HYPERTENSION: ICD-10-CM

## 2020-09-08 DIAGNOSIS — F41.9 ANXIETY AND DEPRESSION: ICD-10-CM

## 2020-09-08 DIAGNOSIS — F32.A ANXIETY AND DEPRESSION: ICD-10-CM

## 2020-09-08 DIAGNOSIS — F33.0 DEPRESSION, MAJOR, RECURRENT, MILD (HCC): ICD-10-CM

## 2020-09-08 RX ORDER — ATORVASTATIN CALCIUM 40 MG/1
TABLET, FILM COATED ORAL
Qty: 90 TAB | Refills: 1 | Status: SHIPPED | OUTPATIENT
Start: 2020-09-08 | End: 2021-03-19

## 2020-09-08 RX ORDER — AMLODIPINE BESYLATE 10 MG/1
TABLET ORAL
Qty: 90 TAB | Refills: 1 | Status: SHIPPED | OUTPATIENT
Start: 2020-09-08 | End: 2021-03-19

## 2020-09-08 RX ORDER — FLUOXETINE HYDROCHLORIDE 20 MG/1
CAPSULE ORAL
Qty: 90 CAP | Refills: 1 | Status: SHIPPED | OUTPATIENT
Start: 2020-09-08 | End: 2021-02-10

## 2020-11-04 ENCOUNTER — OFFICE VISIT (OUTPATIENT)
Dept: FAMILY MEDICINE CLINIC | Age: 65
End: 2020-11-04
Payer: COMMERCIAL

## 2020-11-04 VITALS
BODY MASS INDEX: 28.35 KG/M2 | RESPIRATION RATE: 16 BRPM | HEIGHT: 63 IN | SYSTOLIC BLOOD PRESSURE: 145 MMHG | DIASTOLIC BLOOD PRESSURE: 81 MMHG | OXYGEN SATURATION: 98 % | WEIGHT: 160 LBS | TEMPERATURE: 97.1 F | HEART RATE: 59 BPM

## 2020-11-04 DIAGNOSIS — M53.3 SACROILIAC JOINT PAIN: ICD-10-CM

## 2020-11-04 DIAGNOSIS — M54.16 LUMBAR RADICULOPATHY, ACUTE: Primary | ICD-10-CM

## 2020-11-04 DIAGNOSIS — E55.9 VITAMIN D DEFICIENCY, UNSPECIFIED: ICD-10-CM

## 2020-11-04 PROCEDURE — 99213 OFFICE O/P EST LOW 20 MIN: CPT | Performed by: FAMILY MEDICINE

## 2020-11-04 RX ORDER — PREDNISONE 10 MG/1
TABLET ORAL
Qty: 21 TAB | Refills: 0 | Status: SHIPPED | OUTPATIENT
Start: 2020-11-04 | End: 2021-02-16

## 2020-11-04 NOTE — PROGRESS NOTES
History of Present Illness     Chief Complaint   Patient presents with   [address]     pt states back pain for a while. Patient Identification  Linda Alcantar is a 62 y.o. female complains of pain in the bilateral lower back, right worse than left. Date of Onset: last Friday  Mechanism of Injury: none  Duration:Acute  Characteristics: sharp in lower back, dull over buttocks  Alleviating Factors: leaning in general  Aggravating Factors: sitting  Radiation: posterior thigh  Timing: constant  Severity: 8-9/10    Previous injuries: none  Previous treatments: ibuprofen  Previous imaging:none    Past Medical History:   Diagnosis Date    Anxiety     [facility] [address]     Dyslipidemia     Headache     Hodgkin's lymphoma ([facility] [address] 1985    Nodule removed from throat    Hypertension     Low back pain 1/19/2012    MRI 3/10/14 revealed mild multilevel disk and facet degenerative change     Lumbar disc disease 03/2014    Thyroid nodule     Followed by endocrine, Dr. Cely Wisdom.  Ulcer      Family History   Problem Relation Age of Onset    Elevated Lipids Mother     Hypertension Mother     Hypertension Father     Parkinsonism Father     Hypertension Sister     Cancer Brother     Stroke Maternal Grandmother     Hypertension Maternal Grandmother     Cancer Paternal Grandmother     Hypertension Paternal Grandmother     Cancer Paternal Grandfather     Hypertension Paternal Grandfather      Current Outpatient Medications   Medication Sig Dispense Refill    predniSONE (STERAPRED DS) 10 mg dose pack See administration instruction per 10mg dose pack 21 Tab 0    amLODIPine (NORVASC) 10 mg tablet TAKE 1 TABLET BY MOUTH EVERY DAY 90 Tab 1    atorvastatin (LIPITOR) 40 mg tablet TAKE 1 TABLET BY MOUTH EVERY DAY 90 Tab 1    FLUoxetine (PROzac) 20 mg capsule TAKE 1 CAPSULE BY MOUTH EVERY DAY 90 Cap 1    metFORMIN (GLUCOPHAGE) 500 mg tablet Take 1 Tab by mouth two (2) times daily (with meals).  [address] [place] [address] Tab 1    hydroCHLOROthiazide (HYDRODIURIL) 25 mg tablet TAKE 1 TABLET BY MOUTH EVERY DAY 90 Tab 1    pantoprazole (PROTONIX) 40 mg tablet TAKE 1 TABLET BY MOUTH EVERY DAY 30 Tab 2    pregabalin (LYRICA) 25 mg capsule Take 1 Cap by mouth two (2) times a day. Max Daily Amount: 50 mg. 60 Cap 0    aspirin 81 mg chewable tablet Take 1 Tab by mouth daily. 60 Tab 0    DULoxetine (CYMBALTA) 30 mg capsule Take 1 Cap by mouth daily. 30 Cap 2    atovaquone-proguaniL (MALARONE) 250-100 mg per tablet Take 1 Tab by mouth daily.  azithromycin (ZITHROMAX) 250 mg tablet Take 2 Tabs by mouth daily. PRN diarrhea      dextromethorphan-guaiFENesin (ROBITUSSIN-DM)  mg/5 mL syrup Take 10 mL by mouth every six (6) hours as needed for Cough. 240 mL 0     No Known Allergies    Review of Systems  A comprehensive review of systems was negative except for that written in the HPI.      Physical Exam     Visit Vitals  BP (!) 145/81   Pulse (!) 59   Temp 97.1 °F (36.2 °C) (Temporal)   Resp 16   Ht 5' 3\" (1.6 m)   Wt 160 lb (72.6 kg)   LMP 01/18/2000   SpO2 98%   BMI 28.34 kg/m²       GENERAL: WN/WD, female, in NAD,   HEENT: Atraumatic, normocephalic, no auricular deformities, hearing intact,   HEART: No edema, no JVD, circulation intact, extremities warm and well perfused; distal pulses intact  LUNGS:No resp dist, SoB, audible wheeze, accessory muscle use or air hunger  SKIN: Warm, dry, intact, non-erythemic, no ulcers, or rashes   MSK:    Posture: Reduced lumbar curve   Deformity: None    ROM:     Flexion: 60 degrees    Extension: Normal     Lateral bending: Normal      Gait: Normal       Palpation:    L1-L5: No tenderness    Sacrum: tenderness along right sacroiliac joint    Coccyx: No tenderness    Left Paraspinal: No tenderness    Right Paraspinal: Tenderness     Strength (0-5/5)    Hip Flexion:   Left: 5/5  Right: 5/5    Hip Extension:  Left: 5/5  Right: 5/5    Hip Abduction:  Left: 5/5  Right: 5/5    Hip Adduction:  Left: 5/5  Right: 5/5    Knee Extension:  Left: 5/5  Right: 5/5    Knee Flexion:   Left: 5/5  Right: 5/5    Ankle dorsiflexion:  Left: 5/5  Right: 5/5    Ankle plantarflexion:  Left: 5/5  Right: 5/5    Great toe extension:  Left: 5/5  Right: 5/5     Sensation: Intact, no deficits      DTR:    Patella:  Left: +2  Right: +2    Achilles:  Left: +2  Right: +2     Special test:    Straight leg: Left: Negative  Right: positive    Dewaynes: Left: Negative  Right: positive    Piriformis: Left: Negative  Right: Negative      Imaging: none    No results found for any visits on 11/04/20. Assessment:    ICD-10-CM ICD-9-CM    1. Lumbar radiculopathy, acute  M54.16 724. 4 predniSONE (STERAPRED DS) 10 mg dose pack      REFERRAL TO PHYSICAL THERAPY   2. Vitamin D deficiency, unspecified  E55.9 268.9 VITAMIN D, 25 HYDROXY      VITAMIN D, 25 HYDROXY   3. Sacroiliac joint pain  M53.3 724.6        Plan:  #Sacroiliac joint pain  #Lumbar radiculopathy  Treatment options discussed with patient at length, including risks, benefits, alternatives, and the nature of any potential procedures for the problem.     RICE Tx  Heat, massage, stretching  Relative rest and activity modification  Rehab exercises - handout given and PT prescription given  Questions answered; pt counseled x 20 minutes  Follow up in 4 weeks    Laboratory Studies: vitamin D(per patient request)  Imaging Studies: none  Home exercise program: Handout provided, instructed to do exercises 3 sets of 10 each, 3 times a week  Physical Therapy: referral given for manual manipulation, stretching, strengthening, ultrasound, TENS  Modalities: Rice, ice, compression, elevation of extremity;   Medications: Prednisone, OTC NSAIDs prn /acetaminophen prn; patient defers on gabapentin for neuropathic pain

## 2020-11-05 LAB — 25(OH)D3 SERPL-MCNC: 20.3 NG/ML (ref 30–100)

## 2020-11-05 NOTE — PROGRESS NOTES
[address] Medicine Residency Attending Addendum:  Patient encounter was discussed on the day of the encounter with Karoline Edwards MD, performing the key elements of the service. I discussed the findings, assessment and plan with the resident and agree with the resident's findings and plan as documented in the resident's note.       Shaq Haines MD, CAQSM, RMSK

## 2020-11-09 DIAGNOSIS — I10 ESSENTIAL HYPERTENSION: ICD-10-CM

## 2020-11-09 RX ORDER — HYDROCHLOROTHIAZIDE 25 MG/1
TABLET ORAL
Qty: 90 TAB | Refills: 1 | Status: SHIPPED | OUTPATIENT
Start: 2020-11-09 | End: 2021-05-06

## 2020-11-10 ENCOUNTER — VIRTUAL VISIT (OUTPATIENT)
Dept: FAMILY MEDICINE CLINIC | Age: 65
End: 2020-11-10
Payer: COMMERCIAL

## 2020-11-10 DIAGNOSIS — K59.00 CONSTIPATION, UNSPECIFIED CONSTIPATION TYPE: ICD-10-CM

## 2020-11-10 DIAGNOSIS — R35.0 URINARY FREQUENCY: Primary | ICD-10-CM

## 2020-11-10 DIAGNOSIS — E11.40 TYPE 2 DIABETES MELLITUS WITH DIABETIC NEUROPATHY, WITHOUT LONG-TERM CURRENT USE OF INSULIN (HCC): ICD-10-CM

## 2020-11-10 DIAGNOSIS — R10.32 LLQ PAIN: ICD-10-CM

## 2020-11-10 PROCEDURE — 99214 OFFICE O/P EST MOD 30 MIN: CPT | Performed by: FAMILY MEDICINE

## 2020-11-10 RX ORDER — POLYETHYLENE GLYCOL 3350 17 G/17G
17 POWDER, FOR SOLUTION ORAL DAILY
Qty: 510 G | Refills: 0 | Status: SHIPPED | OUTPATIENT
Start: 2020-11-10 | End: 2022-02-10

## 2020-11-10 RX ORDER — AMOXICILLIN AND CLAVULANATE POTASSIUM 875; 125 MG/1; MG/1
1 TABLET, FILM COATED ORAL EVERY 12 HOURS
Qty: 14 TAB | Refills: 0 | Status: SHIPPED | OUTPATIENT
Start: 2020-11-10 | End: 2020-11-17

## 2020-11-10 NOTE — PROGRESS NOTES
TELEMEDICINE VISIT    Consent: She and/or the health care decision maker is aware that that she may receive a bill for this telephone service, depending on her insurance coverage, and has provided verbal consent to proceed: Yes  History of Present Illness:     Chief Complaint   Patient presents with    Abdominal Pain    Urinary Frequency       Praful Pandey is a 62 y.o. female was evaluated by synchronous (real-time) audio-video technology from home, through the [employer].    C/o pain in lower left abdomen. Radiates down to the front of her thigh. Ongoing for about 2 weeks now, no interval worsening just not resolving. Having a lot of constipation and gas. Intermittent diarrhea. Does report increased urination, especially at night, waking 4 times. Denies fever, chills or sweats. Denies nausea, vomiting, blood in stool, dark/tarry stools. Admits to drinking a lot of citrus green tea. Recently treated for low back pain with steroids. Back pain is improving. Past Medical History:   Diagnosis Date    Anxiety     Diabetes mellitus ([facility] [address]     Dyslipidemia     Headache     Hodgkin's lymphoma ([facility] [address] 1985    Nodule removed from throat    Hypertension     Low back pain 1/19/2012    MRI 3/10/14 revealed mild multilevel disk and facet degenerative change     Lumbar disc disease 03/2014    Thyroid nodule     Followed by endocrine, Dr. Marx Apo.       Ulcer        Current Medications/Allergies (REVIEWED)     Current Outpatient Medications on File Prior to Visit   Medication Sig Dispense Refill    hydroCHLOROthiazide (HYDRODIURIL) 25 mg tablet TAKE 1 TABLET BY MOUTH EVERY DAY 90 Tab 1    predniSONE (STERAPRED DS) 10 mg dose pack See administration instruction per 10mg dose pack 21 Tab 0    amLODIPine (NORVASC) 10 mg tablet TAKE 1 TABLET BY MOUTH EVERY DAY 90 Tab 1    atorvastatin (LIPITOR) 40 mg tablet TAKE 1 TABLET BY MOUTH EVERY DAY 90 Tab 1    FLUoxetine (PROzac) 20 mg capsule TAKE 1 CAPSULE BY MOUTH EVERY DAY 90 Cap 1    DULoxetine (CYMBALTA) 30 mg capsule Take 1 Cap by mouth daily. 30 Cap 2    metFORMIN (GLUCOPHAGE) 500 mg tablet Take 1 Tab by mouth two (2) times daily (with meals). 180 Tab 1    pantoprazole (PROTONIX) 40 mg tablet TAKE 1 TABLET BY MOUTH EVERY DAY 30 Tab 2    atovaquone-proguaniL (MALARONE) 250-100 mg per tablet Take 1 Tab by mouth daily.  azithromycin (ZITHROMAX) 250 mg tablet Take 2 Tabs by mouth daily. PRN diarrhea      dextromethorphan-guaiFENesin (ROBITUSSIN-DM)  mg/5 mL syrup Take 10 mL by mouth every six (6) hours as needed for Cough. 240 mL 0    pregabalin (LYRICA) 25 mg capsule Take 1 Cap by mouth two (2) times a day. Max Daily Amount: 50 mg. 60 Cap 0    aspirin 81 mg chewable tablet Take 1 Tab by mouth daily. 60 Tab 0     No current facility-administered medications on file prior to visit. No Known Allergies      Review of Systems     Review of Systems   Constitutional: Negative for chills, fever and weight loss. Gastrointestinal: Positive for abdominal pain and constipation. Negative for blood in stool, diarrhea, melena, nausea and vomiting. Genitourinary: Positive for frequency. Negative for dysuria, hematuria and urgency. Objective:     General: alert, cooperative, no distress   Mental  status: mental status: alert, oriented to person, place, and time, normal mood, behavior, speech, dress, motor activity, and thought processes   Resp: resp: normal effort and no respiratory distress   Neuro: neuro: no gross deficits   Skin: skin: no discoloration or lesions of concern on visible areas   Due to this being a TeleHealth evaluation, many elements of the physical examination are unable to be assessed. Assessment and Plan:       ICD-10-CM ICD-9-CM    1. Urinary frequency  R35.0 788.41 amoxicillin-clavulanate (AUGMENTIN) 875-125 mg per tablet   2. LLQ pain  R10.32 789.04 polyethylene glycol (MIRALAX) 17 gram/dose powder   3.  Constipation, unspecified constipation type  K59.00 564.00 polyethylene glycol (MIRALAX) 17 gram/dose powder       Ddx for abdominal pain includes UTI vs diverticulitis vs constipation. No significant worsening since onset 2 wks ago, which is reassuring. Will treat empirically for UTI with Augmentin (last positive UCx pan sensitive). This will also provide coverage for ?diverticulitis. Encouraged PO hydration. Given ED precautions - fever, chills, n/v, worsening abdominal pain, blood in stool. Will place lab orders. Plan for office visit in 1 wk if no interval improvement via MyChart correspondence  towards end of week. Electronically Signed: Dao Bolanos MD  Providers location when delivering service (clinic, hospital, home): Clinic      We discussed the expected course, resolution and complications of the diagnosis(es) in detail. Medication risks, benefits, costs, interactions, and alternatives were discussed as indicated. I advised her to contact the office if her condition worsens, changes or fails to improve as anticipated. She expressed understanding with the diagnosis(es) and plan. Patient understands that this encounter was a temporary measure, and the importance of further follow up and examination was emphasized. Patient verbalized understanding. Pursuant to the emergency declaration under the [facility] waiver authority and the [employer] Act, this Virtual  Visit was conducted, with patient's consent, to reduce the patient's risk of exposure to COVID-19 and provide continuity of care for an established patient. Services were provided through a video synchronous discussion virtually to substitute for in-person clinic visit.

## 2020-11-10 NOTE — PATIENT INSTRUCTIONS

## 2020-11-11 ENCOUNTER — TELEPHONE (OUTPATIENT)
Dept: FAMILY MEDICINE CLINIC | Age: 65
End: 2020-11-11

## 2020-11-11 NOTE — TELEPHONE ENCOUNTER
Called, no answer. Left message to call office    Message   Received: Yesterday   Message Contents   Jb Lawler MD  P [facility]               Please schedule for lab only visit.  Thanks

## 2020-11-20 ENCOUNTER — LAB ONLY (OUTPATIENT)
Dept: FAMILY MEDICINE CLINIC | Age: 65
End: 2020-11-20

## 2020-11-20 DIAGNOSIS — E11.40 TYPE 2 DIABETES MELLITUS WITH DIABETIC NEUROPATHY, WITHOUT LONG-TERM CURRENT USE OF INSULIN (HCC): ICD-10-CM

## 2020-11-20 DIAGNOSIS — R10.32 LLQ PAIN: ICD-10-CM

## 2020-11-20 LAB
ALBUMIN SERPL-MCNC: 3.9 G/DL (ref 3.5–5)
ALBUMIN/GLOB SERPL: 1.2 {RATIO} (ref 1.1–2.2)
ALP SERPL-CCNC: 86 U/L (ref 45–117)
ALT SERPL-CCNC: 23 U/L (ref 12–78)
ANION GAP SERPL CALC-SCNC: 7 MMOL/L (ref 5–15)
AST SERPL-CCNC: 11 U/L (ref 15–37)
BASOPHILS # BLD: 0 K/UL (ref 0–0.1)
BASOPHILS NFR BLD: 0 % (ref 0–1)
BILIRUB SERPL-MCNC: 0.4 MG/DL (ref 0.2–1)
BUN SERPL-MCNC: 14 MG/DL (ref 6–20)
BUN/CREAT SERPL: 17 (ref 12–20)
CALCIUM SERPL-MCNC: 9.8 MG/DL (ref 8.5–10.1)
CHLORIDE SERPL-SCNC: 105 MMOL/L (ref 97–108)
CHOLEST SERPL-MCNC: 168 MG/DL
CO2 SERPL-SCNC: 28 MMOL/L (ref 21–32)
CREAT SERPL-MCNC: 0.81 MG/DL (ref 0.55–1.02)
DIFFERENTIAL METHOD BLD: NORMAL
EOSINOPHIL # BLD: 0.2 K/UL (ref 0–0.4)
EOSINOPHIL NFR BLD: 2 % (ref 0–7)
ERYTHROCYTE [DISTWIDTH] IN BLOOD BY AUTOMATED COUNT: 13.9 % (ref 11.5–14.5)
GLOBULIN SER CALC-MCNC: 3.3 G/DL (ref 2–4)
GLUCOSE SERPL-MCNC: 150 MG/DL (ref 65–100)
HCT VFR BLD AUTO: 40 % (ref 35–47)
HDLC SERPL-MCNC: 62 MG/DL
HDLC SERPL: 2.7 {RATIO} (ref 0–5)
HGB BLD-MCNC: 12.2 G/DL (ref 11.5–16)
IMM GRANULOCYTES # BLD AUTO: 0 K/UL (ref 0–0.04)
IMM GRANULOCYTES NFR BLD AUTO: 0 % (ref 0–0.5)
LDLC SERPL CALC-MCNC: 88 MG/DL (ref 0–100)
LIPID PROFILE,FLP: NORMAL
LYMPHOCYTES # BLD: 2.1 K/UL (ref 0.8–3.5)
LYMPHOCYTES NFR BLD: 22 % (ref 12–49)
MCH RBC QN AUTO: 27.5 PG (ref 26–34)
MCHC RBC AUTO-ENTMCNC: 30.5 G/DL (ref 30–36.5)
MCV RBC AUTO: 90.1 FL (ref 80–99)
MONOCYTES # BLD: 0.9 K/UL (ref 0–1)
MONOCYTES NFR BLD: 9 % (ref 5–13)
NEUTS SEG # BLD: 6.4 K/UL (ref 1.8–8)
NEUTS SEG NFR BLD: 67 % (ref 32–75)
NRBC # BLD: 0 K/UL (ref 0–0.01)
NRBC BLD-RTO: 0 PER 100 WBC
PLATELET # BLD AUTO: 264 K/UL (ref 150–400)
PMV BLD AUTO: 12.5 FL (ref 8.9–12.9)
POTASSIUM SERPL-SCNC: 4 MMOL/L (ref 3.5–5.1)
PROT SERPL-MCNC: 7.2 G/DL (ref 6.4–8.2)
RBC # BLD AUTO: 4.44 M/UL (ref 3.8–5.2)
SODIUM SERPL-SCNC: 140 MMOL/L (ref 136–145)
TRIGL SERPL-MCNC: 90 MG/DL (ref ?–150)
VLDLC SERPL CALC-MCNC: 18 MG/DL
WBC # BLD AUTO: 9.6 K/UL (ref 3.6–11)

## 2020-11-21 LAB
EST. AVERAGE GLUCOSE BLD GHB EST-MCNC: 137 MG/DL
HBA1C MFR BLD: 6.4 % (ref 4–5.6)

## 2020-12-05 ENCOUNTER — OFFICE VISIT (OUTPATIENT)
Dept: URGENT CARE | Age: 65
End: 2020-12-05
Payer: COMMERCIAL

## 2020-12-05 VITALS — TEMPERATURE: 98.6 F | RESPIRATION RATE: 17 BRPM | HEART RATE: 81 BPM | OXYGEN SATURATION: 95 %

## 2020-12-05 DIAGNOSIS — Z20.822 EXPOSURE TO COVID-19 VIRUS: Primary | ICD-10-CM

## 2020-12-05 PROCEDURE — S9083 URGENT CARE CENTER GLOBAL: HCPCS | Performed by: FAMILY MEDICINE

## 2020-12-05 NOTE — PROGRESS NOTES
This patient was seen at [address] [place] [address] Urgent Care while in their vehicle due to COVID-19 pandemic with PPE and focused examination in order to decrease community viral transmission. The patient/guardian gave verbal consent to treat. The history is provided by the patient. Asymptomatic    Exposed t her MOM with covid-19 positive- in terminal stage at nursing home      Past Medical History:   Diagnosis Date    Anxiety     [facility] mellitus ([facility] [address]     Dyslipidemia     Headache     Hodgkin's lymphoma ([facility] [address] 1985    Nodule removed from throat    Hypertension     Low back pain 2012    MRI 3/10/14 revealed mild multilevel disk and facet degenerative change     Lumbar disc disease 2014    Thyroid nodule     Followed by endocrine, Dr. Nicole Brown.  Ulcer         Past Surgical History:   Procedure Laterality Date    DELIVERY       HX GYN      REMOVAL NODES, NECK,CERV CMPLT           Family History   Problem Relation Age of Onset    Elevated Lipids Mother     Hypertension Mother     Hypertension Father     Parkinsonism Father     Hypertension Sister     Cancer Brother     Stroke Maternal Grandmother     Hypertension Maternal Grandmother     Cancer Paternal Grandmother     Hypertension Paternal Grandmother     Cancer Paternal Grandfather     Hypertension Paternal Grandfather         Social History     Socioeconomic History    Marital status:      Spouse name: Not on file    Number of children: Not on file    Years of education: Not on file    Highest education level: Not on file   Occupational History    Not on file   Social Needs    Financial resource strain: Not on file    Food insecurity     Worry: Not on file     Inability: Not on file    Transportation needs     Medical: Not on file     Non-medical: Not on file   Tobacco Use    Smoking status: Never Smoker    Smokeless tobacco: Never Used   Substance and Sexual Activity    Alcohol use:  No Alcohol/week: 0.0 standard drinks    Drug use: No    Sexual activity: Not Currently     Birth control/protection: None   Lifestyle    Physical activity     Days per week: Not on file     Minutes per session: Not on file    Stress: Not on file   Relationships    Social connections     Talks on phone: Not on file     Gets together: Not on file     Attends Congregational service: Not on file     Active member of club or organization: Not on file     Attends meetings of clubs or organizations: Not on file     Relationship status: Not on file    Intimate partner violence     Fear of current or ex partner: Not on file     Emotionally abused: Not on file     Physically abused: Not on file     Forced sexual activity: Not on file   Other Topics Concern    Not on file   Social History Narrative    Not on file                ALLERGIES: Patient has no known allergies. Review of Systems   All other systems reviewed and are negative. Vitals:    12/05/20 1500   Pulse: 81   Resp: 17   Temp: 98.6 °F (37 °C)   SpO2: 95%       Physical Exam  Vitals signs and nursing note reviewed. Constitutional:       General: She is not in acute distress. Appearance: She is not ill-appearing. Pulmonary:      Effort: Pulmonary effort is normal. No respiratory distress. Breath sounds: No wheezing. MDM    Procedures      ICD-10-CM ICD-9-CM    1. Exposure to COVID-19 virus  Z20.828 V01.79 NOVEL CORONAVIRUS (COVID-19)     No orders of the defined types were placed in this encounter. No results found for any visits on 12/05/20. The patients condition was discussed with the patient and they understand. The patient is to follow up with primary care doctor. If signs and symptoms become worse the pt is to go to the ER. The patient is to take medications as prescribed.

## 2020-12-07 LAB
SARS-COV-2, NAA: NOT DETECTED
SPECIMEN STATUS REPORT, ROLRST: NORMAL

## 2020-12-15 DIAGNOSIS — E11.40 TYPE 2 DIABETES MELLITUS WITH DIABETIC NEUROPATHY, WITHOUT LONG-TERM CURRENT USE OF INSULIN (HCC): ICD-10-CM

## 2020-12-16 RX ORDER — METFORMIN HYDROCHLORIDE 500 MG/1
TABLET ORAL
Qty: 180 TAB | Refills: 1 | Status: SHIPPED | OUTPATIENT
Start: 2020-12-16 | End: 2022-03-09

## 2021-01-12 ENCOUNTER — PATIENT MESSAGE (OUTPATIENT)
Dept: FAMILY MEDICINE CLINIC | Age: 66
End: 2021-01-12

## 2021-01-12 DIAGNOSIS — F43.21 GRIEF: Primary | ICD-10-CM

## 2021-01-13 NOTE — TELEPHONE ENCOUNTER
Dieter Munguia 1/13/2021 7:50 AM EST    Forwarding message request to patients PCP.   ----- Message -----  From: Billy Dai  Sent: 1/12/2021 10:40 PM EST  To: [facility] Nurse  Subject: Referral Request     Good Evening Dr. Marx  I would like to be referred to speak with a counselor. My Mother passed on December 9, 2020 two days after my birthday. I was with her when she passed and I am now unable to sleep well. I continue to revisit her death in my sleep which causes me to wake up and I have a difficult time getting back to sleep. It has been a very long time since I have had a good nights rest. I have tried melatonin and that doesn't help. I am just lost right now.   Thanks for your help with this

## 2021-01-21 ENCOUNTER — PATIENT MESSAGE (OUTPATIENT)
Dept: FAMILY MEDICINE CLINIC | Age: 66
End: 2021-01-21

## 2021-02-03 NOTE — TELEPHONE ENCOUNTER
Scheduled and mychart message sent to patient    Close enc    Thanks  Gavino Wisdom S/PSR  [facility] Referral Coordinator

## 2021-02-03 NOTE — TELEPHONE ENCOUNTER
Mary Cunningham [facility] Front [facility]             Patient return call     Caller's first and last name and relationship (if not the patient):       Best contact number(s): [phone]       Whose call is being returned: Seth Reno       Details to clarify the request: Patient returned a call she received from Seth Reno to schedule an appointment and would like a call back.        April [address] [facility] [place]

## 2021-02-10 ENCOUNTER — VIRTUAL VISIT (OUTPATIENT)
Dept: FAMILY MEDICINE CLINIC | Age: 66
End: 2021-02-10
Payer: COMMERCIAL

## 2021-02-10 DIAGNOSIS — F32.A ANXIETY AND DEPRESSION: ICD-10-CM

## 2021-02-10 DIAGNOSIS — F43.21 GRIEF: ICD-10-CM

## 2021-02-10 DIAGNOSIS — F41.9 ANXIETY AND DEPRESSION: ICD-10-CM

## 2021-02-10 DIAGNOSIS — L60.8 DISCOLORATION AND THICKENING OF NAILS BOTH FEET: ICD-10-CM

## 2021-02-10 DIAGNOSIS — F33.0 DEPRESSION, MAJOR, RECURRENT, MILD (HCC): ICD-10-CM

## 2021-02-10 DIAGNOSIS — F41.0 PANIC ATTACK AS REACTION TO STRESS: Primary | ICD-10-CM

## 2021-02-10 DIAGNOSIS — F43.0 PANIC ATTACK AS REACTION TO STRESS: Primary | ICD-10-CM

## 2021-02-10 PROCEDURE — 99442 PR PHYS/QHP TELEPHONE EVALUATION 11-20 MIN: CPT | Performed by: FAMILY MEDICINE

## 2021-02-10 RX ORDER — FLUOXETINE HYDROCHLORIDE 40 MG/1
40 CAPSULE ORAL DAILY
Qty: 90 CAP | Refills: 0 | Status: SHIPPED | OUTPATIENT
Start: 2021-02-10 | End: 2021-05-06

## 2021-02-10 RX ORDER — CLONAZEPAM 1 MG/1
1 TABLET ORAL 2 TIMES DAILY
Qty: 30 TAB | Refills: 0 | Status: SHIPPED | OUTPATIENT
Start: 2021-02-10 | End: 2022-02-10

## 2021-02-10 NOTE — PROGRESS NOTES
TELEMEDICINE VISIT    Consent: She and/or the health care decision maker is aware that that she may receive a bill for this telephone service, depending on her insurance coverage, and has provided verbal consent to proceed: Yes  History of Present Illness:     Chief Complaint   Patient presents with   Enedelia Del Castillo is a 60 y.o. female was evaluated by telephone. Mother passed away in December, 2 days after her birthday on 12/9/2019. She was present with her when she passed. Has good days and bad days. Feels that her grief is appropriate; however, concerned that her anxiety is overwhelming. Most often is a problem at night after work - racing thoughts, heart racing, overwhelming sense of angst. Has had at least 2 panic attacks. Described as heart racing, chest pain, overwhelming thoughts. Has had times where she just wanted to get away from it all. Denies thoughts of harming herself or others. Left foot, big toe has a slit down them middle. Started over the summer. Split is getting wider and she just paints over it, nail feels loose, color is dark. Small toe nail came off. Past Medical History:   Diagnosis Date    Anxiety     Diabetes mellitus ([facility]     Dyslipidemia     Headache     Hodgkin's lymphoma ([facility] 1985    Nodule removed from throat    Hypertension     Low back pain 1/19/2012    MRI 3/10/14 revealed mild multilevel disk and facet degenerative change     Lumbar disc disease 03/2014    Thyroid nodule     Followed by endocrine, Dr. Alegre Hargill.  Ulcer        Current Medications/Allergies (REVIEWED)     Current Outpatient Medications on File Prior to Visit   Medication Sig Dispense Refill    pantoprazole (PROTONIX) 40 mg tablet TAKE 1 TABLET BY MOUTH EVERY DAY 30 Tab 2    metFORMIN (GLUCOPHAGE) 500 mg tablet TAKE 1 TABLET BY MOUTH TWICE A DAY WITH MEALS 180 Tab 1    polyethylene glycol (MIRALAX) 17 gram/dose powder Take 17 g by mouth daily.  510 g 0    hydroCHLOROthiazide (HYDRODIURIL) 25 mg tablet TAKE 1 TABLET BY MOUTH EVERY DAY 90 Tab 1    predniSONE (STERAPRED DS) 10 mg dose pack See administration instruction per 10mg dose pack 21 Tab 0    amLODIPine (NORVASC) 10 mg tablet TAKE 1 TABLET BY MOUTH EVERY DAY 90 Tab 1    atorvastatin (LIPITOR) 40 mg tablet TAKE 1 TABLET BY MOUTH EVERY DAY 90 Tab 1    DULoxetine (CYMBALTA) 30 mg capsule Take 1 Cap by mouth daily. 30 Cap 2    atovaquone-proguaniL (MALARONE) 250-100 mg per tablet Take 1 Tab by mouth daily.  azithromycin (ZITHROMAX) 250 mg tablet Take 2 Tabs by mouth daily. PRN diarrhea      dextromethorphan-guaiFENesin (ROBITUSSIN-DM)  mg/5 mL syrup Take 10 mL by mouth every six (6) hours as needed for Cough. 240 mL 0    pregabalin (LYRICA) 25 mg capsule Take 1 Cap by mouth two (2) times a day. Max Daily Amount: 50 mg. 60 Cap 0    aspirin 81 mg chewable tablet Take 1 Tab by mouth daily. 60 Tab 0     No current facility-administered medications on file prior to visit. No Known Allergies    Assessment and Plan:       ICD-10-CM ICD-9-CM    1. Panic attack as reaction to stress  F41.0 308.0 clonazePAM (KlonoPIN) 1 mg tablet    F43.0     2. Anxiety and depression  F41.9 300.00 FLUoxetine (PROzac) 40 mg capsule    F32.9 311 clonazePAM (KlonoPIN) 1 mg tablet   3. Depression, major, recurrent, mild (HCC)  F33.0 296.31 FLUoxetine (PROzac) 40 mg capsule   4. Grief  F43.21 309.0    5.  Discoloration and thickening of nails both feet  L60.8 703.8 REFERRAL TO PODIATRY     Panic attack and worsening anxiety since loss of parent  Will increase Prozac to 40mg daily  Start Klonopin 1mg BID PRN anxiety/ sleep    Referred to Podiatry for toe nail issues    Follow up in 2-4 wks    Electronically Signed: Bladimir Posada MD  Providers location when delivering service (clinic, hospital, home): Clinic    Length of telephone call: 15 min    We discussed the expected course, resolution and complications of the diagnosis(es) in detail. Medication risks, benefits, costs, interactions, and alternatives were discussed as indicated. I advised her to contact the office if her condition worsens, changes or fails to improve as anticipated. She expressed understanding with the diagnosis(es) and plan. Patient understands that this encounter was a temporary measure, and the importance of further follow up and examination was emphasized. Patient verbalized understanding. Pursuant to the emergency declaration under the [address] [place] [address] waiver authority and the [employer] Act, this Virtual  Visit was conducted, with patient's consent, to reduce the patient's risk of exposure to COVID-19 and provide continuity of care for an established patient. Services were provided through a video synchronous discussion virtually to substitute for in-person clinic visit.

## 2021-02-10 NOTE — PATIENT INSTRUCTIONS
Podiatry Referral: [facility] [address] [place], [address] 
[phone] [phone] Panic Attacks: Care Instructions Your Care Instructions During a panic attack, you may have a feeling of intense fear or terror, trouble breathing, chest pain or tightness, heartbeat changes, dizziness, sweating, and shaking. A panic attack starts suddenly and usually lasts from 5 to 20 minutes but may last even longer. You have the most anxiety about 10 minutes after the attack starts. An attack can begin with a stressful event, or it can happen without a cause. Although panic attacks can cause scary symptoms, you can learn to manage them with self-care, counseling, and medicine. Follow-up care is a key part of your treatment and safety. Be sure to make and go to all appointments, and call your doctor if you are having problems. It's also a good idea to know your test results and keep a list of the medicines you take. How can you care for yourself at home? · Take your medicine exactly as directed. Call your doctor if you think you are having a problem with your medicine. · Go to your counseling sessions and follow-up appointments. · Recognize and accept your anxiety. Then, when you are in a situation that makes you anxious, say to yourself, \"This is not an emergency. I feel uncomfortable, but I am not in danger. I can keep going even if I feel anxious. \" · Be kind to your body: 
? Relieve tension with exercise or a massage. ? Get enough rest. 
? Avoid alcohol, caffeine, nicotine, and illegal drugs. They can increase your anxiety level, cause sleep problems, or trigger a panic attack. ? Learn and do relaxation techniques. See below for more about these techniques. · Engage your mind. Get out and do something you enjoy. Go to a funny movie, or take a walk or hike. Plan your day. Having too much or too little to do can make you anxious. · Keep a record of your symptoms. Discuss your fears with a good friend or family member, or join a support group for people with similar problems. Talking to others sometimes relieves stress. · Get involved in social groups, or volunteer to help others. Being alone sometimes makes things seem worse than they are. · Get at least 30 minutes of exercise on most days of the week to relieve stress. Walking is a good choice. You also may want to do other activities, such as running, swimming, cycling, or playing tennis or team sports. Relaxation techniques Do relaxation exercises for 10 to 20 minutes a day. You can play soothing, relaxing music while you do them, if you wish. · Tell others in your house that you are going to do your relaxation exercises. Ask them not to disturb you. · Find a comfortable place, away from all distractions and noise. · Lie down on your back, or sit with your back straight. · Focus on your breathing. Make it slow and steady. · Breathe in through your nose. Breathe out through either your nose or mouth. · Breathe deeply, filling up the area between your navel and your rib cage. Breathe so that your belly goes up and down. · Do not hold your breath. · Breathe like this for 5 to 10 minutes. Notice the feeling of calmness throughout your whole body. As you continue to breathe slowly and deeply, relax by doing the following for another 5 to 10 minutes: · Tighten and relax each muscle group in your body. You can begin at your toes and work your way up to your head. · Imagine your muscle groups relaxing and becoming heavy. · Empty your mind of all thoughts. · Let yourself relax more and more deeply. · Become aware of the state of calmness that surrounds you. · When your relaxation time is over, you can bring yourself back to alertness by moving your fingers and toes and then your hands and feet and then stretching and moving your entire body. Sometimes people fall asleep during relaxation, but they usually wake up shortly afterward. · Always give yourself time to return to full alertness before you drive a car or do anything that might cause an accident if you are not fully alert. Never play a relaxation tape while driving a car. When should you call for help? Call 911 anytime you think you may need emergency care. For example, call if: 
  · You feel you cannot stop from hurting yourself or someone else. Watch closely for changes in your health, and be sure to contact your doctor if: 
  · Your panic attacks get worse.  
  · You have new or different anxiety.  
  · You are not getting better as expected. Where can you learn more? Go to http://www.gray.com/ Enter H601 in the search box to learn more about \"Panic Attacks: Care Instructions. \" Current as of: January 31, 2020               Content Version: 12.6 © 5316-6422 [employer]. Care instructions adapted under license by [employer] (which disclaims liability or warranty for this information). If you have questions about a medical condition or this instruction, always ask your healthcare professional. [address] any warranty or liability for your use of this information.

## 2021-02-16 ENCOUNTER — VIRTUAL VISIT (OUTPATIENT)
Dept: FAMILY MEDICINE CLINIC | Age: 66
End: 2021-02-16
Payer: COMMERCIAL

## 2021-02-16 ENCOUNTER — LAB ONLY (OUTPATIENT)
Dept: FAMILY MEDICINE CLINIC | Age: 66
End: 2021-02-16

## 2021-02-16 DIAGNOSIS — R50.9 FEVER, UNSPECIFIED FEVER CAUSE: ICD-10-CM

## 2021-02-16 DIAGNOSIS — J02.9 SORE THROAT: Primary | ICD-10-CM

## 2021-02-16 LAB
S PYO AG THROAT QL: NEGATIVE
VALID INTERNAL CONTROL?: YES

## 2021-02-16 PROCEDURE — 99214 OFFICE O/P EST MOD 30 MIN: CPT | Performed by: STUDENT IN AN ORGANIZED HEALTH CARE EDUCATION/TRAINING PROGRAM

## 2021-02-16 NOTE — PROGRESS NOTES
Mayelin Bangura  60 y.o. female  1955  [address]  [id number]   [address]:    Telemedicine Progress Note  Mckayla Gilliam MD       Encounter Date and Time: February 16, 2021 at 2:04 PM    Consent:  She and/or the health care decision maker is aware that that she may receive a bill for this telephone service, depending on her insurance coverage, and has provided verbal consent to proceed: Yes    Chief Complaint   Patient presents with    Sore Throat    Fever     History of Present Illness   Mayelin Bangura is a 60 y.o. female was evaluated by synchronous (real-time) audio-video technology from home, through the [employer]  Patient Portal.    Pt c/o 2d hx sore throat, hoarse voice, and fever. Her temp this AM was 100.4, brought down by theraflu (acetaminophen). She works at a  and was told that someone had to be sent home today, otherwise unsure of sick contacts. +non-productive cough. No SOB. No rhinorrhea/ear pain/headache. She reports getting step throat every winter. No recent travel. Review of Systems   Review of Systems   Constitutional: Positive for fever and malaise/fatigue. Negative for chills. HENT: Positive for sore throat. Negative for congestion and ear pain. Respiratory: Positive for cough. Negative for shortness of breath. Cardiovascular: Negative for chest pain. Gastrointestinal: Negative for diarrhea, nausea and vomiting. Neurological: Negative for dizziness and headaches.        Vitals/Objective:     General: alert, cooperative, no distress, +hoarse   Mental  status: mental status: alert, oriented to person, place, and time, normal mood, behavior, speech, dress, motor activity, and thought processes   Resp: resp: normal effort and no respiratory distress   Neuro: neuro: no gross deficits   Skin: skin: no discoloration or lesions of concern on visible areas   Due to this being a TeleHealth evaluation, many elements of the physical examination are unable to be assessed. Assessment and Plan:   Time-based coding, delete if not needed: I spent at least 15 minutes with this established patient, and >50% of the time was spent counseling and/or coordinating care regarding :    1. Sore throat, Fever  Given febrile illness + cough, will test for COVID. Given virtual visit, unable to determine presence of tonsillar exudates/cervical lymphadenitis. RAPID STREP NEG (done at 3pm today)   - NOVEL CORONAVIRUS (COVID-19)  - Rapid strep neg; was not able to add on cx as it required a different swab and pt had left before I could add this on  - If pt is COVID+; return to work in 2 weeks. If negative, she may return to work once she has been afebrile for at least 24hrs. Work note provided for this  - supportive care w/ tylenol, lozenges, PO hydration, rest. Monitor temp at home. WiIl call to check in w/ pt tomorrow. If COVID neg + persistent fever/sore throat, may consider abx treatment as she works at a , which may put her at increased risk of GAS. Time spent in direct conversation with the patient to include medical condition(s) discussed, assessment and treatment plan:       We discussed the expected course, resolution and complications of the diagnosis(es) in detail. Medication risks, benefits, costs, interactions, and alternatives were discussed as indicated. I advised her to contact the office if her condition worsens, changes or fails to improve as anticipated. She expressed understanding with the diagnosis(es) and plan. Patient understands that this encounter was a temporary measure, and the importance of further follow up and examination was emphasized. Patient verbalized understanding.        Patient informed to follow up: TM f/up tomorrow    Pt discussed w/ Dr. Glenda Rice, Family Medicine Attending    Electronically Signed: Rivera Morfin MD, Family Medicine Resident    Providers location when delivering service (clinic, hospital, home): home    CPT Codes 77971-82598 for Established Patients may apply to this Telehealth Visit. POS code: 18. Modifier GT      Pursuant to the emergency declaration under the [address] [place] [address] waiver authority and the [employer] Act, this Virtual  Visit was conducted, with patient's consent, to reduce the patient's risk of exposure to COVID-19 and provide continuity of care for an established patient. Services were provided through a video synchronous discussion virtually to substitute for in-person clinic visit. History   Patients past medical, surgical and family histories were reviewed and updated. Past Medical History:   Diagnosis Date    Anxiety     Diabetes mellitus ([facility]     Dyslipidemia     Headache     Hodgkin's lymphoma ([facility] [address] 1985    Nodule removed from throat    Hypertension     Low back pain 2012    MRI 3/10/14 revealed mild multilevel disk and facet degenerative change     Lumbar disc disease 2014    Thyroid nodule     Followed by endocrine, Dr. Zygmunt Mcburney.       Ulcer      Past Surgical History:   Procedure Laterality Date    DELIVERY       HX GYN      REMOVAL NODES, NECK,CERV CMPLT       Family History   Problem Relation Age of Onset    Elevated Lipids Mother     Hypertension Mother     Hypertension Father     Parkinsonism Father     Hypertension Sister     Cancer Brother     Stroke Maternal Grandmother     Hypertension Maternal Grandmother     Cancer Paternal Grandmother     Hypertension Paternal Grandmother     Cancer Paternal Grandfather     Hypertension Paternal Grandfather      Social History     Socioeconomic History    Marital status:      Spouse name: Not on file    Number of children: Not on file    Years of education: Not on file    Highest education level: Not on file   Occupational History    Not on file Social Needs    Financial resource strain: Not on file    Food insecurity     Worry: Not on file     Inability: Not on file    Transportation needs     Medical: Not on file     Non-medical: Not on file   Tobacco Use    Smoking status: Never Smoker    Smokeless tobacco: Never Used   Substance and Sexual Activity    Alcohol use: No     Alcohol/week: 0.0 standard drinks    Drug use: No    Sexual activity: Not Currently     Birth control/protection: None   Lifestyle    Physical activity     Days per week: Not on file     Minutes per session: Not on file    Stress: Not on file   Relationships    Social connections     Talks on phone: Not on file     Gets together: Not on file     Attends Zoroastrianism service: Not on file     Active member of club or organization: Not on file     Attends meetings of clubs or organizations: Not on file     Relationship status: Not on file    Intimate partner violence     Fear of current or ex partner: Not on file     Emotionally abused: Not on file     Physically abused: Not on file     Forced sexual activity: Not on file   Other Topics Concern    Not on file   Social History Narrative    Not on file     Patient Active Problem List   Diagnosis Code    Hypertension I10    Low back pain M54.5    Esophageal reflux K21.9    Multiple thyroid nodules E04.2    Bone lesion M89.9    Patent foramen ovale Q21.1    Mixed hyperlipidemia E78.2    Dizziness R42    Nausea R11.0    Numbness and tingling of left side of face R20.0, R20.2    Refused pneumococcal vaccination Z28.21    Refused influenza vaccine Z28.21    Type 2 diabetes mellitus with diabetic neuropathy ([facility] [address] E11.40    Depression, major, recurrent, mild (HCC) F33.0          Current Medications/Allergies   Medications and Allergies reviewed:    Current Outpatient Medications   Medication Sig Dispense Refill    FLUoxetine (PROzac) 40 mg capsule Take 1 Cap by mouth daily.  90 Cap 0    clonazePAM (KlonoPIN) 1 mg tablet Take 1 Tab by mouth two (2) times a day. Max Daily Amount: 2 mg. 30 Tab 0    pantoprazole (PROTONIX) 40 mg tablet TAKE 1 TABLET BY MOUTH EVERY DAY 30 Tab 2    metFORMIN (GLUCOPHAGE) 500 mg tablet TAKE 1 TABLET BY MOUTH TWICE A DAY WITH MEALS 180 Tab 1    polyethylene glycol (MIRALAX) 17 gram/dose powder Take 17 g by mouth daily. 510 g 0    hydroCHLOROthiazide (HYDRODIURIL) 25 mg tablet TAKE 1 TABLET BY MOUTH EVERY DAY 90 Tab 1    amLODIPine (NORVASC) 10 mg tablet TAKE 1 TABLET BY MOUTH EVERY DAY 90 Tab 1    atorvastatin (LIPITOR) 40 mg tablet TAKE 1 TABLET BY MOUTH EVERY DAY 90 Tab 1    DULoxetine (CYMBALTA) 30 mg capsule Take 1 Cap by mouth daily. 30 Cap 2    pregabalin (LYRICA) 25 mg capsule Take 1 Cap by mouth two (2) times a day.  Max Daily Amount: 50 mg. 60 Cap 0     No Known Allergies

## 2021-02-16 NOTE — LETTER
NOTIFICATION RETURN TO WORK 
 
2/16/2021 4:22 PM 
 
Ms. Poornima Padilla [place] [address] To Whom It May Concern: 
 
Poornima Padilla is currently under the care of [facility]. She will return to work once she is COVID negative and has not had a fever for 24 hours. If she tests positive for COVID, she would need to isolate at home for 2 weeks. If there are questions or concerns please have the patient contact our office.  
 
 
 
Sincerely, 
 
 
Chad Villasenor MD

## 2021-02-17 ENCOUNTER — PATIENT MESSAGE (OUTPATIENT)
Dept: FAMILY MEDICINE CLINIC | Age: 66
End: 2021-02-17

## 2021-02-17 ENCOUNTER — VIRTUAL VISIT (OUTPATIENT)
Dept: FAMILY MEDICINE CLINIC | Age: 66
End: 2021-02-17
Payer: COMMERCIAL

## 2021-02-17 DIAGNOSIS — J02.9 SORE THROAT: Primary | ICD-10-CM

## 2021-02-17 DIAGNOSIS — J06.0 SORE THROAT AND LARYNGITIS: Primary | ICD-10-CM

## 2021-02-17 PROCEDURE — 99213 OFFICE O/P EST LOW 20 MIN: CPT | Performed by: STUDENT IN AN ORGANIZED HEALTH CARE EDUCATION/TRAINING PROGRAM

## 2021-02-17 RX ORDER — IBUPROFEN 400 MG/1
400 TABLET ORAL
Qty: 30 TAB | Refills: 1 | Status: SHIPPED | OUTPATIENT
Start: 2021-02-17

## 2021-02-17 NOTE — PROGRESS NOTES
Ananth Garcia  60 y.o. female  1955  [address]  [id number]   [address]:    Telemedicine Progress Note  Julio Diaz MD       Encounter Date and Time: February 17, 2021 at 9:37 AM    Consent:  She and/or the health care decision maker is aware that that she may receive a bill for this telephone service, depending on her insurance coverage, and has provided verbal consent to proceed: Yes    Chief Complaint   Patient presents with    Follow-up     sore throat/fever     History of Present Illness   Ananth Garcia is a 60 y.o. female was evaluated by synchronous (real-time) audio-video technology from home, through the [email or web address] Patient Portal.    F/up on sore throat/fever. Pt seen yesterday for 2d hx of these sx; rapid strep neg, COVID test pending. Since then, pt reports persistent sx and worsening pain in her throat, she has lost most of her voice and now has b/l ear-ache. Her non-productive cough has become more frequent. No fevers since yesterday. She has tried throat lozenges w/ some relief. She took a dose of tylenol last night. Review of Systems   Review of Systems   Constitutional: Positive for malaise/fatigue. Negative for chills and fever. HENT: Positive for ear pain and sore throat. Negative for congestion. Eyes: Negative for pain. Respiratory: Positive for cough. Negative for sputum production and shortness of breath. Cardiovascular: Negative for chest pain and palpitations. Gastrointestinal: Negative for nausea and vomiting.        Vitals/Objective:     General: alert, cooperative, no distress + hoarse voice   Mental  status: mental status: alert, oriented to person, place, and time, normal mood, behavior, speech, dress, motor activity, and thought processes   Resp: resp: normal effort and no respiratory distress, +dry cough   Neuro: neuro: no gross deficits   Skin: skin: no discoloration or lesions of concern on visible areas   Due to this being a TeleHealth evaluation, many elements of the physical examination are unable to be assessed. Assessment and Plan:   Time-based coding, delete if not needed: I spent at least 15 minutes with this established patient, and >50% of the time was spent counseling and/or coordinating care regarding :    A/P:  1. Sore throat and laryngitis  Most likely viral laryngitis; COVID test pending. Will continue supportive care, may take ibuprofen to ease throat inflammation/pain  - ibuprofen (MOTRIN) 400 mg tablet; Take 1 Tab by mouth every six (6) hours as needed for Pain. Dispense: 30 Tab; Refill: 1  - seek urgent medical care if she develops any SOB/CP/uncontrolled fevers    Time spent in direct conversation with the patient to include medical condition(s) discussed, assessment and treatment plan:       We discussed the expected course, resolution and complications of the diagnosis(es) in detail. Medication risks, benefits, costs, interactions, and alternatives were discussed as indicated. I advised her to contact the office if her condition worsens, changes or fails to improve as anticipated. She expressed understanding with the diagnosis(es) and plan. Patient understands that this encounter was a temporary measure, and the importance of further follow up and examination was emphasized. Patient verbalized understanding. Patient informed to follow up: TM visit in 2d. Pt discussed w/ Dr. Rey Dunaway, Family Medicine Attending    Electronically Signed: Tahira Randhawa MD, Family Medicine Resident    Providers location when delivering service (clinic, hospital, home): home    CPT Codes 13554-10901 for Established Patients may apply to this Telehealth Visit. POS code: 18.   Modifier GT      Pursuant to the emergency declaration under the [facility], [address] [place] [address] authority and the [employer] and [employer] Appropriations Act, this Virtual  Visit was conducted, with patient's consent, to reduce the patient's risk of exposure to COVID-19 and provide continuity of care for an established patient. Services were provided through a video synchronous discussion virtually to substitute for in-person clinic visit. History   Patients past medical, surgical and family histories were reviewed and updated. Past Medical History:   Diagnosis Date    Anxiety     Diabetes mellitus ([facility] [address]     Dyslipidemia     Headache     Hodgkin's lymphoma ([facility] [address] 1985    Nodule removed from throat    Hypertension     Low back pain 2012    MRI 3/10/14 revealed mild multilevel disk and facet degenerative change     Lumbar disc disease 2014    Thyroid nodule     Followed by endocrine, Dr. Jinny Silva.       Ulcer      Past Surgical History:   Procedure Laterality Date    DELIVERY       HX GYN      [place] REMOVAL NODES, NECK,CERV CMPLT       Family History   Problem Relation Age of Onset    Elevated Lipids Mother     Hypertension Mother     Hypertension Father     Parkinsonism Father     Hypertension Sister     Cancer Brother     Stroke Maternal Grandmother     Hypertension Maternal Grandmother     Cancer Paternal Grandmother     Hypertension Paternal Grandmother     Cancer Paternal Grandfather     Hypertension Paternal Grandfather      Social History     Socioeconomic History    Marital status:      Spouse name: Not on file    Number of children: Not on file    Years of education: Not on file    Highest education level: Not on file   Occupational History    Not on file   Social Needs    Financial resource strain: Not on file    Food insecurity     Worry: Not on file     Inability: Not on file    Transportation needs     Medical: Not on file     Non-medical: Not on file   Tobacco Use    Smoking status: Never Smoker    Smokeless tobacco: Never Used   Substance and Sexual Activity    Alcohol use: No     Alcohol/week: 0.0 standard drinks    Drug use: No    Sexual activity: Not Currently     Birth control/protection: None   Lifestyle    Physical activity     Days per week: Not on file     Minutes per session: Not on file    Stress: Not on file   Relationships    Social connections     Talks on phone: Not on file     Gets together: Not on file     Attends Yazdanism service: Not on file     Active member of club or organization: Not on file     Attends meetings of clubs or organizations: Not on file     Relationship status: Not on file    Intimate partner violence     Fear of current or ex partner: Not on file     Emotionally abused: Not on file     Physically abused: Not on file     Forced sexual activity: Not on file   Other Topics Concern    Not on file   Social History Narrative    Not on file     Patient Active Problem List   Diagnosis Code    Hypertension I10    Low back pain M54.5    Esophageal reflux K21.9    Multiple thyroid nodules E04.2    Bone lesion M89.9    Patent foramen ovale Q21.1    Mixed hyperlipidemia E78.2    Dizziness R42    Nausea R11.0    Numbness and tingling of left side of face R20.0, R20.2    Refused pneumococcal vaccination Z28.21    Refused influenza vaccine Z28.21    Type 2 diabetes mellitus with diabetic neuropathy ([facility] [address] E11.40    Depression, major, recurrent, mild (HCC) F33.0          Current Medications/Allergies   Medications and Allergies reviewed:    Current Outpatient Medications   Medication Sig Dispense Refill    ibuprofen (MOTRIN) 400 mg tablet Take 1 Tab by mouth every six (6) hours as needed for Pain. 30 Tab 1    FLUoxetine (PROzac) 40 mg capsule Take 1 Cap by mouth daily. 90 Cap 0    clonazePAM (KlonoPIN) 1 mg tablet Take 1 Tab by mouth two (2) times a day.  Max Daily Amount: 2 mg. 30 Tab 0    pantoprazole (PROTONIX) 40 mg tablet TAKE 1 TABLET BY MOUTH EVERY DAY 30 Tab 2    metFORMIN (GLUCOPHAGE) 500 mg tablet TAKE 1 TABLET BY MOUTH TWICE A DAY WITH MEALS 180 Tab 1    polyethylene glycol (MIRALAX) 17 gram/dose powder Take 17 g by mouth daily. 510 g 0    hydroCHLOROthiazide (HYDRODIURIL) 25 mg tablet TAKE 1 TABLET BY MOUTH EVERY DAY 90 Tab 1    amLODIPine (NORVASC) 10 mg tablet TAKE 1 TABLET BY MOUTH EVERY DAY 90 Tab 1    atorvastatin (LIPITOR) 40 mg tablet TAKE 1 TABLET BY MOUTH EVERY DAY 90 Tab 1    DULoxetine (CYMBALTA) 30 mg capsule Take 1 Cap by mouth daily. 30 Cap 2    pregabalin (LYRICA) 25 mg capsule Take 1 Cap by mouth two (2) times a day.  Max Daily Amount: 50 mg. 60 Cap 0     No Known Allergies

## 2021-02-17 NOTE — PROGRESS NOTES
[address] Medicine Residency Attending Addendum:  Dr. Praful Huizar MD,  the patient and I were not physically present during this encounter. The resident and I are concurrently monitoring the patient care through appropriate telecommunication technology. I discussed the findings, assessment and plan with the resident and agree with the resident's findings and plan as documented in the resident's note.       Mike Damon MD

## 2021-02-18 ENCOUNTER — TELEPHONE (OUTPATIENT)
Dept: FAMILY MEDICINE CLINIC | Age: 66
End: 2021-02-18

## 2021-02-18 LAB — SARS-COV-2, COV2NT: DETECTED

## 2021-02-18 RX ORDER — AMOXICILLIN 500 MG/1
500 CAPSULE ORAL 2 TIMES DAILY
Qty: 14 CAP | Refills: 0 | Status: SHIPPED | OUTPATIENT
Start: 2021-02-18 | End: 2021-02-25

## 2021-02-18 NOTE — TELEPHONE ENCOUNTER
Called patient to inform her of positive COVID test after receiving critical lab result from [facility] lab through the on call service. Informed patient to quarantine 10 days after that start of symptoms as long as she is fever (without use of fever reducing meds) and symptom free for at least 24 hours. Answered all questions.  Sharona Garcia MD

## 2021-02-18 NOTE — TELEPHONE ENCOUNTER
From: Jessy Valdez MD  To: Ashley Lazaro  Sent: 2/17/2021 12:31 PM EST  Subject: Checking In    Hi Ms. Margherita Leyden,    Just checking in since our visit. How are you? Are the medications helping? I saw your visit from yesterday. Take it easy and I hope you feel better!     ELLIET

## 2021-02-19 ENCOUNTER — VIRTUAL VISIT (OUTPATIENT)
Dept: FAMILY MEDICINE CLINIC | Age: 66
End: 2021-02-19
Payer: COMMERCIAL

## 2021-02-19 DIAGNOSIS — U07.1 COVID-19: Primary | ICD-10-CM

## 2021-02-19 PROCEDURE — 99213 OFFICE O/P EST LOW 20 MIN: CPT | Performed by: STUDENT IN AN ORGANIZED HEALTH CARE EDUCATION/TRAINING PROGRAM

## 2021-02-19 NOTE — PROGRESS NOTES
Dionne Izquierdo  60 y.o. female  1955  [address]  [id number]   [address]:    Telemedicine Progress Note  Destiny Morales MD       Encounter Date and Time: February 19, 2021 at 8:47 AM    Consent:  She and/or the health care decision maker is aware that that she may receive a bill for this telephone service, depending on her insurance coverage, and has provided verbal consent to proceed: Yes    Chief Complaint   Patient presents with    Concern For COVID-19 (Coronavirus)     COVID POSITIVE     History of Present Illness   Dionne Izquierdo is a 60 y.o. female was evaluated by synchronous (real-time) audio-video technology from home, through the [employer] Patient Portal.    Pt f/up on laryngitis; found to be COVID POS. Pt reports feeling somewhat better today, throat pain/cough/voice have slightly improved. No fevers >24hrs. Cough is still intermittent. No CP/SOB. She is aware of her COVID result. She will be out of work to isolate at home for [employer]; plans for a repeat test prior to going back to work at the [employer]. Only in contact w/ her  at home and so he will be getting tested today. Review of Systems   Review of Systems   Constitutional: Negative for chills and fever. HENT: Positive for sore throat. Respiratory: Positive for cough. Negative for shortness of breath. Cardiovascular: Negative for chest pain. Vitals/Objective:     General: alert, cooperative, no distress   Mental  status: mental status: alert, oriented to person, place, and time, normal mood, behavior, speech, dress, motor activity, and thought processes   Resp: resp: normal effort and no respiratory distress   Neuro: neuro: no gross deficits   Skin: skin: no discoloration or lesions of concern on visible areas   Due to this being a TeleHealth evaluation, many elements of the physical examination are unable to be assessed.       Assessment and Plan: Time-based coding, delete if not needed: I spent at least 15 minutes with this established patient, and >50% of the time was spent counseling and/or coordinating care regarding :    A/P:  1. COVID-19  Discussed positive test result. Currently appears clinically stable. - Pt to home quarantine for 2wks;  in contact w/ her at home and will get tested today. If to be in contact w/ other family members, must wear mask and social distance, although advised to avoid interaction w/ others during quarantine  - Plans to get tested again in 2wks to be able to return to work  - Continue supportive care  - Seek urgent medical care if having SOB/persistent fevers  - Discussed mental/emotional health challenges of COVID dx and quarantine. Offered our support at all times if things become overwhelming      Time spent in direct conversation with the patient to include medical condition(s) discussed, assessment and treatment plan:       We discussed the expected course, resolution and complications of the diagnosis(es) in detail. Medication risks, benefits, costs, interactions, and alternatives were discussed as indicated. I advised her to contact the office if her condition worsens, changes or fails to improve as anticipated. She expressed understanding with the diagnosis(es) and plan. Patient understands that this encounter was a temporary measure, and the importance of further follow up and examination was emphasized. Patient verbalized understanding. Patient informed to follow up: PRN if sx worsen/fail to improve and repeat test in 2wks. Pt discussed w/ Dr. Magdalena Ward, Family Medicine Attending    Electronically Signed: Jaydon Butt MD, Family Medicine Resident    Providers location when delivering service (clinic, hospital, home): home    CPT Codes 36552-54412 for Established Patients may apply to this Telehealth Visit. POS code: 18.   Modifier GT      Pursuant to the emergency declaration under the [facility] [address] waiver authority and the [employer] Act, this Virtual  Visit was conducted, with patient's consent, to reduce the patient's risk of exposure to COVID-19 and provide continuity of care for an established patient. Services were provided through a video synchronous discussion virtually to substitute for in-person clinic visit. History   Patients past medical, surgical and family histories were reviewed and updated. Past Medical History:   Diagnosis Date    Anxiety     Diabetes mellitus ([facility] [address]     Dyslipidemia     Headache     Hodgkin's lymphoma ([facility] [address] 1985    Nodule removed from throat    Hypertension     Low back pain 2012    MRI 3/10/14 revealed mild multilevel disk and facet degenerative change     Lumbar disc disease 2014    Thyroid nodule     Followed by endocrine, Dr. Miguel Mendes.       Ulcer      Past Surgical History:   Procedure Laterality Date    DELIVERY       HX GYN      [place] REMOVAL NODES, NECK,CERV CMPLT       Family History   Problem Relation Age of Onset    Elevated Lipids Mother     Hypertension Mother     Hypertension Father     Parkinsonism Father     Hypertension Sister     Cancer Brother     Stroke Maternal Grandmother     Hypertension Maternal Grandmother     Cancer Paternal Grandmother     Hypertension Paternal Grandmother     Cancer Paternal Grandfather     Hypertension Paternal Grandfather      Social History     Socioeconomic History    Marital status:      Spouse name: Not on file    Number of children: Not on file    Years of education: Not on file    Highest education level: Not on file   Occupational History    Not on file   Social Needs    Financial resource strain: Not on file    Food insecurity     Worry: Not on file     Inability: Not on file    Transportation needs     Medical: Not on file     Non-medical: Not on file Tobacco Use    Smoking status: Never Smoker    Smokeless tobacco: Never Used   Substance and Sexual Activity    Alcohol use: No     Alcohol/week: 0.0 standard drinks    Drug use: No    Sexual activity: Not Currently     Birth control/protection: None   Lifestyle    Physical activity     Days per week: Not on file     Minutes per session: Not on file    Stress: Not on file   Relationships    Social connections     Talks on phone: Not on file     Gets together: Not on file     Attends Jew service: Not on file     Active member of club or organization: Not on file     Attends meetings of clubs or organizations: Not on file     Relationship status: Not on file    Intimate partner violence     Fear of current or ex partner: Not on file     Emotionally abused: Not on file     Physically abused: Not on file     Forced sexual activity: Not on file   Other Topics Concern    Not on file   Social History Narrative    Not on file     Patient Active Problem List   Diagnosis Code    Hypertension I10    Low back pain M54.5    Esophageal reflux K21.9    Multiple thyroid nodules E04.2    Bone lesion M89.9    Patent foramen ovale Q21.1    Mixed hyperlipidemia E78.2    Dizziness R42    Nausea R11.0    Numbness and tingling of left side of face R20.0, R20.2    Refused pneumococcal vaccination Z28.21    Refused influenza vaccine Z28.21    Type 2 diabetes mellitus with diabetic neuropathy ([facility] [address] E11.40    Depression, major, recurrent, mild (HCC) F33.0    COVID-19 U07.1          Current Medications/Allergies   Medications and Allergies reviewed:    Current Outpatient Medications   Medication Sig Dispense Refill    amoxicillin (AMOXIL) 500 mg capsule Take 1 Cap by mouth two (2) times a day for 7 days. 14 Cap 0    ibuprofen (MOTRIN) 400 mg tablet Take 1 Tab by mouth every six (6) hours as needed for Pain. 30 Tab 1    FLUoxetine (PROzac) 40 mg capsule Take 1 Cap by mouth daily.  90 Cap 0    clonazePAM (KlonoPIN) 1 mg tablet Take 1 Tab by mouth two (2) times a day. Max Daily Amount: 2 mg. 30 Tab 0    pantoprazole (PROTONIX) 40 mg tablet TAKE 1 TABLET BY MOUTH EVERY DAY 30 Tab 2    metFORMIN (GLUCOPHAGE) 500 mg tablet TAKE 1 TABLET BY MOUTH TWICE A DAY WITH MEALS 180 Tab 1    polyethylene glycol (MIRALAX) 17 gram/dose powder Take 17 g by mouth daily. 510 g 0    hydroCHLOROthiazide (HYDRODIURIL) 25 mg tablet TAKE 1 TABLET BY MOUTH EVERY DAY 90 Tab 1    amLODIPine (NORVASC) 10 mg tablet TAKE 1 TABLET BY MOUTH EVERY DAY 90 Tab 1    atorvastatin (LIPITOR) 40 mg tablet TAKE 1 TABLET BY MOUTH EVERY DAY 90 Tab 1    DULoxetine (CYMBALTA) 30 mg capsule Take 1 Cap by mouth daily. 30 Cap 2    pregabalin (LYRICA) 25 mg capsule Take 1 Cap by mouth two (2) times a day.  Max Daily Amount: 50 mg. 60 Cap 0     No Known Allergies

## 2021-02-19 NOTE — PROGRESS NOTES
COVID+.  ADvised to quarantine at home and seek urgent medical care if developing difficulty breathing or uncontrolled fevers

## 2021-03-05 DIAGNOSIS — U07.1 COVID-19: ICD-10-CM

## 2021-03-06 ENCOUNTER — TELEPHONE (OUTPATIENT)
Dept: FAMILY MEDICINE CLINIC | Age: 66
End: 2021-03-06

## 2021-03-06 NOTE — TELEPHONE ENCOUNTER
Received call from call center stating COVID positive patient having trouble breathing. Called patient back immediately and confirmed identity with name and . Patient stated she was diagnosed with COVID on  after having flu like symptoms. Since then her symptoms were resolving until suddenly last night she began having SOB and chest pressure that has been worsening since. Asked patient to present to the closest ED immediately.     Lucila Atkins MD  21

## 2021-03-10 ENCOUNTER — TELEPHONE (OUTPATIENT)
Dept: FAMILY MEDICINE CLINIC | Age: 66
End: 2021-03-10

## 2021-03-10 NOTE — TELEPHONE ENCOUNTER
----- Message from Ingrid Cristobal sent at 3/5/2021  1:32 PM EST -----  Regarding: Dr. Lorrie Urbina  General Message/Vendor Calls    Caller's first and last name: pt      Reason for call: Covid testing appointment      Callback required yes/no and why: yes to reschedule      Best contact number(s): [phone]      Details to clarify the request: pt called and stated she can't make the 2:00 pm covid-19 test appointment today and needs to reschedule.       Ingrid Cristobal

## 2021-03-25 ENCOUNTER — VIRTUAL VISIT (OUTPATIENT)
Dept: FAMILY MEDICINE CLINIC | Age: 66
End: 2021-03-25
Payer: COMMERCIAL

## 2021-03-25 DIAGNOSIS — F41.9 ANXIETY AND DEPRESSION: Primary | ICD-10-CM

## 2021-03-25 DIAGNOSIS — U07.1 COVID-19: ICD-10-CM

## 2021-03-25 DIAGNOSIS — F32.A ANXIETY AND DEPRESSION: Primary | ICD-10-CM

## 2021-03-25 DIAGNOSIS — Z12.31 ENCOUNTER FOR SCREENING MAMMOGRAM FOR MALIGNANT NEOPLASM OF BREAST: ICD-10-CM

## 2021-03-25 PROCEDURE — 99213 OFFICE O/P EST LOW 20 MIN: CPT | Performed by: FAMILY MEDICINE

## 2021-03-25 NOTE — PROGRESS NOTES
TELEMEDICINE VISIT    Consent: She and/or the health care decision maker is aware that that she may receive a bill for this telephone service, depending on her insurance coverage, and has provided verbal consent to proceed: Yes  History of Present Illness:     Chief Complaint   Patient presents with   Mary Dillon is a 60 y.o. female was evaluated by synchronous (real-time) audio-video technology from home, through the [facility]. Me. COVID symptoms resolved. Had her first COVID vaccine. Anxiety. Doing well on the higher dose of Prozac. Has not needed Klonopin. Sleeping well, using Melatonin to help. Past Medical History:   Diagnosis Date    Anxiety     Diabetes mellitus ([facility] [address]     Dyslipidemia     Headache     Hodgkin's lymphoma ([facility] [address] 1985    Nodule removed from throat    Hypertension     Low back pain 1/19/2012    MRI 3/10/14 revealed mild multilevel disk and facet degenerative change     Lumbar disc disease 03/2014    Thyroid nodule     Followed by endocrine, Dr. Donya Villatoro.  Ulcer        Current Medications/Allergies (REVIEWED)     Current Outpatient Medications on File Prior to Visit   Medication Sig Dispense Refill    atorvastatin (LIPITOR) 40 mg tablet TAKE 1 TABLET BY MOUTH EVERY DAY 90 Tab 1    amLODIPine (NORVASC) 10 mg tablet TAKE 1 TABLET BY MOUTH EVERY DAY 90 Tab 1    ibuprofen (MOTRIN) 400 mg tablet Take 1 Tab by mouth every six (6) hours as needed for Pain. 30 Tab 1    FLUoxetine (PROzac) 40 mg capsule Take 1 Cap by mouth daily. 90 Cap 0    clonazePAM (KlonoPIN) 1 mg tablet Take 1 Tab by mouth two (2) times a day. Max Daily Amount: 2 mg. 30 Tab 0    pantoprazole (PROTONIX) 40 mg tablet TAKE 1 TABLET BY MOUTH EVERY DAY 30 Tab 2    metFORMIN (GLUCOPHAGE) 500 mg tablet TAKE 1 TABLET BY MOUTH TWICE A DAY WITH MEALS 180 Tab 1    polyethylene glycol (MIRALAX) 17 gram/dose powder Take 17 g by mouth daily.  510 g 0    hydroCHLOROthiazide (HYDRODIURIL) 25 mg tablet TAKE 1 TABLET BY MOUTH EVERY DAY 90 Tab 1    DULoxetine (CYMBALTA) 30 mg capsule Take 1 Cap by mouth daily. 30 Cap 2    pregabalin (LYRICA) 25 mg capsule Take 1 Cap by mouth two (2) times a day. Max Daily Amount: 50 mg. 60 Cap 0     No current facility-administered medications on file prior to visit. No Known Allergies      Review of Systems     Denies depressed mood, anxiety, insomnia. Objective:     General: alert, cooperative, no distress   Mental  status: mental status: alert, oriented to person, place, and time, normal mood, behavior, speech, dress, motor activity, and thought processes   Resp: resp: normal effort and no respiratory distress   Neuro: neuro: no gross deficits   Skin: skin: no discoloration or lesions of concern on visible areas   Due to this being a TeleHealth evaluation, many elements of the physical examination are unable to be assessed. Assessment and Plan:       ICD-10-CM ICD-9-CM    1. Anxiety and depression  F41.9 300.00     F32.9 311    2. COVID-19  U07.1 079.89      Doing well on current dose of Prozac 40mg daily. Has Klonopin PRN panic attacks. All COVID symptoms resolved. She will send picture of vaccine card via [employer]. Mammo ordered    Follow up in 2-3 months for Wellness visit with labs. Electronically Signed: Lazarus Aldo, MD  Providers location when delivering service (clinic, hospital, home): Clinic      We discussed the expected course, resolution and complications of the diagnosis(es) in detail. Medication risks, benefits, costs, interactions, and alternatives were discussed as indicated. I advised her to contact the office if her condition worsens, changes or fails to improve as anticipated. She expressed understanding with the diagnosis(es) and plan. Patient understands that this encounter was a temporary measure, and the importance of further follow up and examination was emphasized. Patient verbalized understanding.     Pursuant to the emergency declaration under the [facility] waiver authority and the [employer] Act, this Virtual  Visit was conducted, with patient's consent, to reduce the patient's risk of exposure to COVID-19 and provide continuity of care for an established patient. Services were provided through a video synchronous discussion virtually to substitute for in-person clinic visit.

## 2021-03-25 NOTE — LETTER
Bunionectomy: Before Your Surgery  What is a bunionectomy? A bunionectomy is surgery to remove a lump of bone from your foot. This lump is called a bunion. It forms on the joint where your big toe joins your foot. The surgery will also straighten your big toe. Your doctor will make one or more small cuts near your toe joint. These cuts are called incisions. The doctor will remove small pieces of bone and may straighten your toe. This is done by cutting the bone and setting it in a new position. Your toe may be held in place with pins, screws, wires, or staples. These may stay in your toe. Or they may be removed after a few weeks. The surgery will leave scars that fade with time. The surgery may make walking easier. It may reduce stiffness, pain, or swelling in your toe joint. It may also improve the way your toe looks. Your doctor will give you medicine to help you relax and to numb your foot. Or you may get medicine to put you to sleep. You will probably go home on the day of your surgery. If your surgery is more complex, you may need to spend the night in the hospital.  How soon you can put weight on your toe depends on how complex your surgery is. It may take 6 weeks or longer before swelling goes down and you have healed enough to return to your normal routine. You may have some swelling and pain for as long as 6 months to a year. Follow-up care is a key part of your treatment and safety. Be sure to make and go to all appointments, and call your doctor if you are having problems. It's also a good idea to know your test results and keep a list of the medicines you take. What happens before surgery?   Surgery can be stressful. This information will help you understand what you can expect. And it will help you safely prepare for surgery.   Preparing for surgery    · Understand exactly what surgery is planned, along with the risks, benefits, and other options.     · Tell your doctors ALL the medicines, Name: Dionne Izquierdo   Sex: female   : 1955  
[address] [facility] [address] 
[phone] (home) [phone] (work) Current Immunizations: 
Immunization History Administered Date(s) Administered  Influenza Vaccine XMOS) PF (>6 Mo Flulaval, Fluarix, and >3 Yrs Kemper, Fluzone 18345) 2020  TB Skin Test (PPD) Intradermal 01/15/2019  Tdap 2015, 2020 Allergies: Allergies as of 2021  (No Known Allergies) vitamins, supplements, and herbal remedies you take. Some of these can increase the risk of bleeding or interact with anesthesia.     · If you take blood thinners, such as warfarin (Coumadin), clopidogrel (Plavix), or aspirin, be sure to talk to your doctor. He or she will tell you if you should stop taking these medicines before your surgery. Make sure that you understand exactly what your doctor wants you to do.     · Your doctor will tell you which medicines to take or stop before your surgery. You may need to stop taking certain medicines a week or more before surgery. So talk to your doctor as soon as you can.     · If you have an advance directive, let your doctor know. It may include a living will and a durable power of  for health care. Bring a copy to the hospital. If you don't have one, you may want to prepare one. It lets your doctor and loved ones know your health care wishes. Doctors advise that everyone prepare these papers before any type of surgery or procedure. What happens on the day of surgery? · Follow the instructions exactly about when to stop eating and drinking. If you don't, your surgery may be canceled. If your doctor told you to take your medicines on the day of surgery, take them with only a sip of water.     · Take a bath or shower before you come in for your surgery. Do not apply lotions, perfumes, deodorants, or nail polish.     · Do not shave the surgical site yourself.     · Wear clothing that is easy to put on and take off. You may have a large bandage on your foot.     · Take off all jewelry and piercings. And take out contact lenses, if you wear them.    At the hospital or surgery center   · Bring a picture ID.     · The area for surgery is often marked to make sure there are no errors.     · You will be kept comfortable and safe by your anesthesia provider. The anesthesia may make you sleep.  Or it may just numb the area being worked on.     · The surgery will usually take about 1 to 1½ hours. If you are having both feet done at once, it may take 2 to 3 hours. Going home   · Be sure you have someone to drive you home. Anesthesia and pain medicine make it unsafe for you to drive.     · You will be given more specific instructions about recovering from your surgery. They will cover things like diet, wound care, follow-up care, driving, and getting back to your normal routine. When should you call your doctor? · You have questions or concerns.     · You don't understand how to prepare for your surgery.     · You become ill before the surgery (such as fever, flu, or a cold).     · You need to reschedule or have changed your mind about having the surgery. Where can you learn more? Go to http://emiliano-madeleine.info/. Enter S631 in the search box to learn more about \"Bunionectomy: Before Your Surgery. \"  Current as of: September 20, 2018  Content Version: 11.9  © 6032-4528 [employer]. Care instructions adapted under license by [employer] (which disclaims liability or warranty for this information). If you have questions about a medical condition or this instruction, always ask your healthcare professional. [address] any warranty or liability for your use of this information. Purine-Restricted Diet: Care Instructions  Your Care Instructions    Purines are substances that are found in some foods. Your body turns purines into uric acid. High levels of uric acid can cause gout, which is a form of arthritis that causes pain and inflammation in joints. You may be able to help control the amount of uric acid in your body by limiting high-purine foods in your diet. Follow-up care is a key part of your treatment and safety. Be sure to make and go to all appointments, and call your doctor if you are having problems. It's also a good idea to know your test results and keep a list of the medicines you take.   How can you care for yourself at home? · Plan your meals and snacks around foods that are low in purines and are safe for you to eat. These foods include:  ? Green vegetables and tomatoes. ? Fruits. ? Whole-grain breads, rice, and cereals. ? Eggs, peanut butter, and nuts. ? Low-fat milk, cheese, and other milk products. ? Popcorn. ? Gelatin desserts, chocolate, cocoa, and cakes and sweets, in small amounts. · You can eat certain foods that are medium-high in purines, but eat them only once in a while. These foods include:  ? Legumes, such as dried beans and dried peas. You can have 1 cup cooked legumes each day. ? Asparagus, cauliflower, spinach, mushrooms, and green peas. ? Fish and seafood (other than very high-purine seafood). ? Oatmeal, wheat bran, and wheat germ. · Limit very high-purine foods, including:  ? Organ meats, such as liver, kidneys, sweetbreads, and brains. ? Meats, including arango, beef, pork, and lamb. ? Game meats and any other meats in large amounts. ? Anchovies, sardines, herring, mackerel, and scallops. ? Gravy. ? Beer. Where can you learn more? Go to http://emiliano-madeleine.info/. Enter F448 in the search box to learn more about \"Purine-Restricted Diet: Care Instructions. \"  Current as of: March 28, 2018  Content Version: 11.9  © 9359-7981 [employer]. Care instructions adapted under license by [employer] (which disclaims liability or warranty for this information). If you have questions about a medical condition or this instruction, always ask your healthcare professional. [address] any warranty or liability for your use of this information.

## 2021-03-25 NOTE — PATIENT INSTRUCTIONS
Immunization History Administered Date(s) Administered  Influenza Vaccine [employer]) PF (>6 Mo Flulaval, Fluarix, and >3 Yrs Kamaljit, Fluzone 53038) 02/17/2020  TB Skin Test (PPD) Intradermal 01/15/2019  Tdap 02/06/2015, 02/17/2020

## 2021-03-30 RX ORDER — PANTOPRAZOLE SODIUM 40 MG/1
TABLET, DELAYED RELEASE ORAL
Qty: 30 TAB | Refills: 2 | Status: SHIPPED | OUTPATIENT
Start: 2021-03-30 | End: 2021-06-15

## 2021-05-06 DIAGNOSIS — F41.9 ANXIETY AND DEPRESSION: ICD-10-CM

## 2021-05-06 DIAGNOSIS — F33.0 DEPRESSION, MAJOR, RECURRENT, MILD (HCC): ICD-10-CM

## 2021-05-06 DIAGNOSIS — I10 ESSENTIAL HYPERTENSION: ICD-10-CM

## 2021-05-06 DIAGNOSIS — F32.A ANXIETY AND DEPRESSION: ICD-10-CM

## 2021-05-06 RX ORDER — HYDROCHLOROTHIAZIDE 25 MG/1
TABLET ORAL
Qty: 90 TAB | Refills: 1 | Status: SHIPPED | OUTPATIENT
Start: 2021-05-06 | End: 2022-04-06 | Stop reason: SDUPTHER

## 2021-05-06 RX ORDER — FLUOXETINE HYDROCHLORIDE 40 MG/1
CAPSULE ORAL
Qty: 90 CAP | Refills: 0 | Status: SHIPPED | OUTPATIENT
Start: 2021-05-06 | End: 2021-11-01 | Stop reason: SDUPTHER

## 2021-05-26 ENCOUNTER — OFFICE VISIT (OUTPATIENT)
Dept: FAMILY MEDICINE CLINIC | Age: 66
End: 2021-05-26
Payer: COMMERCIAL

## 2021-05-26 VITALS
DIASTOLIC BLOOD PRESSURE: 70 MMHG | RESPIRATION RATE: 16 BRPM | HEIGHT: 63 IN | HEART RATE: 65 BPM | OXYGEN SATURATION: 99 % | SYSTOLIC BLOOD PRESSURE: 107 MMHG | WEIGHT: 158 LBS | BODY MASS INDEX: 28 KG/M2 | TEMPERATURE: 97.1 F

## 2021-05-26 DIAGNOSIS — H91.93 DECREASED HEARING OF BOTH EARS: ICD-10-CM

## 2021-05-26 DIAGNOSIS — E11.40 TYPE 2 DIABETES MELLITUS WITH DIABETIC NEUROPATHY, WITHOUT LONG-TERM CURRENT USE OF INSULIN (HCC): ICD-10-CM

## 2021-05-26 DIAGNOSIS — Z12.11 COLON CANCER SCREENING: ICD-10-CM

## 2021-05-26 DIAGNOSIS — Z00.00 WELL WOMAN EXAM WITHOUT GYNECOLOGICAL EXAM: Primary | ICD-10-CM

## 2021-05-26 PROCEDURE — 99213 OFFICE O/P EST LOW 20 MIN: CPT | Performed by: FAMILY MEDICINE

## 2021-05-26 PROCEDURE — 99396 PREV VISIT EST AGE 40-64: CPT | Performed by: FAMILY MEDICINE

## 2021-05-26 NOTE — PATIENT INSTRUCTIONS
Noninsulin Medicines for Type 2 Diabetes: Care Instructions Overview There are different types of noninsulin medicines for diabetes. Each works in a different way. But they all help you control your blood sugar. Some types help your body make insulin to lower your blood sugar. Others lower how much insulin your body needs. Some can slow how fast your body digests sugars. And some can remove extra glucose through your urine. You may need to take more than one medicine for diabetes. Two or more medicines may work better to lower your blood sugar level than just one does. · Metformin. This lowers how much glucose your liver makes. And it helps you respond better to insulin. It also lowers the amount of stored sugar that your liver releases when you are not eating. · Sulfonylureas. These help your body release more insulin. Some work for many hours. They can cause low blood sugar if you don't eat as you planned. An example is glipizide. · Thiazolidinediones. These reduce the amount of blood glucose. They also help you respond better to insulin. An example is pioglitazone. · SGLT2 inhibitors. These help to remove extra glucose through your urine. They may also help some people lose weight. An example is ertugliflozin. · DPP-4 inhibitors. These help your body raise the level of insulin after you eat. They also help your body make less of a hormone that raises blood sugar. An example is alogliptin. · Incretin hormones (GLP-1 receptor agonists). These help your body make a protein that can raise your insulin level and make you less hungry. They're given as shots or pills. An example is semaglutide. · Meglitinides. These help your body release insulin. They also help slow how your body digests sugars. So they can keep your blood sugar from rising too fast after you eat. · Alpha-glucosidase inhibitors. These keep starches from breaking down.  This means that they lower the amount of glucose absorbed when you eat. They don't help your body make more insulin. So they will not cause low blood sugar unless you use them with other medicines for diabetes. Follow-up care is a key part of your treatment and safety. Be sure to make and go to all appointments, and call your doctor if you are having problems. It's also a good idea to know your test results and keep a list of the medicines you take. How can you care for yourself at home? · Eat a healthy diet. Get some exercise each day. This may help you to reduce how much medicine you need. · Do not take other prescription or over-the-counter medicines, vitamins, herbal products, or supplements without talking to your doctor first. Some medicines for type 2 diabetes can cause problems with other medicines or supplements. · Tell your doctor if you plan to get pregnant. Some of these drugs are not safe for pregnant women. · Be safe with medicines. Take your medicines exactly as prescribed. Meglitinides and sulfonylureas can cause your blood sugar to drop very low. Call your doctor if you think you are having a problem with your medicine. · Check your blood sugar often. You can use a glucose monitor. Keeping track can help you know how certain foods, activities, and medicines affect your blood sugar. And it can help you keep your blood sugar from getting so low that it's not safe. When should you call for help? Call 911 anytime you think you may need emergency care. For example, call if: 
  · You passed out (lost consciousness).  
  · You are confused or cannot think clearly.  
  · Your blood sugar is very high or very low. Watch closely for changes in your health, and be sure to contact your doctor if: 
  · Your blood sugar stays outside the level your doctor set for you.  
  · You have any problems. Where can you learn more? Go to http://www.gray.com/ Enter H153 in the search box to learn more about \"Noninsulin Medicines for Type 2 Diabetes: Care Instructions. \" Current as of: August 31, 2020               Content Version: 12.8 © 7874-9460 [employer]. Care instructions adapted under license by [employer] (which disclaims liability or warranty for this information). If you have questions about a medical condition or this instruction, always ask your healthcare professional. [place] [address] any warranty or liability for your use of this information.

## 2021-05-26 NOTE — PROGRESS NOTES
General Health Questions   -During the past 4 weeks:   -how would you rate your health in general? Good   -how often have you been bothered by feeling dizzy when standing up? occassionally   -how much have you been bothered by bodily pain? moderately   -Have you noticed any hearing difficulties? yes   -has your physical and emotional health limited your social activities with family or friends? no    Emotional Health Questions   -Do you have a history of depression, anxiety, or emotional problems? yes  -Over the past 2 weeks, have you felt down, depressed or hopeless? yes  -Over the past 2 weeks, have you felt little interest or pleasure in doing things? yes    Health Habits   Please describe your diet habits: Justa Hwang eats when she can\"  Do you get 5 servings of fruits or vegetables daily? no  Do you exercise regularly? no    Activities of Daily Living and Functional Status   -Do you need help with eating, walking, dressing, bathing, toileting, the phone, transportation, shopping, preparing meals, housework, laundry, medications or managing money? no  -In the past four weeks, was someone available to help you if you needed and wanted help with anything? yes  -Are you confident are you that you can control and manage most of your health problems? yes  -Have you been given information to help you keep track of your medications? yes  -How often do you have trouble taking your medications as prescribed? never    Fall Risk and Home Safety   Have you fallen 2 or more times in the past year? no  Does your home have rugs in the hallways? no, Do you have grab bars in the bathrooms?  no, Does your home have handrails on the stairs? no and n/a, Do you have adequate lighting in your home? yes  Do you have smoke detectors and check them regularly? yes  Do you have difficulties driving a car/vehicle? no  Do you always fasten your seat belt when you are in a car?  Yes    Chief Complaint   Patient presents with   [employer] Visit     no concerns today, has gotten both covid vaccines     1. Have you been to the ER, urgent care clinic since your last visit? Hospitalized since your last visit? Yes went to the ED in March 2021 for pneumonia    2. Have you seen or consulted any other health care providers outside of the [address] [place] [address] since your last visit? Include any pap smears or colon screening.  No

## 2021-05-26 NOTE — PROGRESS NOTES
HPI:  Tracy Leija is a 60 y.o. female presenting for well woman exam.     Acute complaints: None    Exercise: None. Works a lot and very active with that and Buddhism. Not sedentary. Diet: Usually eats 2 meals per day. GYN:  Post menopausal. No spotting or bleeding. Sexual: No hx of STDs. Currently sexually active with 1 partners (). Psych: Increased stress - work, missing her mom. Endorses feelings of depressed mood and she has known MDD we are treating. Feels safe at home. Health Maintenance - reviewed:  Pap (age 21-65): 2019. NILM with negative HPV. Mammogram (age 54-69): Ordered 3/2021 but not yet scheduled. Colonoscopy (age 54-65): Due. Needs new GI referral.     Low Dose CT Lung (age 46-80 with 30ppy hx and current smoker or quit < 15 yrs): Not indicated    DEXA (>/= 73 yo or sooner with RF): Not indicated. Will address at next Well Woman visit    Allergies- reviewed:   No Known Allergies      Medications- reviewed:   Current Outpatient Medications   Medication Sig    semaglutide (RYBELSUS) 3 mg tablet Take 1 Tablet by mouth Daily (before breakfast).  FLUoxetine (PROzac) 40 mg capsule TAKE 1 CAPSULE BY MOUTH EVERY DAY    hydroCHLOROthiazide (HYDRODIURIL) 25 mg tablet TAKE 1 TABLET BY MOUTH EVERY DAY    pantoprazole (PROTONIX) 40 mg tablet TAKE 1 TABLET BY MOUTH EVERY DAY    atorvastatin (LIPITOR) 40 mg tablet TAKE 1 TABLET BY MOUTH EVERY DAY    amLODIPine (NORVASC) 10 mg tablet TAKE 1 TABLET BY MOUTH EVERY DAY    ibuprofen (MOTRIN) 400 mg tablet Take 1 Tab by mouth every six (6) hours as needed for Pain.  clonazePAM (KlonoPIN) 1 mg tablet Take 1 Tab by mouth two (2) times a day. Max Daily Amount: 2 mg.  metFORMIN (GLUCOPHAGE) 500 mg tablet TAKE 1 TABLET BY MOUTH TWICE A DAY WITH MEALS    polyethylene glycol (MIRALAX) 17 gram/dose powder Take 17 g by mouth daily.  DULoxetine (CYMBALTA) 30 mg capsule Take 1 Cap by mouth daily.  (Patient not taking: Reported on 2021)    pregabalin (LYRICA) 25 mg capsule Take 1 Cap by mouth two (2) times a day. Max Daily Amount: 50 mg. (Patient not taking: Reported on 2021)     No current facility-administered medications for this visit. Past Medical History- reviewed:  Past Medical History:   Diagnosis Date    Anxiety     [facility] mellitus [facility] [address]     Dyslipidemia     Headache     Hodgkin's lymphoma ([facility] [address] 1985    Nodule removed from throat    Hypertension     Low back pain 2012    MRI 3/10/14 revealed mild multilevel disk and facet degenerative change     Lumbar disc disease 2014    Thyroid nodule     Followed by endocrine, Dr. Barron Jauregui.  Ulcer          Past Surgical History- reviewed:   Past Surgical History:   Procedure Laterality Date    DELIVERY       HX GYN      AK REMOVAL NODES, NECK,CERV CMPLT           Social History- reviewed:  Social History     Socioeconomic History    Marital status:      Spouse name: Not on file    Number of children: Not on file    Years of education: Not on file    Highest education level: Not on file   Occupational History    Not on file   Tobacco Use    Smoking status: Never Smoker    Smokeless tobacco: Never Used   Substance and Sexual Activity    Alcohol use: No     Alcohol/week: 0.0 standard drinks    Drug use: No    Sexual activity: Not Currently     Birth control/protection: None   Other Topics Concern    Not on file   Social History Narrative    Not on file     Social Determinants of Health     Financial Resource Strain:     Difficulty of Paying Living Expenses:    Food Insecurity:     Worried About Running Out of Food in the Last Year:     Ran Out of Food in the Last Year:    Transportation Needs:     Lack of Transportation (Medical):      Lack of Transportation (Non-Medical):    Physical Activity:     Days of Exercise per Week:     Minutes of Exercise per Session:    Stress:     Feeling of Stress :    Social Connections:     Frequency of Communication with Friends and Family:     Frequency of Social Gatherings with Friends and Family:     Attends Adventism Services:     Active Member of Clubs or Organizations:     Attends Club or Organization Meetings:     Marital Status:    Intimate Partner Violence:     Fear of Current or Ex-Partner:     Emotionally Abused:     Physically Abused:     Sexually Abused:          Immunizations- reviewed:   Immunization History   Administered Date(s) Administered    Covid-19, MODERNA, Mrna, Lnp-s, Pf, 100mcg/0.5mL 03/24/2021, 04/21/2021    Influenza Vaccine (Quad) PF (>6 Mo Flulaval, Fluarix, and >3 Yrs Afluria, Fluzone [address]16) 02/17/2020    TB Skin Test (PPD) Intradermal 01/15/2019    Tdap 02/06/2015, 02/17/2020       Review of systems:  Items bolded if positive. Constitutional: Fever, chills, night sweats, weight loss, lymphadenopathy, fatigue  HEENT: Vision change, eye pain, rhinorrhea, sinus pain, epistaxis, dysphagia, change in hearing, tinnitus, vertigo.    Endocrine: Weight change, heat/ cold intolerance, tremor, insomnia, polyuria, polydipsia, polyphagia, abnl hair growth, nail changes  Cardiovascular: Chest pain, palpitations, syncope, lower extremity edema, orthopnea, paroxysmal nocturnal dyspnea  Pulmonary: Shortness of breath, dyspnea on exertion, cough, hemoptysis, wheezing  GI: Nausea, vomiting, diarrhea, melena, hematochezia, change in appetite, abdominal pain, change in bowel habits or stools  : Dysuria, frequency, urgency, incontinence, hematuria, nocturia  Musculoskeletal: joint swelling or pain, muscle pain, back pain  Skin:  Rash, New/growing/changing skin lesions  Neurologic: Headache, muscle weakness, paresthesias, anesthesia, ataxia, change in speech, change in gait   Psychiatric: depression, anxiety, hallucinations, fifi, SI/HI      Physical Exam  Visit Vitals  /70 (BP 1 Location: Right upper arm, BP Patient Position: Sitting, BP Cuff Size: Adult)   Pulse 65   Temp 97.1 °F (36.2 °C) (Temporal)   Resp 16   Ht 5' 3\" (1.6 m)   Wt 158 lb (71.7 kg)   LMP 01/18/2000   SpO2 99%   BMI 27.99 kg/m²       General appearance - alert, well appearing, and in no distress  Eyes - pupils equal and reactive, extraocular eye movements intact  Ears - bilateral TM's and external ear canals normal  Chest - clear to auscultation, no wheezes, rales or rhonchi, symmetric air entry  Heart - normal rate, regular rhythm, normal S1, S2, no murmurs, rubs, clicks or gallops  Abdomen - soft, nontender, nondistended, no masses or organomegaly  Neurological - alert, oriented, normal speech, no focal findings or movement disorder noted  Musculoskeletal - no joint tenderness, deformity or swelling  Extremities - peripheral pulses normal, no pedal edema, no clubbing or cyanosis  Skin - normal coloration and turgor, no rashes, no suspicious skin lesions noted    Assessment/Plan:   Ms. Ange Dillard is a 60 y.o. female presenting for [facility] woman health maintenance visit. · Counseled on importance of healthy diet, regular exercise, healthy lifestyle (i.e. Safe sex practices, seatbelt safety, wearing sunscreen, etc.)    · Mammogram ordered. Needs to be scheduled. · Colonoscopy referral placed. · Labs ordered: A1c, BMP, urine microalbumin    · Will trial Rybelsus for diabetes and hopefully some weight loss.      · Referred to ENT for concerns of hearing loss    · Follow-up: Return for yearly wellness visits      Orders Placed This Encounter    METABOLIC PANEL, BASIC     Standing Status:   Future     Standing Expiration Date:   5/26/2022    HEMOGLOBIN A1C WITH EAG     Standing Status:   Future     Standing Expiration Date:   5/26/2022    MICROALBUMIN, UR, RAND W/ MICROALB/CREAT RATIO     Standing Status:   Future     Standing Expiration Date:   5/26/2022   [facility] ref [facility]     Referral Priority:   Routine     Referral Type:   Consultation     Referral Reason:   Specialty Services Required     Referred to Provider:   Tiki Perez MD     Number of Visits Requested:   1    Jael Moreno [facility]     Referral Priority:   Routine     Referral Type:   Consultation     Referral Reason:   Specialty Services Required     Referral Location:   [facility]     Referred to Provider:   Brian Ram MD     Requested Specialty:   Gastroenterology     Number of Visits Requested:   1    semaglutide (RYBELSUS) 3 mg tablet     Sig: Take 1 Tablet by mouth Daily (before breakfast). Dispense:  90 Tablet     Refill:  1         I have discussed the diagnosis with the patient and the intended plan as seen in the above orders. The patient has received an after-visit summary and questions were answered concerning future plans. Informed pt to return to the office if new symptoms arise.       Akua Giang MD

## 2021-05-27 LAB
ANION GAP SERPL CALC-SCNC: 6 MMOL/L (ref 5–15)
BUN SERPL-MCNC: 15 MG/DL (ref 6–20)
BUN/CREAT SERPL: 21 (ref 12–20)
CALCIUM SERPL-MCNC: 9.8 MG/DL (ref 8.5–10.1)
CHLORIDE SERPL-SCNC: 106 MMOL/L (ref 97–108)
CO2 SERPL-SCNC: 27 MMOL/L (ref 21–32)
CREAT SERPL-MCNC: 0.7 MG/DL (ref 0.55–1.02)
EST. AVERAGE GLUCOSE BLD GHB EST-MCNC: 137 MG/DL
GLUCOSE SERPL-MCNC: 129 MG/DL (ref 65–100)
HBA1C MFR BLD: 6.4 % (ref 4–5.6)
POTASSIUM SERPL-SCNC: 4.6 MMOL/L (ref 3.5–5.1)
SODIUM SERPL-SCNC: 139 MMOL/L (ref 136–145)

## 2021-05-28 LAB
CREAT UR-MCNC: 104 MG/DL
MICROALBUMIN UR-MCNC: 0.68 MG/DL
MICROALBUMIN/CREAT UR-RTO: 7 MG/G (ref 0–30)

## 2021-06-15 RX ORDER — PANTOPRAZOLE SODIUM 40 MG/1
TABLET, DELAYED RELEASE ORAL
Qty: 30 TABLET | Refills: 2 | Status: SHIPPED | OUTPATIENT
Start: 2021-06-15 | End: 2021-10-25

## 2021-08-12 ENCOUNTER — HOSPITAL ENCOUNTER (OUTPATIENT)
Dept: MAMMOGRAPHY | Age: 66
Discharge: HOME | End: 2021-08-12
Attending: FAMILY MEDICINE
Payer: COMMERCIAL

## 2021-08-12 DIAGNOSIS — Z12.31 ENCOUNTER FOR SCREENING MAMMOGRAM FOR MALIGNANT NEOPLASM OF BREAST: ICD-10-CM

## 2021-08-12 PROCEDURE — 77067 SCR MAMMO BI INCL CAD: CPT

## 2021-09-21 DIAGNOSIS — I10 ESSENTIAL HYPERTENSION: ICD-10-CM

## 2021-09-21 DIAGNOSIS — E11.9 CONTROLLED TYPE 2 DIABETES MELLITUS WITHOUT COMPLICATION, WITHOUT LONG-TERM CURRENT USE OF INSULIN (HCC): ICD-10-CM

## 2021-09-21 RX ORDER — ATORVASTATIN CALCIUM 40 MG/1
TABLET, FILM COATED ORAL
Qty: 90 TABLET | Refills: 1 | Status: SHIPPED | OUTPATIENT
Start: 2021-09-21 | End: 2022-04-05

## 2021-09-21 RX ORDER — AMLODIPINE BESYLATE 10 MG/1
TABLET ORAL
Qty: 90 TABLET | Refills: 1 | Status: SHIPPED | OUTPATIENT
Start: 2021-09-21 | End: 2022-06-23

## 2021-10-25 RX ORDER — PANTOPRAZOLE SODIUM 40 MG/1
TABLET, DELAYED RELEASE ORAL
Qty: 30 TABLET | Refills: 2 | Status: SHIPPED | OUTPATIENT
Start: 2021-10-25 | End: 2022-02-20

## 2021-11-01 DIAGNOSIS — F33.0 DEPRESSION, MAJOR, RECURRENT, MILD (HCC): ICD-10-CM

## 2021-11-01 DIAGNOSIS — F41.9 ANXIETY AND DEPRESSION: ICD-10-CM

## 2021-11-01 DIAGNOSIS — F32.A ANXIETY AND DEPRESSION: ICD-10-CM

## 2021-11-02 RX ORDER — FLUOXETINE HYDROCHLORIDE 40 MG/1
40 CAPSULE ORAL DAILY
Qty: 90 CAPSULE | Refills: 0 | Status: SHIPPED | OUTPATIENT
Start: 2021-11-02 | End: 2022-02-10 | Stop reason: ALTCHOICE

## 2022-01-11 ENCOUNTER — TELEPHONE (OUTPATIENT)
Dept: FAMILY MEDICINE CLINIC | Age: 67
End: 2022-01-11

## 2022-01-25 ENCOUNTER — HOSPITAL ENCOUNTER (EMERGENCY)
Age: 67
Discharge: HOME | End: 2022-01-25
Attending: STUDENT IN AN ORGANIZED HEALTH CARE EDUCATION/TRAINING PROGRAM
Payer: COMMERCIAL

## 2022-01-25 ENCOUNTER — APPOINTMENT (OUTPATIENT)
Dept: GENERAL RADIOLOGY | Age: 67
End: 2022-01-25
Attending: STUDENT IN AN ORGANIZED HEALTH CARE EDUCATION/TRAINING PROGRAM
Payer: COMMERCIAL

## 2022-01-25 VITALS
OXYGEN SATURATION: 97 % | HEART RATE: 77 BPM | WEIGHT: 156 LBS | TEMPERATURE: 98 F | BODY MASS INDEX: 27.64 KG/M2 | RESPIRATION RATE: 18 BRPM | DIASTOLIC BLOOD PRESSURE: 95 MMHG | HEIGHT: 63 IN | SYSTOLIC BLOOD PRESSURE: 155 MMHG

## 2022-01-25 DIAGNOSIS — F43.0 STRESS RESPONSE: ICD-10-CM

## 2022-01-25 DIAGNOSIS — R07.89 ATYPICAL CHEST PAIN: Primary | ICD-10-CM

## 2022-01-25 LAB
ANION GAP SERPL CALC-SCNC: 5 MMOL/L (ref 5–15)
BASOPHILS # BLD: 0 K/UL (ref 0–0.1)
BASOPHILS NFR BLD: 0 % (ref 0–1)
BUN SERPL-MCNC: 14 MG/DL (ref 6–20)
BUN/CREAT SERPL: 16 (ref 12–20)
CALCIUM SERPL-MCNC: 9.7 MG/DL (ref 8.5–10.1)
CHLORIDE SERPL-SCNC: 106 MMOL/L (ref 97–108)
CO2 SERPL-SCNC: 29 MMOL/L (ref 21–32)
COMMENT, HOLDF: NORMAL
CREAT SERPL-MCNC: 0.89 MG/DL (ref 0.55–1.02)
DIFFERENTIAL METHOD BLD: NORMAL
EOSINOPHIL # BLD: 0.1 K/UL (ref 0–0.4)
EOSINOPHIL NFR BLD: 1 % (ref 0–7)
ERYTHROCYTE [DISTWIDTH] IN BLOOD BY AUTOMATED COUNT: 14.2 % (ref 11.5–14.5)
GLUCOSE SERPL-MCNC: 131 MG/DL (ref 65–100)
HCT VFR BLD AUTO: 38 % (ref 35–47)
HGB BLD-MCNC: 12.1 G/DL (ref 11.5–16)
IMM GRANULOCYTES # BLD AUTO: 0 K/UL (ref 0–0.04)
IMM GRANULOCYTES NFR BLD AUTO: 0 % (ref 0–0.5)
LYMPHOCYTES # BLD: 2.6 K/UL (ref 0.8–3.5)
LYMPHOCYTES NFR BLD: 29 % (ref 12–49)
MCH RBC QN AUTO: 27.3 PG (ref 26–34)
MCHC RBC AUTO-ENTMCNC: 31.8 G/DL (ref 30–36.5)
MCV RBC AUTO: 85.8 FL (ref 80–99)
MONOCYTES # BLD: 0.9 K/UL (ref 0–1)
MONOCYTES NFR BLD: 10 % (ref 5–13)
NEUTS SEG # BLD: 5.2 K/UL (ref 1.8–8)
NEUTS SEG NFR BLD: 60 % (ref 32–75)
NRBC # BLD: 0 K/UL (ref 0–0.01)
NRBC BLD-RTO: 0 PER 100 WBC
PLATELET # BLD AUTO: 273 K/UL (ref 150–400)
PMV BLD AUTO: 11.2 FL (ref 8.9–12.9)
POTASSIUM SERPL-SCNC: 3.7 MMOL/L (ref 3.5–5.1)
RBC # BLD AUTO: 4.43 M/UL (ref 3.8–5.2)
SAMPLES BEING HELD,HOLD: NORMAL
SODIUM SERPL-SCNC: 140 MMOL/L (ref 136–145)
TROPONIN-HIGH SENSITIVITY: 4 NG/L (ref 0–51)
WBC # BLD AUTO: 8.8 K/UL (ref 3.6–11)

## 2022-01-25 PROCEDURE — 84484 ASSAY OF TROPONIN QUANT: CPT

## 2022-01-25 PROCEDURE — 99283 EMERGENCY DEPT VISIT LOW MDM: CPT

## 2022-01-25 PROCEDURE — 93005 ELECTROCARDIOGRAM TRACING: CPT

## 2022-01-25 PROCEDURE — 71046 X-RAY EXAM CHEST 2 VIEWS: CPT

## 2022-01-25 PROCEDURE — 36415 COLL VENOUS BLD VENIPUNCTURE: CPT

## 2022-01-25 PROCEDURE — 85025 COMPLETE CBC W/AUTO DIFF WBC: CPT

## 2022-01-25 PROCEDURE — 80048 BASIC METABOLIC PNL TOTAL CA: CPT

## 2022-01-25 PROCEDURE — 74011250637 HC RX REV CODE- 250/637: Performed by: STUDENT IN AN ORGANIZED HEALTH CARE EDUCATION/TRAINING PROGRAM

## 2022-01-25 RX ORDER — ACETAMINOPHEN 500 MG
1000 TABLET ORAL ONCE
Status: COMPLETED | OUTPATIENT
Start: 2022-01-25 | End: 2022-01-25

## 2022-01-25 RX ADMIN — ACETAMINOPHEN 1000 MG: 500 TABLET ORAL at 23:29

## 2022-01-25 NOTE — LETTER
[address]  [facility] EMERGENCY DEPT  [address] [id number]  [phone]    Work/School Note    Date: 1/25/2022    To Whom It May concern:    Gwen Soria was seen and treated today in the emergency room by the following provider(s):  Attending Provider: Ada Bang MD.      Gwen Soria may return to work on 01/27/2022.     Sincerely,          Tiki Hernandez RN

## 2022-01-26 NOTE — ED PROVIDER NOTES
Patient is a 18-year-old female with a history of diabetes, hyperlipidemia, hypertension presenting to the ED with chest pain that started yesterday. Patient has known coronary artery disease but does have multiple risk factors. Pain is substernal.    After triage patient endorsed some tingling in her right hand and leg. Mild in nature. No focal neurologic deficits. Doubt stroke at this time. The history is provided by the patient. Chest Pain (Angina)   This is a new problem. The current episode started yesterday. The problem has not changed since onset. The problem occurs constantly. The pain is associated with normal activity. The pain is at a severity of 5/10. The pain is moderate. The quality of the pain is described as pressure-like and burning. The pain does not radiate. Associated symptoms include shortness of breath. Pertinent negatives include no weakness. She has tried nothing for the symptoms. The treatment provided no relief. Risk factors include family history, dyslipidemia, diabetes mellitus, stress, hypertension and postmenopause. Her past medical history is significant for DM and HTN. Pertinent negatives include no cardiac catheterization and no cardiac stents. Shortness of Breath  Associated symptoms include chest pain. Past Medical History:   Diagnosis Date    Anxiety     Diabetes mellitus ([facility] [address]     Dyslipidemia     Headache     Hodgkin's lymphoma ([facility] [address]     Nodule removed from throat    Hypertension     Low back pain 2012    MRI 3/10/14 revealed mild multilevel disk and facet degenerative change     Lumbar disc disease 2014    Thyroid nodule     Followed by endocrine, Dr. Pablito Paige.       Ulcer        Past Surgical History:   Procedure Laterality Date    DELIVERY       HX GYN      MA REMOVAL NODES, NECK,CERV CMPLT           Family History:   Problem Relation Age of Onset    Elevated Lipids Mother     Hypertension Mother     Hypertension Father    [facility] Parkinsonism Father     Hypertension Sister     Cancer Brother     Stroke Maternal Grandmother     Hypertension Maternal Grandmother     Cancer Paternal Grandmother     Hypertension Paternal Grandmother     Cancer Paternal Grandfather     Hypertension Paternal Grandfather        Social History     Socioeconomic History    Marital status:      Spouse name: Not on file    Number of children: Not on file    Years of education: Not on file    Highest education level: Not on file   Occupational History    Not on file   Tobacco Use    Smoking status: Never Smoker    Smokeless tobacco: Never Used   Substance and Sexual Activity    Alcohol use: No     Alcohol/week: 0.0 standard drinks    Drug use: No    Sexual activity: Not Currently     Birth control/protection: None   Other Topics Concern    Not on file   Social History Narrative    Not on file     Social Determinants of Health     Financial Resource Strain:     Difficulty of Paying Living Expenses: Not on file   Food Insecurity:     Worried About Running Out of Food in the Last Year: Not on file    Zeeshan of Food in the Last Year: Not on file   Transportation Needs:     Lack of Transportation (Medical): Not on file    Lack of Transportation (Non-Medical):  Not on file   Physical Activity:     Days of Exercise per Week: Not on file    Minutes of Exercise per Session: Not on file   Stress:     Feeling of Stress : Not on file   Social Connections:     Frequency of Communication with Friends and Family: Not on file    Frequency of Social Gatherings with Friends and Family: Not on file    Attends Taoism Services: Not on file    Active Member of Clubs or Organizations: Not on file    Attends Club or Organization Meetings: Not on file    Marital Status: Not on file   Intimate Partner Violence:     Fear of Current or Ex-Partner: Not on file    Emotionally Abused: Not on file    Physically Abused: Not on file    Sexually Abused: Not on file   Housing Stability:     Unable to Pay for Housing in the Last Year: Not on file    Number of Places Lived in the Last Year: Not on file    Unstable Housing in the Last Year: Not on file         ALLERGIES: Patient has no known allergies. Review of Systems   Constitutional: Positive for fatigue. HENT: Negative. Eyes: Negative. Respiratory: Positive for shortness of breath. Cardiovascular: Positive for chest pain. Gastrointestinal: Negative. Endocrine: Negative. Genitourinary: Negative. Musculoskeletal: Negative. Skin: Negative. Allergic/Immunologic: Negative. Neurological: Negative. Negative for speech difficulty, weakness and light-headedness. Hematological: Negative. Psychiatric/Behavioral: Negative. Vitals:    01/25/22 2005 01/25/22 2330   BP: (!) 161/83 (!) 155/95   Pulse: 81 77   Resp: 16 18   Temp: 97 °F (36.1 °C) 98 °F (36.7 °C)   SpO2: 97% 97%   Weight: 70.8 kg (156 lb)    Height: 5' 3\" (1.6 m)             Physical Exam  Vitals and nursing note reviewed. Constitutional:       General: She is not in acute distress. Appearance: Normal appearance. HENT:      Head: Normocephalic and atraumatic. Right Ear: External ear normal.      Left Ear: External ear normal.      Nose: Nose normal.   Eyes:      Extraocular Movements: Extraocular movements intact. Conjunctiva/sclera: Conjunctivae normal.   Cardiovascular:      Rate and Rhythm: Normal rate. Pulses: Normal pulses. Radial pulses are 2+ on the right side and 2+ on the left side. Heart sounds: Normal heart sounds. Pulmonary:      Effort: Pulmonary effort is normal.      Breath sounds: Normal breath sounds. Chest:      Chest wall: No deformity or tenderness. Abdominal:      General: Abdomen is flat. There is no distension. Tenderness: There is no abdominal tenderness. Musculoskeletal:         General: No deformity or signs of injury. Normal range of motion. Cervical back: Normal range of motion and neck supple. No tenderness. Skin:     General: Skin is warm and dry. Capillary Refill: Capillary refill takes less than 2 seconds. Neurological:      General: No focal deficit present. Mental Status: She is alert and oriented to person, place, and time. GCS: GCS eye subscore is 4. GCS verbal subscore is 5. GCS motor subscore is 6. Cranial Nerves: Cranial nerves are intact. Sensory: Sensation is intact. Motor: Motor function is intact. Coordination: Coordination is intact. Gait: Gait is intact. Psychiatric:         Attention and Perception: Attention normal.         Mood and Affect: Mood normal.         Behavior: Behavior normal.          [facility]  ED Course as of 01/26/22 1453   Tue Jan 25, 2022   0068 EKG interpretation:   Rhythm: normal sinus rhythm and PVC's; and regular . Rate (approx.): 86; Axis: normal; Intervals: normal ; ST/T wave: normal; EKG documented and interpreted by Camilla Heck. Maria Elena Valente MD, Emergency Medicine.     [AL]      ED Course User Index  [AL] Renetta Leos MD     LABORATORY RESULTS:  Labs Reviewed   METABOLIC PANEL, BASIC - Abnormal; Notable for the following components:       Result Value    Glucose 131 (*)     All other components within normal limits   CBC WITH AUTOMATED DIFF   TROPONIN-HIGH SENSITIVITY   SAMPLES BEING HELD       IMAGING RESULTS:  XR CHEST PA LAT   Final Result   No acute cardiopulmonary process. Linear atelectasis/scarring in the   bilateral lower lobes. MEDICATIONS GIVEN:  Medications   acetaminophen (TYLENOL) tablet 1,000 mg (1,000 mg Oral Given 1/25/22 0916)       Differential diagnosis: ACS, stress response, anxiety, GERD    ED physician interpretation of imaging: Chest x-ray without focal pneumonia, widened mediastinum  ED physician interpretation of EKG: No STEMI. See my interpretation EKG and ED course above.   ED physician interpretation of laboratory results: Patient's troponin within normal limits, no indication for repeat. Mild elevation in glucose without signs of DKA. No leukocytosis    MDM: Patient is a 71-year-old female presenting to the ED with chest pain since yesterday with unremarkable work-up for ACS found to have atypical chest pain with multiple risk factors appropriate for outpatient follow-up with cardiology for further evaluation. Upon time of discharge patient was having a headache her usual migraine, Tylenol given. Discussed patient starting 81 mg aspirin due to her risk factors of diabetes hyperlipidemia and hypertension. She stated she had taken in the past but stopped recently. Patient said she will start taking it again. No worsening of patient's tingling and not mention again by patient. Patient is ambulatory with no neurologic deficits. Key discharge instructions and summary of care: You presented to ED with chest pain that started yesterday. A work-up for heart attack including EKG, troponin and chest x-ray were unremarkable. No signs of heart attack at this time. I suspect you chest discomfort is due to recent stress that you are under. However he should follow-up with cardiology for further outpatient evaluation. The patient has been re-evaluated and feeling better. Patient is stable for discharge. All available radiology and laboratory results have been reviewed with patient and/or available family. Patient and/or family verbally conveyed their understanding and agreement of the patient's signs, symptoms, diagnosis, treatment and prognosis and additionally agree to follow-up as recommended in the discharge instructions or to return to the Emergency Department should their condition change or worsen prior to their follow-up appointment. All questions have been answered and patient and/or available family express understanding. IMPRESSION:  1. Atypical chest pain    2. Stress response        DISPOSITION: Discharged    Tho Valente, MD        Procedures

## 2022-01-26 NOTE — DISCHARGE INSTRUCTIONS
You presented to ED with chest pain that started yesterday. A work-up for heart attack including EKG, troponin and chest x-ray were unremarkable. No signs of heart attack at this time. I suspect you chest discomfort is due to recent stress that you are under. However he should follow-up with cardiology for further outpatient evaluation.

## 2022-01-26 NOTE — ED TRIAGE NOTES
Pt arrives to the ER for complaints of chest palpitations/heaviness, shortness of breath and headache. Symptoms started yesterday and progressively got worse today. Denies nausea, vomiting, and abdominal pain. PMH prediabetes.

## 2022-01-27 LAB
ATRIAL RATE: 86 BPM
CALCULATED P AXIS, ECG09: 67 DEGREES
CALCULATED R AXIS, ECG10: -5 DEGREES
CALCULATED T AXIS, ECG11: 49 DEGREES
DIAGNOSIS, 93000: NORMAL
P-R INTERVAL, ECG05: 140 MS
Q-T INTERVAL, ECG07: 394 MS
QRS DURATION, ECG06: 74 MS
QTC CALCULATION (BEZET), ECG08: 471 MS
VENTRICULAR RATE, ECG03: 86 BPM

## 2022-02-10 ENCOUNTER — OFFICE VISIT (OUTPATIENT)
Dept: FAMILY MEDICINE CLINIC | Age: 67
End: 2022-02-10
Payer: COMMERCIAL

## 2022-02-10 VITALS
SYSTOLIC BLOOD PRESSURE: 127 MMHG | TEMPERATURE: 97.1 F | RESPIRATION RATE: 16 BRPM | OXYGEN SATURATION: 96 % | BODY MASS INDEX: 28.14 KG/M2 | DIASTOLIC BLOOD PRESSURE: 68 MMHG | HEART RATE: 85 BPM | WEIGHT: 158.8 LBS | HEIGHT: 63 IN

## 2022-02-10 DIAGNOSIS — I10 PRIMARY HYPERTENSION: ICD-10-CM

## 2022-02-10 DIAGNOSIS — R07.89 ATYPICAL CHEST PAIN: Primary | ICD-10-CM

## 2022-02-10 DIAGNOSIS — E11.40 TYPE 2 DIABETES MELLITUS WITH DIABETIC NEUROPATHY, WITHOUT LONG-TERM CURRENT USE OF INSULIN (HCC): ICD-10-CM

## 2022-02-10 DIAGNOSIS — F43.22 ADJUSTMENT DISORDER WITH ANXIETY: ICD-10-CM

## 2022-02-10 DIAGNOSIS — F33.0 DEPRESSION, MAJOR, RECURRENT, MILD (HCC): ICD-10-CM

## 2022-02-10 DIAGNOSIS — Z78.0 POST-MENOPAUSAL: ICD-10-CM

## 2022-02-10 DIAGNOSIS — Z12.11 COLON CANCER SCREENING: ICD-10-CM

## 2022-02-10 DIAGNOSIS — E78.2 MIXED HYPERLIPIDEMIA: ICD-10-CM

## 2022-02-10 PROCEDURE — 99214 OFFICE O/P EST MOD 30 MIN: CPT | Performed by: FAMILY MEDICINE

## 2022-02-10 RX ORDER — VENLAFAXINE HYDROCHLORIDE 75 MG/1
75 CAPSULE, EXTENDED RELEASE ORAL DAILY
Qty: 90 CAPSULE | Refills: 0 | Status: SHIPPED | OUTPATIENT
Start: 2022-02-10 | End: 2022-03-09

## 2022-02-10 NOTE — Clinical Note
Hey,    Can we reach out to her about being scheduled? Needs follow up in march if possible since we changed her anxiety medication. Thanks!

## 2022-02-10 NOTE — PROGRESS NOTES
History of Present Illness:     Chief Complaint   Patient presents with    Follow Up Chronic Condition       Lei Rodriguez is a 73 y.o. female     Earleen Sax to the ED on 1/25 for chest pain  Thought she was having a heart attack  Reports significant chest pain, palpitations, right arm numbness/tingling  Told it was stress related.    Episodes of palpitations and pain since    Anxiety  A lot of things going on in personal life   fell off ladder, shattered tibia, needing care  Granddaughter has had multiple suicide attempts  Work is stressful  Admits that she is under a lot of stress  Planning to quit her job next month  Does not feel that Prozac helps, not using Clonopin    T2DM  Last A1c 6.4%  Last eye exam 1-2 wks ago  Due for foot exam  Declines PPSV 23    HM   - Colonoscopy - needs to reschedule  - Due for DEXA    3 most recent PHQ Screens 2/10/2022   PHQ Not Done -   Little interest or pleasure in doing things Not at all   Feeling down, depressed, irritable, or hopeless Nearly every day   Total Score PHQ 2 3   Trouble falling or staying asleep, or sleeping too much Not at all   Feeling tired or having little energy More than half the days   Poor appetite, weight loss, or overeating Not at all   Feeling bad about yourself - or that you are a failure or have let yourself or your family down Nearly every day   Trouble concentrating on things such as school, work, reading, or watching TV Not at all   Moving or speaking so slowly that other people could have noticed; or the opposite being so fidgety that others notice Several days   Thoughts of being better off dead, or hurting yourself in some way Several days   PHQ 9 Score 10   How difficult have these problems made it for you to do your work, take care of your home and get along with others Very difficult     WALTER 2/7 2/10/2022   Feeling nervous, anxious, or on edge 3   Not being able to stop or control worrying 3   Worrying too much about different things 3 Trouble relaxing 2   Being so restless that it is hard to sit still 0   Becoming easily annoyed or irritable 3   Feeling afraid as if something awful might happen 3   WALTER-7 Total Score 17       PMH (REVIEWED):  Past Medical History:   Diagnosis Date    Anxiety     [facility] mellitus ([facility] [address]     Dyslipidemia     Headache     Hodgkin's lymphoma ([facility] [address] 1985    Nodule removed from throat    Hypertension     Low back pain 1/19/2012    MRI 3/10/14 revealed mild multilevel disk and facet degenerative change     Lumbar disc disease 03/2014    Thyroid nodule     Followed by endocrine, Dr. Mahsa Villarreal.  Ulcer        Current Medications/Allergies (REVIEWED):     Current Outpatient Medications on File Prior to Visit   Medication Sig Dispense Refill    pantoprazole (PROTONIX) 40 mg tablet TAKE 1 TABLET BY MOUTH EVERY DAY 30 Tablet 2    amLODIPine (NORVASC) 10 mg tablet TAKE 1 TABLET BY MOUTH EVERY DAY 90 Tablet 1    atorvastatin (LIPITOR) 40 mg tablet TAKE 1 TABLET BY MOUTH EVERY DAY 90 Tablet 1    hydroCHLOROthiazide (HYDRODIURIL) 25 mg tablet TAKE 1 TABLET BY MOUTH EVERY DAY 90 Tab 1    ibuprofen (MOTRIN) 400 mg tablet Take 1 Tab by mouth every six (6) hours as needed for Pain. 30 Tab 1    metFORMIN (GLUCOPHAGE) 500 mg tablet TAKE 1 TABLET BY MOUTH TWICE A DAY WITH MEALS 180 Tab 1     No current facility-administered medications on file prior to visit. No Known Allergies      Review of Systems:     Review of Systems   Constitutional: Negative for chills and fever. Respiratory: Negative for cough and shortness of breath. Cardiovascular: Positive for chest pain and palpitations. Negative for leg swelling. Musculoskeletal: Positive for back pain. Psychiatric/Behavioral: Positive for depression. The patient is nervous/anxious.          Objective:     Vitals:    02/10/22 1500   BP: 127/68   Pulse: 85   Resp: 16   Temp: 97.1 °F (36.2 °C)   TempSrc: Temporal   SpO2: 96%   Weight: 158 lb 12.8 oz (72 kg) Height: 5' 3\" (1.6 m)       Physical Exam:  General appearance - alert, well appearing, and in no distress  Mental status - alert, oriented to person, place, and time, normal mood, behavior, speech, dress, motor activity, and thought processes  Chest - clear to auscultation, no wheezes, rales or rhonchi, symmetric air entry  Heart - normal rate, regular rhythm, normal S1, S2, no murmurs, rubs, clicks or gallops  Neurological - alert, oriented, normal speech, no focal findings or movement disorder noted  Extremities - peripheral pulses normal, no pedal edema, no clubbing or cyanosis, feet normal, good pulses, normal color, temperature and sensation, monofilament sensory exam is abnormal in right foot, normal in left foot    Recent Labs:  No results found for this or any previous visit (from the past 12 hour(s)). Assessment and Plan:       ICD-10-CM ICD-9-CM    1. Atypical chest pain  R07.89 786.59 REFERRAL TO CARDIOLOGY   2. Adjustment disorder with anxiety  F43.22 309.24 venlafaxine-SR (EFFEXOR-XR) 75 mg capsule   3. Primary hypertension  I10 401.9    4. Type 2 diabetes mellitus with diabetic neuropathy, without long-term current use of insulin (HCC)  E11.40 250.60 HEMOGLOBIN A1C WITH EAG     357.2 HM DIABETES FOOT EXAM      HEMOGLOBIN A1C WITH EAG   5. Depression, major, recurrent, mild (HCC)  F33.0 296.31 venlafaxine-SR (EFFEXOR-XR) 75 mg capsule   6. Mixed hyperlipidemia  E78.2 272.2 LIPID PANEL      LIPID PANEL   7. Post-menopausal  Z78.0 V49.81 DEXA BONE DENSITY STUDY AXIAL   8.  Colon cancer screening  Z12.11 V76.51 REFERRAL TO GASTROENTEROLOGY       Chest pain  Improved since ED visit but recurs intermittently  Referring to Cardiology  Would likely benefit form stress testing given symptoms and RFs    Adjustment d/o with anxiety/ MDD  Suspect this is contributing to her chest pain  Will DC Prozac  Trial Effexor daily  Still has Clonopin PRN  Recommended seeing a therapist    HTN, well controlled  Continue current meds    T2DM, controlled  Foot exam notable for diminished sensation to monofilament testing on the right forefoot  Continue good foot care  Check labs today    MDD  Tx with Effexor as noted above    HLD  Check lipids  Continue statin    GI referral placed for colon cancer screening    DEXA ordered; can be done with next mammogram    Follow up: 4-6 wks for anxiety    Clare Conner MD    We discussed the expected course, resolution and complications of the diagnosis(es) in detail. Medication risks, benefits, costs, interactions, and alternatives were discussed as indicated. I advised her to contact the office if her condition worsens, changes or fails to improve as anticipated. She expressed understanding with the diagnosis(es) and plan.

## 2022-02-10 NOTE — PROGRESS NOTES
Ed Patricia is a 73 y.o. female    Chief Complaint   Patient presents with    Follow Up Chronic Condition       1. Have you been to the ER, urgent care clinic since your last visit? Hospitalized since your last visit? Yes seen at [facility] thought she was having a heart attack  2. Have you seen or consulted any other health care providers outside of the [address] [place] [address] since your last visit? Include any pap smears or colon screening. No    Visit Vitals  /68 (BP 1 Location: Right arm, BP Patient Position: Sitting)   Pulse 85   Temp 97.1 °F (36.2 °C) (Temporal)   Resp 16   Ht 5' 3\" (1.6 m)   Wt 158 lb 12.8 oz (72 kg)   SpO2 96%   BMI 28.13 kg/m²       Health Maintenance Due   Topic Date Due    Eye Exam Retinal or Dilated  03/19/2020    Depression Monitoring  03/09/2021    Foot Exam Q1  03/09/2021    Lipid Screen  11/20/2021    Bone Densitometry (Dexa) Screening  Never done       Patient would like to discuss pain in her right arm that started 2 months ago. Is the same arm she received her covid vaccine in and believes that may be the cause.  Has soreness in site and difficulty lifting her arm occasionally   Declined shingles and pneumonia and plans to receive 3rd covid within the next few weeks

## 2022-02-11 LAB
CHOLEST SERPL-MCNC: 150 MG/DL
EST. AVERAGE GLUCOSE BLD GHB EST-MCNC: 154 MG/DL
HBA1C MFR BLD: 7 % (ref 4–5.6)
HDLC SERPL-MCNC: 65 MG/DL
HDLC SERPL: 2.3 {RATIO} (ref 0–5)
LDLC SERPL CALC-MCNC: 68.6 MG/DL (ref 0–100)
TRIGL SERPL-MCNC: 82 MG/DL (ref ?–150)
VLDLC SERPL CALC-MCNC: 16.4 MG/DL

## 2022-02-11 NOTE — PROGRESS NOTES
A1c increased  Lipids good  Will rec increase in Metformin to 1000mg BID  Notified via [place] [employer]

## 2022-02-20 RX ORDER — PANTOPRAZOLE SODIUM 40 MG/1
TABLET, DELAYED RELEASE ORAL
Qty: 30 TABLET | Refills: 2 | Status: SHIPPED | OUTPATIENT
Start: 2022-02-20 | End: 2022-05-27

## 2022-03-09 ENCOUNTER — OFFICE VISIT (OUTPATIENT)
Dept: FAMILY MEDICINE CLINIC | Age: 67
End: 2022-03-09
Payer: COMMERCIAL

## 2022-03-09 VITALS
SYSTOLIC BLOOD PRESSURE: 122 MMHG | RESPIRATION RATE: 16 BRPM | BODY MASS INDEX: 27.93 KG/M2 | OXYGEN SATURATION: 96 % | HEART RATE: 86 BPM | DIASTOLIC BLOOD PRESSURE: 79 MMHG | WEIGHT: 157.6 LBS | HEIGHT: 63 IN

## 2022-03-09 DIAGNOSIS — E11.40 TYPE 2 DIABETES MELLITUS WITH DIABETIC NEUROPATHY, WITHOUT LONG-TERM CURRENT USE OF INSULIN (HCC): ICD-10-CM

## 2022-03-09 DIAGNOSIS — F43.22 ADJUSTMENT DISORDER WITH ANXIETY: Primary | ICD-10-CM

## 2022-03-09 DIAGNOSIS — F33.0 DEPRESSION, MAJOR, RECURRENT, MILD (HCC): ICD-10-CM

## 2022-03-09 PROCEDURE — 3051F HG A1C>EQUAL 7.0%<8.0%: CPT | Performed by: FAMILY MEDICINE

## 2022-03-09 PROCEDURE — 99214 OFFICE O/P EST MOD 30 MIN: CPT | Performed by: FAMILY MEDICINE

## 2022-03-09 RX ORDER — METFORMIN HYDROCHLORIDE 500 MG/1
1000 TABLET ORAL
Qty: 180 TABLET | Refills: 1 | Status: SHIPPED | OUTPATIENT
Start: 2022-03-09

## 2022-03-09 RX ORDER — VENLAFAXINE HYDROCHLORIDE 150 MG/1
150 CAPSULE, EXTENDED RELEASE ORAL DAILY
Qty: 30 CAPSULE | Refills: 1 | Status: SHIPPED | OUTPATIENT
Start: 2022-03-09

## 2022-03-09 NOTE — PROGRESS NOTES
History of Present Illness:     Chief Complaint   Patient presents with    Medication Evaluation       Chalino Hanson is a 73 y.o. female     Follow up since starting Effexor  For anxiety and depression  Tolerating med well, feels better overall  Feeling less anxious  Wants to discuss increasing dose of the Effexor  Has not needed her Klonopin recently  No suicidal thoughts    Does endorse she had a day where she left to go to work and just drove on [address]. She did endorse thoughts of \"being better off dead\". She denies a plan or intent to commit suicide. No more of the passive thoughts of being better off dead since that one incident 2 weeks ago. DM  Taking Metformin once daily  Last A1c 7.0%    PMH (REVIEWED):  Past Medical History:   Diagnosis Date    Anxiety     Diabetes mellitus ([facility] [address]     Dyslipidemia     Headache     Hodgkin's lymphoma ([facility] [address] 1985    Nodule removed from throat    Hypertension     Low back pain 1/19/2012    MRI 3/10/14 revealed mild multilevel disk and facet degenerative change     Lumbar disc disease 03/2014    Thyroid nodule     Followed by endocrine, Dr. Leroy Helms.  Ulcer        Current Medications/Allergies (REVIEWED):     Current Outpatient Medications on File Prior to Visit   Medication Sig Dispense Refill    pantoprazole (PROTONIX) 40 mg tablet TAKE 1 TABLET BY MOUTH EVERY DAY 30 Tablet 2    amLODIPine (NORVASC) 10 mg tablet TAKE 1 TABLET BY MOUTH EVERY DAY 90 Tablet 1    atorvastatin (LIPITOR) 40 mg tablet TAKE 1 TABLET BY MOUTH EVERY DAY 90 Tablet 1    hydroCHLOROthiazide (HYDRODIURIL) 25 mg tablet TAKE 1 TABLET BY MOUTH EVERY DAY 90 Tab 1    ibuprofen (MOTRIN) 400 mg tablet Take 1 Tab by mouth every six (6) hours as needed for Pain. 30 Tab 1     No current facility-administered medications on file prior to visit. No Known Allergies      Review of Systems:     Review of Systems   Constitutional: Negative for chills and fever.    Cardiovascular: Negative for chest pain and palpitations. Psychiatric/Behavioral: Positive for depression. Negative for suicidal ideas. The patient is nervous/anxious and has insomnia. Objective:     Vitals:    03/09/22 0812   BP: 122/79   Pulse: 86   Resp: 16   SpO2: 96%   Weight: 157 lb 9.6 oz (71.5 kg)   Height: 5' 3\" (1.6 m)       Physical Exam:  General appearance - alert, well appearing, and in no distress  Mental status - alert, oriented to person, place, and time, normal mood, behavior, speech, dress, motor activity, and thought processes, affect appropriate to mood  Chest - no tachypnea, retractions or cyanosis  Neurological - alert, oriented, normal speech, no focal findings or movement disorder noted    Recent Labs:  No results found for this or any previous visit (from the past 12 hour(s)). Assessment and Plan:       ICD-10-CM ICD-9-CM    1. Adjustment disorder with anxiety  F43.22 309.24 venlafaxine-SR (EFFEXOR-XR) 150 mg capsule   2. Depression, major, recurrent, mild (HCC)  F33.0 296.31 venlafaxine-SR (EFFEXOR-XR) 150 mg capsule   3. Type 2 diabetes mellitus with diabetic neuropathy, without long-term current use of insulin (HCC)  E11.40 250.60 metFORMIN (GLUCOPHAGE) 500 mg tablet     357.2        Anxiety and depression, improving  PHQ9: 10 --> 4  GAD7: 15 -->4    Tolerating Effexor  Will increase Effexor to 150mg daily    No active SI or HI  Should passive thoughts increase, ryan on increased Effexor, pt will notify me immediately. Also placed crisis numbers in AVS.    Will change Metformin to 1000mg once daily    Follow up: 1 mo, scheduled 4/6 @8AM    Mai Garcia MD    We discussed the expected course, resolution and complications of the diagnosis(es) in detail. Medication risks, benefits, costs, interactions, and alternatives were discussed as indicated. I advised her to contact the office if her condition worsens, changes or fails to improve as anticipated.  She expressed understanding with the diagnosis(es) and plan.

## 2022-03-09 NOTE — PROGRESS NOTES
Niyah Patel is a 73 y.o. female    Chief Complaint   Patient presents with    Medication Evaluation       1. Have you been to the ER, urgent care clinic since your last visit? Hospitalized since your last visit? No  2. Have you seen or consulted any other health care providers outside of the [address] [place] [address] since your last visit? Include any pap smears or colon screening. No    Visit Vitals  /79 (BP 1 Location: Right arm, BP Patient Position: Sitting)   Pulse 86   Resp 16   Ht 5' 3\" (1.6 m)   Wt 157 lb 9.6 oz (71.5 kg)   SpO2 96%   BMI 27.92 kg/m²       Health Maintenance Due   Topic Date Due    Eye Exam Retinal or Dilated  03/19/2020    Bone Densitometry (Dexa) Screening  Never done       Wants to discuss higher dose, believes the medication is working but not as much as she wants.   When taking at night it takes longer to sleep, however when taking it in the morning she feels sleepy throughout the day

## 2022-03-09 NOTE — PROGRESS NOTES
[address] [facility] [address] [place] [address]   Office [phone], Fax [phone]      Chief Complaint:     Bob Godoy is a 73 y.o. female that presents for follow-up for her depression and anxiety after starting new medication     Chief Complaint   Patient presents with    Medication Evaluation       Assessment/Plan:   I personally reviewed the following Pertinent Labs/Studies:   - Encounter Notes from 2/10/2022, Labs from [id number], Imaging from 212    78-year-old female with past medical history significant for anxiety, depression, HTN, DM here today for follow-up anxiety / depression after starting Effexor about one month ago. She has been doing well but does endorse an episode of thoughts of harming herself, did not have a plan. 1. Adjustment disorder with anxiety  -     venlafaxine-SR (EFFEXOR-XR) 150 mg capsule; Take 1 Capsule by mouth daily. , Normal, Disp-30 Capsule, R-1  2. Depression, major, recurrent, mild (HCC)  -     venlafaxine-SR (EFFEXOR-XR) 150 mg capsule; Take 1 Capsule by mouth daily. , Normal, Disp-30 Capsule, R-1  3. Type 2 diabetes mellitus with diabetic neuropathy, without long-term current use of insulin (HCC)  -     metFORMIN (GLUCOPHAGE) 500 mg tablet; Take 2 Tablets by mouth daily (with dinner). , Normal, Disp-180 Tablet, R-1     1. Anxiety / Depression  - increase Effexor dose to 150mg every day  - follow-up in one month for re-evaluation    2. DM  - will change Metformin to 1000mg every day instead of 500mg BID    3. HTN  - well-controlled, continue current medication regimen    Follow up: Follow-up and Dispositions    · Return for Medication follow up. Subjective:   HPI:  Bob Godoy is a 73 y.o. female that presents for follow-up for her anxiety and depression. Anxiety / depression. Discontinued Prozac and started Effexor 75mg on 2/10/22. Pt states that her feelings of anxiety and depression are overall improved since starting Effexor.  She does report an episode on 3/4 when the pt felt overwhelmed, felt like she \"needed to get away from everything\" and started driving on the highway without any clear destination. Upon further questioning, pt endorses thoughts of \"being better off dead\" at that time but denies having any plans on how to do so. She notes that her daughter called her while she was driving, her granddaughter could be heard in the background which \"snapped her back to reality\" and she returned home. She cites her great-granddaughter struggle with thoughts of suicide (currently admitted to inpatient psychiatry) as a significant stressor. She is prescribed Klonopin for prn anxiety attacks but has not needed to take it recently. She notes that she has recently retired but still works part time, usually about once per week. Diabetes. Pt states that she has been taking her Metformin 500mg once daily in the evening, has not been taking it twice daily as prescribed because she usually doesn't eat breakfast and doesn't want to take the medication on an empty stomach.      She denies any nausea, vomiting, fever, chills, chest pain, shortness of breath, diarrhea, constipation, abdominal pain, or current thoughts of harming herself.       3 most recent PHQ Screens 2/10/2022   PHQ Not Done -   Little interest or pleasure in doing things Not at all   Feeling down, depressed, irritable, or hopeless Nearly every day   Total Score PHQ 2 3   Trouble falling or staying asleep, or sleeping too much Not at all   Feeling tired or having little energy More than half the days   Poor appetite, weight loss, or overeating Not at all   Feeling bad about yourself - or that you are a failure or have let yourself or your family down Nearly every day   Trouble concentrating on things such as school, work, reading, or watching TV Not at all   Moving or speaking so slowly that other people could have noticed; or the opposite being so fidgety that others notice Several days Thoughts of being better off dead, or hurting yourself in some way Several days   PHQ 9 Score 10   How difficult have these problems made it for you to do your work, take care of your home and get along with others Very difficult       WALTER 2/7 2/10/2022   Feeling nervous, anxious, or on edge 3   Not being able to stop or control worrying 3   Worrying too much about different things 3   Trouble relaxing 2   Being so restless that it is hard to sit still 0   Becoming easily annoyed or irritable 3   Feeling afraid as if something awful might happen 3   WALTER-7 Total Score 17       Health Maintenance:  Health Maintenance Due   Topic Date Due    Eye Exam Retinal or Dilated  03/19/2020    Bone Densitometry (Dexa) Screening  Never done        ROS:   Review of Systems   All other systems reviewed and are negative except as otherwise stated in HPI. Past medical history, social history, and medications personally reviewed. Past Medical History:   Diagnosis Date    Anxiety     Diabetes mellitus ([facility] [address]     Dyslipidemia     Headache     Hodgkin's lymphoma ([facility] [address] 1985    Nodule removed from throat    Hypertension     Low back pain 1/19/2012    MRI 3/10/14 revealed mild multilevel disk and facet degenerative change     Lumbar disc disease 03/2014    Thyroid nodule     Followed by endocrine, Dr. Nichole Herring.  Ulcer         Allergies personally reviewed. No Known Allergies     Objective:   Vitals reviewed. Visit Vitals  /79 (BP 1 Location: Right arm, BP Patient Position: Sitting)   Pulse 86   Resp 16   Ht 5' 3\" (1.6 m)   Wt 157 lb 9.6 oz (71.5 kg)   LMP 01/18/2000   SpO2 96%   BMI 27.92 kg/m²        Physical Exam    General AO x 3. No distress. Not diaphoretic. No jaundice. No cyanosis. No pallor. HEENT Normocephalic, atraumatic. Neck supple, no cervical adenopathy. EOMI. Cardio  Normal rate, regular rhythm. No murmur, rubs, or gallop. Pulmonary Effort normal. No accessory muscle use.  No wheezes, rales, or rhonchi. Abdominal Soft. No tenderness. No distension. Extremities No edema of lower extremities. MSK  FROM, no joint swelling or tenderness. Neurological No focal deficits. Psych  Non-pressured speech. Affect is appropriate. Maintains appropriate eye contact. Endorses an episode of thoughts of \"being better off dead\" but denies any plan to do so. Skin  No rash. Pt was discussed with Dr. Nisa Woodall (attending physician). I have reviewed pertinent labs results and other data. I have discussed the diagnosis with the patient and the intended plan as seen in the above orders. The patient has received an after-visit summary and questions were answered concerning future plans. I have discussed medication side effects and warnings with the patient as well. [facility]  03/09/22      *ATTENTION:  This note has been created by a medical student for educational purposes only. Please do not refer to the content of this note for clinical decision-making, billing, or other purposes. Please see attending physicians note to obtain clinical information on this patient. *

## 2022-03-18 PROBLEM — Z28.21 REFUSED INFLUENZA VACCINE: Status: ACTIVE | Noted: 2018-09-17

## 2022-03-18 PROBLEM — Z28.21 REFUSED PNEUMOCOCCAL VACCINATION: Status: ACTIVE | Noted: 2018-09-17

## 2022-03-18 PROBLEM — R42 DIZZINESS: Status: ACTIVE | Noted: 2018-02-21

## 2022-03-19 PROBLEM — R20.2 NUMBNESS AND TINGLING OF LEFT SIDE OF FACE: Status: ACTIVE | Noted: 2018-02-21

## 2022-03-19 PROBLEM — R20.0 NUMBNESS AND TINGLING OF LEFT SIDE OF FACE: Status: ACTIVE | Noted: 2018-02-21

## 2022-03-19 PROBLEM — U07.1 COVID-19: Status: ACTIVE | Noted: 2021-02-19

## 2022-03-19 PROBLEM — R11.0 NAUSEA: Status: ACTIVE | Noted: 2018-02-21

## 2022-03-20 PROBLEM — F33.0 DEPRESSION, MAJOR, RECURRENT, MILD (HCC): Status: ACTIVE | Noted: 2020-03-09

## 2022-03-20 PROBLEM — E11.40 TYPE 2 DIABETES MELLITUS WITH DIABETIC NEUROPATHY (HCC): Status: ACTIVE | Noted: 2019-05-07

## 2022-04-06 ENCOUNTER — OFFICE VISIT (OUTPATIENT)
Dept: FAMILY MEDICINE CLINIC | Age: 67
End: 2022-04-06
Payer: COMMERCIAL

## 2022-04-06 VITALS
OXYGEN SATURATION: 97 % | BODY MASS INDEX: 27.89 KG/M2 | HEART RATE: 94 BPM | DIASTOLIC BLOOD PRESSURE: 75 MMHG | SYSTOLIC BLOOD PRESSURE: 118 MMHG | HEIGHT: 63 IN | RESPIRATION RATE: 16 BRPM | WEIGHT: 157.4 LBS

## 2022-04-06 DIAGNOSIS — F33.0 DEPRESSION, MAJOR, RECURRENT, MILD (HCC): ICD-10-CM

## 2022-04-06 DIAGNOSIS — F43.22 ADJUSTMENT DISORDER WITH ANXIETY: Primary | ICD-10-CM

## 2022-04-06 DIAGNOSIS — E11.40 TYPE 2 DIABETES MELLITUS WITH DIABETIC NEUROPATHY, WITHOUT LONG-TERM CURRENT USE OF INSULIN (HCC): ICD-10-CM

## 2022-04-06 DIAGNOSIS — M25.511 ACUTE PAIN OF RIGHT SHOULDER: ICD-10-CM

## 2022-04-06 DIAGNOSIS — I10 ESSENTIAL HYPERTENSION: ICD-10-CM

## 2022-04-06 PROCEDURE — 3051F HG A1C>EQUAL 7.0%<8.0%: CPT | Performed by: FAMILY MEDICINE

## 2022-04-06 PROCEDURE — 99213 OFFICE O/P EST LOW 20 MIN: CPT | Performed by: FAMILY MEDICINE

## 2022-04-06 RX ORDER — HYDROCHLOROTHIAZIDE 25 MG/1
25 TABLET ORAL DAILY
Qty: 90 TABLET | Refills: 3 | Status: SHIPPED | OUTPATIENT
Start: 2022-04-06

## 2022-04-06 NOTE — PROGRESS NOTES
Judith Singh is a 73 y.o. female    Chief Complaint   Patient presents with    Follow Up Chronic Condition       1. Have you been to the ER, urgent care clinic since your last visit? Hospitalized since your last visit? No  2. Have you seen or consulted any other health care providers outside of the [address] [place] [address] since your last visit? Include any pap smears or colon screening.  No    Visit Vitals  /75 (BP 1 Location: Right arm, BP Patient Position: Sitting)   Pulse 94   Resp 16   Ht 5' 3\" (1.6 m)   Wt 157 lb 6.4 oz (71.4 kg)   SpO2 97%   BMI 27.88 kg/m²       Health Maintenance Due   Topic Date Due    Eye Exam Retinal or Dilated  03/19/2020    COVID-19 Vaccine (3 - Booster for Moderna series) 09/21/2021    Bone Densitometry (Dexa) Screening  Never done    Pneumococcal 65+ years (1 of 1 - PPSV23) Never done

## 2022-04-06 NOTE — PROGRESS NOTES
History of Present Illness:     Chief Complaint   Patient presents with    Follow Up Chronic Condition       Rod Larsen is a 73 y.o. female     Follow up on adjustment d/o  Doing well on the Effexor  No concerns    DM  Taking Metformin 500mg BID  Will switch to Metformin 1000mg when her script runs out        PMH (REVIEWED):  Past Medical History:   Diagnosis Date    Anxiety     Diabetes mellitus ([facility] [address]     Dyslipidemia     Headache     Hodgkin's lymphoma ([facility] [address] 1985    Nodule removed from throat    Hypertension     Low back pain 1/19/2012    MRI 3/10/14 revealed mild multilevel disk and facet degenerative change     Lumbar disc disease 03/2014    Thyroid nodule     Followed by endocrine, Dr. Wicho Leahy.  Ulcer        Current Medications/Allergies (REVIEWED):     Current Outpatient Medications on File Prior to Visit   Medication Sig Dispense Refill    atorvastatin (LIPITOR) 40 mg tablet TAKE 1 TABLET BY MOUTH EVERY DAY 90 Tablet 3    venlafaxine-SR (EFFEXOR-XR) 150 mg capsule Take 1 Capsule by mouth daily. 30 Capsule 1    pantoprazole (PROTONIX) 40 mg tablet TAKE 1 TABLET BY MOUTH EVERY DAY 30 Tablet 2    amLODIPine (NORVASC) 10 mg tablet TAKE 1 TABLET BY MOUTH EVERY DAY 90 Tablet 1    ibuprofen (MOTRIN) 400 mg tablet Take 1 Tab by mouth every six (6) hours as needed for Pain. 30 Tab 1    metFORMIN (GLUCOPHAGE) 500 mg tablet Take 2 Tablets by mouth daily (with dinner). 180 Tablet 1     No current facility-administered medications on file prior to visit. No Known Allergies      Review of Systems:     Review of Systems   Musculoskeletal: Positive for joint pain. Psychiatric/Behavioral: Negative for depression and suicidal ideas. The patient is not nervous/anxious and does not have insomnia.          Objective:     Vitals:    04/06/22 0818   BP: 118/75   Pulse: 94   Resp: 16   SpO2: 97%   Weight: 157 lb 6.4 oz (71.4 kg)   Height: 5' 3\" (1.6 m)       Physical Exam:  General appearance - alert, well appearing, and in no distress  Mental status - alert, oriented to person, place, and time, normal mood, behavior, speech, dress, motor activity, and thought processes  Neurological - alert, oriented, normal speech, no focal findings or movement disorder noted  Musculoskeletal - abnormal exam of right shoulder. Limited active ROM with abduction due to pain but full active ROM intact. +TTP along the trapezius muscle distribution. Recent Labs:  No results found for this or any previous visit (from the past 12 hour(s)). Assessment and Plan:       ICD-10-CM ICD-9-CM    1. Adjustment disorder with anxiety  F43.22 309.24    2. Depression, major, recurrent, mild (HCC)  F33.0 296.31    3. Acute pain of right shoulder  M25.511 719.41    4. Essential hypertension  I10 401.9 hydroCHLOROthiazide (HYDRODIURIL) 25 mg tablet   5. Type 2 diabetes mellitus with diabetic neuropathy, without long-term current use of insulin (HCC)  E11.40 250.60 HM DIABETES EYE EXAM     357.2        Adjustment d/o and MDD  Doing well on Effexor XR 150mg   Continue at current dose    Shoulder pain  Referred to Sports Med    HTN  BP at goal  Refilled HCTZ    T2DM  Eye exam UTD  Continue Metformin    Follow up: 3 mo    Lonnie Mclaughlin MD    We discussed the expected course, resolution and complications of the diagnosis(es) in detail. Medication risks, benefits, costs, interactions, and alternatives were discussed as indicated. I advised her to contact the office if her condition worsens, changes or fails to improve as anticipated. She expressed understanding with the diagnosis(es) and plan.

## 2022-04-19 ENCOUNTER — OFFICE VISIT (OUTPATIENT)
Dept: FAMILY MEDICINE CLINIC | Age: 67
End: 2022-04-19

## 2022-04-19 VITALS
BODY MASS INDEX: 28.67 KG/M2 | SYSTOLIC BLOOD PRESSURE: 135 MMHG | HEART RATE: 78 BPM | RESPIRATION RATE: 18 BRPM | OXYGEN SATURATION: 96 % | WEIGHT: 161.8 LBS | HEIGHT: 63 IN | DIASTOLIC BLOOD PRESSURE: 79 MMHG

## 2022-04-19 DIAGNOSIS — M75.41 IMPINGEMENT SYNDROME OF RIGHT SHOULDER: ICD-10-CM

## 2022-04-19 DIAGNOSIS — M25.511 RIGHT SHOULDER PAIN, UNSPECIFIED CHRONICITY: Primary | ICD-10-CM

## 2022-04-19 PROCEDURE — 20610 DRAIN/INJ JOINT/BURSA W/O US: CPT | Performed by: FAMILY MEDICINE

## 2022-04-19 RX ORDER — BETAMETHASONE SODIUM PHOSPHATE AND BETAMETHASONE ACETATE 3; 3 MG/ML; MG/ML
12 INJECTION, SUSPENSION INTRA-ARTICULAR; INTRALESIONAL; INTRAMUSCULAR; SOFT TISSUE ONCE
Status: COMPLETED | OUTPATIENT
Start: 2022-04-19 | End: 2022-04-19

## 2022-04-19 RX ORDER — LIDOCAINE HYDROCHLORIDE 10 MG/ML
3 INJECTION INFILTRATION; PERINEURAL ONCE
Status: COMPLETED | OUTPATIENT
Start: 2022-04-19 | End: 2022-04-19

## 2022-04-19 RX ADMIN — LIDOCAINE HYDROCHLORIDE 3 ML: 10 INJECTION INFILTRATION; PERINEURAL at 17:08

## 2022-04-19 RX ADMIN — BETAMETHASONE SODIUM PHOSPHATE AND BETAMETHASONE ACETATE 12 MG: 3; 3 INJECTION, SUSPENSION INTRA-ARTICULAR; INTRALESIONAL; INTRAMUSCULAR; SOFT TISSUE at 17:07

## 2022-04-19 NOTE — PROGRESS NOTES
[address] [facility]      Chief Complaint:   Chief Complaint   Patient presents with    Shoulder Pain     right shoulder pain,no FABBY       History of Present Illness     Patient Identification  Rod Larsen is a 73 y.o. female complains of pain in the right shoulder pain. This has been going on for 5 months. It started located laterally, but encompasses the whole shoulder now. She describes it as dull and aching. Denies shooting or radiation. She has difficulty with reaching and putting on clothes. It wakes her up at night. Motion is limited by pain. Tylenol, advil have not helped. Has not tried ice or heat, stretching or exercises. Denies fevers, chills, neurological symptoms. Past Medical History:   Diagnosis Date    Anxiety     Diabetes mellitus ([facility] [address]     Dyslipidemia     Headache     Hodgkin's lymphoma ([facility] [address] 1985    Nodule removed from throat    Hypertension     Low back pain 1/19/2012    MRI 3/10/14 revealed mild multilevel disk and facet degenerative change     Lumbar disc disease 03/2014    Thyroid nodule     Followed by endocrine, Dr. Wicho Leahy.  Ulcer      Family History   Problem Relation Age of Onset    Elevated Lipids Mother     Hypertension Mother     Hypertension Father     Parkinsonism Father     Hypertension Sister     Cancer Brother     Stroke Maternal Grandmother     Hypertension Maternal Grandmother     Cancer Paternal Grandmother     Hypertension Paternal Grandmother     Cancer Paternal Grandfather     Hypertension Paternal Grandfather      Current Outpatient Medications   Medication Sig Dispense Refill    hydroCHLOROthiazide (HYDRODIURIL) 25 mg tablet Take 1 Tablet by mouth daily. 90 Tablet 3    atorvastatin (LIPITOR) 40 mg tablet TAKE 1 TABLET BY MOUTH EVERY DAY 90 Tablet 3    venlafaxine-SR (EFFEXOR-XR) 150 mg capsule Take 1 Capsule by mouth daily.  30 Capsule 1    metFORMIN (GLUCOPHAGE) 500 mg tablet Take 2 Tablets by mouth daily (with dinner). 180 Tablet 1    pantoprazole (PROTONIX) 40 mg tablet TAKE 1 TABLET BY MOUTH EVERY DAY 30 Tablet 2    amLODIPine (NORVASC) 10 mg tablet TAKE 1 TABLET BY MOUTH EVERY DAY 90 Tablet 1    ibuprofen (MOTRIN) 400 mg tablet Take 1 Tab by mouth every six (6) hours as needed for Pain. 30 Tab 1     No Known Allergies    Review of Systems  Pertinent items are noted in HPI. Physical Exam     Visit Vitals  Resp 18   Ht 5' 3\" (1.6 m)   Wt 161 lb 12.8 oz (73.4 kg)   LMP 01/18/2000   BMI 28.66 kg/m²       GEN: Well appearing. No apparent distress. Responds to all questions appropriately. Lungs: No labored respirations. Talking in complete sentences without difficulty. Shoulder: right  Deformity: None    ROM:  Forward Flexion: Active: 90   Passive: 120 (limited by pain)  ER (0): Active: 45   Passive: 45   IR (0): Active: Behind the body to the level T8  Abduction: Active: 90   Passive: 120    Palpation:  AC tenderness: None  SC tenderness: None  Clavicle tenderness: None  Biceps tenderness: None  Trapezius tenderness: Present    Strength (0-5/5):  Deltoid:5/5  Supraspinatus: 5/5  External rotation: 5/5  Internal rotation: 5/5    Rotator Cuff Exam:  Neers sign: Positive  Callaway sign: Positive  Painful Arc: Positive  Lift-off sign / Belly Press: Negative    Neuro/Vascular:  Pulses intact, no edema, and neurologically intact    C-Spine:  Cervical motion: FROM without pain. Cervical tenderness: None    Skin: No obvious rash or skin breakdown. PROCEDURE NOTE:     Informed consent obtained verbally and risks, benefits and alternatives discussed. Time out performed, cross checking patient ID and procedure.   The landmarks of the right shoulder were identified and the shoulder was cleaned and prepped with sterile technique using Chloraprep x3, anesthetized with ethyl chloride spray and injected from a posterior lateral approach with Celestone 12 mg and 3 ml of 1% lidocaine. The patient tolerated the procedure well and there were no complications. Assessment:  Right shoulder impingement syndrome    Plan:  1. Home Exercise Program as per handout. 2. Ice 15 minutes, three times a day PRN and after exercise. Can alternate with heat for 15 minutes. Medications:    1. Naproxin (Aleve): 220mg 1-2 tablets twice a day PRN. 2. Acetaminophen (Tylenol):  500mg 1-2 tablets every 6 hours as needed for pain.     RTC:PRN

## 2022-04-19 NOTE — PROGRESS NOTES
Pain Assessment Encounter      Darlene Osorio  Chief Complaint   Patient presents with   Tomie Grater Shoulder Pain     right shoulder pain,no FABBY     Visit Vitals  /79   Pulse 78   Resp 18   Ht 5' 3\" (1.6 m)   Wt 161 lb 12.8 oz (73.4 kg)   SpO2 96%   BMI 28.66 kg/m²       Onset of Symptoms: since November 2021  Description:has difficulty lifting her right arm overhead. Pain causes limited ROM and interferes with sleep    Pain Scale:(1-10): 8  Trauma Hx: none  Hx of similar symptoms: Yes  Radiation: pain radiates up to her neck and sometimes down to her fingers    Progression: has worsened  What makes it better?: nothing  What makes it worse?:overhead movements    1. \"Have you been to the ER, urgent care clinic since your last visit? Hospitalized since your last visit? \" No    2. \"Have you seen or consulted any other health care providers outside of the [address] [place] [address] since your last visit? \" No     3. For patients aged 39-70: Has the patient had a colonoscopy / FIT/ Cologuard? Yes - no Care Gap present      If the patient is female:    4. For patients aged 41-77: Has the patient had a mammogram within the past 2 years? Yes - no Care Gap present      5. For patients aged 21-65: Has the patient had a pap smear?  Yes - no Care Gap present

## 2022-05-27 RX ORDER — PANTOPRAZOLE SODIUM 40 MG/1
TABLET, DELAYED RELEASE ORAL
Qty: 30 TABLET | Refills: 2 | Status: SHIPPED | OUTPATIENT
Start: 2022-05-27 | End: 2022-09-16 | Stop reason: SDUPTHER

## 2022-06-23 DIAGNOSIS — I10 ESSENTIAL HYPERTENSION: ICD-10-CM

## 2022-06-23 RX ORDER — AMLODIPINE BESYLATE 10 MG/1
TABLET ORAL
Qty: 90 TABLET | Refills: 1 | Status: SHIPPED | OUTPATIENT
Start: 2022-06-23

## 2022-08-02 ENCOUNTER — DOCUMENTATION ONLY (OUTPATIENT)
Dept: FAMILY MEDICINE CLINIC | Age: 67
End: 2022-08-02

## 2022-08-02 NOTE — PROGRESS NOTES
Received fax from [facility], no updated colonoscopy report on file, per their office last one completed at [facility] 11/10/2009.

## 2022-11-04 DIAGNOSIS — F33.0 DEPRESSION, MAJOR, RECURRENT, MILD (HCC): ICD-10-CM

## 2022-11-04 DIAGNOSIS — F43.22 ADJUSTMENT DISORDER WITH ANXIETY: ICD-10-CM

## 2022-11-04 RX ORDER — VENLAFAXINE HYDROCHLORIDE 150 MG/1
CAPSULE, EXTENDED RELEASE ORAL
Qty: 90 CAPSULE | Refills: 1 | Status: SHIPPED | OUTPATIENT
Start: 2022-11-04

## 2023-01-20 ENCOUNTER — APPOINTMENT (OUTPATIENT)
Dept: GENERAL RADIOLOGY | Age: 68
End: 2023-01-20
Attending: STUDENT IN AN ORGANIZED HEALTH CARE EDUCATION/TRAINING PROGRAM
Payer: MEDICARE

## 2023-01-20 ENCOUNTER — OFFICE VISIT (OUTPATIENT)
Dept: FAMILY MEDICINE CLINIC | Age: 68
End: 2023-01-20
Payer: MEDICARE

## 2023-01-20 ENCOUNTER — TELEPHONE (OUTPATIENT)
Dept: FAMILY MEDICINE CLINIC | Age: 68
End: 2023-01-20

## 2023-01-20 ENCOUNTER — HOSPITAL ENCOUNTER (EMERGENCY)
Age: 68
Discharge: HOME | End: 2023-01-20
Attending: STUDENT IN AN ORGANIZED HEALTH CARE EDUCATION/TRAINING PROGRAM
Payer: MEDICARE

## 2023-01-20 VITALS
TEMPERATURE: 98.4 F | RESPIRATION RATE: 17 BRPM | OXYGEN SATURATION: 97 % | SYSTOLIC BLOOD PRESSURE: 170 MMHG | DIASTOLIC BLOOD PRESSURE: 82 MMHG | HEART RATE: 68 BPM | WEIGHT: 163.14 LBS | HEIGHT: 63 IN | BODY MASS INDEX: 28.91 KG/M2

## 2023-01-20 VITALS
BODY MASS INDEX: 28.92 KG/M2 | RESPIRATION RATE: 14 BRPM | SYSTOLIC BLOOD PRESSURE: 175 MMHG | DIASTOLIC BLOOD PRESSURE: 108 MMHG | OXYGEN SATURATION: 95 % | WEIGHT: 163.2 LBS | HEART RATE: 76 BPM | HEIGHT: 63 IN | TEMPERATURE: 98.8 F

## 2023-01-20 DIAGNOSIS — I16.0 HYPERTENSIVE URGENCY: ICD-10-CM

## 2023-01-20 DIAGNOSIS — I10 ESSENTIAL HYPERTENSION: ICD-10-CM

## 2023-01-20 DIAGNOSIS — R07.9 CHEST PAIN, UNSPECIFIED TYPE: Primary | ICD-10-CM

## 2023-01-20 DIAGNOSIS — F33.0 DEPRESSION, MAJOR, RECURRENT, MILD (HCC): ICD-10-CM

## 2023-01-20 DIAGNOSIS — R07.89 CHEST PRESSURE: ICD-10-CM

## 2023-01-20 DIAGNOSIS — I16.0 HYPERTENSIVE URGENCY: Primary | ICD-10-CM

## 2023-01-20 DIAGNOSIS — F43.22 ADJUSTMENT DISORDER WITH ANXIETY: ICD-10-CM

## 2023-01-20 DIAGNOSIS — I10 ELEVATED BLOOD PRESSURE READING IN OFFICE WITH DIAGNOSIS OF HYPERTENSION: ICD-10-CM

## 2023-01-20 DIAGNOSIS — E11.40 TYPE 2 DIABETES MELLITUS WITH DIABETIC NEUROPATHY, WITHOUT LONG-TERM CURRENT USE OF INSULIN (HCC): ICD-10-CM

## 2023-01-20 LAB
ALBUMIN SERPL-MCNC: 3.7 G/DL (ref 3.5–5)
ALBUMIN/GLOB SERPL: 1.1 (ref 1.1–2.2)
ALP SERPL-CCNC: 83 U/L (ref 45–117)
ALT SERPL-CCNC: 35 U/L (ref 12–78)
ANION GAP SERPL CALC-SCNC: 6 MMOL/L (ref 5–15)
AST SERPL-CCNC: 17 U/L (ref 15–37)
BASOPHILS # BLD: 0 K/UL (ref 0–0.1)
BASOPHILS NFR BLD: 0 % (ref 0–1)
BILIRUB SERPL-MCNC: 0.3 MG/DL (ref 0.2–1)
BUN SERPL-MCNC: 12 MG/DL (ref 6–20)
BUN/CREAT SERPL: 14 (ref 12–20)
CALCIUM SERPL-MCNC: 9.5 MG/DL (ref 8.5–10.1)
CHLORIDE SERPL-SCNC: 105 MMOL/L (ref 97–108)
CO2 SERPL-SCNC: 25 MMOL/L (ref 21–32)
CREAT SERPL-MCNC: 0.86 MG/DL (ref 0.55–1.02)
DIFFERENTIAL METHOD BLD: NORMAL
EOSINOPHIL # BLD: 0.1 K/UL (ref 0–0.4)
EOSINOPHIL NFR BLD: 1 % (ref 0–7)
ERYTHROCYTE [DISTWIDTH] IN BLOOD BY AUTOMATED COUNT: 14.1 % (ref 11.5–14.5)
GLOBULIN SER CALC-MCNC: 3.5 G/DL (ref 2–4)
GLUCOSE SERPL-MCNC: 199 MG/DL (ref 65–100)
HCT VFR BLD AUTO: 38.1 % (ref 35–47)
HGB BLD-MCNC: 12.4 G/DL (ref 11.5–16)
IMM GRANULOCYTES # BLD AUTO: 0 K/UL (ref 0–0.04)
IMM GRANULOCYTES NFR BLD AUTO: 0 % (ref 0–0.5)
LYMPHOCYTES # BLD: 2.3 K/UL (ref 0.8–3.5)
LYMPHOCYTES NFR BLD: 30 % (ref 12–49)
MCH RBC QN AUTO: 27.7 PG (ref 26–34)
MCHC RBC AUTO-ENTMCNC: 32.5 G/DL (ref 30–36.5)
MCV RBC AUTO: 85 FL (ref 80–99)
MONOCYTES # BLD: 0.8 K/UL (ref 0–1)
MONOCYTES NFR BLD: 10 % (ref 5–13)
NEUTS SEG # BLD: 4.4 K/UL (ref 1.8–8)
NEUTS SEG NFR BLD: 59 % (ref 32–75)
NRBC # BLD: 0 K/UL (ref 0–0.01)
NRBC BLD-RTO: 0 PER 100 WBC
PLATELET # BLD AUTO: 255 K/UL (ref 150–400)
PMV BLD AUTO: 12.3 FL (ref 8.9–12.9)
POTASSIUM SERPL-SCNC: 3.8 MMOL/L (ref 3.5–5.1)
PROT SERPL-MCNC: 7.2 G/DL (ref 6.4–8.2)
RBC # BLD AUTO: 4.48 M/UL (ref 3.8–5.2)
SODIUM SERPL-SCNC: 136 MMOL/L (ref 136–145)
TROPONIN-HIGH SENSITIVITY: 6 NG/L (ref 0–51)
TROPONIN-HIGH SENSITIVITY: 8 NG/L (ref 0–51)
WBC # BLD AUTO: 7.5 K/UL (ref 3.6–11)

## 2023-01-20 PROCEDURE — 80053 COMPREHEN METABOLIC PANEL: CPT

## 2023-01-20 PROCEDURE — 85025 COMPLETE CBC W/AUTO DIFF WBC: CPT

## 2023-01-20 PROCEDURE — 99285 EMERGENCY DEPT VISIT HI MDM: CPT

## 2023-01-20 PROCEDURE — 36415 COLL VENOUS BLD VENIPUNCTURE: CPT

## 2023-01-20 PROCEDURE — 71046 X-RAY EXAM CHEST 2 VIEWS: CPT

## 2023-01-20 PROCEDURE — 93005 ELECTROCARDIOGRAM TRACING: CPT

## 2023-01-20 PROCEDURE — 84484 ASSAY OF TROPONIN QUANT: CPT

## 2023-01-20 RX ORDER — VALSARTAN AND HYDROCHLOROTHIAZIDE 160; 25 MG/1; MG/1
1 TABLET ORAL DAILY
Qty: 90 TABLET | Refills: 1 | Status: SHIPPED | OUTPATIENT
Start: 2023-01-20

## 2023-01-20 RX ORDER — VENLAFAXINE HYDROCHLORIDE 37.5 MG/1
37.5 CAPSULE, EXTENDED RELEASE ORAL DAILY
Qty: 90 CAPSULE | Refills: 0 | Status: SHIPPED | OUTPATIENT
Start: 2023-01-20

## 2023-01-20 NOTE — ED TRIAGE NOTES
The patient arrives from her PCP's office with chest pain and elevated BP. Patient reports that for about 3 days she has been experiencing consistent chest pain that feels heavy in nature and runs across her chest, down her right arm, and underneath her left breast. She also reports tingling in her left hand. The patient says yesterday she started with severe nausea. Her PCP felt she needed an ED evaluation. EMS gave two nitro and 4 baby aspirin. The Nitro took the pain form an 8 to a 4 according to EMS.

## 2023-01-20 NOTE — TELEPHONE ENCOUNTER
Hugh Calvert from [address] [facility] called and stated that they received two prescriptions for Rybelsus. One is for 3 mg and the other is 7 mg. They are requesting to receive a call back to confirm both prescriptions or one. Their call back number is [phone].     Thank you

## 2023-01-20 NOTE — PROGRESS NOTES
Blake Mayes is a 78 y.o. female    No chief complaint on file. 1. Have you been to the ER, urgent care clinic since your last visit? Hospitalized since your last visit? No  2. Have you seen or consulted any other health care providers outside of the [address] [place] [address] since your last visit? Include any pap smears or colon screening.  No    Visit Vitals  BP (!) 182/111 (BP 1 Location: Right arm, BP Patient Position: Sitting, BP Cuff Size: Adult)   Pulse 76   Temp 98.8 °F (37.1 °C)   Resp 14   Ht 5' 3\" (1.6 m)   Wt 163 lb 3.2 oz (74 kg)   SpO2 95%   BMI 28.91 kg/m²     3 most recent PHQ Screens 3/9/2022   PHQ Not Done -   Little interest or pleasure in doing things Not at all   Feeling down, depressed, irritable, or hopeless Several days   Total Score PHQ 2 1   Trouble falling or staying asleep, or sleeping too much Several days   Feeling tired or having little energy Not at all   Poor appetite, weight loss, or overeating Not at all   Feeling bad about yourself - or that you are a failure or have let yourself or your family down Several days   Trouble concentrating on things such as school, work, reading, or watching TV Not at all   Moving or speaking so slowly that other people could have noticed; or the opposite being so fidgety that others notice Not at all   Thoughts of being better off dead, or hurting yourself in some way Several days   PHQ 9 Score 4   How difficult have these problems made it for you to do your work, take care of your home and get along with others Somewhat difficult     Health Maintenance Due   Topic Date Due    Shingles Vaccine (1 of 2) Never done    Eye Exam Retinal or Dilated  03/19/2020    COVID-19 Vaccine (3 - Booster for Moderna series) 06/16/2021    Bone Densitometry (Dexa) Screening  Never done    Diabetic Alb to Cr ratio (uACR) test  05/26/2022    GFR test (Diabetes, CKD 3-4, OR last GFR 15-59)  01/25/2023    Foot Exam Q1  02/10/2023

## 2023-01-20 NOTE — PROGRESS NOTES
History of Present Illness:     Chief Complaint   Patient presents with    Follow-up     Patient here to follow up on BP and medication. Harjinder Nava is a 78 y.o. female     Depression/ anxiety  Still taking the Effexor  Anxious about her medical conditions    HTN  Home readings in 160-170s/ 80-90s  Not taking all of her BP medications  Not checking BPs at home  Currently only taking her HCTZ  Reports 1 episode of feeling dizzy, sweat, vomited and diarrhea yesterday; felt fine after that. Diabetes  Stopped taking Metformin  Not taking Lipitor    She does not like taking so many pills, so stopped taking some for about 3 mo    PMH (REVIEWED):  Past Medical History:   Diagnosis Date    Anxiety     Diabetes mellitus ([facility] [address]     Dyslipidemia     Headache     Hodgkin's lymphoma ([facility] [address] 1985    Nodule removed from throat    Hypertension     Low back pain 1/19/2012    MRI 3/10/14 revealed mild multilevel disk and facet degenerative change     Lumbar disc disease 03/2014    Thyroid nodule     Followed by endocrine, Dr. Annie Lowry. Ulcer        Current Medications/Allergies (REVIEWED):     Current Outpatient Medications on File Prior to Visit   Medication Sig Dispense Refill    pantoprazole (PROTONIX) 40 mg tablet Take 1 Tablet by mouth daily. 90 Tablet 1    venlafaxine-SR (EFFEXOR-XR) 150 mg capsule Take 1 Capsule by mouth daily. 30 Capsule 1    ibuprofen (MOTRIN) 400 mg tablet Take 1 Tab by mouth every six (6) hours as needed for Pain. 30 Tab 1    atorvastatin (LIPITOR) 40 mg tablet TAKE 1 TABLET BY MOUTH EVERY DAY (Patient not taking: Reported on 1/20/2023) 90 Tablet 3    metFORMIN (GLUCOPHAGE) 500 mg tablet Take 2 Tablets by mouth daily (with dinner). (Patient not taking: Reported on 1/20/2023) 180 Tablet 1     No current facility-administered medications on file prior to visit.        No Known Allergies      Review of Systems:     +Chest pain, shortness of breath  +Changes in vision      Objective: Vitals:    01/20/23 1304   BP: (!) 182/111   Pulse: 76   Resp: 14   Temp: 98.8 °F (37.1 °C)   SpO2: 95%   Weight: 163 lb 3.2 oz (74 kg)   Height: 5' 3\" (1.6 m)       Physical Exam:  General appearance - alert, well appearing, and in no distress  Chest - clear to auscultation, no wheezes, rales or rhonchi, symmetric air entry  Heart - normal rate, regular rhythm, normal S1, S2, no murmurs, rubs, clicks or gallops    Recent Labs:  No results found for this or any previous visit (from the past 12 hour(s)). Assessment and Plan:       ICD-10-CM ICD-9-CM    1. Hypertensive urgency  I16.0 401.9       2. Chest pressure  R07.89 786.59       3. Elevated blood pressure reading in office with diagnosis of hypertension  I10 401.9 valsartan-hydroCHLOROthiazide (DIOVAN-HCT) 160-25 mg per tablet      CBC W/O DIFF      4. Depression, major, recurrent, mild (HCC)  F33.0 296.31 venlafaxine-SR (EFFEXOR-XR) 37.5 mg capsule      5. Essential hypertension  Z77 319.7 METABOLIC PANEL, COMPREHENSIVE      6. Type 2 diabetes mellitus with diabetic neuropathy, without long-term current use of insulin (HCC)  E11.40 250.60 LIPID PANEL     463.4 METABOLIC PANEL, COMPREHENSIVE      semaglutide (Rybelsus) 3 mg tablet      semaglutide (Rybelsus) 7 mg tablet      HEMOGLOBIN A1C WITH EAG      MICROALBUMIN, UR, RAND W/ MICROALB/CREAT RATIO      7. Adjustment disorder with anxiety  F43.22 309.24 venlafaxine-SR (EFFEXOR-XR) 37.5 mg capsule          Severely elevated blood pressure in office to 182/111, 175/108 on repeat. Pt does admit to active chest pressure along the left side of chest and changes in vision. Last night, had severe chest pain and shortness of breath but she was reluctant to go to the ED. EMS called to transport to ED for further evaluation    Chronic conditions initially addressed and orders placed prior to pt disclosing she is having chest pain. Will plan on implementing plan pending ED eval and subsequent follow up.      Follow up: following ED sera Huynh MD    We discussed the expected course, resolution and complications of the diagnosis(es) in detail. Medication risks, benefits, costs, interactions, and alternatives were discussed as indicated. I advised her to contact the office if her condition worsens, changes or fails to improve as anticipated. She expressed understanding with the diagnosis(es) and plan.

## 2023-01-21 LAB
ATRIAL RATE: 70 BPM
CALCULATED P AXIS, ECG09: 71 DEGREES
CALCULATED R AXIS, ECG10: -16 DEGREES
CALCULATED T AXIS, ECG11: 39 DEGREES
DIAGNOSIS, 93000: NORMAL
P-R INTERVAL, ECG05: 170 MS
Q-T INTERVAL, ECG07: 400 MS
QRS DURATION, ECG06: 62 MS
QTC CALCULATION (BEZET), ECG08: 432 MS
VENTRICULAR RATE, ECG03: 70 BPM

## 2023-01-21 NOTE — ED PROVIDER NOTES
The patient is a 44-year-old female history of hypertension, lymphoma, hyperlipidemia and diabetes presenting today with chest discomfort. Patient reports that she has had pressure in her chest persistently for the past 3 days. It is not pleuritic nor is it exertional.  When she lies down at night the pain becomes sharp. At times it radiates down the right arm and at times it causes numbness in the left arm. No dizziness or diaphoresis. No history of coronary disease that she is aware of. She did not take her blood pressure medication today but was prescribed a new 1 by her primary care doctor which she is going to  from the pharmacy today. Right now her pain is mild. Past Medical History:   Diagnosis Date    Anxiety     Diabetes mellitus ([address]     Dyslipidemia     Headache     Hodgkin's lymphoma ([facility] [address]     Nodule removed from throat    Hypertension     Low back pain 2012    MRI 3/10/14 revealed mild multilevel disk and facet degenerative change     Lumbar disc disease 2014    Thyroid nodule     Followed by endocrine, Dr. Kassidy Mejia.       Ulcer        Past Surgical History:   Procedure Laterality Date    DELIVERY       HX GYN      VA REMOVAL NODES, NECK,CERV CMPLT           Family History:   Problem Relation Age of Onset    Elevated Lipids Mother     Hypertension Mother     Hypertension Father     Parkinsonism Father     Hypertension Sister     Cancer Brother     Stroke Maternal Grandmother     Hypertension Maternal Grandmother     Cancer Paternal Grandmother     Hypertension Paternal Grandmother     Cancer Paternal Grandfather     Hypertension Paternal Grandfather        Social History     Socioeconomic History    Marital status:      Spouse name: Not on file    Number of children: Not on file    Years of education: Not on file    Highest education level: Not on file   Occupational History    Not on file   Tobacco Use    Smoking status: Never    Smokeless tobacco: Never Substance and Sexual Activity    Alcohol use: No     Alcohol/week: 0.0 standard drinks    Drug use: No    Sexual activity: Not Currently     Birth control/protection: None   Other Topics Concern    Not on file   Social History Narrative    Not on file     Social Determinants of Health     Financial Resource Strain: Low Risk     Difficulty of Paying Living Expenses: Not hard at all   Food Insecurity: No Food Insecurity    Worried About Running Out of Food in the Last Year: Never true    Ran Out of Food in the Last Year: Never true   Transportation Needs: Not on file   Physical Activity: Not on file   Stress: Not on file   Social Connections: Not on file   Intimate Partner Violence: Not on file   Housing Stability: Not on file         ALLERGIES: Patient has no known allergies. Review of Systems   Constitutional:  Negative for chills and fever. HENT:  Negative for congestion and rhinorrhea. Eyes:  Negative for redness and visual disturbance. Respiratory:  Negative for cough and shortness of breath. Cardiovascular:  Positive for chest pain. Negative for leg swelling. Gastrointestinal:  Negative for abdominal pain, diarrhea, nausea and vomiting. Genitourinary:  Negative for dysuria, flank pain, frequency, hematuria and urgency. Musculoskeletal:  Positive for arthralgias. Negative for back pain, myalgias and neck pain. Skin:  Negative for rash and wound. Allergic/Immunologic: Negative for immunocompromised state. Neurological:  Positive for numbness. Negative for dizziness and headaches. Vitals:    01/20/23 1749 01/20/23 1804 01/20/23 1819 01/20/23 1834   BP:  138/70  (!) 170/82   Pulse: 86 80 93 68   Resp: 18 18 19 17   Temp:       SpO2: 97% 96% 95% 97%   Weight:       Height:                Physical Exam  Vitals and nursing note reviewed. Constitutional:       General: She is not in acute distress. Appearance: She is well-developed. She is not diaphoretic.    HENT:      Head: Normocephalic. Mouth/Throat:      Pharynx: No oropharyngeal exudate. Eyes:      General:         Right eye: No discharge. Left eye: No discharge. Pupils: Pupils are equal, round, and reactive to light. Cardiovascular:      Rate and Rhythm: Normal rate and regular rhythm. Heart sounds: Normal heart sounds. No murmur heard. No friction rub. No gallop. Comments: Equal pulses throughout  Pulmonary:      Effort: Pulmonary effort is normal. No respiratory distress. Breath sounds: Normal breath sounds. No stridor. No wheezing or rales. Abdominal:      General: Bowel sounds are normal. There is no distension. Palpations: Abdomen is soft. Tenderness: There is no abdominal tenderness. There is no guarding or rebound. Musculoskeletal:         General: No deformity. Normal range of motion. Cervical back: Normal range of motion and neck supple. Skin:     General: Skin is warm and dry. Capillary Refill: Capillary refill takes less than 2 seconds. Findings: No rash. Neurological:      Mental Status: She is alert and oriented to person, place, and time. Psychiatric:         Behavior: Behavior normal.        Medical Decision Making  Work-up:  Troponin negative x2  No leukocytosis or anemia  Renal function and electrolytes acceptable  Mildly hyperglycemic  Chest x-ray clear      The patient is a 61-year-old female presenting today with 3 days of chest discomfort that gets worse when she lies down to go to bed. On exam she is in no acute distress. Her EKG is nonischemic with 2 negative troponins making ACS unlikely. She has equal pulses throughout and her history is not typical of a dissection or aneurysm rupture. Patient is low risk for PE so I doubt this. Chest x-ray without evidence of esophageal perforation, pneumothorax or other acute pathology. Suspect GI etiology versus musculoskeletal.  Plan for discharge home.   Return precautions provided. Problems Addressed:  Chest pain, unspecified type: acute illness or injury  Hypertensive urgency: chronic illness or injury with exacerbation, progression, or side effects of treatment    Amount and/or Complexity of Data Reviewed  Labs: ordered. Radiology: ordered.       ED Course as of 01/21/23 0728   Fri Jan 20, 2023   1456 EKG time 1454  Normal sinus rhythm rate of 67 with a leftward axis, narrow QRS, normal QTC, nonspecific ST-T wave changes without ST elevations or [CC]      ED Course User Index  [CC] Marty Cavazos, DO       Procedures

## 2023-01-27 ENCOUNTER — LAB ONLY (OUTPATIENT)
Dept: FAMILY MEDICINE CLINIC | Age: 68
End: 2023-01-27

## 2023-01-27 DIAGNOSIS — I10 ESSENTIAL HYPERTENSION: ICD-10-CM

## 2023-01-27 DIAGNOSIS — E11.40 TYPE 2 DIABETES MELLITUS WITH DIABETIC NEUROPATHY, WITHOUT LONG-TERM CURRENT USE OF INSULIN (HCC): ICD-10-CM

## 2023-01-27 DIAGNOSIS — I10 ELEVATED BLOOD PRESSURE READING IN OFFICE WITH DIAGNOSIS OF HYPERTENSION: ICD-10-CM

## 2023-01-28 LAB
ALBUMIN SERPL-MCNC: 4.2 G/DL (ref 3.5–5)
ALBUMIN/GLOB SERPL: 1.2 (ref 1.1–2.2)
ALP SERPL-CCNC: 92 U/L (ref 45–117)
ALT SERPL-CCNC: 37 U/L (ref 12–78)
ANION GAP SERPL CALC-SCNC: 4 MMOL/L (ref 5–15)
AST SERPL-CCNC: 26 U/L (ref 15–37)
BILIRUB SERPL-MCNC: 0.5 MG/DL (ref 0.2–1)
BUN SERPL-MCNC: 12 MG/DL (ref 6–20)
BUN/CREAT SERPL: 13 (ref 12–20)
CALCIUM SERPL-MCNC: 10.5 MG/DL (ref 8.5–10.1)
CHLORIDE SERPL-SCNC: 106 MMOL/L (ref 97–108)
CHOLEST SERPL-MCNC: 304 MG/DL
CO2 SERPL-SCNC: 28 MMOL/L (ref 21–32)
CREAT SERPL-MCNC: 0.89 MG/DL (ref 0.55–1.02)
CREAT UR-MCNC: 241 MG/DL
ERYTHROCYTE [DISTWIDTH] IN BLOOD BY AUTOMATED COUNT: 13.8 % (ref 11.5–14.5)
EST. AVERAGE GLUCOSE BLD GHB EST-MCNC: 232 MG/DL
GLOBULIN SER CALC-MCNC: 3.5 G/DL (ref 2–4)
GLUCOSE SERPL-MCNC: 163 MG/DL (ref 65–100)
HBA1C MFR BLD: 9.7 % (ref 4–5.6)
HCT VFR BLD AUTO: 42.6 % (ref 35–47)
HDLC SERPL-MCNC: 56 MG/DL
HDLC SERPL: 5.4 (ref 0–5)
HGB BLD-MCNC: 13.3 G/DL (ref 11.5–16)
LDLC SERPL CALC-MCNC: 218.2 MG/DL (ref 0–100)
MCH RBC QN AUTO: 26.8 PG (ref 26–34)
MCHC RBC AUTO-ENTMCNC: 31.2 G/DL (ref 30–36.5)
MCV RBC AUTO: 85.9 FL (ref 80–99)
MICROALBUMIN UR-MCNC: 2.41 MG/DL
MICROALBUMIN/CREAT UR-RTO: 10 MG/G (ref 0–30)
NRBC # BLD: 0 K/UL (ref 0–0.01)
NRBC BLD-RTO: 0 PER 100 WBC
PLATELET # BLD AUTO: 282 K/UL (ref 150–400)
PMV BLD AUTO: 12.7 FL (ref 8.9–12.9)
POTASSIUM SERPL-SCNC: 4.2 MMOL/L (ref 3.5–5.1)
PROT SERPL-MCNC: 7.7 G/DL (ref 6.4–8.2)
RBC # BLD AUTO: 4.96 M/UL (ref 3.8–5.2)
SODIUM SERPL-SCNC: 138 MMOL/L (ref 136–145)
TRIGL SERPL-MCNC: 149 MG/DL (ref ?–150)
VLDLC SERPL CALC-MCNC: 29.8 MG/DL
WBC # BLD AUTO: 6.3 K/UL (ref 3.6–11)

## 2023-01-30 ENCOUNTER — TELEPHONE (OUTPATIENT)
Dept: FAMILY MEDICINE CLINIC | Age: 68
End: 2023-01-30

## 2023-01-30 DIAGNOSIS — E11.9 CONTROLLED TYPE 2 DIABETES MELLITUS WITHOUT COMPLICATION, WITHOUT LONG-TERM CURRENT USE OF INSULIN (HCC): ICD-10-CM

## 2023-01-30 DIAGNOSIS — E11.40 TYPE 2 DIABETES MELLITUS WITH DIABETIC NEUROPATHY, WITHOUT LONG-TERM CURRENT USE OF INSULIN (HCC): ICD-10-CM

## 2023-01-30 RX ORDER — ATORVASTATIN CALCIUM 40 MG/1
40 TABLET, FILM COATED ORAL DAILY
Qty: 90 TABLET | Refills: 3 | Status: SHIPPED | OUTPATIENT
Start: 2023-01-30

## 2023-01-30 RX ORDER — METFORMIN HYDROCHLORIDE 500 MG/1
1000 TABLET ORAL
Qty: 180 TABLET | Refills: 3 | Status: SHIPPED | OUTPATIENT
Start: 2023-01-30

## 2023-01-30 NOTE — PROGRESS NOTES
Reviewed labs over the phone with patient  Will resume statin for HLD  Confirmed she now has started Rybelsus and clarified she needs to take in addition to Metformin

## 2023-01-30 NOTE — TELEPHONE ENCOUNTER
Returned call to patient regarding her medications. She reports feeling OK since her ED visit. BP this AM was 140/86 on home cuff. We clarified medications and concerns. She confirms she has Valsartan-HCTZ and Rybelsus. She needs refills of Metformin and Atorvastatin. We clarified her medications and indications.      Kenyon Mayer MD

## 2023-03-14 RX ORDER — PANTOPRAZOLE SODIUM 40 MG/1
TABLET, DELAYED RELEASE ORAL
Qty: 90 TABLET | Refills: 1 | Status: SHIPPED | OUTPATIENT
Start: 2023-03-14

## 2023-03-22 ENCOUNTER — OFFICE VISIT (OUTPATIENT)
Dept: FAMILY MEDICINE CLINIC | Age: 68
End: 2023-03-22

## 2023-03-22 VITALS
BODY MASS INDEX: 27.89 KG/M2 | RESPIRATION RATE: 18 BRPM | WEIGHT: 157.4 LBS | SYSTOLIC BLOOD PRESSURE: 143 MMHG | OXYGEN SATURATION: 96 % | TEMPERATURE: 98.6 F | DIASTOLIC BLOOD PRESSURE: 83 MMHG | HEIGHT: 63 IN | HEART RATE: 91 BPM

## 2023-03-22 DIAGNOSIS — I10 ESSENTIAL HYPERTENSION: ICD-10-CM

## 2023-03-22 DIAGNOSIS — E78.2 MIXED HYPERLIPIDEMIA: ICD-10-CM

## 2023-03-22 DIAGNOSIS — Z00.00 MEDICARE ANNUAL WELLNESS VISIT, SUBSEQUENT: ICD-10-CM

## 2023-03-22 DIAGNOSIS — I10 ELEVATED BLOOD PRESSURE READING IN OFFICE WITH DIAGNOSIS OF HYPERTENSION: ICD-10-CM

## 2023-03-22 DIAGNOSIS — Z71.89 ACP (ADVANCE CARE PLANNING): ICD-10-CM

## 2023-03-22 DIAGNOSIS — Z13.820 OSTEOPOROSIS SCREENING: ICD-10-CM

## 2023-03-22 DIAGNOSIS — Z12.31 ENCOUNTER FOR SCREENING MAMMOGRAM FOR MALIGNANT NEOPLASM OF BREAST: ICD-10-CM

## 2023-03-22 DIAGNOSIS — N95.8 OTHER SPECIFIED MENOPAUSAL AND PERIMENOPAUSAL DISORDERS: ICD-10-CM

## 2023-03-22 DIAGNOSIS — E11.40 TYPE 2 DIABETES MELLITUS WITH DIABETIC NEUROPATHY, WITHOUT LONG-TERM CURRENT USE OF INSULIN (HCC): Primary | ICD-10-CM

## 2023-03-22 DIAGNOSIS — R68.89 OTHER GENERAL SYMPTOMS AND SIGNS: ICD-10-CM

## 2023-03-22 RX ORDER — VALSARTAN AND HYDROCHLOROTHIAZIDE 320; 25 MG/1; MG/1
1 TABLET, FILM COATED ORAL DAILY
Qty: 90 TABLET | Refills: 1 | Status: SHIPPED | OUTPATIENT
Start: 2023-03-22

## 2023-03-22 RX ORDER — METFORMIN HYDROCHLORIDE 500 MG/1
1000 TABLET ORAL 2 TIMES DAILY WITH MEALS
Qty: 180 TABLET | Refills: 0
Start: 2023-03-22

## 2023-03-22 NOTE — PROGRESS NOTES
Chief Complaint   Patient presents with    Follow Up Chronic Condition    Hypertension    Medication Evaluation     Visit Vitals  /78 (BP 1 Location: Right upper arm, BP Patient Position: Sitting, BP Cuff Size: Large adult)   Pulse 91   Temp 98.6 °F (37 °C) (Oral)   Resp 18   Ht 5' 3\" (1.6 m)   Wt 157 lb 6.4 oz (71.4 kg)   SpO2 96%   BMI 27.88 kg/m²     1. Have you been to the ER, urgent care clinic since your last visit? Hospitalized since your last visit? No    2. Have you seen or consulted any other health care providers outside of the [address] [place] [address] since your last visit? Include any pap smears or colon screening. No      General Health Questions   -During the past 4 weeks:   -how would you rate your health in general? Good   -how often have you been bothered by feeling dizzy when standing up? Never   -how much have you been bothered by bodily pain? not   -Have you noticed any hearing difficulties? Yes   -has your physical and emotional health limited your social activities with family or friends? no    Emotional Health Questions   -Do you have a history of depression, anxiety, or emotional problems? yes  -Over the past 2 weeks, have you felt down, depressed or hopeless? no  -Over the past 2 weeks, have you felt little interest or pleasure in doing things? no    Health Habits   Please describe your diet habits: Good  Do you get 5 servings of fruits or vegetables daily? no  Do you exercise regularly? No  Do you average more than 1 drink per night or more than 7 drinks a week? No  On any one occasion in the past three months, have you had more than 3 drinks containing alcohol.  No    Activities of Daily Living and Functional Status   -Do you need help with eating, walking, dressing, bathing, toileting, the phone, transportation, shopping, preparing meals, housework, laundry, medications or managing money? no  -In the past four weeks, was someone available to help you if you needed and wanted help with anything? Yes  Has your family/caregivers states any concerns about your memory? No  -Are you confident are you that you can control and manage most of your health problems? yes  -Have you been given information to help you keep track of your medications? yes  -How often do you have trouble taking your medications as prescribed? never  Fall Risk and Home Safety   Have you fallen 2 or more times in the past year? no  Does your home have rugs in the hallways? no, Do you have grab bars in the bathrooms?  no, Does your home have handrails on the stairs? yes, Do you have adequate lighting in your home? yes  Do you have smoke detectors and check them regularly? yes  Do you have difficulties driving a car/vehicle? no  Do you always fasten your seat belt when you are in a car? Yes  Do you feel safe at home? Yes  Are you concerned about your safety at home?  No

## 2023-03-22 NOTE — ACP (ADVANCE CARE PLANNING)
Advance Care Planning     Advance Care Planning (ACP) Physician/NP/PA Conversation      Date of Conversation: 3/22/2023  Conducted with: Patient with Decision Making Capacity    Healthcare Decision Maker:     Primary Decision Maker: Param Delgadillo - [phone]  Click here to complete [address] including selection of the Healthcare Decision Maker Relationship (ie \"Primary\")      Today we documented Decision Maker(s) consistent with Legal Next of Kin hierarchy. Care Preferences:    Hospitalization: \"If your health worsens and it becomes clear that your chance of recovery is unlikely, what would be your preference regarding hospitalization? \"  The patient would prefer hospitalization. Ventilation: \"If you were unable to breathe on your own and your chance of recovery was unlikely, what would be your preference about the use of a ventilator (breathing machine) if it was available to you? \"   The patient would desire the use of a ventilator. Resuscitation: \"In the event your heart stopped as a result of an underlying serious health condition, would you want attempts to be made to restart your heart, or would you prefer a natural death? \"   Yes, attempt to resuscitate.     Additional topics discussed: ventilation preferences and resuscitation preferences    Conversation Outcomes / Follow-Up Plan:   ACP incomplete - refer to ACP Clinical Specialist  Reviewed DNR/DNI and patient elects Full Code (Attempt Resuscitation)     Length of Voluntary ACP Conversation in minutes:  <16 minutes (Non-Billable)    Kristin Covarrubias MD

## 2023-03-22 NOTE — PROGRESS NOTES
History of Present Illness:     Chief Complaint   Patient presents with    Follow Up Chronic Condition    Hypertension    Medication Evaluation       Farshad Tatum is a 78 y.o. female     HTN  Getting elevated readings at home  In office BP is good  This AM at home, 156/97  Home BPs ranging 130-150s/ 70-90s    Diabetes  Not taking the Rybelsus due to SEs  Read about the SEs of Invokana and does not want to start  Followed by an eye doctor for eye care      PMH (REVIEWED):  Past Medical History:   Diagnosis Date    Anxiety     Diabetes mellitus ([facility] [address]     Dyslipidemia     Headache     Hodgkin's lymphoma ([facility] [address] 1985    Nodule removed from throat    Hypertension     Low back pain 1/19/2012    MRI 3/10/14 revealed mild multilevel disk and facet degenerative change     Lumbar disc disease 03/2014    Thyroid nodule     Followed by endocrine, Dr. Richardson Levels. Ulcer        Current Medications/Allergies (REVIEWED):     Current Outpatient Medications on File Prior to Visit   Medication Sig Dispense Refill    pantoprazole (PROTONIX) 40 mg tablet TAKE 1 TABLET BY MOUTH EVERY DAY 90 Tablet 1    atorvastatin (LIPITOR) 40 mg tablet Take 1 Tablet by mouth daily. 90 Tablet 3    venlafaxine-SR (EFFEXOR-XR) 37.5 mg capsule Take 1 Capsule by mouth daily. Take in addition to Effexor 150mg for total of 187.5mg daily. 90 Capsule 0    venlafaxine-SR (EFFEXOR-XR) 150 mg capsule Take 1 Capsule by mouth daily. 30 Capsule 1    ibuprofen (MOTRIN) 400 mg tablet Take 1 Tab by mouth every six (6) hours as needed for Pain. 30 Tab 1     No current facility-administered medications on file prior to visit. No Known Allergies      Review of Systems:     Review of Systems   Constitutional:  Negative for chills and fever. Respiratory:  Negative for cough. Cardiovascular:  Negative for chest pain, palpitations and leg swelling. Musculoskeletal:  Positive for back pain. Neurological:  Positive for tingling.         Objective:     Vitals: 03/22/23 1306 03/22/23 1351   BP: 129/78 (!) 143/83   Pulse: 91    Resp: 18    Temp: 98.6 °F (37 °C)    TempSrc: Oral    SpO2: 96%    Weight: 157 lb 6.4 oz (71.4 kg)    Height: 5' 3\" (1.6 m)        Physical Exam:  General appearance - alert, well appearing, and in no distress  Mental status - alert, oriented to person, place, and time, normal mood, behavior, speech, dress, motor activity, and thought processes  Chest - clear to auscultation, no wheezes, rales or rhonchi, symmetric air entry  Heart - normal rate, regular rhythm, normal S1, S2, no murmurs, rubs, clicks or gallops  Neurological - alert, oriented, normal speech, no focal findings or movement disorder noted  Extremities - peripheral pulses normal, no pedal edema, no clubbing or cyanosis, feet normal, good pulses, normal color, temperature and sensation, monofilament sensory exam is normal in both feet    Recent Labs:  No results found for this or any previous visit (from the past 12 hour(s)). Assessment and Plan:       ICD-10-CM ICD-9-CM    1. Type 2 diabetes mellitus with diabetic neuropathy, without long-term current use of insulin (HCC)  E11.40 250.60 REFERRAL TO NUTRITION     357.2 metFORMIN (GLUCOPHAGE) 500 mg tablet       DIABETES FOOT EXAM      METABOLIC PANEL, COMPREHENSIVE      VITAMIN B12      HEMOGLOBIN A1C WITH EAG      2. Essential hypertension  I10 401.9 valsartan-hydroCHLOROthiazide (DIOVAN-HCT) 320-25 mg per tablet      METABOLIC PANEL, COMPREHENSIVE      3. Mixed hyperlipidemia  I23.9 708.3 METABOLIC PANEL, COMPREHENSIVE      LIPID PANEL      4. Encounter for screening mammogram for malignant neoplasm of breast  Z12.31 V76.12 [facility] MAMMO BI SCREENING INCL CAD      5. Osteoporosis screening  Z13.820 V82.81 DEXA BONE DENSITY STUDY AXIAL      VITAMIN D, 25 HYDROXY      6. Other specified menopausal and perimenopausal disorders   N95.8 627.8 DEXA BONE DENSITY STUDY AXIAL      7.  ACP (advance care planning)  Z71.89 V65.49 FULL CODE REFERRAL TO ACP CLINICAL SPECIALIST      8. Medicare annual wellness visit, subsequent  Z00.00 V70.0       9. Elevated blood pressure reading in office with diagnosis of hypertension  I10 401.9       10. Other general symptoms and signs   R68.89 780.99 VITAMIN B12          T2DM, poorly controlled  Id not tolerate Rybelsus  Did not start Invokana due to fear of SEs  Will increase Metformin to 1000mg BID  Referred to nutritionist  Repeat A1c at 3 mo (early May)    HTN, improving but not at goal  Will increase Diovan to 320-25mg daily  Check BMP in 1 mo    HLD, worsening  LDL >200 last check due to statin non compliance  Pt has resumed Atorvastatin 40mg  Check CMP and Lipid panel with labs in 1 mo    Mammogram and DEXA ordered for screening    ACP  -  is MDM  - FULL code  - Referred to ACP specialist for AD planning    MWV; see LPN's notes     Follow up: 1 mo for labs, 3 mo for diabetes follow up    Munira Lucero MD    We discussed the expected course, resolution and complications of the diagnosis(es) in detail. Medication risks, benefits, costs, interactions, and alternatives were discussed as indicated. I advised her to contact the office if her condition worsens, changes or fails to improve as anticipated. She expressed understanding with the diagnosis(es) and plan.

## 2023-03-22 NOTE — PATIENT INSTRUCTIONS
Gilma Wright, MPH, RDN  [address] [facility]  [address] [place], [place]  Yordan Brown [address], [facility], [address] [facility]  [address]   [address] [facility], [address] ([facility])  [place], [address] [place] [address]  [phone]

## 2023-03-23 ENCOUNTER — PATIENT OUTREACH (OUTPATIENT)
Dept: CASE MANAGEMENT | Age: 68
End: 2023-03-23

## 2023-03-23 NOTE — ACP (ADVANCE CARE PLANNING)
Advance Care Planning   Ambulatory ACP Specialist Patient Outreach    Date:  3/23/2023    ACP Specialist:  Rhonda Ramirez    Outreach call to patient in follow-up to ACP Specialist referral from:  Camilla Danielle MD    [x] PCP  [] Provider   [] Ambulatory Care Management [] Other     For:                  [x] Advance Directive Assistance              [] Complete Portable DNR order              [] Complete POST/POLST/MOST              [] Code Status Discussion             [] Discuss Goals of Care             [] Early ACP Decision-Making              [] Other (Specify)    Date Referral Received:  3/22/2023    Outreaches:       [x] 1st -  Date:  3/23/2023                              Intervention:  [x] Spoke with Patient  By: [x] Phone [] Left Voice mail [] Email / Mail    [] [employer]  [] Other (Specify) : Outcomes:  Patient is agreeable to a conversation with ACP Specialist Anthony Lindsey on Friday, 3/31/23, at 1:00 PM.  A copy of [facility] and ACP information sheets sent to patient's confirmed email address on file. [] 2nd -  Date:                                Intervention:  [] Spoke with Patient  By: [] Phone [] Left Voice mail [] Email / Mail    [] [employer]  [] Other [id number]) : Outcomes:                [] 3rd -  Date:                               Intervention:  [] Spoke with Patient  By: [] Phone [] Left Voice mail [] Email / Mail    [] [employer]  [] Other [id number]) : Outcomes:           []  Additional Outreach -  Date:     (Specify Dates & special circumstances): Outcomes:       Next Step:   [x] ACP scheduled conversation  [] Outreach again in one week               [x] Email / Mail ACP Info Sheets  [x] Email / Mail Advance Directive   [] Closing referral.  Routing closure to referring provider/staff and to ACP Specialist .     [] Closure letter mailed to patient with invitation to contact ACP Specialist if / when ready. [] Other (Specify here):       Thank you for this referral.

## 2023-03-31 ENCOUNTER — DOCUMENTATION ONLY (OUTPATIENT)
Dept: CASE MANAGEMENT | Age: 68
End: 2023-03-31

## 2023-03-31 NOTE — ACP (ADVANCE CARE PLANNING)
Advance Care Planning   Ambulatory ACP Specialist Patient Outreach    Date:  3/31/2023    ACP Specialist:  Ildefonso Sweet RN    Outreach call to patient in follow-up to ACP Specialist referral from:    [x] PCP  [] Provider   [] Ambulatory Care Management [] Other     For:                  [x] Advance Directive Assistance              [] Complete Portable DNR order              [] Complete POST/POLST/MOST              [] Code Status Discussion             [] Discuss Goals of Care             [] Early ACP Decision-Making              [] Other (Specify)    Date Referral Received:3/23/23    Outreaches:       [] 1st -  Date:  3/31/23                              Intervention:  [x] Spoke with Patient  By: [x] Phone [] Left Voice mail [] Email / Mail    [] depicthart  [] Other (Specify) : Outcomes:RN called pt this morning to remind her of scheduled appointment this afternoon and to request changing the time to 9:26DE due to a conflict. Pt agreed to this time. Ildefonso Sweet RN    Addendum:  RN called pt @ 1:30PM for ACP conversation. Pt asked if the appointment could be rescheduled as she is out of town taking care of a grandchild. RN rescheduled the appointment for 4/3/23 @9:00AM.  Pt requested that the ACP information sheets and the blank AMD be resent by email for her review prior to our conversation, and these were emailed. Ildefonso Sweet RN             [] 2nd -  Date:                                Intervention:  [] Spoke with Patient  By: [] Phone [] Left Voice mail [] Email / Mail    [] depicthart  [] Other [id number] : Outcomes:                [] 3rd -  Date:                               Intervention:  [] Spoke with Patient  By: [] Phone [] Left Voice mail [] Email / Mail    [] MyChart  [] Other [id number]) : Outcomes:           []  Additional Outreach -  Date:     (Specify Dates & special circumstances):                     Outcomes: Next Step:   [] ACP scheduled conversation  [] Outreach again in one week               [x] Email / Mail ACP Info Sheets  [x] Email / Mail Advance Directive   [] Closing referral.  Routing closure to referring provider/staff and to ACP Specialist . [] Closure letter mailed to patient with invitation to contact ACP Specialist if / when ready. [] Other (Specify here):       Thank you for this referral.

## 2023-04-03 ENCOUNTER — DOCUMENTATION ONLY (OUTPATIENT)
Dept: CASE MANAGEMENT | Age: 68
End: 2023-04-03

## 2023-04-03 NOTE — ACP (ADVANCE CARE PLANNING)
Advance Care Planning     Advance Care Planning Clinical Specialist  Conversation Note      Date of ACP Conversation: 04/03/23    Conversation Conducted with:  Patient with Decision Making Capacity    ACP Clinical Specialist: Aixa Hung RN      Health Care Decision Maker:    Current Designated Health Care Decision Maker:     Primary Decision Maker: Yessica Medina - [phone]    Secondary Decision Maker: Janice Cloud - Jonatan - [phone]    Today we documented Decision Maker(s) consistent with Legal Next of Kin hierarchy. Care Preferences    Hospitalization: \"If your health worsens and it becomes clear that your chance of recovery is unlikely, what would your preference be regarding hospitalization? \"    Choice:  [x]  The patient wants hospitalization  []  The patient prefers comfort-focused treatment without hospitalization. Ventilation: \"If you were in your present state of health and suddenly became very ill and were unable to breathe on your own, what would your preference be about the use of a ventilator (breathing machine) if it were available to you? \"      If patient would desire the use of a ventilator (breathing machine), answer \"yes\", if not \"no\":yes    \"If your health worsens and it becomes clear that your chance of recovery is unlikely, what would your preference be about the use of a ventilator (breathing machine) if it were available to you? \"     Would the patient desire the use of a ventilator (breathing machine)? YES, \"I would like for my family to decide when to remove me from the ventilator. \"      Resuscitation  \"CPR works best to restart the heart when there is a sudden event, like a heart attack, in someone who is otherwise healthy. Unfortunately, CPR does not typically restart the heart for people who have serious health conditions or who are very sick. \"    \"In the event your heart stopped as a result of an underlying serious health condition, would you want attempts to be made to restart your heart (answer \"yes\" for attempt to resuscitate) or would you prefer a natural death (answer \"no\" for do not attempt to resuscitate)? \" No \"If there is no chance of recovery, I do not want CPR. \"      [] Yes   No   Educated Patient / Kirk Clunes regarding differences between Advance Directives and portable DNR orders. Length of ACP Conversation in minutes:  28    Conversation Outcomes:  [] ACP discussion completed  [] Existing advance directive reviewed with patient; no changes to patient's previously recorded wishes   [] New Advance Directive completed   [] Portable Do Not Resuscitate prepared for Provider review and signature  [] POLST/POST/MOLST/MOST prepared for Provider review and signature      Follow-up plan:    [x] Schedule follow-up conversation to continue planning  [] Referred individual to Provider for additional questions/concerns   [] Advised patient/agent/surrogate to review completed ACP document and update if needed with changes in condition, patient preferences or care setting     [x] This note routed to one or more involved healthcare providers      RN spoke with pt by phone for this ACP discussion. Virtual visit through [facility] was offered, however pt declined. Pt appeared to be alert and oriented during this call. RN reviewed the above questions with pt and her answers are indicated. Her answers to the first three questions were the same as when she discussed these situations with her PCP on 3/22/23. Her answer to the last question regarding CPR (please see above) is different from her discussion with her PCP. RN reviewed the AMD form with pt. She chose her  to be her primary agent for health care decisions, and her daughter, Carmen Mendez, as her secondary agent. When we began to discuss Page 2, pt stated she would like to have more to think about her preferences for health care instructions.   She asked if we could schedule another discussion on 4/12/23 @ 1:30PM.  This date and time was agreed upon for a follow up conversation.   Anita Miller RN

## 2023-04-04 ENCOUNTER — PATIENT MESSAGE (OUTPATIENT)
Dept: FAMILY MEDICINE CLINIC | Age: 68
End: 2023-04-04

## 2023-04-05 NOTE — TELEPHONE ENCOUNTER
From: Garo Erwin  To: Tacho Bowden MD  Sent: 4/4/2023 10:27 PM EDT  Subject: lucius Ozuna  I was having a conversation with my pharmacist on yesterday and she spoke really high of the rybelsus and said that if I didn't give it at least a full 2 weeks to get in my system I would have the issues that I had with it. she recommended that I give it another try. she said that most of the people that come to get it from there really like it after the side effects have subsided. So I would like to start over and try it again. starting with the 3 for 30 days if you don't mind. sorry to be such a pain. .. Catherin Mohs Catherin Mohs  just need to get this right I HAVE to see my Violet Cintrone Granddaughter grow up :) :)

## 2023-04-05 NOTE — TELEPHONE ENCOUNTER
Pt open to trying Rybelsus again. Sent 3mg dose to pharmacy. Will increase to 7mg if tolerates well after 30 days.

## 2023-04-17 ENCOUNTER — HOSPITAL ENCOUNTER (OUTPATIENT)
Age: 68
Setting detail: OUTPATIENT SURGERY
Discharge: HOME | End: 2023-04-17
Attending: INTERNAL MEDICINE | Admitting: INTERNAL MEDICINE
Payer: MEDICARE

## 2023-04-17 ENCOUNTER — ANESTHESIA EVENT (OUTPATIENT)
Dept: ENDOSCOPY | Age: 68
End: 2023-04-17
Payer: MEDICARE

## 2023-04-17 ENCOUNTER — ANESTHESIA (OUTPATIENT)
Dept: ENDOSCOPY | Age: 68
End: 2023-04-17
Payer: MEDICARE

## 2023-04-17 VITALS
DIASTOLIC BLOOD PRESSURE: 83 MMHG | TEMPERATURE: 97.8 F | HEART RATE: 76 BPM | BODY MASS INDEX: 26.72 KG/M2 | WEIGHT: 156.53 LBS | OXYGEN SATURATION: 98 % | RESPIRATION RATE: 15 BRPM | HEIGHT: 64 IN | SYSTOLIC BLOOD PRESSURE: 144 MMHG

## 2023-04-17 DIAGNOSIS — F43.22 ADJUSTMENT DISORDER WITH ANXIETY: ICD-10-CM

## 2023-04-17 DIAGNOSIS — F33.0 DEPRESSION, MAJOR, RECURRENT, MILD (HCC): ICD-10-CM

## 2023-04-17 LAB
GLUCOSE BLD STRIP.AUTO-MCNC: 138 MG/DL (ref 65–117)
SERVICE CMNT-IMP: ABNORMAL

## 2023-04-17 PROCEDURE — 2709999900 HC NON-CHARGEABLE SUPPLY: Performed by: INTERNAL MEDICINE

## 2023-04-17 PROCEDURE — 74011250636 HC RX REV CODE- 250/636: Performed by: NURSE ANESTHETIST, CERTIFIED REGISTERED

## 2023-04-17 PROCEDURE — 76060000031 HC ANESTHESIA FIRST 0.5 HR: Performed by: INTERNAL MEDICINE

## 2023-04-17 PROCEDURE — 82962 GLUCOSE BLOOD TEST: CPT

## 2023-04-17 PROCEDURE — 74011000250 HC RX REV CODE- 250: Performed by: NURSE ANESTHETIST, CERTIFIED REGISTERED

## 2023-04-17 PROCEDURE — 76040000019: Performed by: INTERNAL MEDICINE

## 2023-04-17 RX ORDER — VENLAFAXINE HYDROCHLORIDE 37.5 MG/1
37.5 CAPSULE, EXTENDED RELEASE ORAL DAILY
Qty: 90 CAPSULE | Refills: 0 | Status: SHIPPED | OUTPATIENT
Start: 2023-04-17

## 2023-04-17 RX ORDER — FLUMAZENIL 0.1 MG/ML
0.2 INJECTION INTRAVENOUS
Status: DISCONTINUED | OUTPATIENT
Start: 2023-04-17 | End: 2023-04-17 | Stop reason: HOSPADM

## 2023-04-17 RX ORDER — LIDOCAINE HYDROCHLORIDE 20 MG/ML
INJECTION, SOLUTION EPIDURAL; INFILTRATION; INTRACAUDAL; PERINEURAL AS NEEDED
Status: DISCONTINUED | OUTPATIENT
Start: 2023-04-17 | End: 2023-04-17 | Stop reason: HOSPADM

## 2023-04-17 RX ORDER — DEXTROMETHORPHAN/PSEUDOEPHED 2.5-7.5/.8
1.2 DROPS ORAL
Status: DISCONTINUED | OUTPATIENT
Start: 2023-04-17 | End: 2023-04-17 | Stop reason: HOSPADM

## 2023-04-17 RX ORDER — FENTANYL CITRATE 50 UG/ML
100 INJECTION, SOLUTION INTRAMUSCULAR; INTRAVENOUS
Status: DISCONTINUED | OUTPATIENT
Start: 2023-04-17 | End: 2023-04-17 | Stop reason: HOSPADM

## 2023-04-17 RX ORDER — SODIUM CHLORIDE 9 MG/ML
50 INJECTION, SOLUTION INTRAVENOUS CONTINUOUS
Status: DISCONTINUED | OUTPATIENT
Start: 2023-04-17 | End: 2023-04-17 | Stop reason: HOSPADM

## 2023-04-17 RX ORDER — ATROPINE SULFATE 0.1 MG/ML
0.5 INJECTION INTRAVENOUS
Status: DISCONTINUED | OUTPATIENT
Start: 2023-04-17 | End: 2023-04-17 | Stop reason: HOSPADM

## 2023-04-17 RX ORDER — MIDAZOLAM HYDROCHLORIDE 1 MG/ML
.25-5 INJECTION, SOLUTION INTRAMUSCULAR; INTRAVENOUS
Status: DISCONTINUED | OUTPATIENT
Start: 2023-04-17 | End: 2023-04-17 | Stop reason: HOSPADM

## 2023-04-17 RX ORDER — PROPOFOL 10 MG/ML
INJECTION, EMULSION INTRAVENOUS
Status: DISCONTINUED | OUTPATIENT
Start: 2023-04-17 | End: 2023-04-17 | Stop reason: HOSPADM

## 2023-04-17 RX ORDER — PROPOFOL 10 MG/ML
INJECTION, EMULSION INTRAVENOUS AS NEEDED
Status: DISCONTINUED | OUTPATIENT
Start: 2023-04-17 | End: 2023-04-17 | Stop reason: HOSPADM

## 2023-04-17 RX ORDER — NALOXONE HYDROCHLORIDE 0.4 MG/ML
0.4 INJECTION, SOLUTION INTRAMUSCULAR; INTRAVENOUS; SUBCUTANEOUS
Status: DISCONTINUED | OUTPATIENT
Start: 2023-04-17 | End: 2023-04-17 | Stop reason: HOSPADM

## 2023-04-17 RX ORDER — EPINEPHRINE 0.1 MG/ML
1 INJECTION INTRACARDIAC; INTRAVENOUS
Status: DISCONTINUED | OUTPATIENT
Start: 2023-04-17 | End: 2023-04-17 | Stop reason: HOSPADM

## 2023-04-17 RX ORDER — SODIUM CHLORIDE 9 MG/ML
INJECTION, SOLUTION INTRAVENOUS
Status: DISCONTINUED | OUTPATIENT
Start: 2023-04-17 | End: 2023-04-17 | Stop reason: HOSPADM

## 2023-04-17 RX ADMIN — LIDOCAINE HYDROCHLORIDE 50 MG: 20 INJECTION, SOLUTION EPIDURAL; INFILTRATION; INTRACAUDAL; PERINEURAL at 14:14

## 2023-04-17 RX ADMIN — PROPOFOL 100 MG: 10 INJECTION, EMULSION INTRAVENOUS at 14:14

## 2023-04-17 RX ADMIN — PROPOFOL 100 MCG/KG/MIN: 10 INJECTION, EMULSION INTRAVENOUS at 14:14

## 2023-04-17 RX ADMIN — PROPOFOL 50 MG: 10 INJECTION, EMULSION INTRAVENOUS at 14:17

## 2023-04-17 RX ADMIN — SODIUM CHLORIDE: 9 INJECTION, SOLUTION INTRAVENOUS at 14:00

## 2023-04-17 NOTE — ANESTHESIA PREPROCEDURE EVALUATION
Relevant Problems   No relevant active problems       Anesthetic History   No history of anesthetic complications            Review of Systems / Medical History  Patient summary reviewed and pertinent labs reviewed    Pulmonary  Within defined limits                 Neuro/Psych         Psychiatric history     Cardiovascular    Hypertension          Hyperlipidemia    Exercise tolerance: >4 METS     GI/Hepatic/Renal                Endo/Other    Diabetes: type 2  Hypothyroidism       Other Findings            Physical Exam    Airway  Mallampati: II  TM Distance: 4 - 6 cm  Neck ROM: normal range of motion   Mouth opening: Normal     Cardiovascular    Rhythm: regular  Rate: normal         Dental  No notable dental hx       Pulmonary  Breath sounds clear to auscultation               Abdominal  GI exam deferred       Other Findings            Anesthetic Plan    ASA: 3  Anesthesia type: MAC          Induction: Intravenous  Anesthetic plan and risks discussed with: Patient

## 2023-04-17 NOTE — DISCHARGE INSTRUCTIONS
Miller Stafford  [id number]  1955    DISCHARGE INSTRUCTIONS    Impression:  Normal colonoscopy to the cecum, with no evidence of neoplasia, diverticular disease, or mucosal abnormality. Recommendations:   Resume usual medications and high-fiber diet  Because of age, routine follow up exam not recommended     Discomfort:  Redness at IV site- apply warm compress to area; if redness or soreness persist- contact your physician. There may be a slight amount of blood passed from the rectum. Gaseous discomfort - walking, belching will help relieve any discomfort. You may not operate a vehicle for 12 hours. You may not engage in an occupation involving machinery or appliances for rest of today. You may not drink alcoholic beverages for at least 12 hours. Avoid making any critical decisions for at least 24 hours. DIET:   High fiber diet. Medications:                Resume usual medications today   ACTIVITY:  You may resume your normal daily activities it is recommended that you spend the remainder of the day resting -  avoid any strenuous activity. CALL M.D. ANY SIGN OF:   Increasing pain, nausea, vomiting  Abdominal distension (swelling)  New increased bleeding (oral or rectal)  Fever (chills)  Pain in chest area  Bloody discharge from nose or mouth  Shortness of breath     Follow-up Instructions:  Call Dr. Rolanda Bone if you have any questions or problems. DISCHARGE SUMMARY from Nurse    The following personal items collected during your admission are returned to you:   Dental Appliance: Dental Appliances: None  Vision: Visual Aid:  At bedside, Glasses  Hearing Aid:    Jewelry:    Clothing:    Other Valuables:    Valuables sent to safe:

## 2023-04-17 NOTE — PROCEDURES
[address]  [phone]      2023    Colonoscopy Procedure Note  Freda Hernandez  :  1955  Michael Medical Record Number: [id number]    Indications:     Screening colonoscopy  PCP:  Josy Johnson MD  Anesthesia/Sedation: see nursing notes  Endoscopist:  Dr. Aram Donald  Assistants: None  Complications:  None  Estimate Blood Loss:  None    Permit:  The indications, risks, benefits and alternatives were reviewed with the patient or their decision maker who was provided an opportunity to ask questions and all questions were answered. The specific risks of colonoscopy with conscious sedation were reviewed, including but not limited to anesthetic complication, bleeding, adverse drug reaction, missed lesion, infection, IV site reactions, and intestinal perforation which would lead to the need for surgical repair. Alternatives to colonoscopy including radiographic imaging, observation without testing, or laboratory testing were reviewed including the limitations of those alternatives. After considering the options and having all their questions answered, the patient or their decision maker provided both verbal and written consent to proceed. Procedure in Detail:  After obtaining informed consent, positioning of the patient in the left lateral decubitus position, and conduction of a pre-procedure pause or \"time out\" the endoscope was introduced into the anus and advanced to the cecum, which was identified by the ileocecal valve and appendiceal orifice. The quality of the colonic preparation was good. A careful inspection was made as the colonoscope was withdrawn, findings and interventions are described below.     Appendiceal orifice photographed    Findings:   normal  no mucosal lesion appreciated    Specimens:    none    Implants: None    Complications:   None; patient tolerated the procedure well. Estimated blood loss: none    Impression:  Normal colonoscopy to the cecum, with no evidence of neoplasia, diverticular disease, or mucosal abnormality. Recommendations:   Resume usual medications and high-fiber diet  Because of age, routine follow up exam not recommended       Thank you for entrusting me with this patient's care. Please do not hesitate to contact me with any questions or if I can be of assistance with any of your other patients' GI needs.     Signed By: Niya Mackay MD                        April 17, 2023

## 2023-04-17 NOTE — PROGRESS NOTES
Endoscopy discharge instructions have been reviewed and given to patient. The patient verbalized understanding and acceptance of instructions. Dr. Debra Miller discussed with patient procedure findings and next steps.

## 2023-04-17 NOTE — H&P
Patient Name: Radha Chan  Gender: Female   (age): 1955 (67)       Referring Physician:    Jakub Atkinson [address]  [phone] (phone)  [phone] (fax)     Chief Complaint:    GERD (follow-up), change in bowel habits     History of Present Illness:        Personal history longstanding indigestion, heartburn; if she misses even a days dosing of medication usually has return of symptoms probably. As long as there is daily use of medication she perceives good . New problem is of many months of unanticipated and usually uncontrollable bowel movements; so far she has avoided any major embarrassment. She also notices every time she has urination she has a bowel movement with that she perceives the need or not. She has not had any bleeding, vomiting, weight loss. Past Medical History  Medical Conditions:   Anemia  COVID-19  Diabetes Mellitus  High blood pressure  High cholesterol  Surgical Procedures:     Dx Studies:   Abdominal U/S, 3/2016  CAT Scan, 3/2016  Colonoscopy,   EGD, 10/5/2016, Hiatal Hernia in the, Erosion in the gastroesophageal junction. (Biopsy), Normal mucosa in the stomach.  (DESIREE-Test) and Normal mucosa in the duodenum  Endoscopy,   Pre-Procedure Call, 2016  Medications:   amlodipine 10 mg Take 1 tablet by mouth once a day  atorvastatin 40 mg Take 1 tablet by mouth once a day  fluoxetine 40 mg Take 1 capsule by mouth once a day  hydrochlorothiazide 25 mg Take 1 tablet by mouth once a day  metformin 500 mg Take 1 tablet by mouth twice a day  pantoprazole 40 mg Take 1 tablet by mouth once a day  Allergies:   Patient has no known allergies or drug allergies  Immunizations:   COVID Vaccine, 21 Moderna  Influenza vaccination (refused)  Influenza vaccination (refused)  Social History  Alcohol:   None  Tobacco:   Never smoker  Drugs:   None  Exercise:   Exercise less than 3 times a week.  Caffeine:   Coffee or Tea # of cups per day:.  1.  Marital Status:    Unknown     Family History   No history of Colon Cancer, Colon Polyps, Esophogeal Cancer, GI Cancers, IBD (Crohn's or UC), Liver disease  Review of Systems:  Cardiovascular: Denies chest pain, irregular heart beat, palpitations, peripheral edema, syncope, Sweats. Constitutional: Presents suffers from fatigue, weight gain, weight loss. Denies fever, loss of appetite. ENMT: Presents suffers from hearing loss. Denies nose bleeds, sore throat. Endocrine: Denies excessive thirst, heat intolerance. Eyes: Denies loss of vision. Gastrointestinal: Presents suffers from abdominal swelling, change in bowel habits, constipation, Bloating/gas, heartburn, stomach cramps, Stool incontinence. Denies abdominal pain, diarrhea, jaundice, nausea, rectal bleeding, vomiting, dysphagia, rectal pain, hematemesis. Genitourinary: Denies dark urine, dysuria, frequent urination, hematuria, incontinence. Hematologic/Lymphatic: Denies easy bruising, prolonged bleeding. Integumentary: Denies itching, rashes, sun sensitivity. Musculoskeletal: Presents suffers from arthritis, back pain. Denies gout, joint pain, muscle weakness, stiffness. Neurological: Denies dizziness, fainting, frequent headaches, memory loss. Psychiatric: Denies anxiety, depression, difficulty sleeping, hallucinations, nervousness, panic attacks, paranoia. Respiratory: Denies cough, dyspnea, wheezing. Vital Signs: See nursing notes    Physical Exam:  Constitutional:  Appearance: No distress, appears comfortable. Skin:  Inspection: No rash, no jaundice,. Head/face: Inspection: Normacephalic, atraumatic,. Eyes:  Conjunctivae/lids: Normal.  ENMT:  External: Normal.  Neck:  Neck: Normal appearance, trachea midline. Respiratory:  Effort: Normal respiratory effort, comfortable, speaks in complete sentences.   Auscultation: normal breath sounds, no rubs, wheezes or rhonchi. Cardiovascular: Auscultation: normal, S1 and S2,no gallops,no rubs or murmurs. Gastrointestinal/Abdomen:  Abdomen: non-distended, LLQ tender,. Liver/Spleen: normal,normal size,Liver size and consistency normal, spleen is non-palpable. Musculoskeletal:  Gait/station: normal,. Muscles: normal strength and tone, no atrophy or abnormal movements. Psychiatric:  Judgment/insight: Normal,normal judgement, normal insight. Mood and affect: Normal mood, affect full,no evidence of depression, anxiety or agitation. Impressions:   Diaphragmatic hernia without obstruction or gangrene  Gastroesophageal reflux disease (GERD)  Full incontinence of feces  Abdominal tenderness, left lower quadrant    Plan:   EI've discussed colonoscopy possible biopsy, polypectomy, cautery, injection, alternatives, complications including but not limited to pain, cardiopulmonary event, bleeding, perforation requiring additional blood transfusion or operative repair; all questions answered.

## 2023-04-17 NOTE — ANESTHESIA POSTPROCEDURE EVALUATION
Procedure(s):  COLONOSCOPY. MAC    Anesthesia Post Evaluation      Multimodal analgesia: multimodal analgesia not used between 6 hours prior to anesthesia start to PACU discharge  Patient location during evaluation: PACU  Patient participation: complete - patient participated  Level of consciousness: awake and alert  Pain score: 0  Pain management: satisfactory to patient  Airway patency: patent  Anesthetic complications: no  Cardiovascular status: acceptable  Respiratory status: acceptable  Post anesthesia nausea and vomiting:  none  Final Post Anesthesia Temperature Assessment:  Normothermia (36.0-37.5 degrees C)      INITIAL Post-op Vital signs:   Vitals Value Taken Time   /82 04/17/23 1445   Temp 36.6 °C (97.8 °F) 04/17/23 1436   Pulse 76 04/17/23 1450   Resp 15 04/17/23 1450   SpO2 98 % 04/17/23 1450   Vitals shown include unvalidated device data.

## 2023-04-17 NOTE — PERIOP NOTES
1412  Timeout performed. Anesthesia staff at patient's bedside administering anesthesia and monitoring patients vital signs throughout procedure. See anesthesia note. Post procedure, report received from Charlie WOODS. 18  Endoscope was pre-cleaned at bedside immediately following procedure by endo Jose Miguel camacho. 1430  Patient tolerated procedure. Abdomen soft and patient arousable and voices no complaints. Patient transported to endoscopy recovery area. Report given to post procedure RNRose.

## 2023-04-17 NOTE — PROGRESS NOTES
Anish Guard  1955  [id number]    Situation:  Verbal report received from: [facility] RN  Procedure: Procedure(s):  COLONOSCOPY    Background:    Preoperative diagnosis: Full incontinence of feces [R15.9]  Abdominal tenderness of left lower quadrant, rebound tenderness presence not specified [R10.814]  Postoperative diagnosis: * No post-op diagnosis entered *    :  Dr. Debra Miller  Assistant(s): Endoscopy Technician-1: Erin Parr  Endoscopy RN-1: Cherie Rodriguez RN    Specimens: * No specimens in log *  H. Pylori  no    Assessment:  Intra-procedure medications   Anesthesia gave intra-procedure sedation and medications, see anesthesia flow sheet yes    Intravenous fluids: NS@ KVO     Vital signs stable yes    Abdominal assessment: round and soft yes    Recommendation:  Discharge patient per MD order yes.   Family or Friend grandaughter  Permission to share finding with family or friend yes

## 2023-04-27 DIAGNOSIS — E11.40 TYPE 2 DIABETES MELLITUS WITH DIABETIC NEUROPATHY, WITHOUT LONG-TERM CURRENT USE OF INSULIN (HCC): ICD-10-CM

## 2023-04-29 RX ORDER — ORAL SEMAGLUTIDE 3 MG/1
TABLET ORAL
Qty: 30 TABLET | Refills: 0 | Status: SHIPPED | OUTPATIENT
Start: 2023-04-29

## 2023-05-03 ENCOUNTER — LAB ONLY (OUTPATIENT)
Dept: FAMILY MEDICINE CLINIC | Age: 68
End: 2023-05-03

## 2023-05-03 DIAGNOSIS — E78.2 MIXED HYPERLIPIDEMIA: ICD-10-CM

## 2023-05-03 DIAGNOSIS — E11.40 TYPE 2 DIABETES MELLITUS WITH DIABETIC NEUROPATHY, WITHOUT LONG-TERM CURRENT USE OF INSULIN (HCC): ICD-10-CM

## 2023-05-03 DIAGNOSIS — Z13.820 OSTEOPOROSIS SCREENING: ICD-10-CM

## 2023-05-03 DIAGNOSIS — I10 ESSENTIAL HYPERTENSION: ICD-10-CM

## 2023-05-03 DIAGNOSIS — R68.89 OTHER GENERAL SYMPTOMS AND SIGNS: ICD-10-CM

## 2023-05-04 LAB
25(OH)D3+25(OH)D2 SERPL-MCNC: 16.5 NG/ML (ref 30–100)
ALBUMIN SERPL-MCNC: 4.3 G/DL (ref 3.8–4.8)
ALBUMIN/GLOB SERPL: 1.6 {RATIO} (ref 1.2–2.2)
ALP SERPL-CCNC: 91 IU/L (ref 44–121)
ALT SERPL-CCNC: 17 IU/L (ref 0–32)
AST SERPL-CCNC: 14 IU/L (ref 0–40)
BILIRUB SERPL-MCNC: 0.3 MG/DL (ref 0–1.2)
BUN SERPL-MCNC: 8 MG/DL (ref 8–27)
BUN/CREAT SERPL: 9 (ref 12–28)
CALCIUM SERPL-MCNC: 10 MG/DL (ref 8.7–10.3)
CHLORIDE SERPL-SCNC: 103 MMOL/L (ref 96–106)
CHOLEST SERPL-MCNC: 161 MG/DL (ref 100–199)
CO2 SERPL-SCNC: 26 MMOL/L (ref 20–29)
CREAT SERPL-MCNC: 0.94 MG/DL (ref 0.57–1)
EGFRCR SERPLBLD CKD-EPI 2021: 67 ML/MIN/1.73
EST. AVERAGE GLUCOSE BLD GHB EST-MCNC: 174 MG/DL
GLOBULIN SER CALC-MCNC: 2.7 G/DL (ref 1.5–4.5)
GLUCOSE SERPL-MCNC: 137 MG/DL (ref 70–99)
HBA1C MFR BLD: 7.7 % (ref 4.8–5.6)
HDLC SERPL-MCNC: 50 MG/DL
IMP & REVIEW OF LAB RESULTS: NORMAL
LDLC SERPL CALC-MCNC: 90 MG/DL (ref 0–99)
POTASSIUM SERPL-SCNC: 4 MMOL/L (ref 3.5–5.2)
PROT SERPL-MCNC: 7 G/DL (ref 6–8.5)
SODIUM SERPL-SCNC: 141 MMOL/L (ref 134–144)
TRIGL SERPL-MCNC: 116 MG/DL (ref 0–149)
VIT B12 SERPL-MCNC: 449 PG/ML (ref 232–1245)
VLDLC SERPL CALC-MCNC: 21 MG/DL (ref 5–40)

## 2023-05-16 DIAGNOSIS — F33.0 MAJOR DEPRESSIVE DISORDER, RECURRENT, MILD (HCC): ICD-10-CM

## 2023-05-16 DIAGNOSIS — F43.22 ADJUSTMENT DISORDER WITH ANXIETY: ICD-10-CM

## 2023-05-16 RX ORDER — VENLAFAXINE HYDROCHLORIDE 150 MG/1
CAPSULE, EXTENDED RELEASE ORAL
Qty: 90 CAPSULE | Refills: 1 | Status: SHIPPED | OUTPATIENT
Start: 2023-05-16

## 2023-05-16 NOTE — TELEPHONE ENCOUNTER
Requested Prescriptions     Pending Prescriptions Disp Refills    venlafaxine (EFFEXOR XR) 150 MG extended release capsule [Pharmacy Med Name: VENLAFAXINE HCL  MG CAP] 90 capsule 1     Sig: TAKE 1 CAPSULE BY MOUTH EVERY DAY     Last Prescribed: 02/10/203    Last Office Visit 03/22/2023    Next Scheduled Appointment: No scheduled Follow up   --Recommended follow up in 1 Month for Labs and 3 Months Follow Up on diabetes  --Encounter forwarded to scheduling to assist with scheduling a follow up appointment

## 2023-05-17 DIAGNOSIS — F43.22 ADJUSTMENT DISORDER WITH ANXIETY: ICD-10-CM

## 2023-05-17 DIAGNOSIS — F33.0 MAJOR DEPRESSIVE DISORDER, RECURRENT, MILD (HCC): ICD-10-CM

## 2023-05-17 RX ORDER — VENLAFAXINE HYDROCHLORIDE 37.5 MG/1
CAPSULE, EXTENDED RELEASE ORAL
Qty: 90 CAPSULE | Refills: 1 | Status: SHIPPED | OUTPATIENT
Start: 2023-05-17

## 2023-05-17 RX ORDER — IBUPROFEN 400 MG/1
400 TABLET ORAL EVERY 6 HOURS PRN
COMMUNITY
Start: 2021-02-17

## 2023-05-22 ENCOUNTER — TRANSCRIBE ORDERS (OUTPATIENT)
Facility: HOSPITAL | Age: 68
End: 2023-05-22

## 2023-05-22 DIAGNOSIS — Z12.31 ENCOUNTER FOR SCREENING MAMMOGRAM FOR MALIGNANT NEOPLASM OF BREAST: ICD-10-CM

## 2023-05-22 DIAGNOSIS — Z13.820 OSTEOPOROSIS SCREENING: Primary | ICD-10-CM

## 2023-05-22 DIAGNOSIS — N95.8 OTHER SPECIFIED MENOPAUSAL AND PERIMENOPAUSAL DISORDERS: ICD-10-CM

## 2023-05-24 ENCOUNTER — TELEPHONE (OUTPATIENT)
Age: 68
End: 2023-05-24

## 2023-05-24 NOTE — TELEPHONE ENCOUNTER
----- Message from Zack sent at 5/22/2023  1:11 PM EDT -----  Subject: Message to Provider    QUESTIONS  Information for Provider? Patient is requesting to schedule screening   mammogram and bone density test. Unable to see orders for these tests   through [facility]. Patient is requesting they be ordered. ---------------------------------------------------------------------------  --------------  Ayush Dougherty [facility]  [phone]; OK to leave message on voicemail  ---------------------------------------------------------------------------  --------------  SCRIPT ANSWERS  Relationship to Patient?  Self

## 2023-06-01 ENCOUNTER — HOSPITAL ENCOUNTER (OUTPATIENT)
Facility: HOSPITAL | Age: 68
End: 2023-06-01
Payer: MEDICARE

## 2023-06-01 ENCOUNTER — HOSPITAL ENCOUNTER (OUTPATIENT)
Facility: HOSPITAL | Age: 68
Discharge: HOME | End: 2023-06-01
Payer: MEDICARE

## 2023-06-01 DIAGNOSIS — Z12.31 ENCOUNTER FOR SCREENING MAMMOGRAM FOR MALIGNANT NEOPLASM OF BREAST: ICD-10-CM

## 2023-06-01 DIAGNOSIS — N95.8 OTHER SPECIFIED MENOPAUSAL AND PERIMENOPAUSAL DISORDERS: ICD-10-CM

## 2023-06-01 DIAGNOSIS — Z13.820 OSTEOPOROSIS SCREENING: ICD-10-CM

## 2023-06-01 PROCEDURE — 77080 DXA BONE DENSITY AXIAL: CPT

## 2023-06-01 PROCEDURE — 77067 SCR MAMMO BI INCL CAD: CPT

## 2023-07-18 ENCOUNTER — OFFICE VISIT (OUTPATIENT)
Age: 68
End: 2023-07-18
Payer: MEDICARE

## 2023-07-18 VITALS
DIASTOLIC BLOOD PRESSURE: 80 MMHG | BODY MASS INDEX: 24.28 KG/M2 | SYSTOLIC BLOOD PRESSURE: 127 MMHG | HEART RATE: 90 BPM | TEMPERATURE: 97.3 F | HEIGHT: 64 IN | RESPIRATION RATE: 16 BRPM | WEIGHT: 142.2 LBS | OXYGEN SATURATION: 95 %

## 2023-07-18 DIAGNOSIS — E11.40 TYPE 2 DIABETES MELLITUS WITH DIABETIC NEUROPATHY, WITHOUT LONG-TERM CURRENT USE OF INSULIN (HCC): Primary | ICD-10-CM

## 2023-07-18 DIAGNOSIS — E55.9 VITAMIN D DEFICIENCY: ICD-10-CM

## 2023-07-18 DIAGNOSIS — T88.7XXA SIDE EFFECT OF MEDICATION: ICD-10-CM

## 2023-07-18 PROBLEM — U07.1 COVID-19: Status: ACTIVE | Noted: 2020-02-01

## 2023-07-18 PROCEDURE — 99214 OFFICE O/P EST MOD 30 MIN: CPT | Performed by: FAMILY MEDICINE

## 2023-07-18 PROCEDURE — 3079F DIAST BP 80-89 MM HG: CPT | Performed by: FAMILY MEDICINE

## 2023-07-18 PROCEDURE — 3074F SYST BP LT 130 MM HG: CPT | Performed by: FAMILY MEDICINE

## 2023-07-18 PROCEDURE — 3051F HG A1C>EQUAL 7.0%<8.0%: CPT | Performed by: FAMILY MEDICINE

## 2023-07-18 PROCEDURE — 1123F ACP DISCUSS/DSCN MKR DOCD: CPT | Performed by: FAMILY MEDICINE

## 2023-07-18 SDOH — ECONOMIC STABILITY: FOOD INSECURITY: WITHIN THE PAST 12 MONTHS, YOU WORRIED THAT YOUR FOOD WOULD RUN OUT BEFORE YOU GOT MONEY TO BUY MORE.: NEVER TRUE

## 2023-07-18 SDOH — ECONOMIC STABILITY: FOOD INSECURITY: WITHIN THE PAST 12 MONTHS, THE FOOD YOU BOUGHT JUST DIDN'T LAST AND YOU DIDN'T HAVE MONEY TO GET MORE.: NEVER TRUE

## 2023-07-18 SDOH — ECONOMIC STABILITY: INCOME INSECURITY: HOW HARD IS IT FOR YOU TO PAY FOR THE VERY BASICS LIKE FOOD, HOUSING, MEDICAL CARE, AND HEATING?: NOT HARD AT ALL

## 2023-07-18 ASSESSMENT — PATIENT HEALTH QUESTIONNAIRE - PHQ9
SUM OF ALL RESPONSES TO PHQ QUESTIONS 1-9: 1
SUM OF ALL RESPONSES TO PHQ QUESTIONS 1-9: 1
2. FEELING DOWN, DEPRESSED OR HOPELESS: 1
1. LITTLE INTEREST OR PLEASURE IN DOING THINGS: 0
SUM OF ALL RESPONSES TO PHQ QUESTIONS 1-9: 1
SUM OF ALL RESPONSES TO PHQ9 QUESTIONS 1 & 2: 1
SUM OF ALL RESPONSES TO PHQ QUESTIONS 1-9: 1

## 2023-07-18 ASSESSMENT — ENCOUNTER SYMPTOMS
NAUSEA: 1
BLOOD IN STOOL: 1
CONSTIPATION: 0
ABDOMINAL PAIN: 0

## 2023-07-18 NOTE — PROGRESS NOTES
History of Present Illness:     Chief Complaint   Patient presents with    Diabetes       Alix Sultana is a 78 y.o. female     Diabetes follow up  Worries she is having SE of the Rybelsus  Had an episode of diarrhea and worries the stool was red  Has now been on the Rybelsus 7mg for 30 days  Does feel nauseated when she doesn't eat but also doesn't feel hungry     Low vitamin D  Completed high dose of vitamin D  Still feels drained    PMH (REVIEWED):  Past Medical History:   Diagnosis Date    Anxiety     Diabetes mellitus ([address])     Dyslipidemia     Headache     Hypertension     Low back pain 01/19/2012    MRI 3/10/14 revealed mild multilevel disk and facet degenerative change     Lumbar disc disease 03/2014    Non Hodgkin's lymphoma ([address]) 1985    Nodule removed from throat    Thyroid nodule     Followed by endocrine, Dr. Carloz Nova. Ulcer        Current Medications/Allergies (REVIEWED):     Current Outpatient Medications on File Prior to Visit   Medication Sig Dispense Refill    Semaglutide (RYBELSUS) 7 MG TABS Take 1 tablet by mouth daily 90 tablet 0    venlafaxine (EFFEXOR XR) 37.5 MG extended release capsule TAKE 1 CAPSULE BY MOUTH DAILY. TAKE IN ADDITION TO EFFEXOR 150MG FOR TOTAL . 5MG DAILY. 90 capsule 1    venlafaxine (EFFEXOR XR) 150 MG extended release capsule TAKE 1 CAPSULE BY MOUTH EVERY DAY 90 capsule 1    vitamin D (ERGOCALCIFEROL) 1.25 MG (55450 UT) CAPS capsule Take 1 capsule by mouth once a week 4 capsule 5    atorvastatin (LIPITOR) 40 MG tablet Take 1 tablet by mouth daily      metFORMIN (GLUCOPHAGE) 500 MG tablet TAKE 2 TABLETS BY MOUTH DAILY (WITH DINNER). pantoprazole (PROTONIX) 40 MG tablet Take 1 tablet by mouth daily      valsartan-hydroCHLOROthiazide (DIOVAN-HCT) 320-25 MG per tablet Take 1 tablet by mouth daily      ibuprofen (ADVIL;MOTRIN) 400 MG tablet Take 1 tablet by mouth every 6 hours as needed       No current facility-administered medications on file prior to visit.

## 2023-08-07 ENCOUNTER — TELEPHONE (OUTPATIENT)
Age: 68
End: 2023-08-07

## 2023-08-07 NOTE — TELEPHONE ENCOUNTER
----- Message from Indigo Elliott, [place] sent at 8/1/2023 11:39 AM EDT -----  Subject: Message to Provider    QUESTIONS  Information for Provider? Pt called to reschedule her lab appointment that   is scheduled for 08/04/2023. Please call the pt back to reschedule.   ---------------------------------------------------------------------------  --------------  Ji Stanley Luis Alfredo  [phone]; OK to leave message on voicemail  ---------------------------------------------------------------------------  --------------  SCRIPT ANSWERS  Relationship to Patient?  Self

## 2023-08-08 ENCOUNTER — NURSE ONLY (OUTPATIENT)
Age: 68
End: 2023-08-08

## 2023-08-08 DIAGNOSIS — E55.9 VITAMIN D DEFICIENCY: ICD-10-CM

## 2023-08-08 DIAGNOSIS — E11.40 TYPE 2 DIABETES MELLITUS WITH DIABETIC NEUROPATHY, WITHOUT LONG-TERM CURRENT USE OF INSULIN (HCC): ICD-10-CM

## 2023-08-09 LAB
25(OH)D3+25(OH)D2 SERPL-MCNC: 61 NG/ML (ref 30–100)
HBA1C MFR BLD: 6.6 % (ref 4.8–5.6)

## 2023-08-22 DIAGNOSIS — K21.9 GASTRO-ESOPHAGEAL REFLUX DISEASE WITHOUT ESOPHAGITIS: Primary | ICD-10-CM

## 2023-08-24 RX ORDER — PANTOPRAZOLE SODIUM 40 MG/1
TABLET, DELAYED RELEASE ORAL
Qty: 90 TABLET | Refills: 1 | Status: SHIPPED | OUTPATIENT
Start: 2023-08-24

## 2023-08-24 NOTE — TELEPHONE ENCOUNTER
Medication Refill Request    Bryan Celeste is requesting a refill of the following medication(s):   Requested Prescriptions     Pending Prescriptions Disp Refills    pantoprazole (PROTONIX) 40 MG tablet [Pharmacy Med Name: PANTOPRAZOLE SOD DR 40 MG TAB] 90 tablet 1     Sig: TAKE 1 TABLET BY MOUTH EVERY DAY      Last provider to prescribe medication: Dr. Gasper Griffin  Last Date of Medication Prescribed: 03/14/2023   Last Office Visit Date: 07/18/2023    Please send refill to:    [facility] [address] [facility], [address] [place] [address] [phone] [facility] Laws [address] [place] [address]  Phone: [phone] Fax: [phone]

## 2023-08-31 ENCOUNTER — TELEPHONE (OUTPATIENT)
Age: 68
End: 2023-08-31

## 2023-08-31 NOTE — TELEPHONE ENCOUNTER
HORTENCIA  GROUP DOCUMENTATION INDIVIDUAL                                                                          Group Therapy Note    Date: 3/14/2023    Group Start Time: 1400  Group End Time: 1500  Group Topic: Process Group - Inpatient    [facility] CARE MANAGEMENT    [place] BSW    [facility] GROUP DOCUMENTATION GROUP    Group Therapy Note    The writer discussed goal setting with the group and encouraged building new habits. Attendees: 4/9       Attendance: Did not attend    Additional Notes: The patient was encouraged to come to group but did not attend.     NATALIA Rogers The pharmacist from [facility] called stating that they received a call from the patient's insurance company stating that they needed a diagnosis code for the Rybelsus 7 mg. If more clarification is needed the pharmacy can be reached at [phone]. Thanks!

## 2023-09-01 NOTE — TELEPHONE ENCOUNTER
This writer reached out to pharmacy. Notified pharmacy of the diagnosis code for patients Rybelsus. Pharmacy states patient insurance is saying a payment is due but no PA is needed.

## 2023-09-01 NOTE — TELEPHONE ENCOUNTER
This writer reached out to patient, verified patient by 2 identifiers with patient. Informed patient that we spoke with the pharmacy and gave them the Diagnosis code for patient medication. Informed patient that [facility] states that they are uncertain if she is in a donut and have to pay 259.81 for the medication. Patient states she will reach out to pharmacy as her insurance information have changed. No further questions at this time.

## 2023-09-06 ENCOUNTER — CLINICAL DOCUMENTATION (OUTPATIENT)
Age: 68
End: 2023-09-06

## 2023-09-06 NOTE — PROGRESS NOTES
Prior Authorization    Prior auth submitted via [email or web address]    Insurance Carrier/Provider: [employer]    Medication Name/ Dosage: Rybelsis 7mg tablets    Quantity/Day Supply: 30 tablets/30 days    DX: Type 2 diabetes mellitus with diabetic neuropathy [facility])    Reference Number:     Outcome: Approval, Effective Until 12/31/2023    If Denied Reason for Denial: N/A

## 2023-09-18 DIAGNOSIS — F33.0 MAJOR DEPRESSIVE DISORDER, RECURRENT, MILD (HCC): ICD-10-CM

## 2023-09-18 DIAGNOSIS — F43.22 ADJUSTMENT DISORDER WITH ANXIETY: ICD-10-CM

## 2023-09-19 RX ORDER — VENLAFAXINE HYDROCHLORIDE 150 MG/1
CAPSULE, EXTENDED RELEASE ORAL
Qty: 90 CAPSULE | Refills: 1 | Status: SHIPPED | OUTPATIENT
Start: 2023-09-19

## 2023-09-19 NOTE — TELEPHONE ENCOUNTER
Medication Refill Request    Juan Ramon Cobian is requesting a refill of the following medication(s):   Requested Prescriptions     Pending Prescriptions Disp Refills    venlafaxine (EFFEXOR XR) 150 MG extended release capsule [Pharmacy Med Name: VENLAFAXINE HCL  MG CAP] 90 capsule 1     Sig: TAKE 1 CAPSULE BY MOUTH EVERY DAY      Last provider to prescribe medication: Dr. Mohinder Pretty  Last Date of Medication Prescribed: 05/17/2023   Last Office Visit Date: 07/18/2023    [id number] [place] [address], [address] [place] [address] [phone] [place] [address]  Phone: [phone] Fax: [phone]

## 2023-10-04 DIAGNOSIS — I10 ESSENTIAL (PRIMARY) HYPERTENSION: ICD-10-CM

## 2023-10-04 RX ORDER — VALSARTAN AND HYDROCHLOROTHIAZIDE 320; 25 MG/1; MG/1
1 TABLET, FILM COATED ORAL DAILY
Qty: 90 TABLET | Refills: 1 | Status: SHIPPED | OUTPATIENT
Start: 2023-10-04

## 2023-10-04 NOTE — TELEPHONE ENCOUNTER
Medication Refill Request    Duane Florence is requesting a refill of the following medication(s):   Requested Prescriptions     Pending Prescriptions Disp Refills    valsartan-hydroCHLOROthiazide (DIOVAN-HCT) 320-25 MG per tablet [Pharmacy Med Name: VALSARTAN-HCTZ 320-25 MG TAB] 90 tablet 1     Sig: TAKE 1 TABLET BY MOUTH EVERY DAY      Last provider to prescribe medication: Dr. Jesse Glover  Last Date of Medication Prescribed: 03/22/2023   Last Office Visit Date: 07/18/2023  Follow Up Appointment Scheduled: 10/26/2023  Last Labs: 05/03/2023  Lab Results   Component Value Date     05/03/2023    K 4.0 05/03/2023     05/03/2023    CO2 26 05/03/2023    BUN 8 05/03/2023    CREATININE 0.94 05/03/2023    GLUCOSE 137 (H) 05/03/2023    CALCIUM 10.0 05/03/2023    PROT 7.0 05/03/2023    LABALBU 4.3 05/03/2023    BILITOT 0.3 05/03/2023    ALKPHOS 91 05/03/2023    AST 14 05/03/2023    ALT 17 05/03/2023    LABGLOM 67 05/03/2023    GFRAA >60 01/25/2022    AGRATIO 1.6 05/03/2023    GLOB 3.5 01/27/2023     Please send refill to:    [id number] [phone] Liz Ortiz [phone]  [address] [place] [address]  Phone: [phone] Fax: [phone]

## 2023-10-05 ENCOUNTER — TELEPHONE (OUTPATIENT)
Age: 68
End: 2023-10-05

## 2023-10-05 NOTE — TELEPHONE ENCOUNTER
----- Message from [facility] sent at 10/4/2023 12:28 PM EDT -----  Subject: Message to Provider    QUESTIONS  Information for Provider? Hardy Quinteros needs an email for Dr. Washington Elizabeth so the    of one of her medications can email her a form to fill out so   the  will pay for the medication.   ---------------------------------------------------------------------------  --------------  Ji [place] Luis Alfredo  [phone]; OK to leave message on voicemail  ---------------------------------------------------------------------------  --------------  SCRIPT ANSWERS  Relationship to Patient?  Self

## 2023-10-12 ENCOUNTER — PATIENT MESSAGE (OUTPATIENT)
Age: 68
End: 2023-10-12

## 2023-10-12 DIAGNOSIS — E11.40 TYPE 2 DIABETES MELLITUS WITH DIABETIC NEUROPATHY, WITHOUT LONG-TERM CURRENT USE OF INSULIN (HCC): Primary | ICD-10-CM

## 2023-10-16 NOTE — TELEPHONE ENCOUNTER
From: Rangel Merchant  To: Dr. Patti Corona: 10/12/2023 9:00 PM EDT  Subject: Rybelsus Refill    Good Evening  I am in need of a refill for Rybelsus 7mg. [facility] and was advised that have sent a request to refill at least 5 times with no reply. I only have 4 pills left and need a refill.    thanks

## 2023-10-16 NOTE — TELEPHONE ENCOUNTER
Medication Refill Request    Nadia Argueta is requesting a refill of the following medication(s):   Requested Prescriptions     Pending Prescriptions Disp Refills    Semaglutide (RYBELSUS) 7 MG TABS 90 tablet 1     Sig: Take 1 tablet by mouth daily      Last provider to prescribe medication: Dr. Aga Nails  Last Date of Medication Prescribed: 06/12/2023   Last Office Visit Date: 07/18/2023  Hemoglobin A1C   Date Value Ref Range Status   08/08/2023 6.6 (H) 4.8 - 5.6 % Final     Comment:              Prediabetes: 5.7 - 6.4           Diabetes: >6.4           Glycemic control for adults with diabetes: <7.0         Please send refill to:    [facility] [id number] [place] [facility] Marcelle Pena, [address] [place] [address] [phone] Jossy Cleary [address] [place] [address]  Phone: [phone] Fax: [phone]

## 2023-10-17 RX ORDER — ORAL SEMAGLUTIDE 7 MG/1
1 TABLET ORAL DAILY
Qty: 90 TABLET | Refills: 1 | Status: SHIPPED | OUTPATIENT
Start: 2023-10-17

## 2023-10-26 ENCOUNTER — OFFICE VISIT (OUTPATIENT)
Age: 68
End: 2023-10-26
Payer: MEDICARE

## 2023-10-26 VITALS
BODY MASS INDEX: 23.01 KG/M2 | WEIGHT: 134.8 LBS | HEIGHT: 64 IN | OXYGEN SATURATION: 95 % | DIASTOLIC BLOOD PRESSURE: 70 MMHG | SYSTOLIC BLOOD PRESSURE: 108 MMHG | RESPIRATION RATE: 18 BRPM | HEART RATE: 95 BPM

## 2023-10-26 DIAGNOSIS — L65.9 HAIR LOSS: ICD-10-CM

## 2023-10-26 DIAGNOSIS — F33.0 DEPRESSION, MAJOR, RECURRENT, MILD (HCC): ICD-10-CM

## 2023-10-26 DIAGNOSIS — R63.4 WEIGHT LOSS: ICD-10-CM

## 2023-10-26 DIAGNOSIS — E11.40 TYPE 2 DIABETES MELLITUS WITH DIABETIC NEUROPATHY, WITHOUT LONG-TERM CURRENT USE OF INSULIN (HCC): Primary | ICD-10-CM

## 2023-10-26 PROCEDURE — 1090F PRES/ABSN URINE INCON ASSESS: CPT | Performed by: FAMILY MEDICINE

## 2023-10-26 PROCEDURE — 1036F TOBACCO NON-USER: CPT | Performed by: FAMILY MEDICINE

## 2023-10-26 PROCEDURE — G8399 PT W/DXA RESULTS DOCUMENT: HCPCS | Performed by: FAMILY MEDICINE

## 2023-10-26 PROCEDURE — 3074F SYST BP LT 130 MM HG: CPT | Performed by: FAMILY MEDICINE

## 2023-10-26 PROCEDURE — 3078F DIAST BP <80 MM HG: CPT | Performed by: FAMILY MEDICINE

## 2023-10-26 PROCEDURE — 2022F DILAT RTA XM EVC RTNOPTHY: CPT | Performed by: FAMILY MEDICINE

## 2023-10-26 PROCEDURE — 3017F COLORECTAL CA SCREEN DOC REV: CPT | Performed by: FAMILY MEDICINE

## 2023-10-26 PROCEDURE — 99214 OFFICE O/P EST MOD 30 MIN: CPT | Performed by: FAMILY MEDICINE

## 2023-10-26 PROCEDURE — G8483 FLU IMM NO ADMIN DOC REA: HCPCS | Performed by: FAMILY MEDICINE

## 2023-10-26 PROCEDURE — 3044F HG A1C LEVEL LT 7.0%: CPT | Performed by: FAMILY MEDICINE

## 2023-10-26 PROCEDURE — 1123F ACP DISCUSS/DSCN MKR DOCD: CPT | Performed by: FAMILY MEDICINE

## 2023-10-26 PROCEDURE — G8420 CALC BMI NORM PARAMETERS: HCPCS | Performed by: FAMILY MEDICINE

## 2023-10-26 PROCEDURE — G8427 DOCREV CUR MEDS BY ELIG CLIN: HCPCS | Performed by: FAMILY MEDICINE

## 2023-10-26 ASSESSMENT — PATIENT HEALTH QUESTIONNAIRE - PHQ9
SUM OF ALL RESPONSES TO PHQ9 QUESTIONS 1 & 2: 2
SUM OF ALL RESPONSES TO PHQ QUESTIONS 1-9: 2
2. FEELING DOWN, DEPRESSED OR HOPELESS: 1
SUM OF ALL RESPONSES TO PHQ QUESTIONS 1-9: 2
1. LITTLE INTEREST OR PLEASURE IN DOING THINGS: 1

## 2023-10-26 ASSESSMENT — ENCOUNTER SYMPTOMS
BLOOD IN STOOL: 1
NAUSEA: 1
SHORTNESS OF BREATH: 0
CONSTIPATION: 0
ABDOMINAL PAIN: 0

## 2023-10-26 NOTE — PROGRESS NOTES
Chief Complaint   Patient presents with    Alopecia     Hair Loss associated with soreness of the scalp x 1 Month - Evaluated by [address] [place] [address] Dermatology - Dr. Alisa Walker    -Advised hair loss related to stress not breakage due to hair loss in strains   -Prescribed an ointment to apply to scalp  Patient is unsure if hair loss is related to current medication regimen      Vitals:    10/26/23 1016   BP: 108/70   Site: Right Upper Arm   Position: Sitting   Cuff Size: Medium Adult   Pulse: 95   Resp: 18   SpO2: 95%   Weight: 61.1 kg (134 lb 12.8 oz)   Height: 1.613 m (5' 3.5\")     1. Have you been to the ER, urgent care clinic since your last visit? Hospitalized since your last visit? No    2. Have you seen or consulted any other health care providers outside of the [address] [place] [address] since your last visit? Include any pap smears or colon screening.  No

## 2023-10-26 NOTE — PROGRESS NOTES
History of Present Illness:     Chief Complaint   Patient presents with    Alopecia     Hair Loss associated with soreness of the scalp x 1 Month - Evaluated by [address] [place] [address] Dermatology - Dr. Goemz Grand    -Advised hair loss related to stress not breakage due to hair loss in strains   -Prescribed an ointment to apply to scalp  Patient is unsure if hair loss is related to current medication regimen        Blue Locke is a 78 y.o. female     Diabetes follow up  Taking Rybelsus     Hair loss  See LPN note above, confirmed history as outlined  Told it was due to stress  Just started the treatment     Under a lot of stress  Relates to her Yazdanism and home issues  Has a few big decisions to make  Losing weight as her appetite is down  Not interested in eating between the stress and the Rybelsus    PMH (REVIEWED):  Past Medical History:   Diagnosis Date    Anxiety     Diabetes mellitus ([address])     Dyslipidemia     Headache     Hypertension     Low back pain 01/19/2012    MRI 3/10/14 revealed mild multilevel disk and facet degenerative change     Lumbar disc disease 03/2014    Non Hodgkin's lymphoma ([address]) 1985    Nodule removed from throat    Thyroid nodule     Followed by endocrine, Dr. Christoph Miller. Ulcer        Current Medications/Allergies (REVIEWED):     Current Outpatient Medications on File Prior to Visit   Medication Sig Dispense Refill    vitamin D (CHOLECALCIFEROL) 125 MCG (5000 UT) CAPS capsule Take 1 capsule by mouth daily      Semaglutide (RYBELSUS) 7 MG TABS Take 1 tablet by mouth daily 90 tablet 1    valsartan-hydroCHLOROthiazide (DIOVAN-HCT) 320-25 MG per tablet TAKE 1 TABLET BY MOUTH EVERY DAY 90 tablet 1    venlafaxine (EFFEXOR XR) 150 MG extended release capsule TAKE 1 CAPSULE BY MOUTH EVERY DAY 90 capsule 1    pantoprazole (PROTONIX) 40 MG tablet TAKE 1 TABLET BY MOUTH EVERY DAY 90 tablet 1    venlafaxine (EFFEXOR XR) 37.5 MG extended release capsule TAKE 1 CAPSULE BY MOUTH DAILY.  TAKE IN ADDITION TO

## 2023-11-01 DIAGNOSIS — F33.0 MAJOR DEPRESSIVE DISORDER, RECURRENT, MILD (HCC): ICD-10-CM

## 2023-11-01 DIAGNOSIS — F43.22 ADJUSTMENT DISORDER WITH ANXIETY: ICD-10-CM

## 2023-11-01 RX ORDER — VENLAFAXINE HYDROCHLORIDE 37.5 MG/1
CAPSULE, EXTENDED RELEASE ORAL
Qty: 90 CAPSULE | Refills: 1 | Status: SHIPPED | OUTPATIENT
Start: 2023-11-01

## 2023-11-01 NOTE — TELEPHONE ENCOUNTER
Medication Refill Request    Ras Mccarty is requesting a refill of the following medication(s):   Requested Prescriptions     Pending Prescriptions Disp Refills    venlafaxine (EFFEXOR XR) 37.5 MG extended release capsule [Pharmacy Med Name: VENLAFAXINE HCL ER 37.5 MG CAP] 90 capsule 1     Sig: TAKE 1 CAPSULE BY MOUTH DAILY. TAKE IN ADDITION TO EFFEXOR 150MG FOR TOTAL . 5MG DAILY.       Last provider to prescribe medication: Dr. Paradise Veliz  Last Date of Medication Prescribed: 09/19/2023   Last Office Visit Date: 10/26/203    Please send refill to:    [facility] [address] [place] [facility] - Meryl Machado, [address] [place] [address] [phone] [place] [address]  Phone: [phone] Fax: [phone]

## 2023-11-20 ENCOUNTER — TELEPHONE (OUTPATIENT)
Age: 68
End: 2023-11-20

## 2023-11-20 NOTE — TELEPHONE ENCOUNTER
Dr. Clayton Prakash with [facility] stated that pt was seen in ER for TIA workup for numbness on left side. Due to her symptoms not being present at the time, she was advised to request or see her PCP for an Outpatient MRI order. This writer contacted pt to schedule an ED follow up. She was advised that Dr. Bobby does not have any available appts and offered an appt with a different doctor. Pt stated that she does not want to see another doctor. She asked to be contacted by Dr. Bobby before scheduling with another doctor.     Thank you

## 2023-11-29 ENCOUNTER — TELEPHONE (OUTPATIENT)
Age: 68
End: 2023-11-29

## 2023-11-29 NOTE — TELEPHONE ENCOUNTER
----- Message from Ruby Harper sent at 11/29/2023 11:05 AM EST -----  Subject: Message to Provider    QUESTIONS  Information for Provider? Patient called to schedule lab appt. Patient has   active lab orders. I was unable to get through to the office. Please call   the patient to schedule a lab appt.  ---------------------------------------------------------------------------  --------------  CALL BACK INFO  [phone]; OK to leave message on voicemail  ---------------------------------------------------------------------------  --------------  SCRIPT ANSWERS  Relationship to Patient? Self

## 2023-12-04 ENCOUNTER — NURSE ONLY (OUTPATIENT)
Age: 68
End: 2023-12-04

## 2023-12-04 DIAGNOSIS — L65.9 HAIR LOSS: ICD-10-CM

## 2023-12-04 DIAGNOSIS — R63.4 WEIGHT LOSS: ICD-10-CM

## 2023-12-04 DIAGNOSIS — E11.40 TYPE 2 DIABETES MELLITUS WITH DIABETIC NEUROPATHY, WITHOUT LONG-TERM CURRENT USE OF INSULIN (HCC): ICD-10-CM

## 2023-12-04 LAB
ANION GAP SERPL CALC-SCNC: 5 MMOL/L (ref 5–15)
BUN SERPL-MCNC: 15 MG/DL (ref 6–20)
BUN/CREAT SERPL: 16 (ref 12–20)
CALCIUM SERPL-MCNC: 9.7 MG/DL (ref 8.5–10.1)
CHLORIDE SERPL-SCNC: 108 MMOL/L (ref 97–108)
CO2 SERPL-SCNC: 28 MMOL/L (ref 21–32)
CREAT SERPL-MCNC: 0.96 MG/DL (ref 0.55–1.02)
EST. AVERAGE GLUCOSE BLD GHB EST-MCNC: 111 MG/DL
GLUCOSE SERPL-MCNC: 111 MG/DL (ref 65–100)
HBA1C MFR BLD: 5.5 % (ref 4–5.6)
POTASSIUM SERPL-SCNC: 4.2 MMOL/L (ref 3.5–5.1)
SODIUM SERPL-SCNC: 141 MMOL/L (ref 136–145)
TSH SERPL DL<=0.05 MIU/L-ACNC: 1.69 UIU/ML (ref 0.36–3.74)

## 2023-12-05 DIAGNOSIS — K21.9 GASTRO-ESOPHAGEAL REFLUX DISEASE WITHOUT ESOPHAGITIS: ICD-10-CM

## 2023-12-07 RX ORDER — PANTOPRAZOLE SODIUM 40 MG/1
TABLET, DELAYED RELEASE ORAL
Qty: 90 TABLET | Refills: 1 | OUTPATIENT
Start: 2023-12-07

## 2024-02-10 DIAGNOSIS — E11.40 TYPE 2 DIABETES MELLITUS WITH DIABETIC NEUROPATHY, UNSPECIFIED (HCC): ICD-10-CM

## 2024-02-10 DIAGNOSIS — E11.9 TYPE 2 DIABETES MELLITUS WITHOUT COMPLICATIONS (HCC): ICD-10-CM

## 2024-02-13 ENCOUNTER — OFFICE VISIT (OUTPATIENT)
Age: 69
End: 2024-02-13
Payer: MEDICARE

## 2024-02-13 VITALS
HEIGHT: 64 IN | DIASTOLIC BLOOD PRESSURE: 74 MMHG | BODY MASS INDEX: 21.89 KG/M2 | OXYGEN SATURATION: 97 % | SYSTOLIC BLOOD PRESSURE: 125 MMHG | RESPIRATION RATE: 18 BRPM | TEMPERATURE: 97.2 F | WEIGHT: 128.2 LBS | HEART RATE: 96 BPM

## 2024-02-13 DIAGNOSIS — E11.40 TYPE 2 DIABETES MELLITUS WITH DIABETIC NEUROPATHY, WITHOUT LONG-TERM CURRENT USE OF INSULIN (HCC): Primary | ICD-10-CM

## 2024-02-13 DIAGNOSIS — F33.0 DEPRESSION, MAJOR, RECURRENT, MILD (HCC): ICD-10-CM

## 2024-02-13 DIAGNOSIS — G43.109 MIGRAINE WITH AURA AND WITHOUT STATUS MIGRAINOSUS, NOT INTRACTABLE: ICD-10-CM

## 2024-02-13 LAB
CREAT UR-MCNC: 290 MG/DL
MICROALBUMIN UR-MCNC: 2.23 MG/DL
MICROALBUMIN/CREAT UR-RTO: 8 MG/G (ref 0–30)

## 2024-02-13 PROCEDURE — 3046F HEMOGLOBIN A1C LEVEL >9.0%: CPT | Performed by: FAMILY MEDICINE

## 2024-02-13 PROCEDURE — G8420 CALC BMI NORM PARAMETERS: HCPCS | Performed by: FAMILY MEDICINE

## 2024-02-13 PROCEDURE — 3078F DIAST BP <80 MM HG: CPT | Performed by: FAMILY MEDICINE

## 2024-02-13 PROCEDURE — 1123F ACP DISCUSS/DSCN MKR DOCD: CPT | Performed by: FAMILY MEDICINE

## 2024-02-13 PROCEDURE — 1036F TOBACCO NON-USER: CPT | Performed by: FAMILY MEDICINE

## 2024-02-13 PROCEDURE — 2022F DILAT RTA XM EVC RTNOPTHY: CPT | Performed by: FAMILY MEDICINE

## 2024-02-13 PROCEDURE — G8483 FLU IMM NO ADMIN DOC REA: HCPCS | Performed by: FAMILY MEDICINE

## 2024-02-13 PROCEDURE — 99214 OFFICE O/P EST MOD 30 MIN: CPT | Performed by: FAMILY MEDICINE

## 2024-02-13 PROCEDURE — 1090F PRES/ABSN URINE INCON ASSESS: CPT | Performed by: FAMILY MEDICINE

## 2024-02-13 PROCEDURE — G8399 PT W/DXA RESULTS DOCUMENT: HCPCS | Performed by: FAMILY MEDICINE

## 2024-02-13 PROCEDURE — G8427 DOCREV CUR MEDS BY ELIG CLIN: HCPCS | Performed by: FAMILY MEDICINE

## 2024-02-13 PROCEDURE — 3017F COLORECTAL CA SCREEN DOC REV: CPT | Performed by: FAMILY MEDICINE

## 2024-02-13 PROCEDURE — 3074F SYST BP LT 130 MM HG: CPT | Performed by: FAMILY MEDICINE

## 2024-02-13 RX ORDER — RIZATRIPTAN BENZOATE 10 MG/1
10 TABLET ORAL
Qty: 9 TABLET | Refills: 0 | Status: SHIPPED | OUTPATIENT
Start: 2024-02-13 | End: 2024-02-13

## 2024-02-13 NOTE — PROGRESS NOTES
History of Present Illness:     Chief Complaint   Patient presents with    Depression    Diabetes    Migraine     C/O Frequent Migraines associated with blurred vision. Denies nausea or vomiting x end of December. Tylenol as needed.        Glenn Keyes is a 68 y.o. female     Diabetes follow up  Taking Rybelsus  Down about 35 lbs over the past 1 year   Doing well on the med     Depression  Stress levels are better  She will stop leading her Buddhism but will continue her small groups  Feels fine at the Effexor dose    1-2 months of worsening migraines  2-3 per week  Starts as right eye glare  Tylenol does help but will have mild pains  Long time since she was having migraines     PMH (REVIEWED):  Past Medical History:   Diagnosis Date    Anxiety     Diabetes mellitus (HCC)     Dyslipidemia     Headache     Hypertension     Low back pain 01/19/2012    MRI 3/10/14 revealed mild multilevel disk and facet degenerative change     Lumbar disc disease 03/2014    Non Hodgkin's lymphoma (HCC) 1985    Nodule removed from throat    Thyroid nodule     Followed by endocrine, Dr. Dangelo.      Ulcer        Current Medications/Allergies (REVIEWED):     Current Outpatient Medications on File Prior to Visit   Medication Sig Dispense Refill    vitamin D (ERGOCALCIFEROL) 1.25 MG (26953 UT) CAPS capsule TAKE 1 CAPSULE BY MOUTH ONE TIME PER WEEK 12 capsule 1    venlafaxine (EFFEXOR XR) 37.5 MG extended release capsule TAKE 1 CAPSULE BY MOUTH DAILY. TAKE IN ADDITION TO EFFEXOR 150MG FOR TOTAL .5MG DAILY. 90 capsule 1    Semaglutide (RYBELSUS) 7 MG TABS Take 1 tablet by mouth daily 90 tablet 1    valsartan-hydroCHLOROthiazide (DIOVAN-HCT) 320-25 MG per tablet TAKE 1 TABLET BY MOUTH EVERY DAY 90 tablet 1    venlafaxine (EFFEXOR XR) 150 MG extended release capsule TAKE 1 CAPSULE BY MOUTH EVERY DAY 90 capsule 1    pantoprazole (PROTONIX) 40 MG tablet TAKE 1 TABLET BY MOUTH EVERY DAY 90 tablet 1    ibuprofen (ADVIL;MOTRIN) 400 MG

## 2024-02-13 NOTE — PROGRESS NOTES
Room: 14      Identified pt with two pt identifiers(name and ). Reviewed record in preparation for visit and have obtained necessary documentation.    Chief Complaint   Patient presents with    Depression    Diabetes    Migraine     C/O Frequent Migraines associated with blurred vision. Denies nausea or vomiting x end of December. Tylenol as needed.         Health Maintenance Due   Topic    Pneumococcal 65+ years Vaccine (1 - PCV)    Respiratory Syncytial Virus (RSV) Pregnant or age 60 yrs+ (1 - 1-dose 60+ series)    Diabetic retinal exam     COVID-19 Vaccine (3 - 2023-24 season)    Annual Wellness Visit (Medicare Advantage)     Diabetic Alb to Cr ratio (uACR) test        Vitals:    24 0941   BP: 125/74   Site: Left Upper Arm   Position: Sitting   Cuff Size: Medium Adult   Pulse: 96   Resp: 18   Temp: 97.2 °F (36.2 °C)   TempSrc: Temporal   SpO2: 97%   Weight: 58.2 kg (128 lb 3.2 oz)   Height: 1.613 m (5' 3.5\")           Coordination of Care Questionnaire:  :   1. Have you been to the ER, urgent care clinic since your last visit?  Hospitalized since your last visit?2023 [facility] Due to severe chest pain     2. Have you seen or consulted any other health care providers outside of the [facility] since your last visit?  Include any pap smears or colon screening. No    This patient is accompanied in the office by her self.  I have received verbal consent from Glenn Keyes to discuss any/all medical information while they are present in the room.

## 2024-02-14 NOTE — TELEPHONE ENCOUNTER
Medication Refill Request    Glenn Keyes is requesting a refill of the following medication(s):   Requested Prescriptions     Pending Prescriptions Disp Refills    atorvastatin (LIPITOR) 40 MG tablet [Pharmacy Med Name: ATORVASTATIN 40 MG TABLET] 90 tablet 3     Sig: TAKE 1 TABLET BY MOUTH EVERY DAY     Refused Prescriptions Disp Refills    metFORMIN (GLUCOPHAGE) 500 MG tablet [Pharmacy Med Name: METFORMIN  MG TABLET] 180 tablet 3     Sig: TAKE 2 TABLETS BY MOUTH DAILY (WITH DINNER).        Listed PCP is Susan Lam MD   Last provider to prescribe medication: Dr. Lam  Last Date of Medication Prescribed: 04/27/2023   Date of Last Office Visit at [facility]: 02/13/2024   Last Labs:  Lab Results   Component Value Date    CHOL 161 05/03/2023    TRIG 116 05/03/2023    HDL 50 05/03/2023    LDLCALC 90 05/03/2023    VLDL 21 05/03/2023    CHOLHDLRATIO 5.4 (H) 01/27/2023      Future Appointment: No future appointments.    Refill Request Note: N/A    Please send refill to:    [id number] [place] - [address] [place] [address] [phone] - F [phone]  [address] [place] [address]  Phone: [phone] Fax: [phone]    [facility]/pharmacy #[id number] - [place], [place] - [address] [facility] -  [phone] - F [phone]  [address] [place] [address]  Phone: [phone] Fax: [phone]

## 2024-02-15 RX ORDER — ATORVASTATIN CALCIUM 40 MG/1
40 TABLET, FILM COATED ORAL DAILY
Qty: 90 TABLET | Refills: 3 | Status: SHIPPED | OUTPATIENT
Start: 2024-02-15

## 2024-03-03 DIAGNOSIS — K21.9 GASTRO-ESOPHAGEAL REFLUX DISEASE WITHOUT ESOPHAGITIS: ICD-10-CM

## 2024-03-03 DIAGNOSIS — E11.40 TYPE 2 DIABETES MELLITUS WITH DIABETIC NEUROPATHY, WITHOUT LONG-TERM CURRENT USE OF INSULIN (HCC): ICD-10-CM

## 2024-03-05 RX ORDER — ORAL SEMAGLUTIDE 7 MG/1
1 TABLET ORAL DAILY
Qty: 90 TABLET | Refills: 1 | Status: SHIPPED | OUTPATIENT
Start: 2024-03-05

## 2024-03-07 RX ORDER — PANTOPRAZOLE SODIUM 40 MG/1
40 TABLET, DELAYED RELEASE ORAL DAILY
Qty: 90 TABLET | Refills: 1 | Status: SHIPPED | OUTPATIENT
Start: 2024-03-07

## 2024-03-07 NOTE — TELEPHONE ENCOUNTER
Medication Refill Request    Glenn Keyes is requesting a refill of the following medication(s):   Requested Prescriptions     Pending Prescriptions Disp Refills    pantoprazole (PROTONIX) 40 MG tablet 90 tablet 1     Sig: Take 1 tablet by mouth daily        Listed PCP is Susan Lam MD   Last provider to prescribe medication: Dr. Lam  Last Date of Medication Prescribed: 08/24/2023   Date of Last Office Visit at [facility]: 02/13/2024   Last Labs: N/A  Future Appointment: No future appointments.    Refill Request Note:    Please send refill to:    [facility] [id number] [place] - [address] [place] [address] [phone] - F [phone]  [address] [place] [address]  Phone: [phone] Fax: [phone]    [facility]/pharmacy #[id number] [place], [place] - [address] [place] [address] [phone] - F [phone]  [address] [place] [id number]  Phone: [phone] Fax: [phone]

## 2024-04-03 ENCOUNTER — TELEPHONE (OUTPATIENT)
Age: 69
End: 2024-04-03

## 2024-04-10 DIAGNOSIS — I10 ESSENTIAL (PRIMARY) HYPERTENSION: ICD-10-CM

## 2024-04-11 RX ORDER — VALSARTAN AND HYDROCHLOROTHIAZIDE 320; 25 MG/1; MG/1
1 TABLET, FILM COATED ORAL DAILY
Qty: 90 TABLET | Refills: 1 | Status: SHIPPED | OUTPATIENT
Start: 2024-04-11

## 2024-04-11 NOTE — TELEPHONE ENCOUNTER
Medication Refill Request    Glenn Keyes is requesting a refill of the following medication(s):   Requested Prescriptions     Pending Prescriptions Disp Refills    valsartan-hydroCHLOROthiazide (DIOVAN-HCT) 320-25 MG per tablet [Pharmacy Med Name: VALSARTAN-HCTZ 320-25 MG TAB] 90 tablet 1     Sig: TAKE 1 TABLET BY MOUTH EVERY DAY        Listed PCP is Susan Lam MD   Last provider to prescribe medication: Dr. Lam  Last Date of Medication Prescribed:  10/04/2023  Date of Last Office Visit at [facility]: 02/13/2024   Last Labs:  Lab Results   Component Value Date/Time     12/04/2023 09:55 AM    K 4.2 12/04/2023 09:55 AM     12/04/2023 09:55 AM    CO2 28 12/04/2023 09:55 AM    BUN 15 12/04/2023 09:55 AM    CREATININE 0.96 12/04/2023 09:55 AM    GLUCOSE 111 12/04/2023 09:55 AM    CALCIUM 9.7 12/04/2023 09:55 AM    LABGLOM >60 12/04/2023 09:55 AM    LABGLOM 67 05/03/2023 12:00 AM        Future Appointment: No future appointments.    Please send refill to:    [facility] [id number] [place] [facility] [place] - [address] [facility] [phone] - F [phone]  [address] [facility] [address]  Phone: [phone] Fax: [phone]    [facility]/pharmacy #[id number] - [place], [place] - [address] [place] [address] -  [phone] - F [phone]  [address] [place] [address]  Phone: [phone] Fax: [phone]

## 2024-04-30 ENCOUNTER — OFFICE VISIT (OUTPATIENT)
Age: 69
End: 2024-04-30
Payer: MEDICARE

## 2024-04-30 VITALS
SYSTOLIC BLOOD PRESSURE: 118 MMHG | HEART RATE: 93 BPM | BODY MASS INDEX: 21.31 KG/M2 | DIASTOLIC BLOOD PRESSURE: 77 MMHG | WEIGHT: 124.8 LBS | HEIGHT: 64 IN | OXYGEN SATURATION: 98 % | RESPIRATION RATE: 17 BRPM

## 2024-04-30 DIAGNOSIS — R59.0 CERVICAL LYMPHADENOPATHY: ICD-10-CM

## 2024-04-30 DIAGNOSIS — Z00.00 MEDICARE ANNUAL WELLNESS VISIT, SUBSEQUENT: Primary | ICD-10-CM

## 2024-04-30 DIAGNOSIS — I10 ESSENTIAL (PRIMARY) HYPERTENSION: ICD-10-CM

## 2024-04-30 DIAGNOSIS — E11.40 TYPE 2 DIABETES MELLITUS WITH DIABETIC NEUROPATHY, WITHOUT LONG-TERM CURRENT USE OF INSULIN (HCC): ICD-10-CM

## 2024-04-30 PROCEDURE — 1090F PRES/ABSN URINE INCON ASSESS: CPT | Performed by: FAMILY MEDICINE

## 2024-04-30 PROCEDURE — 99214 OFFICE O/P EST MOD 30 MIN: CPT | Performed by: FAMILY MEDICINE

## 2024-04-30 PROCEDURE — 3078F DIAST BP <80 MM HG: CPT | Performed by: FAMILY MEDICINE

## 2024-04-30 PROCEDURE — G8420 CALC BMI NORM PARAMETERS: HCPCS | Performed by: FAMILY MEDICINE

## 2024-04-30 PROCEDURE — 3017F COLORECTAL CA SCREEN DOC REV: CPT | Performed by: FAMILY MEDICINE

## 2024-04-30 PROCEDURE — G8427 DOCREV CUR MEDS BY ELIG CLIN: HCPCS | Performed by: FAMILY MEDICINE

## 2024-04-30 PROCEDURE — 1036F TOBACCO NON-USER: CPT | Performed by: FAMILY MEDICINE

## 2024-04-30 PROCEDURE — G8399 PT W/DXA RESULTS DOCUMENT: HCPCS | Performed by: FAMILY MEDICINE

## 2024-04-30 PROCEDURE — 2022F DILAT RTA XM EVC RTNOPTHY: CPT | Performed by: FAMILY MEDICINE

## 2024-04-30 PROCEDURE — 3046F HEMOGLOBIN A1C LEVEL >9.0%: CPT | Performed by: FAMILY MEDICINE

## 2024-04-30 PROCEDURE — G0439 PPPS, SUBSEQ VISIT: HCPCS | Performed by: FAMILY MEDICINE

## 2024-04-30 PROCEDURE — 3074F SYST BP LT 130 MM HG: CPT | Performed by: FAMILY MEDICINE

## 2024-04-30 PROCEDURE — 1123F ACP DISCUSS/DSCN MKR DOCD: CPT | Performed by: FAMILY MEDICINE

## 2024-04-30 RX ORDER — VALSARTAN AND HYDROCHLOROTHIAZIDE 160; 25 MG/1; MG/1
1 TABLET ORAL DAILY
Qty: 90 TABLET | Refills: 1 | Status: SHIPPED | OUTPATIENT
Start: 2024-04-30

## 2024-04-30 RX ORDER — ORAL SEMAGLUTIDE 7 MG/1
1 TABLET ORAL DAILY
Qty: 90 TABLET | Refills: 3 | Status: SHIPPED | OUTPATIENT
Start: 2024-04-30

## 2024-04-30 RX ORDER — CALCIUM CITRATE/VITAMIN D3 200MG-6.25
1 TABLET ORAL DAILY
COMMUNITY

## 2024-04-30 ASSESSMENT — PATIENT HEALTH QUESTIONNAIRE - PHQ9
SUM OF ALL RESPONSES TO PHQ QUESTIONS 1-9: 0
2. FEELING DOWN, DEPRESSED OR HOPELESS: NOT AT ALL
SUM OF ALL RESPONSES TO PHQ9 QUESTIONS 1 & 2: 0
1. LITTLE INTEREST OR PLEASURE IN DOING THINGS: NOT AT ALL
SUM OF ALL RESPONSES TO PHQ QUESTIONS 1-9: 0

## 2024-04-30 ASSESSMENT — LIFESTYLE VARIABLES
HOW OFTEN DO YOU HAVE A DRINK CONTAINING ALCOHOL: NEVER
HOW MANY STANDARD DRINKS CONTAINING ALCOHOL DO YOU HAVE ON A TYPICAL DAY: PATIENT DOES NOT DRINK

## 2024-04-30 NOTE — ACP (ADVANCE CARE PLANNING)
Advance Care Planning     Advance Care Planning (ACP) Physician/NP/PA Conversation    Date of Conversation: 4/30/2024  Conducted with: Patient with Decision Making Capacity    Healthcare Decision Maker:      Primary Decision Maker: Sam Sorto - Child - [phone]    Secondary Decision Maker: Alcides Keyes - Spouse - [phone]    Secondary Decision Maker: Naida Cummings - Child - [phone]    Click here to complete Healthcare Decision Makers including selection of the Healthcare Decision Maker Relationship (ie \"Primary\")  Today we documented Decision Maker(s) consistent with Legal Next of Kin hierarchy.    Care Preferences:    Hospitalization:  \"If your health worsens and it becomes clear that your chance of recovery is unlikely, what would be your preference regarding hospitalization?\"  The patient would prefer hospitalization.    Ventilation:  \"If you were unable to breath on your own and your chance of recovery was unlikely, what would be your preference about the use of a ventilator (breathing machine) if it was available to you?\"  The patient would desire the use of a ventilator.    Resuscitation:  \"In the event your heart stopped as a result of an underlying serious health condition, would you want attempts made to restart your heart, or would you prefer a natural death?\"  Yes, attempt to resuscitate.    treatment goals, ventilation preferences, and resuscitation preferences    Conversation Outcomes / Follow-Up Plan:  ACP in process - completing/providing documents  Reviewed DNR/DNI and patient elects Full Code (Attempt Resuscitation)    Length of Voluntary ACP Conversation in minutes:  <16 minutes (Non-Billable)    Susan Lam MD

## 2024-04-30 NOTE — PROGRESS NOTES
Medicare Annual Wellness Visit    Glenn Keyes is here for Lymph Node (C/o lymph node swelling located at neck x 2 to 3 weeks. Denies pain. States she has a mass located at the anterior neck but mass has decreased in size) and Medicare AWV (Discuss Recommendation for Ophthalmologist.  /)    Assessment & Plan   Medicare annual wellness visit, subsequent  -     FULL CODE  Cervical lymphadenopathy  -     Comprehensive Metabolic Panel; Future  -     US HEAD NECK SOFT TISSUE THYROID; Future  Type 2 diabetes mellitus with diabetic neuropathy, without long-term current use of insulin (HCC)  -     Lipid Panel; Future  -      DIABETES FOOT EXAM  -     Hemoglobin A1C; Future  -     CBC with Auto Differential; Future  -     [facility] - Enrique Reyna Jr, MD, Ophthalmology, [place]  -     [facility] - Kasi Collazo MD, Otolaryngology, [place]  -     Semaglutide (RYBELSUS) 7 MG TABS; Take 1 tablet by mouth daily, Disp-90 tablet, R-3Normal  Essential (primary) hypertension  -     Lipid Panel; Future  -     Comprehensive Metabolic Panel; Future  -     valsartan-hydroCHLOROthiazide (DIOVAN-HCT) 160-25 MG per tablet; Take 1 tablet by mouth daily, Disp-90 tablet, R-1Normal      T2DM, stable  Doing very well on Rybelsus  Refills submitted to pharmacy at 7mg dose  A1c today    HTN, stable  Doing very well with wt loss from GLP1  Will reduce Diovan to 160-25mg daily    Depression, stable  No change in Effexor dose    Cervical lymphadenopathy  Nearly completely resolved, no significant exam findings  Given hx of Non-hodgkins lymphoma, will check CBC with diff and neck US      Recommendations for Preventive Services Due: see orders and patient instructions/AVS.  Recommended screening schedule for the next 5-10 years is provided to the patient in written form: see Patient Instructions/AVS.     Return in 6 months (on 10/30/2024).     Subjective     - Nodule in neck. Nearly resolved but still feels her glands are swollen.     - Doing well

## 2024-04-30 NOTE — PROGRESS NOTES
Room 14    Identified pt with two pt identifiers(name and ). Reviewed record in preparation for visit and have obtained necessary documentation.    Chief Complaint   Patient presents with    Lymph Node     C/o lymph node swelling located at neck x 2 to 3 weeks. Denies pain. States she has a mass located at the anterior neck but mass has decreased in size    Medicare AWV     Discuss Recommendation for Ophthalmologist.            Health Maintenance Due   Topic    Pneumococcal 65+ years Vaccine (1 of 2 - PCV)    Diabetic retinal exam     Annual Wellness Visit (Medicare Advantage)     Lipids        Vitals:    24 1021   BP: 118/77   Site: Left Upper Arm   Position: Sitting   Cuff Size: Medium Adult   Pulse: 93   Resp: 17   SpO2: 98%   Weight: 56.6 kg (124 lb 12.8 oz)   Height: 1.613 m (5' 3.5\")         \"Have you been to the ER, urgent care clinic since your last visit?  Hospitalized since your last visit?\"    NO    “Have you seen or consulted any other health care providers outside of [facility] since your last visit?”    No Last Eye Exam, Will schedule an appointment        Click Here for Release of Records Request     This patient is accompanied in the office by her self.  I have received verbal consent from Glenn Keyes to discuss any/all medical information while they are present in the room.

## 2024-04-30 NOTE — PATIENT INSTRUCTIONS
attack. These may include:    Chest pain or pressure, or a strange feeling in the chest.     Sweating.     Shortness of breath.     Pain, pressure, or a strange feeling in the back, neck, jaw, or upper belly or in one or both shoulders or arms.     Lightheadedness or sudden weakness.     A fast or irregular heartbeat.   After you call 911, the  may tell you to chew 1 adult-strength or 2 to 4 low-dose aspirin. Wait for an ambulance. Do not try to drive yourself.  Watch closely for changes in your health, and be sure to contact your doctor if you have any problems.  Where can you learn more?  Go to https://www.[employer].net/patientEd and enter F075 to learn more about \"A Healthy Heart: Care Instructions.\"  Current as of: June 24, 2023               Content Version: 14.0  © 7873-1296 [employer].   Care instructions adapted under license by [employer]. If you have questions about a medical condition or this instruction, always ask your healthcare professional. [employer] disclaims any warranty or liability for your use of this information.      Personalized Preventive Plan for Glenn Keyes - 4/30/2024  Medicare offers a range of preventive health benefits. Some of the tests and screenings are paid in full while other may be subject to a deductible, co-insurance, and/or copay.    Some of these benefits include a comprehensive review of your medical history including lifestyle, illnesses that may run in your family, and various assessments and screenings as appropriate.    After reviewing your medical record and screening and assessments performed today your provider may have ordered immunizations, labs, imaging, and/or referrals for you.  A list of these orders (if applicable) as well as your Preventive Care list are included within your After Visit Summary for your review.    Other Preventive Recommendations:    A preventive eye exam performed by an eye specialist is recommended

## 2024-05-01 LAB
ALBUMIN SERPL-MCNC: 4.1 G/DL (ref 3.5–5)
ALBUMIN/GLOB SERPL: 1.2 (ref 1.1–2.2)
ALP SERPL-CCNC: 78 U/L (ref 45–117)
ALT SERPL-CCNC: 26 U/L (ref 12–78)
ANION GAP SERPL CALC-SCNC: 3 MMOL/L (ref 5–15)
AST SERPL-CCNC: 18 U/L (ref 15–37)
BASOPHILS # BLD: 0 K/UL (ref 0–0.1)
BASOPHILS NFR BLD: 0 % (ref 0–1)
BILIRUB SERPL-MCNC: 0.4 MG/DL (ref 0.2–1)
BUN SERPL-MCNC: 16 MG/DL (ref 6–20)
BUN/CREAT SERPL: 19 (ref 12–20)
CALCIUM SERPL-MCNC: 10.6 MG/DL (ref 8.5–10.1)
CHLORIDE SERPL-SCNC: 107 MMOL/L (ref 97–108)
CHOLEST SERPL-MCNC: 161 MG/DL
CO2 SERPL-SCNC: 29 MMOL/L (ref 21–32)
CREAT SERPL-MCNC: 0.86 MG/DL (ref 0.55–1.02)
DIFFERENTIAL METHOD BLD: NORMAL
EOSINOPHIL # BLD: 0.1 K/UL (ref 0–0.4)
EOSINOPHIL NFR BLD: 2 % (ref 0–7)
ERYTHROCYTE [DISTWIDTH] IN BLOOD BY AUTOMATED COUNT: 14 % (ref 11.5–14.5)
EST. AVERAGE GLUCOSE BLD GHB EST-MCNC: 111 MG/DL
GLOBULIN SER CALC-MCNC: 3.3 G/DL (ref 2–4)
GLUCOSE SERPL-MCNC: 94 MG/DL (ref 65–100)
HBA1C MFR BLD: 5.5 % (ref 4–5.6)
HCT VFR BLD AUTO: 41.4 % (ref 35–47)
HDLC SERPL-MCNC: 70 MG/DL
HDLC SERPL: 2.3 (ref 0–5)
HGB BLD-MCNC: 12.6 G/DL (ref 11.5–16)
IMM GRANULOCYTES # BLD AUTO: 0 K/UL (ref 0–0.04)
IMM GRANULOCYTES NFR BLD AUTO: 0 % (ref 0–0.5)
LDLC SERPL CALC-MCNC: 76 MG/DL (ref 0–100)
LYMPHOCYTES # BLD: 2 K/UL (ref 0.8–3.5)
LYMPHOCYTES NFR BLD: 31 % (ref 12–49)
MCH RBC QN AUTO: 27.1 PG (ref 26–34)
MCHC RBC AUTO-ENTMCNC: 30.4 G/DL (ref 30–36.5)
MCV RBC AUTO: 89 FL (ref 80–99)
MONOCYTES # BLD: 0.6 K/UL (ref 0–1)
MONOCYTES NFR BLD: 10 % (ref 5–13)
NEUTS SEG # BLD: 3.6 K/UL (ref 1.8–8)
NEUTS SEG NFR BLD: 57 % (ref 32–75)
NRBC # BLD: 0 K/UL (ref 0–0.01)
NRBC BLD-RTO: 0 PER 100 WBC
PLATELET # BLD AUTO: 288 K/UL (ref 150–400)
PMV BLD AUTO: 12.3 FL (ref 8.9–12.9)
POTASSIUM SERPL-SCNC: 4.6 MMOL/L (ref 3.5–5.1)
PROT SERPL-MCNC: 7.4 G/DL (ref 6.4–8.2)
RBC # BLD AUTO: 4.65 M/UL (ref 3.8–5.2)
SODIUM SERPL-SCNC: 139 MMOL/L (ref 136–145)
TRIGL SERPL-MCNC: 75 MG/DL
VLDLC SERPL CALC-MCNC: 15 MG/DL
WBC # BLD AUTO: 6.3 K/UL (ref 3.6–11)

## 2024-05-11 DIAGNOSIS — F43.22 ADJUSTMENT DISORDER WITH ANXIETY: ICD-10-CM

## 2024-05-11 DIAGNOSIS — F33.0 MAJOR DEPRESSIVE DISORDER, RECURRENT, MILD (HCC): ICD-10-CM

## 2024-05-13 RX ORDER — VENLAFAXINE HYDROCHLORIDE 150 MG/1
CAPSULE, EXTENDED RELEASE ORAL
Qty: 90 CAPSULE | Refills: 1 | Status: SHIPPED | OUTPATIENT
Start: 2024-05-13

## 2024-05-13 NOTE — TELEPHONE ENCOUNTER
Medication Refill Request    Glenn Keyes is requesting a refill of the following medication(s):   Requested Prescriptions     Pending Prescriptions Disp Refills    venlafaxine (EFFEXOR XR) 150 MG extended release capsule [Pharmacy Med Name: VENLAFAXINE HCL  MG CAP] 90 capsule 1     Sig: TAKE 1 CAPSULE BY MOUTH EVERY DAY        Listed PCP is Susan Lam MD   Last provider to prescribe medication: Dr. Lam  Last Date of Medication Prescribed: 09/19/2023   Date of Last Office Visit at [facility]: 04/30/2024     Future Appointment:   Future Appointments   Date Time Provider Department Center   5/14/2024  8:30 AM [facility] 2 [facility]   5/15/2024  8:00 AM Susan Lam MD [facility] BS AMB       Please send refill to:    [id number] [place] [address] [phone] - F [phone]  [address] [place] [address]  Phone: [phone] Fax: [phone]    [facility]/pharmacy #[id number] [place], [place] - [address] [place] [address] -  [phone] - F [phone]  [address] [place] [address]  Phone: [phone] Fax: [phone]

## 2024-05-14 ENCOUNTER — HOSPITAL ENCOUNTER (OUTPATIENT)
Facility: HOSPITAL | Age: 69
Discharge: HOME | End: 2024-05-17
Attending: FAMILY MEDICINE
Payer: MEDICARE

## 2024-05-14 DIAGNOSIS — R59.0 CERVICAL LYMPHADENOPATHY: ICD-10-CM

## 2024-05-14 PROCEDURE — 76536 US EXAM OF HEAD AND NECK: CPT

## 2024-05-20 ENCOUNTER — PATIENT MESSAGE (OUTPATIENT)
Age: 69
End: 2024-05-20

## 2024-05-20 DIAGNOSIS — E04.1 NODULE OF LEFT LOBE OF THYROID GLAND: Primary | ICD-10-CM

## 2024-05-23 NOTE — TELEPHONE ENCOUNTER
From: Glenn Keyes  To: Dr. Susan Lam  Sent: 5/20/2024 8:34 PM EDT  Subject: Test Results     Hi Dr. Lam. Yes I would like to see an ENT and do a biopsy to see what it is. I'm very nervous about this. I'm sorry I missed the appointment I thought it was the original appointment and since I had the other appointment, I thought it should have been canceled. Sorry

## 2024-06-13 ENCOUNTER — OFFICE VISIT (OUTPATIENT)
Age: 69
End: 2024-06-13

## 2024-06-13 VITALS
SYSTOLIC BLOOD PRESSURE: 130 MMHG | RESPIRATION RATE: 16 BRPM | OXYGEN SATURATION: 96 % | HEIGHT: 64 IN | WEIGHT: 127 LBS | HEART RATE: 86 BPM | BODY MASS INDEX: 21.68 KG/M2 | DIASTOLIC BLOOD PRESSURE: 76 MMHG

## 2024-06-13 DIAGNOSIS — E04.1 THYROID NODULE: ICD-10-CM

## 2024-06-13 DIAGNOSIS — E04.1 THYROID NODULE: Primary | ICD-10-CM

## 2024-06-14 NOTE — PROGRESS NOTES
Resp 16   Ht 1.613 m (5' 3.5\")   Wt 57.6 kg (127 lb)   SpO2 96%   BMI 22.14 kg/m²     General: Comfortable, pleasant, appears stated age  Voice: Strong, speaking in full sentences, no stridor    Face: No masses or lesions, facial strength symmetric   Ears: External ears unremarkable. Bilateral ear canal clear. Tympanic membrane clear and intact, with visible landmarks. Clear middle ear space  Nose: External nose unremarkable. Dorsum midline. Anterior rhinoscopy demonstrates no lesions. Septum midline. Turbinates without hypertrophy.  Oral Cavity / Oropharynx: No trismus. Mucosa pink and moist. No lesions. Tongue is midline and mobile. Palate elevates symmetrically. Uvula midline. Tonsils unremarkable. Base of tongue soft. Floor of mouth soft.   Neck: Supple. No adenopathy. Thyroid unremarkable. Palpable laryngeal landmarks. Full neck range of motion   Neurologic: CN II - XI intact. Normal gait      Thyroid US 5/2024  IMPRESSION:  Left lobe nodule. No overt malignant features.                  Assessment and Plan:   Thyroid nodule   History of lymphoma  Reviewed that most thyroid nodules are benign  Given her family history of possible thyroid cancer as well as personal history of radiation treatment, we will move forward with thyroid FNA  I will order this via interventional radiology  Pending FNA results we discussed role of serial ultrasound versus thyroid surgery  Consider scope at next visit pending any residual throat symptoms        The patient was instructed to return to clinic if no improvement or progression of symptoms. Signs to watch out for reviewed.      Shaina Reyes MD   [facility]  [address] [place] [address] [address]  [place] [address]  Office Phone: [phone]

## 2024-07-05 ENCOUNTER — OFFICE VISIT (OUTPATIENT)
Age: 69
End: 2024-07-05
Payer: MEDICARE

## 2024-07-05 VITALS
TEMPERATURE: 100.6 F | SYSTOLIC BLOOD PRESSURE: 148 MMHG | HEART RATE: 90 BPM | OXYGEN SATURATION: 96 % | RESPIRATION RATE: 20 BRPM | BODY MASS INDEX: 23.02 KG/M2 | WEIGHT: 132 LBS | DIASTOLIC BLOOD PRESSURE: 80 MMHG

## 2024-07-05 DIAGNOSIS — R52 BODY ACHES: ICD-10-CM

## 2024-07-05 DIAGNOSIS — R50.81 FEVER IN OTHER DISEASES: ICD-10-CM

## 2024-07-05 DIAGNOSIS — J06.9 VIRAL URI: ICD-10-CM

## 2024-07-05 DIAGNOSIS — U07.1 COVID-19: Primary | ICD-10-CM

## 2024-07-05 LAB
GROUP A STREP ANTIGEN, POC: NEGATIVE
INFLUENZA A ANTIGEN, POC: NEGATIVE
INFLUENZA B ANTIGEN, POC: NEGATIVE
VALID INTERNAL CONTROL, POC: NORMAL
VALID INTERNAL CONTROL, POC: NORMAL

## 2024-07-05 PROCEDURE — 87804 INFLUENZA ASSAY W/OPTIC: CPT

## 2024-07-05 PROCEDURE — 99213 OFFICE O/P EST LOW 20 MIN: CPT

## 2024-07-05 PROCEDURE — 87880 STREP A ASSAY W/OPTIC: CPT

## 2024-07-05 NOTE — PROGRESS NOTES
I discussed the findings, assessment and plan with the resident and agree with the resident's findings and plan as documented in the resident's note.      
Patient states she's been having body aches all over for 2 days now. She did states she take reybelus. She states her back hurts the most but over all she's in pain. She stated she received a code yesterday but worked Wed also. She took ibuprofen and today she took 2 aleeves. It didn't help at all. She took her temp it was 99.9 two hours ago. Her nose is currently running also. She does have a history of migraines but currently having a frontal headache to her temples.    Chief Complaint   Patient presents with    Generalized Body Aches     Vitals:    07/05/24 1507   BP: (!) 148/80   Pulse: 90   Resp: 20   Temp: (!) 100.6 °F (38.1 °C)   SpO2: 96%      
presents with 1 day of URI symptoms, POC testing negative, history and exam most consistent with viral URI.      1. Viral URI  POC testing for flu and strep negative.  Febrile to 100.6 in clinic.  Exam benign, only notable for congestion.  -Patient to take home COVID test.  Given age, would qualify for Paxlovid as symptoms only been present for 1 day.  Patient to call in with results of home COVID test.  -If home COVID testing negative, discussed conservative care at home including over-the-counter medications, fluids, rest.  -ER precautions and return precautions discussed, including symptoms extending past 10 days and worsening.      2. Body aches  - AMB POC RAPID STREP A  - AMB POC RAPID INFLUENZA TEST    3. Fever in other diseases  - AMB POC RAPID STREP A  - AMB POC RAPID INFLUENZA TEST      Return if symptoms worsen or fail to improve in 10 days.       ADDENDUM:    Patient found to have positive COVID-19 home test.  Given patient's age, meets criteria for Paxlovid therapy.  Will send course to pharmacy, patient contacted and instructed to hold home statin medication during Paxlovid therapy.    Pt was discussed with Dr Bautista (attending physician).    I have reviewed patient medical and social history and medications.  I have reviewed pertinent labs results and other data. I have discussed the diagnosis with the patient and the intended plan as seen in the above orders. The patient has received an after-visit summary and questions were answered concerning future plans. I have discussed medication side effects and warnings with the patient as well.    Logan Garcia MD  Resident [facility]

## 2024-07-16 ENCOUNTER — HOSPITAL ENCOUNTER (OUTPATIENT)
Facility: HOSPITAL | Age: 69
Discharge: HOME | End: 2024-07-19
Payer: MEDICARE

## 2024-07-16 DIAGNOSIS — E04.1 THYROID NODULE: ICD-10-CM

## 2024-07-16 PROCEDURE — 88173 CYTOPATH EVAL FNA REPORT: CPT

## 2024-07-16 PROCEDURE — 10005 FNA BX W/US GDN 1ST LES: CPT

## 2024-07-16 PROCEDURE — 88172 CYTP DX EVAL FNA 1ST EA SITE: CPT

## 2024-07-19 DIAGNOSIS — F33.0 MAJOR DEPRESSIVE DISORDER, RECURRENT, MILD (HCC): ICD-10-CM

## 2024-07-19 DIAGNOSIS — F43.22 ADJUSTMENT DISORDER WITH ANXIETY: ICD-10-CM

## 2024-07-19 NOTE — RESULT ENCOUNTER NOTE
Can we let patient know that her biopsy results need additional testing, so this may take another few 1-2 weeks to come back. I will call her once those are finalized

## 2024-07-23 RX ORDER — VENLAFAXINE HYDROCHLORIDE 37.5 MG/1
CAPSULE, EXTENDED RELEASE ORAL
Qty: 90 CAPSULE | Refills: 1 | Status: SHIPPED | OUTPATIENT
Start: 2024-07-23

## 2024-07-24 ENCOUNTER — TELEPHONE (OUTPATIENT)
Age: 69
End: 2024-07-24

## 2024-07-24 ENCOUNTER — HOSPITAL ENCOUNTER (OUTPATIENT)
Facility: HOSPITAL | Age: 69
Discharge: HOME | End: 2024-07-27
Attending: FAMILY MEDICINE
Payer: MEDICARE

## 2024-07-24 VITALS — HEIGHT: 63 IN | WEIGHT: 126 LBS | BODY MASS INDEX: 22.32 KG/M2

## 2024-07-24 DIAGNOSIS — Z12.31 VISIT FOR SCREENING MAMMOGRAM: ICD-10-CM

## 2024-07-24 PROCEDURE — 77063 BREAST TOMOSYNTHESIS BI: CPT

## 2024-07-24 NOTE — TELEPHONE ENCOUNTER
----- Message from Shaina Reyes MD sent at 7/19/2024 12:53 PM EDT -----  Can we let patient know that her biopsy results need additional testing, so this may take another few 1-2 weeks to come back. I will call her once those are finalized

## 2024-07-25 ENCOUNTER — TELEPHONE (OUTPATIENT)
Age: 69
End: 2024-07-25

## 2024-07-26 ENCOUNTER — TELEPHONE (OUTPATIENT)
Age: 69
End: 2024-07-26

## 2024-07-26 NOTE — TELEPHONE ENCOUNTER
LVM to call the office in regards to biopsy results.    Have tried several times via phone without success.  Mailed letter to let her know that additional testing is being done and this may take up to 1-2 weeks.  Will contact her once we have those results.

## 2024-08-07 ENCOUNTER — PATIENT MESSAGE (OUTPATIENT)
Age: 69
End: 2024-08-07

## 2024-08-09 NOTE — TELEPHONE ENCOUNTER
Attempted to reach patient since she stated that she does check her [employer] messages regularly. Unable to reach patient. Left her voicemail asking that she gives us a call back.

## 2024-08-22 ENCOUNTER — TELEPHONE (OUTPATIENT)
Age: 69
End: 2024-08-22

## 2024-08-22 NOTE — TELEPHONE ENCOUNTER
----- Message from Dr. Shaina Reyes MD sent at 8/22/2024 12:08 PM EDT -----  Can I see her on Monday or Tuesday at 845 to review her thyroid FNA results

## 2024-08-26 ENCOUNTER — OFFICE VISIT (OUTPATIENT)
Age: 69
End: 2024-08-26
Payer: MEDICARE

## 2024-08-26 VITALS
DIASTOLIC BLOOD PRESSURE: 80 MMHG | OXYGEN SATURATION: 98 % | SYSTOLIC BLOOD PRESSURE: 128 MMHG | HEIGHT: 63 IN | HEART RATE: 79 BPM | WEIGHT: 124 LBS | BODY MASS INDEX: 21.97 KG/M2 | RESPIRATION RATE: 16 BRPM

## 2024-08-26 DIAGNOSIS — E04.1 THYROID NODULE: Primary | ICD-10-CM

## 2024-08-26 PROCEDURE — 1090F PRES/ABSN URINE INCON ASSESS: CPT | Performed by: OTOLARYNGOLOGY

## 2024-08-26 PROCEDURE — 1036F TOBACCO NON-USER: CPT | Performed by: OTOLARYNGOLOGY

## 2024-08-26 PROCEDURE — 99213 OFFICE O/P EST LOW 20 MIN: CPT | Performed by: OTOLARYNGOLOGY

## 2024-08-26 PROCEDURE — G8399 PT W/DXA RESULTS DOCUMENT: HCPCS | Performed by: OTOLARYNGOLOGY

## 2024-08-26 PROCEDURE — 3017F COLORECTAL CA SCREEN DOC REV: CPT | Performed by: OTOLARYNGOLOGY

## 2024-08-26 PROCEDURE — 1123F ACP DISCUSS/DSCN MKR DOCD: CPT | Performed by: OTOLARYNGOLOGY

## 2024-08-26 PROCEDURE — G8427 DOCREV CUR MEDS BY ELIG CLIN: HCPCS | Performed by: OTOLARYNGOLOGY

## 2024-08-26 PROCEDURE — G8420 CALC BMI NORM PARAMETERS: HCPCS | Performed by: OTOLARYNGOLOGY

## 2024-08-26 PROCEDURE — 3074F SYST BP LT 130 MM HG: CPT | Performed by: OTOLARYNGOLOGY

## 2024-08-26 PROCEDURE — 3079F DIAST BP 80-89 MM HG: CPT | Performed by: OTOLARYNGOLOGY

## 2024-08-26 NOTE — PROGRESS NOTES
Otolaryngology-Head and Neck Surgery  Follow Up Patient Visit     Patient: Glenn Keyes  YOB: 1955  MRN: [id number]  Date of Service: 2024     Chief Complaint:   Chief Complaint   Patient presents with    Follow-up     Discuss FNA results     Interval hx 2024  Had thyroid FNA     History of Present Illness: Glenn Keyes is a 68 y.o. female who presents today for discussion of a thyroid nodule    Remote history of non-Hodgkin's lymphoma, managed with chemotherapy and possible radiation therapy 30 years ago.  Presented with cervical adenopathy at that time    Has been feeling intermittent cervical lymph node swelling in the last couple of months  PCP ordered a neck ultrasound showing a thyroid nodule    Sister has some sort of thyroid issue, required possible thyroid surgery  Unsure of family history of thyroid cancer    Again she believes she may have had radiation to her neck    Otherwise no prior neck or throat surgeries    Past Medical History:  Past Medical History:   Diagnosis Date    Anxiety     Diabetes mellitus (HCC)     Dyslipidemia     Headache     Hypertension     Low back pain 2012    MRI 3/10/14 revealed mild multilevel disk and facet degenerative change     Lumbar disc disease 2014    Non Hodgkin's lymphoma (HCC) 1985    Nodule removed from throat    Thyroid nodule     Followed by endocrine, Dr. Dangelo.      Ulcer        Past Surgical History:   Past Surgical History:   Procedure Laterality Date    COLONOSCOPY      COLONOSCOPY N/A 2023    COLONOSCOPY performed by Dashawn Steele MD at [facility] ENDOSCOPY    DELIVERY       REMOVAL NODES, NECK,CERV CMPLT      TONSILLECTOMY      Age 9    UPPER GASTROINTESTINAL ENDOSCOPY         Medications:   Current Outpatient Medications   Medication Instructions    atorvastatin (LIPITOR) 40 mg, Oral, DAILY    ibuprofen (ADVIL;MOTRIN) 400 mg, Oral, EVERY 6 HOURS PRN    Multiple Vitamins-Minerals (MULTIVITAMIN GUMMIES WOMENS)

## 2024-08-27 ENCOUNTER — TELEPHONE (OUTPATIENT)
Age: 69
End: 2024-08-27

## 2024-08-27 ENCOUNTER — PREP FOR PROCEDURE (OUTPATIENT)
Age: 69
End: 2024-08-27

## 2024-08-27 DIAGNOSIS — E04.1 THYROID NODULE: ICD-10-CM

## 2024-08-27 NOTE — TELEPHONE ENCOUNTER
Called patient to get scheduled for her procedure. Patient is now scheduled for 10/11 at [facility] with a post op that following Friday 10/18. Patient is aware of scheduled surgery as well as post op date and time.

## 2024-08-30 DIAGNOSIS — E11.40 TYPE 2 DIABETES MELLITUS WITH DIABETIC NEUROPATHY, WITHOUT LONG-TERM CURRENT USE OF INSULIN (HCC): ICD-10-CM

## 2024-09-03 ENCOUNTER — TELEPHONE (OUTPATIENT)
Age: 69
End: 2024-09-03

## 2024-09-03 RX ORDER — ORAL SEMAGLUTIDE 7 MG/1
1 TABLET ORAL DAILY
Qty: 90 TABLET | Refills: 3 | Status: SHIPPED | OUTPATIENT
Start: 2024-09-03

## 2024-09-03 NOTE — TELEPHONE ENCOUNTER
Medication Refill Request    Glenn Keyes is requesting a refill of the following medication(s):   Requested Prescriptions     Pending Prescriptions Disp Refills    Semaglutide (RYBELSUS) 7 MG TABS 90 tablet 3     Sig: Take 1 tablet by mouth daily        Listed PCP is Susan Lam MD   Last provider to prescribe medication: Dr. Lam  Last Date of Medication Prescribed: 04/30/2024   Date of Last Office Visit at [facility]: 04/30/2024   Last Labs:  Hemoglobin A1C   Date Value Ref Range Status   04/30/2024 5.5 4.0 - 5.6 % Final     Comment:     (NOTE)  HbA1C Interpretive Ranges  <5.7              Normal  5.7 - 6.4         Consider Prediabetes  >6.5              Consider Diabetes         Future Appointment:   Future Appointments   Date Time Provider Department Center   10/18/2024  9:00 AM Shaina Reyes MD RES  AMB       Refill Request Note: Recommended to return to clinic in 6 months (10/30/2024)     Please send refill to:    [id number] [place], [place] - [address] [place] [address] [phone] - F [phone]  [address] [place] [address]  Phone: [phone] Fax: [phone]    [facility]/pharmacy #[id number] [place], [place] - [address] [place] [address] -  [phone] - F [phone]  [address] [place] [address]  Phone: [phone] Fax: [phone]

## 2024-09-03 NOTE — TELEPHONE ENCOUNTER
----- Message from Rosalina SILVIA sent at 9/3/2024  9:12 AM EDT -----  Regarding: ECC Message to Provider  ECC Message to Provider    Relationship to Patient: Self     Additional Information: Patient wants to know what are the availability of her physician and would like her physician to call her back. If they can't reach the patient please call her at work.   --------------------------------------------------------------------------------------------------------------------------    Call Back Information: OK to leave message on voicemail  Preferred Call Back Number: Phone [phone]

## 2024-09-05 DIAGNOSIS — K21.9 GASTRO-ESOPHAGEAL REFLUX DISEASE WITHOUT ESOPHAGITIS: ICD-10-CM

## 2024-09-06 RX ORDER — PANTOPRAZOLE SODIUM 40 MG/1
40 TABLET, DELAYED RELEASE ORAL DAILY
Qty: 90 TABLET | Refills: 1 | Status: SHIPPED | OUTPATIENT
Start: 2024-09-06

## 2024-09-06 NOTE — TELEPHONE ENCOUNTER
Medication Refill Request    Glenn Keyes is requesting a refill of the following medication(s):   Requested Prescriptions     Pending Prescriptions Disp Refills    pantoprazole (PROTONIX) 40 MG tablet [Pharmacy Med Name: PANTOPRAZOLE SOD DR 40 MG TAB] 90 tablet 1     Sig: TAKE 1 TABLET BY MOUTH EVERY DAY        Listed PCP is Susan Lam MD   Last provider to prescribe medication: Dr. Lam  Last Date of Medication Prescribed: 03/07/2024   Date of Last Office Visit at [facility]: 04/03/2024     Future Appointment:   Future Appointments   Date Time Provider Department Center   10/18/2024  9:00 AM Shaina Reyes MD RES BS AMB     Refill Request Note: Recommended to   Return in 6 months (on 10/30/2024).    Please send refill to:    [id number] [place], [place] - [address] [place] [address] [phone] - F [phone]  [address] [place] [address]  Phone: [phone] Fax: [phone]    [facility]/pharmacy #[id number] [place], [place] - [address] [place] [address] -  [phone] - F [phone]  [address] [place], [place] [address]  Phone: [phone] Fax: [phone]

## 2024-09-13 ENCOUNTER — TELEPHONE (OUTPATIENT)
Age: 69
End: 2024-09-13

## 2024-09-26 ENCOUNTER — OFFICE VISIT (OUTPATIENT)
Age: 69
End: 2024-09-26
Payer: MEDICARE

## 2024-09-26 VITALS
SYSTOLIC BLOOD PRESSURE: 117 MMHG | WEIGHT: 126.8 LBS | DIASTOLIC BLOOD PRESSURE: 72 MMHG | RESPIRATION RATE: 16 BRPM | OXYGEN SATURATION: 98 % | HEIGHT: 63 IN | BODY MASS INDEX: 22.47 KG/M2 | HEART RATE: 87 BPM

## 2024-09-26 DIAGNOSIS — F33.0 MAJOR DEPRESSIVE DISORDER, RECURRENT, MILD (HCC): ICD-10-CM

## 2024-09-26 DIAGNOSIS — E04.1 NODULE OF LEFT LOBE OF THYROID GLAND: ICD-10-CM

## 2024-09-26 DIAGNOSIS — E55.9 VITAMIN D DEFICIENCY: ICD-10-CM

## 2024-09-26 DIAGNOSIS — F43.22 ADJUSTMENT DISORDER WITH ANXIETY: ICD-10-CM

## 2024-09-26 DIAGNOSIS — I10 ESSENTIAL (PRIMARY) HYPERTENSION: ICD-10-CM

## 2024-09-26 DIAGNOSIS — E11.40 TYPE 2 DIABETES MELLITUS WITH DIABETIC NEUROPATHY, WITHOUT LONG-TERM CURRENT USE OF INSULIN (HCC): Primary | ICD-10-CM

## 2024-09-26 PROCEDURE — 99214 OFFICE O/P EST MOD 30 MIN: CPT | Performed by: FAMILY MEDICINE

## 2024-09-26 RX ORDER — VALSARTAN AND HYDROCHLOROTHIAZIDE 160; 25 MG/1; MG/1
0.5 TABLET ORAL DAILY
Qty: 90 TABLET | Refills: 1
Start: 2024-09-26

## 2024-09-26 RX ORDER — HYDROXYZINE HYDROCHLORIDE 10 MG/1
10 TABLET, FILM COATED ORAL 3 TIMES DAILY PRN
Qty: 30 TABLET | Refills: 0 | Status: SHIPPED | OUTPATIENT
Start: 2024-09-26

## 2024-09-26 RX ORDER — ACETAMINOPHEN 500 MG
500 TABLET ORAL EVERY 6 HOURS PRN
COMMUNITY

## 2024-09-26 ASSESSMENT — ENCOUNTER SYMPTOMS: SHORTNESS OF BREATH: 0

## 2024-10-03 ENCOUNTER — HOSPITAL ENCOUNTER (OUTPATIENT)
Facility: HOSPITAL | Age: 69
Discharge: HOME | End: 2024-10-06
Payer: MEDICARE

## 2024-10-03 ENCOUNTER — OFFICE VISIT (OUTPATIENT)
Age: 69
End: 2024-10-03
Payer: MEDICARE

## 2024-10-03 VITALS
DIASTOLIC BLOOD PRESSURE: 71 MMHG | WEIGHT: 125.8 LBS | BODY MASS INDEX: 22.29 KG/M2 | TEMPERATURE: 98.1 F | RESPIRATION RATE: 18 BRPM | HEIGHT: 63 IN | HEART RATE: 81 BPM | SYSTOLIC BLOOD PRESSURE: 118 MMHG | OXYGEN SATURATION: 98 %

## 2024-10-03 VITALS
SYSTOLIC BLOOD PRESSURE: 140 MMHG | HEIGHT: 63 IN | BODY MASS INDEX: 22.27 KG/M2 | DIASTOLIC BLOOD PRESSURE: 75 MMHG | WEIGHT: 125.66 LBS | RESPIRATION RATE: 14 BRPM | HEART RATE: 79 BPM | OXYGEN SATURATION: 98 % | TEMPERATURE: 97.3 F

## 2024-10-03 DIAGNOSIS — M62.830 LUMBAR PARASPINAL MUSCLE SPASM: Primary | ICD-10-CM

## 2024-10-03 LAB
ABO + RH BLD: NORMAL
ANION GAP SERPL CALC-SCNC: 5 MMOL/L (ref 2–12)
BASOPHILS # BLD: 0 K/UL (ref 0–0.1)
BASOPHILS NFR BLD: 0 % (ref 0–1)
BLOOD GROUP ANTIBODIES SERPL: NORMAL
BUN SERPL-MCNC: 16 MG/DL (ref 6–20)
BUN/CREAT SERPL: 19 (ref 12–20)
CALCIUM SERPL-MCNC: 10.1 MG/DL (ref 8.5–10.1)
CHLORIDE SERPL-SCNC: 105 MMOL/L (ref 97–108)
CO2 SERPL-SCNC: 30 MMOL/L (ref 21–32)
CREAT SERPL-MCNC: 0.86 MG/DL (ref 0.55–1.02)
DIFFERENTIAL METHOD BLD: NORMAL
EOSINOPHIL # BLD: 0.1 K/UL (ref 0–0.4)
EOSINOPHIL NFR BLD: 1 % (ref 0–7)
ERYTHROCYTE [DISTWIDTH] IN BLOOD BY AUTOMATED COUNT: 13.6 % (ref 11.5–14.5)
GLUCOSE SERPL-MCNC: 69 MG/DL (ref 65–100)
HCT VFR BLD AUTO: 39.3 % (ref 35–47)
HGB BLD-MCNC: 12.5 G/DL (ref 11.5–16)
IMM GRANULOCYTES # BLD AUTO: 0 K/UL (ref 0–0.04)
IMM GRANULOCYTES NFR BLD AUTO: 0 % (ref 0–0.5)
LYMPHOCYTES # BLD: 1.7 K/UL (ref 0.8–3.5)
LYMPHOCYTES NFR BLD: 30 % (ref 12–49)
MCH RBC QN AUTO: 27.5 PG (ref 26–34)
MCHC RBC AUTO-ENTMCNC: 31.8 G/DL (ref 30–36.5)
MCV RBC AUTO: 86.6 FL (ref 80–99)
MONOCYTES # BLD: 0.5 K/UL (ref 0–1)
MONOCYTES NFR BLD: 9 % (ref 5–13)
NEUTS SEG # BLD: 3.3 K/UL (ref 1.8–8)
NEUTS SEG NFR BLD: 60 % (ref 32–75)
NRBC # BLD: 0 K/UL (ref 0–0.01)
NRBC BLD-RTO: 0 PER 100 WBC
PLATELET # BLD AUTO: 306 K/UL (ref 150–400)
PMV BLD AUTO: 10.9 FL (ref 8.9–12.9)
POTASSIUM SERPL-SCNC: 3.8 MMOL/L (ref 3.5–5.1)
RBC # BLD AUTO: 4.54 M/UL (ref 3.8–5.2)
SODIUM SERPL-SCNC: 140 MMOL/L (ref 136–145)
SPECIMEN EXP DATE BLD: NORMAL
WBC # BLD AUTO: 5.5 K/UL (ref 3.6–11)

## 2024-10-03 PROCEDURE — 3074F SYST BP LT 130 MM HG: CPT | Performed by: FAMILY MEDICINE

## 2024-10-03 PROCEDURE — 1090F PRES/ABSN URINE INCON ASSESS: CPT | Performed by: FAMILY MEDICINE

## 2024-10-03 PROCEDURE — 99213 OFFICE O/P EST LOW 20 MIN: CPT | Performed by: FAMILY MEDICINE

## 2024-10-03 PROCEDURE — G8420 CALC BMI NORM PARAMETERS: HCPCS | Performed by: FAMILY MEDICINE

## 2024-10-03 PROCEDURE — 1123F ACP DISCUSS/DSCN MKR DOCD: CPT | Performed by: FAMILY MEDICINE

## 2024-10-03 PROCEDURE — G8483 FLU IMM NO ADMIN DOC REA: HCPCS | Performed by: FAMILY MEDICINE

## 2024-10-03 PROCEDURE — G8399 PT W/DXA RESULTS DOCUMENT: HCPCS | Performed by: FAMILY MEDICINE

## 2024-10-03 PROCEDURE — 1036F TOBACCO NON-USER: CPT | Performed by: FAMILY MEDICINE

## 2024-10-03 PROCEDURE — 86900 BLOOD TYPING SEROLOGIC ABO: CPT

## 2024-10-03 PROCEDURE — 85025 COMPLETE CBC W/AUTO DIFF WBC: CPT

## 2024-10-03 PROCEDURE — 86850 RBC ANTIBODY SCREEN: CPT

## 2024-10-03 PROCEDURE — G8427 DOCREV CUR MEDS BY ELIG CLIN: HCPCS | Performed by: FAMILY MEDICINE

## 2024-10-03 PROCEDURE — 86901 BLOOD TYPING SEROLOGIC RH(D): CPT

## 2024-10-03 PROCEDURE — 3017F COLORECTAL CA SCREEN DOC REV: CPT | Performed by: FAMILY MEDICINE

## 2024-10-03 PROCEDURE — 93005 ELECTROCARDIOGRAM TRACING: CPT | Performed by: OTOLARYNGOLOGY

## 2024-10-03 PROCEDURE — 80048 BASIC METABOLIC PNL TOTAL CA: CPT

## 2024-10-03 PROCEDURE — 3078F DIAST BP <80 MM HG: CPT | Performed by: FAMILY MEDICINE

## 2024-10-03 PROCEDURE — 36415 COLL VENOUS BLD VENIPUNCTURE: CPT

## 2024-10-03 ASSESSMENT — PATIENT HEALTH QUESTIONNAIRE - PHQ9
SUM OF ALL RESPONSES TO PHQ QUESTIONS 1-9: 0
SUM OF ALL RESPONSES TO PHQ9 QUESTIONS 1 & 2: 0
2. FEELING DOWN, DEPRESSED OR HOPELESS: NOT AT ALL
1. LITTLE INTEREST OR PLEASURE IN DOING THINGS: NOT AT ALL
SUM OF ALL RESPONSES TO PHQ QUESTIONS 1-9: 0

## 2024-10-03 NOTE — DISCHARGE INSTRUCTIONS
[facility]                   [address] [place] [address]   MAIN OR                                  [phone]   MAIN PRE OP                          [phone]                                                                                AMBULATORY PRE OP          [phone]  PRE-ADMISSION TESTING    [phone]     Surgery Date:  October 11 (Friday)      Is surgery arrival time given by surgeon?    NO  If “NO”, [place] staff will call you between 3 and 7pm the day before your surgery with your arrival time. (If your surgery is on a Monday, we will call you the Friday before.)    Call [phone] after 7pm Monday-Friday if you did not receive this call.    INSTRUCTIONS BEFORE YOUR SURGERY   When You  Arrive Arrive at the 2nd Floor Admitting Desk on the day of your surgery  Have your insurance card, photo ID, and any copayment (if needed)   Food   and   Drink NO food or drink after midnight the night before surgery    This means NO water, gum, mints, coffee, juice, etc.  No alcohol (beer, wine, liquor) 24 hours before and after surgery   Medications to   TAKE   Morning of Surgery MEDICATIONS TO TAKE THE MORNING OF SURGERY WITH A SIP OF WATER:     Hydroxyzine, Pantoprazole, Venlafaxine    Do not take morning of surgery or night before - Valsartan    Do not take morning of surgery or day before - Maxalt    Discuss with prescriber if taking for diabetes - Semaglutide.  If taking for weight loss, please stop immediately.     Medications  To  STOP      7 days before surgery Non-Steroidal anti-inflammatory Drugs (NSAID's): for example, Ibuprofen (Advil, Motrin), Naproxen (Aleve)  Aspirin, if taking for pain   Herbal supplements, vitamins, and fish oil  Other:  (Pain medications not listed above, including Tylenol may be taken)       Bathing Clothing  Jewelry  Valuables     If you shower the morning of surgery, please do not apply anything to your skin (lotions,

## 2024-10-03 NOTE — PROGRESS NOTES
Pt having mid to lower back pain, pt states the pain travels from the LT side of her back into the legs causing a burning, pt has been having back pain for a long time but pain has been persistent for about a month now.  Pt denies injury.    Identified pt with two pt identifiers(name and ). Reviewed record in preparation for visit and have obtained necessary documentation.  Chief Complaint   Patient presents with    Back Pain     Mid to lower part of her back.        Vitals:    10/03/24 0840   BP: 118/71   Site: Right Upper Arm   Position: Sitting   Cuff Size: Medium Adult   Pulse: 81   Resp: 18   Temp: 98.1 °F (36.7 °C)   TempSrc: Oral   SpO2: 98%   Weight: 57.1 kg (125 lb 12.8 oz)   Height: 1.6 m (5' 3\")         Coordination of Care Questionnaire:  :     \"Have you been to the ER, urgent care clinic since your last visit?  Hospitalized since your last visit?\"    NO    “Have you seen or consulted any other health care providers outside of [facility] since your last visit?”    NO            Click Here for Release of Records Request   
Onset    Stroke Maternal Grandmother     Hypertension Maternal Grandmother     Cancer Brother         kidney    Hypertension Sister     Parkinsonism Father     Hypertension Father     Hypertension Paternal Grandfather     Cancer Paternal Grandfather     Elevated Lipids Mother     Hypertension Mother     Cancer Paternal Grandmother     Hypertension Paternal Grandmother      Current Outpatient Medications   Medication Sig Dispense Refill    acetaminophen (TYLENOL) 500 MG tablet Take 1 tablet by mouth every 6 hours as needed for Pain      valsartan-hydroCHLOROthiazide (DIOVAN-HCT) 160-25 MG per tablet Take 0.5 tablets by mouth daily 90 tablet 1    hydrOXYzine HCl (ATARAX) 10 MG tablet Take 1 tablet by mouth 3 times daily as needed for Anxiety (Sleep) 30 tablet 0    pantoprazole (PROTONIX) 40 MG tablet TAKE 1 TABLET BY MOUTH EVERY DAY 90 tablet 1    Semaglutide (RYBELSUS) 7 MG TABS Take 1 tablet by mouth daily 90 tablet 3    venlafaxine (EFFEXOR XR) 37.5 MG extended release capsule TAKE 1 CAPSULE BY MOUTH DAILY. TAKE IN ADDITION TO EFFEXOR 150MG FOR TOTAL .5MG DAILY. 90 capsule 1    venlafaxine (EFFEXOR XR) 150 MG extended release capsule TAKE 1 CAPSULE BY MOUTH EVERY DAY 90 capsule 1    Multiple Vitamins-Minerals (MULTIVITAMIN GUMMIES WOMENS) CHEW Take 1 tablet by mouth daily      vitamin D (ERGOCALCIFEROL) 1.25 MG (43059 UT) CAPS capsule TAKE 1 CAPSULE BY MOUTH ONE TIME PER WEEK 12 capsule 1    ibuprofen (ADVIL;MOTRIN) 400 MG tablet Take 1 tablet by mouth every 6 hours as needed      rizatriptan (MAXALT) 10 MG tablet Take 1 tablet by mouth once as needed for Migraine (May take 2nd dose in 2 hours if needed) May repeat in 2 hours if needed 9 tablet 0     No current facility-administered medications for this visit.     No Known Allergies  Social History     Socioeconomic History    Marital status:      Spouse name: Not on file    Number of children: Not on file    Years of education: Not on file    Highest

## 2024-10-04 LAB
EKG ATRIAL RATE: 75 BPM
EKG DIAGNOSIS: NORMAL
EKG P AXIS: 74 DEGREES
EKG P-R INTERVAL: 160 MS
EKG Q-T INTERVAL: 394 MS
EKG QRS DURATION: 72 MS
EKG QTC CALCULATION (BAZETT): 439 MS
EKG R AXIS: 16 DEGREES
EKG T AXIS: 69 DEGREES
EKG VENTRICULAR RATE: 75 BPM

## 2024-10-04 PROCEDURE — 93010 ELECTROCARDIOGRAM REPORT: CPT | Performed by: SPECIALIST

## 2024-10-10 ENCOUNTER — ANESTHESIA EVENT (OUTPATIENT)
Facility: HOSPITAL | Age: 69
End: 2024-10-10
Payer: MEDICARE

## 2024-10-10 NOTE — PERIOP NOTE
Hello,     You are scheduled to have surgery tomorrow at [facility].     We would like for you to arrive at  0800 am  We are located on the second floor, [address]. You will check-in at the registration desk located outside the elevators on the second floor prior to proceeding to [address].  Remember nothing to eat after midnight. Please drink 12oz of water before surgery.   Wear loose, comfortable clothing and leave all your jewelry at home.   You may bring your cell phone with you.  One family member will be allowed in the pre-op area once you are dressed and your IV has been started.   You will need someone to drive you home and be with you for 24 hours post-anesthesia.     We look forward to seeing you! Call [phone] for questions after hours and [phone] between 5:30AM and 6PM.     Thanks!    [facility] PREOP TEAM

## 2024-10-11 ENCOUNTER — HOSPITAL ENCOUNTER (OUTPATIENT)
Facility: HOSPITAL | Age: 69
Setting detail: OBSERVATION
Discharge: HOME | End: 2024-10-12
Attending: OTOLARYNGOLOGY | Admitting: OTOLARYNGOLOGY
Payer: MEDICARE

## 2024-10-11 ENCOUNTER — TELEPHONE (OUTPATIENT)
Age: 69
End: 2024-10-11

## 2024-10-11 ENCOUNTER — ANESTHESIA (OUTPATIENT)
Facility: HOSPITAL | Age: 69
End: 2024-10-11
Payer: MEDICARE

## 2024-10-11 DIAGNOSIS — Z98.890 S/P THYROID SURGERY: Primary | ICD-10-CM

## 2024-10-11 LAB
GLUCOSE BLD STRIP.AUTO-MCNC: 104 MG/DL (ref 65–117)
GLUCOSE BLD STRIP.AUTO-MCNC: 91 MG/DL (ref 65–117)
SERVICE CMNT-IMP: NORMAL
SERVICE CMNT-IMP: NORMAL

## 2024-10-11 PROCEDURE — 7100000000 HC PACU RECOVERY - FIRST 15 MIN: Performed by: OTOLARYNGOLOGY

## 2024-10-11 PROCEDURE — 2580000003 HC RX 258: Performed by: OTOLARYNGOLOGY

## 2024-10-11 PROCEDURE — 2580000003 HC RX 258: Performed by: ANESTHESIOLOGY

## 2024-10-11 PROCEDURE — 82962 GLUCOSE BLOOD TEST: CPT

## 2024-10-11 PROCEDURE — 94761 N-INVAS EAR/PLS OXIMETRY MLT: CPT

## 2024-10-11 PROCEDURE — C1713 ANCHOR/SCREW BN/BN,TIS/BN: HCPCS | Performed by: OTOLARYNGOLOGY

## 2024-10-11 PROCEDURE — 3600000014 HC SURGERY LEVEL 4 ADDTL 15MIN: Performed by: OTOLARYNGOLOGY

## 2024-10-11 PROCEDURE — 6360000002 HC RX W HCPCS: Performed by: ANESTHESIOLOGY

## 2024-10-11 PROCEDURE — 60220 PARTIAL REMOVAL OF THYROID: CPT | Performed by: OTOLARYNGOLOGY

## 2024-10-11 PROCEDURE — 3700000001 HC ADD 15 MINUTES (ANESTHESIA): Performed by: OTOLARYNGOLOGY

## 2024-10-11 PROCEDURE — 2709999900 HC NON-CHARGEABLE SUPPLY: Performed by: OTOLARYNGOLOGY

## 2024-10-11 PROCEDURE — 7100000001 HC PACU RECOVERY - ADDTL 15 MIN: Performed by: OTOLARYNGOLOGY

## 2024-10-11 PROCEDURE — 3600000004 HC SURGERY LEVEL 4 BASE: Performed by: OTOLARYNGOLOGY

## 2024-10-11 PROCEDURE — G0378 HOSPITAL OBSERVATION PER HR: HCPCS

## 2024-10-11 PROCEDURE — 88331 PATH CONSLTJ SURG 1 BLK 1SPC: CPT

## 2024-10-11 PROCEDURE — 6360000002 HC RX W HCPCS: Performed by: OTOLARYNGOLOGY

## 2024-10-11 PROCEDURE — 6360000002 HC RX W HCPCS: Performed by: NURSE ANESTHETIST, CERTIFIED REGISTERED

## 2024-10-11 PROCEDURE — 2500000003 HC RX 250 WO HCPCS: Performed by: OTOLARYNGOLOGY

## 2024-10-11 PROCEDURE — 6370000000 HC RX 637 (ALT 250 FOR IP): Performed by: OTOLARYNGOLOGY

## 2024-10-11 PROCEDURE — 88307 TISSUE EXAM BY PATHOLOGIST: CPT

## 2024-10-11 PROCEDURE — 2720000010 HC SURG SUPPLY STERILE: Performed by: OTOLARYNGOLOGY

## 2024-10-11 PROCEDURE — 3700000000 HC ANESTHESIA ATTENDED CARE: Performed by: OTOLARYNGOLOGY

## 2024-10-11 PROCEDURE — 2500000003 HC RX 250 WO HCPCS: Performed by: NURSE ANESTHETIST, CERTIFIED REGISTERED

## 2024-10-11 RX ORDER — DIPHENHYDRAMINE HYDROCHLORIDE 50 MG/ML
12.5 INJECTION INTRAMUSCULAR; INTRAVENOUS
Status: DISCONTINUED | OUTPATIENT
Start: 2024-10-11 | End: 2024-10-11 | Stop reason: HOSPADM

## 2024-10-11 RX ORDER — MIDAZOLAM HYDROCHLORIDE 1 MG/ML
INJECTION INTRAMUSCULAR; INTRAVENOUS
Status: DISCONTINUED | OUTPATIENT
Start: 2024-10-11 | End: 2024-10-11 | Stop reason: SDUPTHER

## 2024-10-11 RX ORDER — ONDANSETRON 2 MG/ML
4 INJECTION INTRAMUSCULAR; INTRAVENOUS
Status: DISCONTINUED | OUTPATIENT
Start: 2024-10-11 | End: 2024-10-11 | Stop reason: HOSPADM

## 2024-10-11 RX ORDER — VENLAFAXINE HYDROCHLORIDE 37.5 MG/1
37.5 CAPSULE, EXTENDED RELEASE ORAL
Status: DISCONTINUED | OUTPATIENT
Start: 2024-10-12 | End: 2024-10-12 | Stop reason: HOSPADM

## 2024-10-11 RX ORDER — MORPHINE SULFATE 2 MG/ML
2 INJECTION, SOLUTION INTRAMUSCULAR; INTRAVENOUS EVERY 4 HOURS PRN
Status: DISCONTINUED | OUTPATIENT
Start: 2024-10-11 | End: 2024-10-12 | Stop reason: HOSPADM

## 2024-10-11 RX ORDER — ERGOCALCIFEROL 1.25 MG/1
50000 CAPSULE, LIQUID FILLED ORAL WEEKLY
Status: DISCONTINUED | OUTPATIENT
Start: 2024-10-11 | End: 2024-10-12 | Stop reason: HOSPADM

## 2024-10-11 RX ORDER — SODIUM CHLORIDE, SODIUM LACTATE, POTASSIUM CHLORIDE, CALCIUM CHLORIDE 600; 310; 30; 20 MG/100ML; MG/100ML; MG/100ML; MG/100ML
INJECTION, SOLUTION INTRAVENOUS CONTINUOUS
Status: DISCONTINUED | OUTPATIENT
Start: 2024-10-11 | End: 2024-10-11 | Stop reason: HOSPADM

## 2024-10-11 RX ORDER — VENLAFAXINE HYDROCHLORIDE 150 MG/1
150 CAPSULE, EXTENDED RELEASE ORAL
Status: DISCONTINUED | OUTPATIENT
Start: 2024-10-12 | End: 2024-10-12 | Stop reason: HOSPADM

## 2024-10-11 RX ORDER — LIDOCAINE HYDROCHLORIDE 20 MG/ML
INJECTION, SOLUTION EPIDURAL; INFILTRATION; INTRACAUDAL; PERINEURAL
Status: DISCONTINUED | OUTPATIENT
Start: 2024-10-11 | End: 2024-10-11 | Stop reason: SDUPTHER

## 2024-10-11 RX ORDER — GLYCOPYRROLATE 0.2 MG/ML
INJECTION INTRAMUSCULAR; INTRAVENOUS
Status: DISCONTINUED | OUTPATIENT
Start: 2024-10-11 | End: 2024-10-11 | Stop reason: SDUPTHER

## 2024-10-11 RX ORDER — PROPOFOL 10 MG/ML
INJECTION, EMULSION INTRAVENOUS
Status: DISCONTINUED | OUTPATIENT
Start: 2024-10-11 | End: 2024-10-11 | Stop reason: SDUPTHER

## 2024-10-11 RX ORDER — REMIFENTANIL HYDROCHLORIDE 1 MG/ML
INJECTION, POWDER, LYOPHILIZED, FOR SOLUTION INTRAVENOUS
Status: DISCONTINUED | OUTPATIENT
Start: 2024-10-11 | End: 2024-10-11 | Stop reason: SDUPTHER

## 2024-10-11 RX ORDER — FENTANYL CITRATE 50 UG/ML
INJECTION, SOLUTION INTRAMUSCULAR; INTRAVENOUS
Status: DISCONTINUED | OUTPATIENT
Start: 2024-10-11 | End: 2024-10-11 | Stop reason: SDUPTHER

## 2024-10-11 RX ORDER — PHENYLEPHRINE HCL IN 0.9% NACL 0.4MG/10ML
SYRINGE (ML) INTRAVENOUS
Status: DISCONTINUED | OUTPATIENT
Start: 2024-10-11 | End: 2024-10-11 | Stop reason: SDUPTHER

## 2024-10-11 RX ORDER — FENTANYL CITRATE 50 UG/ML
100 INJECTION, SOLUTION INTRAMUSCULAR; INTRAVENOUS
Status: DISCONTINUED | OUTPATIENT
Start: 2024-10-11 | End: 2024-10-11 | Stop reason: HOSPADM

## 2024-10-11 RX ORDER — SODIUM CHLORIDE 0.9 % (FLUSH) 0.9 %
5-40 SYRINGE (ML) INJECTION EVERY 12 HOURS SCHEDULED
Status: DISCONTINUED | OUTPATIENT
Start: 2024-10-11 | End: 2024-10-12 | Stop reason: HOSPADM

## 2024-10-11 RX ORDER — LIDOCAINE HYDROCHLORIDE 10 MG/ML
1 INJECTION, SOLUTION EPIDURAL; INFILTRATION; INTRACAUDAL; PERINEURAL
Status: DISCONTINUED | OUTPATIENT
Start: 2024-10-11 | End: 2024-10-11 | Stop reason: HOSPADM

## 2024-10-11 RX ORDER — LIDOCAINE HYDROCHLORIDE AND EPINEPHRINE 10; 10 MG/ML; UG/ML
INJECTION, SOLUTION INFILTRATION; PERINEURAL PRN
Status: DISCONTINUED | OUTPATIENT
Start: 2024-10-11 | End: 2024-10-11 | Stop reason: HOSPADM

## 2024-10-11 RX ORDER — ONDANSETRON 2 MG/ML
INJECTION INTRAMUSCULAR; INTRAVENOUS
Status: DISCONTINUED | OUTPATIENT
Start: 2024-10-11 | End: 2024-10-11 | Stop reason: SDUPTHER

## 2024-10-11 RX ORDER — OXYCODONE AND ACETAMINOPHEN 5; 325 MG/1; MG/1
1 TABLET ORAL EVERY 4 HOURS PRN
Status: DISCONTINUED | OUTPATIENT
Start: 2024-10-11 | End: 2024-10-12 | Stop reason: HOSPADM

## 2024-10-11 RX ORDER — SUCCINYLCHOLINE/SOD CL,ISO/PF 100 MG/5ML
SYRINGE (ML) INTRAVENOUS
Status: DISCONTINUED | OUTPATIENT
Start: 2024-10-11 | End: 2024-10-11 | Stop reason: SDUPTHER

## 2024-10-11 RX ORDER — ROCURONIUM BROMIDE 10 MG/ML
INJECTION, SOLUTION INTRAVENOUS
Status: DISCONTINUED | OUTPATIENT
Start: 2024-10-11 | End: 2024-10-11 | Stop reason: SDUPTHER

## 2024-10-11 RX ORDER — ACETAMINOPHEN 325 MG/1
650 TABLET ORAL EVERY 4 HOURS PRN
Status: DISCONTINUED | OUTPATIENT
Start: 2024-10-11 | End: 2024-10-12 | Stop reason: HOSPADM

## 2024-10-11 RX ORDER — VALSARTAN AND HYDROCHLOROTHIAZIDE 160; 25 MG/1; MG/1
0.5 TABLET ORAL DAILY
Status: DISCONTINUED | OUTPATIENT
Start: 2024-10-11 | End: 2024-10-11

## 2024-10-11 RX ORDER — DEXAMETHASONE SODIUM PHOSPHATE 4 MG/ML
INJECTION, SOLUTION INTRA-ARTICULAR; INTRALESIONAL; INTRAMUSCULAR; INTRAVENOUS; SOFT TISSUE
Status: DISCONTINUED | OUTPATIENT
Start: 2024-10-11 | End: 2024-10-11 | Stop reason: SDUPTHER

## 2024-10-11 RX ORDER — ONDANSETRON 2 MG/ML
4 INJECTION INTRAMUSCULAR; INTRAVENOUS EVERY 6 HOURS PRN
Status: DISCONTINUED | OUTPATIENT
Start: 2024-10-11 | End: 2024-10-12 | Stop reason: HOSPADM

## 2024-10-11 RX ORDER — SODIUM CHLORIDE 0.9 % (FLUSH) 0.9 %
5-40 SYRINGE (ML) INJECTION PRN
Status: DISCONTINUED | OUTPATIENT
Start: 2024-10-11 | End: 2024-10-12 | Stop reason: HOSPADM

## 2024-10-11 RX ORDER — ONDANSETRON 4 MG/1
4 TABLET, ORALLY DISINTEGRATING ORAL EVERY 8 HOURS PRN
Status: DISCONTINUED | OUTPATIENT
Start: 2024-10-11 | End: 2024-10-12 | Stop reason: HOSPADM

## 2024-10-11 RX ORDER — VALSARTAN 80 MG/1
160 TABLET ORAL DAILY
Status: DISCONTINUED | OUTPATIENT
Start: 2024-10-11 | End: 2024-10-12 | Stop reason: HOSPADM

## 2024-10-11 RX ORDER — SODIUM CHLORIDE 9 MG/ML
INJECTION, SOLUTION INTRAVENOUS PRN
Status: DISCONTINUED | OUTPATIENT
Start: 2024-10-11 | End: 2024-10-12 | Stop reason: HOSPADM

## 2024-10-11 RX ORDER — POLYETHYLENE GLYCOL 3350 17 G/17G
17 POWDER, FOR SOLUTION ORAL DAILY PRN
Status: DISCONTINUED | OUTPATIENT
Start: 2024-10-11 | End: 2024-10-12 | Stop reason: HOSPADM

## 2024-10-11 RX ORDER — MIDAZOLAM HYDROCHLORIDE 2 MG/2ML
2 INJECTION, SOLUTION INTRAMUSCULAR; INTRAVENOUS
Status: DISCONTINUED | OUTPATIENT
Start: 2024-10-11 | End: 2024-10-11 | Stop reason: HOSPADM

## 2024-10-11 RX ORDER — NALOXONE HYDROCHLORIDE 0.4 MG/ML
INJECTION, SOLUTION INTRAMUSCULAR; INTRAVENOUS; SUBCUTANEOUS PRN
Status: DISCONTINUED | OUTPATIENT
Start: 2024-10-11 | End: 2024-10-11 | Stop reason: HOSPADM

## 2024-10-11 RX ORDER — HYDROXYZINE HYDROCHLORIDE 10 MG/1
10 TABLET, FILM COATED ORAL 3 TIMES DAILY PRN
Status: DISCONTINUED | OUTPATIENT
Start: 2024-10-11 | End: 2024-10-12 | Stop reason: HOSPADM

## 2024-10-11 RX ORDER — HYDROCHLOROTHIAZIDE 25 MG/1
25 TABLET ORAL DAILY
Status: DISCONTINUED | OUTPATIENT
Start: 2024-10-11 | End: 2024-10-12 | Stop reason: HOSPADM

## 2024-10-11 RX ORDER — PANTOPRAZOLE SODIUM 40 MG/1
40 TABLET, DELAYED RELEASE ORAL DAILY
Status: DISCONTINUED | OUTPATIENT
Start: 2024-10-11 | End: 2024-10-12 | Stop reason: HOSPADM

## 2024-10-11 RX ADMIN — WATER 2000 MG: 1 INJECTION INTRAMUSCULAR; INTRAVENOUS; SUBCUTANEOUS at 11:00

## 2024-10-11 RX ADMIN — Medication 40 MCG: at 11:38

## 2024-10-11 RX ADMIN — ONDANSETRON 4 MG: 2 INJECTION INTRAMUSCULAR; INTRAVENOUS at 13:07

## 2024-10-11 RX ADMIN — Medication 100 MG: at 10:50

## 2024-10-11 RX ADMIN — PROPOFOL 120 MG: 10 INJECTION, EMULSION INTRAVENOUS at 10:49

## 2024-10-11 RX ADMIN — HYDROMORPHONE HYDROCHLORIDE 0.5 MG: 1 INJECTION, SOLUTION INTRAMUSCULAR; INTRAVENOUS; SUBCUTANEOUS at 13:53

## 2024-10-11 RX ADMIN — REMIFENTANIL HYDROCHLORIDE 0.05 MCG/KG/MIN: 1 INJECTION, POWDER, LYOPHILIZED, FOR SOLUTION INTRAVENOUS at 10:55

## 2024-10-11 RX ADMIN — GLYCOPYRROLATE 0.1 MG: 0.2 INJECTION INTRAMUSCULAR; INTRAVENOUS at 11:00

## 2024-10-11 RX ADMIN — MIDAZOLAM HYDROCHLORIDE 2 MG: 1 INJECTION, SOLUTION INTRAMUSCULAR; INTRAVENOUS at 10:39

## 2024-10-11 RX ADMIN — ERGOCALCIFEROL 50000 UNITS: 1.25 CAPSULE ORAL at 20:26

## 2024-10-11 RX ADMIN — Medication 80 MCG: at 11:10

## 2024-10-11 RX ADMIN — FENTANYL CITRATE 50 MCG: 50 INJECTION, SOLUTION INTRAMUSCULAR; INTRAVENOUS at 10:49

## 2024-10-11 RX ADMIN — LIDOCAINE HYDROCHLORIDE 30 MG: 20 INJECTION, SOLUTION EPIDURAL; INFILTRATION; INTRACAUDAL; PERINEURAL at 10:48

## 2024-10-11 RX ADMIN — SODIUM CHLORIDE, PRESERVATIVE FREE 10 ML: 5 INJECTION INTRAVENOUS at 20:30

## 2024-10-11 RX ADMIN — Medication 40 MCG: at 12:58

## 2024-10-11 RX ADMIN — PROPOFOL 70 MCG/KG/MIN: 10 INJECTION, EMULSION INTRAVENOUS at 10:54

## 2024-10-11 RX ADMIN — ROCURONIUM BROMIDE 5 MG: 10 INJECTION, SOLUTION INTRAVENOUS at 10:48

## 2024-10-11 RX ADMIN — SODIUM CHLORIDE, POTASSIUM CHLORIDE, SODIUM LACTATE AND CALCIUM CHLORIDE: 600; 310; 30; 20 INJECTION, SOLUTION INTRAVENOUS at 10:35

## 2024-10-11 RX ADMIN — Medication 80 MCG: at 11:00

## 2024-10-11 RX ADMIN — Medication 40 MCG: at 11:48

## 2024-10-11 RX ADMIN — Medication 40 MCG: at 13:08

## 2024-10-11 RX ADMIN — PROPOFOL 40 MG: 10 INJECTION, EMULSION INTRAVENOUS at 11:11

## 2024-10-11 RX ADMIN — PROPOFOL 40 MG: 10 INJECTION, EMULSION INTRAVENOUS at 10:50

## 2024-10-11 RX ADMIN — OXYCODONE AND ACETAMINOPHEN 1 TABLET: 5; 325 TABLET ORAL at 20:26

## 2024-10-11 RX ADMIN — Medication 40 MCG: at 13:02

## 2024-10-11 RX ADMIN — FENTANYL CITRATE 50 MCG: 50 INJECTION, SOLUTION INTRAMUSCULAR; INTRAVENOUS at 10:43

## 2024-10-11 RX ADMIN — DEXAMETHASONE SODIUM PHOSPHATE 8 MG: 4 INJECTION INTRA-ARTICULAR; INTRALESIONAL; INTRAMUSCULAR; INTRAVENOUS; SOFT TISSUE at 10:58

## 2024-10-11 ASSESSMENT — PAIN DESCRIPTION - FREQUENCY
FREQUENCY: CONTINUOUS

## 2024-10-11 ASSESSMENT — PAIN DESCRIPTION - ONSET
ONSET: ON-GOING

## 2024-10-11 ASSESSMENT — PAIN DESCRIPTION - ORIENTATION
ORIENTATION: LEFT
ORIENTATION: LEFT;MID
ORIENTATION: LEFT;MID
ORIENTATION: LEFT
ORIENTATION: MID
ORIENTATION: LEFT

## 2024-10-11 ASSESSMENT — PAIN DESCRIPTION - LOCATION
LOCATION: NECK
LOCATION: NECK
LOCATION: INCISION;NECK
LOCATION: NECK;THROAT

## 2024-10-11 ASSESSMENT — PAIN SCALES - GENERAL
PAINLEVEL_OUTOF10: 4
PAINLEVEL_OUTOF10: 4
PAINLEVEL_OUTOF10: 8
PAINLEVEL_OUTOF10: 3
PAINLEVEL_OUTOF10: 6
PAINLEVEL_OUTOF10: 8

## 2024-10-11 ASSESSMENT — PAIN - FUNCTIONAL ASSESSMENT
PAIN_FUNCTIONAL_ASSESSMENT: ACTIVITIES ARE NOT PREVENTED
PAIN_FUNCTIONAL_ASSESSMENT: PREVENTS OR INTERFERES SOME ACTIVE ACTIVITIES AND ADLS
PAIN_FUNCTIONAL_ASSESSMENT: 0-10

## 2024-10-11 ASSESSMENT — PAIN DESCRIPTION - DESCRIPTORS
DESCRIPTORS: SORE
DESCRIPTORS: ACHING
DESCRIPTORS: SORE
DESCRIPTORS: ACHING
DESCRIPTORS: SORE
DESCRIPTORS: SORE

## 2024-10-11 ASSESSMENT — PAIN DESCRIPTION - PAIN TYPE
TYPE: ACUTE PAIN;SURGICAL PAIN
TYPE: SURGICAL PAIN

## 2024-10-11 NOTE — PLAN OF CARE
Problem: Pain  Goal: Verbalizes/displays adequate comfort level or baseline comfort level  Outcome: Progressing  Flowsheets (Taken 10/11/2024 1423 by Jemima Altamirano RN)  Verbalizes/displays adequate comfort level or baseline comfort level:   Encourage patient to monitor pain and request assistance   Assess pain using appropriate pain scale

## 2024-10-11 NOTE — DISCHARGE INSTRUCTIONS
You may resume your home medications  You may shower, but do not soak your incision  Sleep with the head of bed elevated if able for the next few days    You may resume a regular diet as tolerated, start soft and advance as you feel comfortable  You may use an ice pack to your neck as is comfortable     Take tylenol or the narcotic medication prescribed to you  Do not exceed > 4000 mg of tylenol / 1 day  If no wound concerns, you may take NSAID (motrin, Advil) if needed as well for pain control

## 2024-10-11 NOTE — PERIOP NOTE
TRANSFER - OUT REPORT:    Verbal report given to Natividad on Madera M Wali  being transferred to [facility] for routine progression of patient care       Report consisted of patient's Situation, Background, Assessment and   Recommendations(SBAR).     Information from the following report(s) Nurse Handoff Report, Index, MAR, Recent Results, and Cardiac Rhythm NSR  was reviewed with the receiving nurse.           Lines:   Peripheral IV 10/11/24 Left;Posterior Hand (Active)   Site Assessment Clean, dry & intact 10/11/24 1429   Line Status Infusing 10/11/24 1429   Line Care Connections checked and tightened 10/11/24 1429   Phlebitis Assessment No symptoms 10/11/24 1429   Infiltration Assessment 0 10/11/24 1429   Alcohol Cap Used Yes 10/11/24 1429   Dressing Status Clean, dry & intact 10/11/24 1429   Dressing Type Transparent 10/11/24 1429        Opportunity for questions and clarification was provided.      Patient transported with:  Registered Nurses

## 2024-10-11 NOTE — TELEPHONE ENCOUNTER
States that the patient has surgery scheduled for today and there is no auth on file for the surgery.  Please call.

## 2024-10-11 NOTE — OP NOTE
platysma was encountered and subplatysmal flaps were raised superiorly and inferiorly. The strap muscles were identified and divided along the midline raphae. The strap muscles were then dissected away from the thyroid gland bilaterally with bipolar cautery and blunt kitner dissection. The lateral border of the gland was identified bilaterally.  Venous branches and fascial tissues were dissected away from the thyroid gland using hemoclips and the ligasure. The inferior thyroid pole was freed. Next we turned our attention to the superior thyroid. A treva was used to grasp the superior pole. The cleft between the thyroid and cricothyroid dissected bluntly. The vessels and fascial tissues of the superior pole were divided with the ligasure. We were then able to retract the gland medially. We then identified the tubercle of Zuckerkandl and likely the superior parathyroid gland. Using this as a landmark, dissection continued in the region of the recurrent laryngeal nerve, until this was visualized. This was confirmed with the nerve stimulator. We dissected along the recurrent laryngeal nerve towards the entry point. The superior parathyroid was dissected away from the thyroid and left in situ.  The thyroid isthmus was divided and the thyroid was dissected away from the trachea. Once the thyroid tissue was safely away from the recurrent laryngeal nerve, the remaining attachments were divided and the gland was removed from the trachea. It was inspected for parathyroid tissue and sent for permanent specimen. The nerve was once more stimulated.     The wound bed was irrigated copiously. A valsalva was performed for hemostasis. Fibrillar was placed along the RLN. A 7 Fr SELAM drain was placed through a lateral stab incision. Once satisfied, the strap muscles were reapproximated and the midline raphae closed.  The platysma and deep dermal layer was closed with 3-0 Vicryl and 4-0 Vicryl.  The skin was closed with 5-0 Monocryl  subcuticular and Dermabond.  SELAM drain was placed bulb suction and holding appropriately.    The patient was extubated and transferred to the PACU in stable condition.  She tolerated the procedure well     Shaina Reyes MD   [facility]  [address] [place] [address] [address]  [place] [address]  Office Phone: [phone]            Electronically signed by Shaina Reyes MD on 10/11/2024 at 1:47 PM

## 2024-10-11 NOTE — H&P
Otolaryngology-Head and Neck Surgery  H&P    Patient: Glenn Keyes  YOB: 1955  MRN: [id number]  Date of Service: 10/11/2024     Chief Complaint:   No chief complaint on file.    Interval hx 10/11/2024  Seen in preop with daughter Leeann     Interval hx 2024  Had thyroid FNA     History of Present Illness: Glenn Keyes is a 68 y.o. female who presents today for discussion of a thyroid nodule    Remote history of non-Hodgkin's lymphoma, managed with chemotherapy and possible radiation therapy 30 years ago.  Presented with cervical adenopathy at that time    Has been feeling intermittent cervical lymph node swelling in the last couple of months  PCP ordered a neck ultrasound showing a thyroid nodule    Sister has some sort of thyroid issue, required possible thyroid surgery  Unsure of family history of thyroid cancer    Again she believes she may have had radiation to her neck    Otherwise no prior neck or throat surgeries    Past Medical History:  Past Medical History:   Diagnosis Date    Anxiety     Diabetes mellitus (HCC)     Dyslipidemia     Headache     Hypertension     Low back pain 2012    MRI 3/10/14 revealed mild multilevel disk and facet degenerative change     Lumbar disc disease 2014    Non Hodgkin's lymphoma (HCC) 1985    Nodule removed from throat    Thyroid nodule     Followed by endocrine, Dr. Dangelo.      Ulcer        Past Surgical History:   Past Surgical History:   Procedure Laterality Date    COLONOSCOPY      COLONOSCOPY N/A 2023    COLONOSCOPY performed by Dashawn Steele MD at [facility] ENDOSCOPY    DELIVERY       REMOVAL NODES, NECK,CERV CMPLT      TONSILLECTOMY      Age 9    UPPER GASTROINTESTINAL ENDOSCOPY         Medications:   Current Outpatient Medications   Medication Instructions    acetaminophen (TYLENOL) 500 mg, Oral, EVERY 6 HOURS PRN    hydrOXYzine HCl (ATARAX) 10 mg, Oral, 3 TIMES DAILY PRN    ibuprofen (ADVIL;MOTRIN) 400 mg, Oral, EVERY 6 HOURS PRN     Multiple Vitamins-Minerals (MULTIVITAMIN GUMMIES WOMENS) CHEW 1 tablet, Oral, DAILY    pantoprazole (PROTONIX) 40 mg, Oral, DAILY    rizatriptan (MAXALT) 10 mg, Oral, ONCE PRN, May repeat in 2 hours if needed    Semaglutide (RYBELSUS) 7 MG TABS 1 tablet, Oral, DAILY    valsartan-hydroCHLOROthiazide (DIOVAN-HCT) 160-25 MG per tablet 0.5 tablets, Oral, DAILY    venlafaxine (EFFEXOR XR) 150 MG extended release capsule TAKE 1 CAPSULE BY MOUTH EVERY DAY    venlafaxine (EFFEXOR XR) 37.5 MG extended release capsule TAKE 1 CAPSULE BY MOUTH DAILY. TAKE IN ADDITION TO EFFEXOR 150MG FOR TOTAL .5MG DAILY.    vitamin D (ERGOCALCIFEROL) 50,000 Units, Oral, WEEKLY       Allergies:   No Known Allergies    Social History:   Social History     Tobacco Use    Smoking status: Never     Passive exposure: Never    Smokeless tobacco: Never   Vaping Use    Vaping status: Never Used   Substance Use Topics    Alcohol use: No    Drug use: No        Family History:  Family History   Problem Relation Age of Onset    Stroke Maternal Grandmother     Hypertension Maternal Grandmother     Cancer Brother         kidney    Hypertension Sister     Parkinsonism Father     Hypertension Father     Hypertension Paternal Grandfather     Cancer Paternal Grandfather     Elevated Lipids Mother     Hypertension Mother     Cancer Paternal Grandmother     Hypertension Paternal Grandmother        Review of Systems:    Consitutional: denies fever, excessive weight gain or loss.  Eyes: denies diplopia, eye pain.  Integumentary: denies new concerning skin lesions.  Ears, Nose, Mouth, Throat: denies except as per HPI.  Endocrine: denies hot or cold intolerance, increased thirst.  Respiratory: denies cough, hemoptysis, wheezing  Gastrointestinal: denies trouble swallowing, nausea, emesis, regurgitation  Musculoskeletal: denies muscle weakness or wasting  Cardiovascular: denies chest pain, shortness of breath  Neurologic: denies seizures, numbness or tingling,

## 2024-10-11 NOTE — DISCHARGE INSTR - DIET

## 2024-10-11 NOTE — ANESTHESIA POSTPROCEDURE EVALUATION
Department of Anesthesiology  Postprocedure Note    Patient: Glenn Keyes  MRN: [id number]  YOB: 1955  Date of evaluation: 10/11/2024    Procedure Summary       Date: 10/11/24 Room / Location: [facility] ASU OR  / [facility] AMBULATORY OR    Anesthesia Start: 1039 Anesthesia Stop: 1345    Procedure: LEFT THYROID LOBECTOMY (Neck) Diagnosis:       Thyroid nodule      (Thyroid nodule [E04.1])    Surgeons: Shaina Reyes MD Responsible Provider: Xavier Allen MD    Anesthesia Type: General ASA Status: 2            Anesthesia Type: General    Paula Phase I: Paula Score: 8    Paula Phase II:      Anesthesia Post Evaluation    Patient location during evaluation: PACU  Patient participation: complete - patient participated  Level of consciousness: awake and alert  Pain score: 2  Airway patency: patent  Nausea & Vomiting: no vomiting and no nausea  Cardiovascular status: hemodynamically stable  Respiratory status: room air  Hydration status: stable  Multimodal analgesia pain management approach  Pain management: adequate    No notable events documented.

## 2024-10-11 NOTE — ANESTHESIA PRE PROCEDURE
Department of Anesthesiology  Preprocedure Note       Name:  Glenn Keyes   Age:  68 y.o.  :  1955                                          MRN:  [id number]         Date:  10/11/2024      Surgeon: Surgeon(s):  Shaina Reyes MD    Procedure: Procedure(s):  LEFT THYROID LOBECTOMY WITH POSSIBLE TOTAL THYROIDECTOMY    Medications prior to admission:   Prior to Admission medications    Medication Sig Start Date End Date Taking? Authorizing Provider   acetaminophen (TYLENOL) 500 MG tablet Take 1 tablet by mouth every 6 hours as needed for Pain   Yes Jonna Dyson MD   valsartan-hydroCHLOROthiazide (DIOVAN-HCT) 160-25 MG per tablet Take 0.5 tablets by mouth daily 24  Yes Susan Lam MD   hydrOXYzine HCl (ATARAX) 10 MG tablet Take 1 tablet by mouth 3 times daily as needed for Anxiety (Sleep) 24  Yes Susan Lam MD   pantoprazole (PROTONIX) 40 MG tablet TAKE 1 TABLET BY MOUTH EVERY DAY 24  Yes Susan Lam MD   venlafaxine (EFFEXOR XR) 37.5 MG extended release capsule TAKE 1 CAPSULE BY MOUTH DAILY. TAKE IN ADDITION TO EFFEXOR 150MG FOR TOTAL .5MG DAILY. 24  Yes Susan Lam MD   venlafaxine (EFFEXOR XR) 150 MG extended release capsule TAKE 1 CAPSULE BY MOUTH EVERY DAY 24  Yes Susan Lam MD   Multiple Vitamins-Minerals (MULTIVITAMIN GUMMIES WOMENS) CHEW Take 1 tablet by mouth daily   Yes ProviderJonna MD   rizatriptan (MAXALT) 10 MG tablet Take 1 tablet by mouth once as needed for Migraine (May take 2nd dose in 2 hours if needed) May repeat in 2 hours if needed 2/13/24 10/11/24 Yes Susan Lam MD   vitamin D (ERGOCALCIFEROL) 1.25 MG (97002 UT) CAPS capsule TAKE 1 CAPSULE BY MOUTH ONE TIME PER WEEK 23  Yes Susan Lam MD   ibuprofen (ADVIL;MOTRIN) 400 MG tablet Take 1 tablet by mouth every 6 hours as needed 21  Yes Automatic Reconciliation, Ar   Semaglutide (RYBELSUS) 7 MG TABS Take 1 tablet by mouth daily 9/3/24

## 2024-10-12 VITALS
DIASTOLIC BLOOD PRESSURE: 77 MMHG | SYSTOLIC BLOOD PRESSURE: 145 MMHG | OXYGEN SATURATION: 98 % | HEART RATE: 83 BPM | TEMPERATURE: 98.1 F | RESPIRATION RATE: 16 BRPM

## 2024-10-12 PROCEDURE — 99024 POSTOP FOLLOW-UP VISIT: CPT | Performed by: OTOLARYNGOLOGY

## 2024-10-12 PROCEDURE — 2580000003 HC RX 258: Performed by: OTOLARYNGOLOGY

## 2024-10-12 PROCEDURE — 6370000000 HC RX 637 (ALT 250 FOR IP): Performed by: OTOLARYNGOLOGY

## 2024-10-12 PROCEDURE — 6360000002 HC RX W HCPCS: Performed by: OTOLARYNGOLOGY

## 2024-10-12 PROCEDURE — 94761 N-INVAS EAR/PLS OXIMETRY MLT: CPT

## 2024-10-12 PROCEDURE — 96374 THER/PROPH/DIAG INJ IV PUSH: CPT

## 2024-10-12 PROCEDURE — G0378 HOSPITAL OBSERVATION PER HR: HCPCS

## 2024-10-12 RX ORDER — OXYCODONE AND ACETAMINOPHEN 5; 325 MG/1; MG/1
1 TABLET ORAL EVERY 6 HOURS PRN
Qty: 20 TABLET | Refills: 0 | Status: SHIPPED | OUTPATIENT
Start: 2024-10-12 | End: 2024-10-17

## 2024-10-12 RX ADMIN — VALSARTAN 160 MG: 80 TABLET ORAL at 09:05

## 2024-10-12 RX ADMIN — PANTOPRAZOLE SODIUM 40 MG: 40 TABLET, DELAYED RELEASE ORAL at 09:05

## 2024-10-12 RX ADMIN — SODIUM CHLORIDE, PRESERVATIVE FREE 10 ML: 5 INJECTION INTRAVENOUS at 09:06

## 2024-10-12 RX ADMIN — MORPHINE SULFATE 2 MG: 2 INJECTION, SOLUTION INTRAMUSCULAR; INTRAVENOUS at 05:04

## 2024-10-12 RX ADMIN — HYDROCHLOROTHIAZIDE 25 MG: 25 TABLET ORAL at 09:05

## 2024-10-12 RX ADMIN — VENLAFAXINE HYDROCHLORIDE 150 MG: 150 CAPSULE, EXTENDED RELEASE ORAL at 09:06

## 2024-10-12 RX ADMIN — VENLAFAXINE HYDROCHLORIDE 37.5 MG: 37.5 CAPSULE, EXTENDED RELEASE ORAL at 09:05

## 2024-10-12 ASSESSMENT — PAIN DESCRIPTION - DESCRIPTORS: DESCRIPTORS: ACHING

## 2024-10-12 ASSESSMENT — PAIN DESCRIPTION - LOCATION
LOCATION: NECK
LOCATION: NECK

## 2024-10-12 ASSESSMENT — PAIN SCALES - GENERAL
PAINLEVEL_OUTOF10: 2
PAINLEVEL_OUTOF10: 8

## 2024-10-12 NOTE — DISCHARGE SUMMARY
[facility] Discharge Summary     Patient ID:  Glenn Keyes  [id number]  68 y.o.  1955    Admit date: 10/11/2024    Discharge date and time: 10/12/2024    Admitting Physician: Shaina Reyes MD     Discharge Physician: Shaina Reyes MD    Admission Diagnoses: Thyroid nodule [E04.1]  S/P thyroid surgery [Z98.890]    Discharge Diagnoses:  Same     Admission Condition: good    Discharged Condition: good    Hospital Course: She was admitted post operatively and did well. On POD 1, she was felt to be stable for discharge as she was tolerating a regular diet, pain well controlled, voiding without difficulty. Her SELAM drain had minimal output and was removed prior to DC home.     Consults: none    Outstanding Order Results       No orders found for last 30 day(s).            Discharge Exam:  Comfortable  Soft but strong clear voice  Neck incision clean, with tape  Soft  Very minimal swelling left neck which is subtle  Scant serous output in SELAM drain, removed     Disposition: home    Patient Instructions:   No heavy lifting or strenuous activity x 2 weeks    Diet: regular diet    Wound Care: keep wound clean and dry    Follow-up in 1 week     Signed:  Shaina Reyes MD  10/12/2024  9:58 AM

## 2024-10-12 NOTE — PLAN OF CARE
Problem: Chronic Conditions and Co-morbidities  Goal: Patient's chronic conditions and co-morbidity symptoms are monitored and maintained or improved  Outcome: [facility] Progressing  Flowsheets (Taken 10/11/2024 2031)  Care Plan - Patient's Chronic Conditions and Co-Morbidity Symptoms are Monitored and Maintained or Improved: Monitor and assess patient's chronic conditions and comorbid symptoms for stability, deterioration, or improvement     Problem: Pain  Goal: Verbalizes/displays adequate comfort level or baseline comfort level  10/11/2024 2353 by Radha Urbina, RN  Outcome: [facility] Progressing  10/11/2024 1534 by Fior Rodriguez, RN  Outcome: Progressing  Flowsheets (Taken 10/11/2024 1423 by Jemima Altamirano, RN)  Verbalizes/displays adequate comfort level or baseline comfort level:   Encourage patient to monitor pain and request assistance   Assess pain using appropriate pain scale     Problem: Discharge Planning  Goal: Discharge to home or other facility with appropriate resources  Outcome: [facility] Progressing  Flowsheets (Taken 10/11/2024 2031)  Discharge to home or other facility with appropriate resources: Identify barriers to discharge with patient and caregiver     Problem: Safety - Adult  Goal: Free from fall injury  Outcome: [facility] Progressing     Problem: ABCDS Injury Assessment  Goal: Absence of physical injury  Outcome: [facility] Progressing

## 2024-10-18 ENCOUNTER — OFFICE VISIT (OUTPATIENT)
Age: 69
End: 2024-10-18

## 2024-10-18 VITALS — OXYGEN SATURATION: 98 % | SYSTOLIC BLOOD PRESSURE: 132 MMHG | DIASTOLIC BLOOD PRESSURE: 84 MMHG | HEART RATE: 91 BPM

## 2024-10-18 DIAGNOSIS — Z98.890 S/P THYROID SURGERY: Primary | ICD-10-CM

## 2024-10-18 PROCEDURE — 99024 POSTOP FOLLOW-UP VISIT: CPT | Performed by: OTOLARYNGOLOGY

## 2024-10-18 NOTE — PROGRESS NOTES
Glenn Keyes  1955  [id number]    10/18/2024    No issues  Doing well  Slight incisional swelling    /84   Pulse 91   SpO2 98%     Comfortable  Neck very minimal incisional edema, appropriate post op, soft  Tape partly removed and trimmed, remainder not ready to come off    Pathology - still pending    A/P   Left thyroid nodule s/p left thyroid lobectomy  - Routine post op course  - Discussed tape should gradually peel off on in the coming days  - Discussed vit E oil  - Discussed silicone sheeting   - I called pathology dept this AM - apparently specimen has been sent to [facility] for further evaluation and result is not yet back  - I will follow up next week and call patient accordingly    Shaina Reyes MD   [facility]  [address]  [place] [address]  Office Phone: [phone]         SW met pt and wife at their request today in infusion at . Pt explained that he went to [employer] to pick-up [employer]. [employer] asked that SW fax the referral form. SW re-faxed it today per pt request. Pt reported no other issues. SW will f/u as needs arise.

## 2024-10-22 NOTE — LETTER
NOTIFICATION RETURN TO WORK / SCHOOL 
 
4/18/2019 2:38 PM 
 
Ms. Ortiz Vieira [id number] [facility] [address] [id number] To Whom It May Concern: 
 
Ortiz Vieira is currently under the care of [facility]. She will return to work/school on: 4/22/2019 If there are questions or concerns please have the patient contact our office. Sincerely, Jordan Bagley MD 
 
                                
 

no...

## 2024-10-24 ENCOUNTER — TELEMEDICINE (OUTPATIENT)
Age: 69
End: 2024-10-24

## 2024-10-24 DIAGNOSIS — C73 PAPILLARY THYROID CARCINOMA (HCC): Primary | ICD-10-CM

## 2024-10-25 NOTE — PROGRESS NOTES
Gelnn Keyes  1955  [id number]    10/24/2024    Virtual visit to review pathology results    There were no vitals taken for this visit.  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   Angiolymphatic invasion is not identified.   Background adenomatoid change and incidental follicular adenoma.   See synoptic report.          A/P   Left thyroid nodule s/p left thyroid lobectomy  -Reviewed pathology which shows papillary thyroid carcinoma, follicular variant  -Discussed sizes 1.8 cm and there is no extrathyroidal extension  -Discussed role of completion thyroidectomy versus observation  -I would favor observation as the size is 1.8 cm  -She is in agreement but will consider this further and discuss it with her daughters  -We will also arrange an endocrinology referral that she will need to follow-up with them regardless     Documentation:    Total Time: minutes: 11-20 minutes    Glenn Keyes was evaluated through a synchronous (real-time) audio encounter. Patient identification was verified at the start of the visit. She (or guardian if applicable) is aware that this is a billable service, which includes applicable co-pays. This visit was conducted with the patient's (and/or legal guardian's) verbal consent. She has not had a related appointment within my department in the past 7 days or scheduled within the next 24 hours.   The patient was located at Home: [address] [place] [id number].  The provider was located at Facility (Appt Dept): [address] [place] [address] [address] [address].  Confirm you are appropriately licensed, registered, or certified to deliver care in the state where the patient is located as indicated above. If you are not or unsure, please re-schedule the visit:

## 2024-11-07 DIAGNOSIS — I10 ESSENTIAL (PRIMARY) HYPERTENSION: ICD-10-CM

## 2024-11-07 RX ORDER — VALSARTAN AND HYDROCHLOROTHIAZIDE 160; 25 MG/1; MG/1
1 TABLET ORAL DAILY
Qty: 90 TABLET | Refills: 1 | OUTPATIENT
Start: 2024-11-07

## 2024-11-07 NOTE — TELEPHONE ENCOUNTER
This writer reached out to the [facility] in [employer] as prescription was sent in on 9/26/24. Per review of chart medication was sent to [facility] but no escribing status noted, meaning pharmacy never received the refill request. This writer called pharmacy and per order given by Dr Lam to verbally give order of Valsartan-hydrochlorothiazide 106-25 mg tablet, dose: 0.5 tablet; Dispense Quantity: 90 tablet; Sig: Take 0.5 tablets by mouth daily with Refills of 1. Pharmacist verbalized order and completed a read back of verbal order of medication.

## 2024-11-21 ENCOUNTER — OFFICE VISIT (OUTPATIENT)
Age: 69
End: 2024-11-21
Payer: MEDICARE

## 2024-11-21 ENCOUNTER — PROCEDURE VISIT (OUTPATIENT)
Age: 69
End: 2024-11-21

## 2024-11-21 VITALS
OXYGEN SATURATION: 98 % | RESPIRATION RATE: 16 BRPM | SYSTOLIC BLOOD PRESSURE: 126 MMHG | HEART RATE: 88 BPM | DIASTOLIC BLOOD PRESSURE: 88 MMHG | HEIGHT: 63 IN | BODY MASS INDEX: 22.26 KG/M2

## 2024-11-21 DIAGNOSIS — Z98.890 S/P THYROID SURGERY: ICD-10-CM

## 2024-11-21 DIAGNOSIS — H90.3 SENSORINEURAL HEARING LOSS (SNHL), BILATERAL: Primary | ICD-10-CM

## 2024-11-21 DIAGNOSIS — C73 PAPILLARY THYROID CARCINOMA (HCC): Primary | ICD-10-CM

## 2024-11-21 DIAGNOSIS — H90.3 SENSORINEURAL HEARING LOSS (SNHL), BILATERAL: ICD-10-CM

## 2024-11-21 PROCEDURE — 1036F TOBACCO NON-USER: CPT | Performed by: OTOLARYNGOLOGY

## 2024-11-21 PROCEDURE — 1090F PRES/ABSN URINE INCON ASSESS: CPT | Performed by: OTOLARYNGOLOGY

## 2024-11-21 PROCEDURE — G8483 FLU IMM NO ADMIN DOC REA: HCPCS | Performed by: OTOLARYNGOLOGY

## 2024-11-21 PROCEDURE — 3017F COLORECTAL CA SCREEN DOC REV: CPT | Performed by: OTOLARYNGOLOGY

## 2024-11-21 PROCEDURE — 3074F SYST BP LT 130 MM HG: CPT | Performed by: OTOLARYNGOLOGY

## 2024-11-21 PROCEDURE — 1159F MED LIST DOCD IN RCRD: CPT | Performed by: OTOLARYNGOLOGY

## 2024-11-21 PROCEDURE — G8427 DOCREV CUR MEDS BY ELIG CLIN: HCPCS | Performed by: OTOLARYNGOLOGY

## 2024-11-21 PROCEDURE — 3079F DIAST BP 80-89 MM HG: CPT | Performed by: OTOLARYNGOLOGY

## 2024-11-21 PROCEDURE — G8420 CALC BMI NORM PARAMETERS: HCPCS | Performed by: OTOLARYNGOLOGY

## 2024-11-21 PROCEDURE — G8399 PT W/DXA RESULTS DOCUMENT: HCPCS | Performed by: OTOLARYNGOLOGY

## 2024-11-21 PROCEDURE — 1123F ACP DISCUSS/DSCN MKR DOCD: CPT | Performed by: OTOLARYNGOLOGY

## 2024-11-21 PROCEDURE — 99213 OFFICE O/P EST LOW 20 MIN: CPT | Performed by: OTOLARYNGOLOGY

## 2024-11-21 RX ORDER — OMEGA-3/DHA/EPA/FISH OIL 300-1000MG
2 CAPSULE ORAL DAILY
COMMUNITY

## 2024-11-21 NOTE — PROGRESS NOTES
Type A tympanogram, consistent with normal middle ear function.       Reliability: Good   Transducer: Inserts    See scanned audiogram dated 11/21/2024 for results.        PATIENT EDUCATION:       The following items were discussed with the patient:   - Good Communication Strategies  - Hearing Loss and Hearing Aids  - Noise-Induced Hearing Loss and use of Hearing Protection Devices (HPDs)     Educational information was shared in the After Visit Summary.                                                RECOMMENDATIONS:                                                                                                                                                                                                                                                            The following items are recommended based on patient report and results from today's appointment:   - Continue medical follow-up with Shaina Reyes MD.    - Retest hearing as medically indicated and/or sooner if a change in hearing is noted.  - If desired, schedule a Hearing Aid Evaluation (HAE) appointment to discuss hearing aid options.  - Utilize \"Good Communication Strategies\" as discussed to assist in speech understanding with communication partners.  - Use hearing protection devices (HPDs), such as protective ear muffs and ear plugs, when exposed to dangerous sound levels.    Acosta Byrd, CCC-A  Audiologist     Chart CC'd to: Shaina Reyes MD        Degree of   Hearing Sensitivity dB Range   Within Normal Limits (WNL) 0 - 25   Mild 25 - 40   Moderate 40 - 55   Moderately-Severe 55 - 70   Severe 70 - 90   Profound 90 +

## 2024-11-21 NOTE — PATIENT INSTRUCTIONS
(do not scream)   - Speak slowly and distinctly   - Use short, simple sentences   - Rephrase your words if the person is having a hard time understanding you    - To avoid distortion, don’t speak directly into a person’s ear      Some additional items that may be helpful:   - Remain patient - this is important for both parties   - Write down items that still cannot be heard/understood.  You may write with pen/paper or consider typing/texting on a cell phone or smart device.   - If background noise is unavoidable, try to keep yourself in a good position in the room.  By sitting at a sky on the side of the restaurant (preferably a corner), it will be easier to communicate than if you were sitting at a table in the middle with background noise surrounding you.  Keep yourself positioned away from music speakers or heavy foot traffic.

## 2024-11-22 NOTE — PROGRESS NOTES
Otolaryngology-Head and Neck Surgery  Follow Up Patient Visit     Patient: Glenn Keyes  YOB: 1955  MRN: [id number]  Date of Service: 2024     Chief Complaint:   Chief Complaint   Patient presents with    Follow-up     Papillary thyroid carcinoma     Interval hx 2024  S/p left thyroid lobectomy, about 6 weeks post op  Had audiogram  Recent significant right neck pain, unclear etiology, ultimately self limited     Interval hx 2024  Had thyroid FNA     History of Present Illness: Glenn Keyes is a 68 y.o. female who presents today for discussion of a thyroid nodule    Remote history of non-Hodgkin's lymphoma, managed with chemotherapy and possible radiation therapy 30 years ago.  Presented with cervical adenopathy at that time    Has been feeling intermittent cervical lymph node swelling in the last couple of months  PCP ordered a neck ultrasound showing a thyroid nodule    Sister has some sort of thyroid issue, required possible thyroid surgery  Unsure of family history of thyroid cancer    Again she believes she may have had radiation to her neck    Otherwise no prior neck or throat surgeries    Past Medical History:  Past Medical History:   Diagnosis Date    Anxiety     Diabetes mellitus (HCC)     Dyslipidemia     Headache     Hypertension     Low back pain 2012    MRI 3/10/14 revealed mild multilevel disk and facet degenerative change     Lumbar disc disease 2014    Non Hodgkin's lymphoma (HCC)     Nodule removed from throat    Thyroid nodule     Followed by endocrine, Dr. Dangelo.      Ulcer        Past Surgical History:   Past Surgical History:   Procedure Laterality Date    COLONOSCOPY      COLONOSCOPY N/A 2023    COLONOSCOPY performed by Dashawn Steele MD at [facility] ENDOSCOPY    DELIVERY       REMOVAL NODES, NECK,CERV CMPLT      THYROIDECTOMY N/A 10/11/2024    LEFT THYROID LOBECTOMY performed by Shaina Reyes MD at [facility] AMBULATORY OR    TONSILLECTOMY

## 2024-11-26 ENCOUNTER — OFFICE VISIT (OUTPATIENT)
Age: 69
End: 2024-11-26
Payer: MEDICARE

## 2024-11-26 ENCOUNTER — LAB (OUTPATIENT)
Age: 69
End: 2024-11-26

## 2024-11-26 VITALS
BODY MASS INDEX: 23 KG/M2 | HEART RATE: 75 BPM | TEMPERATURE: 98.3 F | SYSTOLIC BLOOD PRESSURE: 139 MMHG | OXYGEN SATURATION: 97 % | DIASTOLIC BLOOD PRESSURE: 79 MMHG | HEIGHT: 63 IN | WEIGHT: 129.8 LBS

## 2024-11-26 DIAGNOSIS — E55.9 VITAMIN D DEFICIENCY: ICD-10-CM

## 2024-11-26 DIAGNOSIS — I10 ESSENTIAL (PRIMARY) HYPERTENSION: ICD-10-CM

## 2024-11-26 DIAGNOSIS — C73 THYROID CANCER (HCC): Primary | ICD-10-CM

## 2024-11-26 DIAGNOSIS — E11.40 TYPE 2 DIABETES MELLITUS WITH DIABETIC NEUROPATHY, WITHOUT LONG-TERM CURRENT USE OF INSULIN (HCC): ICD-10-CM

## 2024-11-26 DIAGNOSIS — R53.83 FATIGUE, UNSPECIFIED TYPE: ICD-10-CM

## 2024-11-26 PROCEDURE — G8483 FLU IMM NO ADMIN DOC REA: HCPCS | Performed by: INTERNAL MEDICINE

## 2024-11-26 PROCEDURE — 1123F ACP DISCUSS/DSCN MKR DOCD: CPT | Performed by: INTERNAL MEDICINE

## 2024-11-26 PROCEDURE — G8399 PT W/DXA RESULTS DOCUMENT: HCPCS | Performed by: INTERNAL MEDICINE

## 2024-11-26 PROCEDURE — G8420 CALC BMI NORM PARAMETERS: HCPCS | Performed by: INTERNAL MEDICINE

## 2024-11-26 PROCEDURE — 1090F PRES/ABSN URINE INCON ASSESS: CPT | Performed by: INTERNAL MEDICINE

## 2024-11-26 PROCEDURE — 1036F TOBACCO NON-USER: CPT | Performed by: INTERNAL MEDICINE

## 2024-11-26 PROCEDURE — G8427 DOCREV CUR MEDS BY ELIG CLIN: HCPCS | Performed by: INTERNAL MEDICINE

## 2024-11-26 PROCEDURE — 99204 OFFICE O/P NEW MOD 45 MIN: CPT | Performed by: INTERNAL MEDICINE

## 2024-11-26 PROCEDURE — 3075F SYST BP GE 130 - 139MM HG: CPT | Performed by: INTERNAL MEDICINE

## 2024-11-26 PROCEDURE — 1126F AMNT PAIN NOTED NONE PRSNT: CPT | Performed by: INTERNAL MEDICINE

## 2024-11-26 PROCEDURE — 3078F DIAST BP <80 MM HG: CPT | Performed by: INTERNAL MEDICINE

## 2024-11-26 PROCEDURE — 1159F MED LIST DOCD IN RCRD: CPT | Performed by: INTERNAL MEDICINE

## 2024-11-26 PROCEDURE — 3017F COLORECTAL CA SCREEN DOC REV: CPT | Performed by: INTERNAL MEDICINE

## 2024-11-26 PROCEDURE — 1160F RVW MEDS BY RX/DR IN RCRD: CPT | Performed by: INTERNAL MEDICINE

## 2024-11-26 PROCEDURE — G2211 COMPLEX E/M VISIT ADD ON: HCPCS | Performed by: INTERNAL MEDICINE

## 2024-11-27 LAB
25(OH)D3 SERPL-MCNC: 38 NG/ML (ref 30–100)
ANION GAP SERPL CALC-SCNC: 4 MMOL/L (ref 2–12)
BUN SERPL-MCNC: 15 MG/DL (ref 6–20)
BUN/CREAT SERPL: 16 (ref 12–20)
CALCIUM SERPL-MCNC: 9.8 MG/DL (ref 8.5–10.1)
CHLORIDE SERPL-SCNC: 108 MMOL/L (ref 97–108)
CHOLEST SERPL-MCNC: 235 MG/DL
CO2 SERPL-SCNC: 30 MMOL/L (ref 21–32)
CREAT SERPL-MCNC: 0.94 MG/DL (ref 0.55–1.02)
EST. AVERAGE GLUCOSE BLD GHB EST-MCNC: 111 MG/DL
GLUCOSE SERPL-MCNC: 102 MG/DL (ref 65–100)
HBA1C MFR BLD: 5.5 % (ref 4–5.6)
HDLC SERPL-MCNC: 69 MG/DL
HDLC SERPL: 3.4 (ref 0–5)
LDLC SERPL CALC-MCNC: 135.4 MG/DL (ref 0–100)
POTASSIUM SERPL-SCNC: 4.3 MMOL/L (ref 3.5–5.1)
SODIUM SERPL-SCNC: 142 MMOL/L (ref 136–145)
T4 FREE SERPL-MCNC: 0.9 NG/DL (ref 0.8–1.5)
TRIGL SERPL-MCNC: 153 MG/DL
TSH SERPL DL<=0.05 MIU/L-ACNC: 2.02 UIU/ML (ref 0.36–3.74)
VLDLC SERPL CALC-MCNC: 30.6 MG/DL

## 2024-11-27 NOTE — PROGRESS NOTES
Glenn Keyes is a 68 y.o. female here for   Chief Complaint   Patient presents with    Thyroid Problem       1. Have you been to the ER, urgent care clinic since your last visit?  Hospitalized since your last visit? -  no  2. Have you seen or consulted any other health care providers outside of the [facility] since your last visit?  Include any pap smears or colon screening.-no    
Temporal, height 1.6 m (5' 3\"), weight 58.9 kg (129 lb 12.8 oz), SpO2 97%.      General: pleasant, no distress, good eye contact  HEENT: no exopthalmos, no periorbital edema, no scleral/conjunctival injection, EOMI, no lid lag or stare  Neck: supple, surgical scar, clean, no adenopathy, negative Chvostek sign    Cardiovascular: regular,  normal S1 and S2, no murmurs  Respiratory: clear to auscultation bilaterally  Musculoskeletal: no proximal muscle weakness in upper or lower extremities  Integumentary: No tremors, no edema  Neurological: alert and oriented   Psychiatric: normal mood and affect  Skin - normal turgor    Data Reviewed:     No results found for: \"TSILT\"  No results found for: \"TRALT\"    Lab Results   Component Value Date    TSH 2.02 11/26/2024     Lab Results   Component Value Date    TSH 2.02 11/26/2024    T4FREE 0.9 11/26/2024        Results  Imaging  Ultrasound showed  Two separate subcentimeter nodules on the right side.       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 Angiolymphatic invasion is not identified. Lymphatic Invasion:  Not identified   Extrathyroidal Extension:  Not identified   Margin Status:  All margins negative for carcinoma   REGIONAL LYMPH NODES        Regional Lymph Node Status:  no regional lymph nodes submitted    [x] Reviewed lab      Assessment/Plan:     1. Thyroid cancer (HCC)    2. Fatigue, unspecified type        1.  Follicular variant papillary thyroid cancer (1.8 cm) minimally invasive, confined to left lobe status post left hemithyroidectomy October 2024, T1bNx    Low risk  Risk factors, possible radiation therapy, family history of thyroid cancer  Goal of non stimulated Tg <30 ng/mL after lobectomy.     Initial Target TSH:  - s/p lobectomy, low risk disease, undetectable Tg : 0.5-2   Check TSH, Tg  Ultrasound in 6 months, has subcentimeter right

## 2024-12-02 ENCOUNTER — OFFICE VISIT (OUTPATIENT)
Age: 69
End: 2024-12-02
Payer: MEDICARE

## 2024-12-02 VITALS
HEART RATE: 84 BPM | WEIGHT: 130.4 LBS | RESPIRATION RATE: 15 BRPM | BODY MASS INDEX: 23.11 KG/M2 | HEIGHT: 63 IN | OXYGEN SATURATION: 98 % | DIASTOLIC BLOOD PRESSURE: 79 MMHG | TEMPERATURE: 98.4 F | SYSTOLIC BLOOD PRESSURE: 136 MMHG

## 2024-12-02 DIAGNOSIS — E11.40 TYPE 2 DIABETES MELLITUS WITH DIABETIC NEUROPATHY, WITHOUT LONG-TERM CURRENT USE OF INSULIN (HCC): Primary | ICD-10-CM

## 2024-12-02 DIAGNOSIS — R10.13 EPIGASTRIC PAIN: ICD-10-CM

## 2024-12-02 DIAGNOSIS — E78.00 ELEVATED LDL CHOLESTEROL LEVEL: ICD-10-CM

## 2024-12-02 DIAGNOSIS — I10 ESSENTIAL (PRIMARY) HYPERTENSION: ICD-10-CM

## 2024-12-02 PROCEDURE — 99214 OFFICE O/P EST MOD 30 MIN: CPT | Performed by: FAMILY MEDICINE

## 2024-12-02 RX ORDER — ROSUVASTATIN CALCIUM 5 MG/1
5 TABLET, COATED ORAL NIGHTLY
Qty: 90 TABLET | Refills: 3 | Status: SHIPPED | OUTPATIENT
Start: 2024-12-02

## 2024-12-02 ASSESSMENT — PATIENT HEALTH QUESTIONNAIRE - PHQ9
SUM OF ALL RESPONSES TO PHQ QUESTIONS 1-9: 1
5. POOR APPETITE OR OVEREATING: NOT AT ALL
10. IF YOU CHECKED OFF ANY PROBLEMS, HOW DIFFICULT HAVE THESE PROBLEMS MADE IT FOR YOU TO DO YOUR WORK, TAKE CARE OF THINGS AT HOME, OR GET ALONG WITH OTHER PEOPLE: NOT DIFFICULT AT ALL
3. TROUBLE FALLING OR STAYING ASLEEP: NOT AT ALL
SUM OF ALL RESPONSES TO PHQ9 QUESTIONS 1 & 2: 1
7. TROUBLE CONCENTRATING ON THINGS, SUCH AS READING THE NEWSPAPER OR WATCHING TELEVISION: NOT AT ALL
6. FEELING BAD ABOUT YOURSELF - OR THAT YOU ARE A FAILURE OR HAVE LET YOURSELF OR YOUR FAMILY DOWN: NOT AT ALL
9. THOUGHTS THAT YOU WOULD BE BETTER OFF DEAD, OR OF HURTING YOURSELF: NOT AT ALL
SUM OF ALL RESPONSES TO PHQ QUESTIONS 1-9: 1
4. FEELING TIRED OR HAVING LITTLE ENERGY: NOT AT ALL
1. LITTLE INTEREST OR PLEASURE IN DOING THINGS: NOT AT ALL
SUM OF ALL RESPONSES TO PHQ QUESTIONS 1-9: 1
SUM OF ALL RESPONSES TO PHQ QUESTIONS 1-9: 1
8. MOVING OR SPEAKING SO SLOWLY THAT OTHER PEOPLE COULD HAVE NOTICED. OR THE OPPOSITE, BEING SO FIGETY OR RESTLESS THAT YOU HAVE BEEN MOVING AROUND A LOT MORE THAN USUAL: NOT AT ALL
2. FEELING DOWN, DEPRESSED OR HOPELESS: SEVERAL DAYS

## 2024-12-02 ASSESSMENT — ENCOUNTER SYMPTOMS: SHORTNESS OF BREATH: 0

## 2024-12-02 NOTE — PROGRESS NOTES
Glenn Keyes is a 68 y.o. female      Chief Complaint   Patient presents with    Diabetes    Discuss Labs     -lipid panel  -A1C  -BMP  - would like to discuss the weight gain while taking Semaglutide 7mg       \"Have you been to the ER, urgent care clinic since your last visit?  Hospitalized since your last visit?\"    NO    “Have you seen or consulted any other health care providers outside of [facility] since your last visit?”    NO              Vitals:    12/02/24 1527   BP: 136/79   Site: Right Upper Arm   Position: Sitting   Cuff Size: Medium Adult   Pulse: 84   Resp: 15   Temp: 98.4 °F (36.9 °C)   TempSrc: Oral   SpO2: 98%   Weight: 59.1 kg (130 lb 6.4 oz)   Height: 1.6 m (5' 2.99\")            Health Maintenance Due   Topic Date Due    Pneumococcal 65+ years Vaccine (1 of 2 - PCV) Never done    Shingles vaccine (1 of 2) Never done    Diabetic retinal exam  03/19/2019    COVID-19 Vaccine (3 - 2023-24 season) 09/01/2024         Medication Reconciliation completed, changes noted.  Please  Update medication list.    
Diovan to 80-12.5 mg daily (1/2 tab)    Depression, stable  Will continue Effexor dose  Trial Hydroxyzine as needed    Cervical lymphadenopathy  Dx with left thyroid nodule, suspicious  Scheduled for left thyroidectomy on 10/11    Epigastric pain, ongoing  Will further eval with CT abd/ pelvis manny given her cancer hx    Follow up: 6 mo for diabetes follow up    Susan Lam MD    We discussed the expected course, resolution and complications of the diagnosis(es) in detail.  Medication risks, benefits, costs, interactions, and alternatives were discussed as indicated.  I advised her to contact the office if her condition worsens, changes or fails to improve as anticipated. She expressed understanding with the diagnosis(es) and plan.

## 2024-12-07 LAB
THYROGLOBULIN (ICMA): 31.6 NG/ML
THYROGLOBULIN (TG-RIA): NORMAL NG/ML
THYROGLOBULIN AB: <1 IU/ML

## 2024-12-24 ENCOUNTER — HOSPITAL ENCOUNTER (OUTPATIENT)
Facility: HOSPITAL | Age: 69
Discharge: HOME | End: 2024-12-27
Attending: FAMILY MEDICINE
Payer: MEDICARE

## 2024-12-24 DIAGNOSIS — R10.13 EPIGASTRIC PAIN: ICD-10-CM

## 2024-12-24 PROCEDURE — 74177 CT ABD & PELVIS W/CONTRAST: CPT

## 2024-12-24 PROCEDURE — 6360000004 HC RX CONTRAST MEDICATION: Performed by: FAMILY MEDICINE

## 2024-12-24 RX ORDER — IOPAMIDOL 755 MG/ML
100 INJECTION, SOLUTION INTRAVASCULAR
Status: COMPLETED | OUTPATIENT
Start: 2024-12-24 | End: 2024-12-24

## 2024-12-24 RX ADMIN — IOPAMIDOL 90 ML: 755 INJECTION, SOLUTION INTRAVENOUS at 13:49

## 2025-01-04 DIAGNOSIS — F43.22 ADJUSTMENT DISORDER WITH ANXIETY: ICD-10-CM

## 2025-01-04 DIAGNOSIS — F33.0 MAJOR DEPRESSIVE DISORDER, RECURRENT, MILD (HCC): ICD-10-CM

## 2025-01-06 RX ORDER — VENLAFAXINE HYDROCHLORIDE 150 MG/1
CAPSULE, EXTENDED RELEASE ORAL
Qty: 90 CAPSULE | Refills: 1 | Status: SHIPPED | OUTPATIENT
Start: 2025-01-06

## 2025-01-06 RX ORDER — VENLAFAXINE HYDROCHLORIDE 37.5 MG/1
37.5 CAPSULE, EXTENDED RELEASE ORAL DAILY
Qty: 90 CAPSULE | Refills: 1 | Status: SHIPPED | OUTPATIENT
Start: 2025-01-06

## 2025-01-06 NOTE — TELEPHONE ENCOUNTER
Medication Refill Request    Glenn Keyes is requesting a refill of the following medication(s):   Requested Prescriptions     Pending Prescriptions Disp Refills    venlafaxine (EFFEXOR XR) 150 MG extended release capsule [Pharmacy Med Name: VENLAFAXINE HCL  MG CAP] 90 capsule 1     Sig: TAKE 1 CAPSULE BY MOUTH EVERY DAY        Listed PCP is Susan Lam MD   Last provider to prescribe medication: Susan Lam MD  Last Date of Medication Prescribed: 05.13.24   Date of Last Office Visit at [facility]: 12.02.24   FUTURE APPOINTMENT:   Future Appointments   Date Time Provider Department Center   1/8/2025  8:40 AM Susan Lam MD [facility] ECC DEP   4/22/2025  8:30 AM SPEC CDE LAB [facility] BS AMB   4/29/2025  8:30 AM Jen Dangelo MD DI[facility]END[facility] BS AMB       Please send refill to:    [id number] [place] [facility] [place] [place] - [address] [place] [address] [phone] - F [phone]  [address] [place] [address]  Phone: [phone] Fax: [phone]      Please review request and approve or deny with recommendations.

## 2025-01-08 ENCOUNTER — TELEPHONE (OUTPATIENT)
Age: 70
End: 2025-01-08

## 2025-01-08 ENCOUNTER — OFFICE VISIT (OUTPATIENT)
Age: 70
End: 2025-01-08
Payer: MEDICARE

## 2025-01-08 VITALS
OXYGEN SATURATION: 97 % | HEART RATE: 83 BPM | RESPIRATION RATE: 16 BRPM | TEMPERATURE: 98.1 F | BODY MASS INDEX: 23.81 KG/M2 | HEIGHT: 63 IN | WEIGHT: 134.4 LBS | DIASTOLIC BLOOD PRESSURE: 75 MMHG | SYSTOLIC BLOOD PRESSURE: 129 MMHG

## 2025-01-08 DIAGNOSIS — C73 THYROID CANCER (HCC): ICD-10-CM

## 2025-01-08 DIAGNOSIS — E11.40 TYPE 2 DIABETES MELLITUS WITH DIABETIC NEUROPATHY, WITHOUT LONG-TERM CURRENT USE OF INSULIN (HCC): ICD-10-CM

## 2025-01-08 DIAGNOSIS — G43.E09 CHRONIC MIGRAINE WITH AURA WITHOUT STATUS MIGRAINOSUS, NOT INTRACTABLE: ICD-10-CM

## 2025-01-08 DIAGNOSIS — M54.16 LUMBAR RADICULOPATHY: Primary | ICD-10-CM

## 2025-01-08 DIAGNOSIS — M54.12 CERVICAL RADICULOPATHY: ICD-10-CM

## 2025-01-08 PROCEDURE — 99213 OFFICE O/P EST LOW 20 MIN: CPT | Performed by: FAMILY MEDICINE

## 2025-01-08 RX ORDER — SUMATRIPTAN SUCCINATE 25 MG/1
25 TABLET ORAL
Qty: 9 TABLET | Refills: 0 | Status: SHIPPED | OUTPATIENT
Start: 2025-01-08 | End: 2025-01-08

## 2025-01-08 SDOH — ECONOMIC STABILITY: FOOD INSECURITY

## 2025-01-08 SDOH — ECONOMIC STABILITY: FOOD INSECURITY: WITHIN THE PAST 12 MONTHS, THE FOOD YOU BOUGHT JUST DIDN'T LAST AND YOU DIDN'T HAVE MONEY TO GET MORE.: NEVER TRUE

## 2025-01-08 SDOH — ECONOMIC STABILITY: FOOD INSECURITY: WITHIN THE PAST 12 MONTHS, YOU WORRIED THAT YOUR FOOD WOULD RUN OUT BEFORE YOU GOT MONEY TO BUY MORE.: NEVER TRUE

## 2025-01-08 ASSESSMENT — PATIENT HEALTH QUESTIONNAIRE - PHQ9
2. FEELING DOWN, DEPRESSED OR HOPELESS: NOT AT ALL
5. POOR APPETITE OR OVEREATING: NOT AT ALL
SUM OF ALL RESPONSES TO PHQ QUESTIONS 1-9: 2
7. TROUBLE CONCENTRATING ON THINGS, SUCH AS READING THE NEWSPAPER OR WATCHING TELEVISION: NOT AT ALL
SUM OF ALL RESPONSES TO PHQ QUESTIONS 1-9: 2
SUM OF ALL RESPONSES TO PHQ QUESTIONS 1-9: 2
4. FEELING TIRED OR HAVING LITTLE ENERGY: SEVERAL DAYS
6. FEELING BAD ABOUT YOURSELF - OR THAT YOU ARE A FAILURE OR HAVE LET YOURSELF OR YOUR FAMILY DOWN: NOT AT ALL
1. LITTLE INTEREST OR PLEASURE IN DOING THINGS: NOT AT ALL
SUM OF ALL RESPONSES TO PHQ QUESTIONS 1-9: 2
8. MOVING OR SPEAKING SO SLOWLY THAT OTHER PEOPLE COULD HAVE NOTICED. OR THE OPPOSITE, BEING SO FIGETY OR RESTLESS THAT YOU HAVE BEEN MOVING AROUND A LOT MORE THAN USUAL: NOT AT ALL
SUM OF ALL RESPONSES TO PHQ9 QUESTIONS 1 & 2: 0
9. THOUGHTS THAT YOU WOULD BE BETTER OFF DEAD, OR OF HURTING YOURSELF: NOT AT ALL
10. IF YOU CHECKED OFF ANY PROBLEMS, HOW DIFFICULT HAVE THESE PROBLEMS MADE IT FOR YOU TO DO YOUR WORK, TAKE CARE OF THINGS AT HOME, OR GET ALONG WITH OTHER PEOPLE: NOT DIFFICULT AT ALL
3. TROUBLE FALLING OR STAYING ASLEEP: SEVERAL DAYS

## 2025-01-08 ASSESSMENT — LIFESTYLE VARIABLES
HOW MANY STANDARD DRINKS CONTAINING ALCOHOL DO YOU HAVE ON A TYPICAL DAY: PATIENT DOES NOT DRINK
HOW OFTEN DO YOU HAVE A DRINK CONTAINING ALCOHOL: NEVER

## 2025-01-08 NOTE — PROGRESS NOTES
Glenn Keyes is a 69 y.o. female      Chief Complaint   Patient presents with    Abdominal Pain     Lower stomach   - not as bad as before    Tingling     Right side  - back of the thigh  - right hand  - behind the ear  - side of the neck     Fatigue       \"Have you been to the ER, urgent care clinic since your last visit?  Hospitalized since your last visit?\"    NO    “Have you seen or consulted any other health care providers outside of [facility] since your last visit?”    NO              Vitals:    01/08/25 0855   BP: 129/75   Site: Right Upper Arm   Position: Sitting   Cuff Size: Medium Adult   Pulse: 83   Resp: 16   Temp: 98.1 °F (36.7 °C)   TempSrc: Oral   SpO2: 97%   Weight: 61 kg (134 lb 6.4 oz)   Height: 1.6 m (5' 2.99\")            Health Maintenance Due   Topic Date Due    Diabetic retinal exam  03/19/2019    Annual Wellness Visit (Medicare Advantage)  01/01/2025         Medication Reconciliation completed, changes noted.  Please  Update medication list.    
lumbar and cervical degenerative changes   Pt evaluated by Sports med and referred to PT  We discussed starting PT; information provided  Will consider repeat MRI to assess for progression +/- referral to Ortho    Thyroid cancer s/p thyroidectomy  Followed by ENT    T2DM, well controlled  Continue Rybelsus  Check A1c q6 mo    Migraines with aura  Increased frequency of migraines 2-3 days/ wk  Prescribed Maxalt but causing SE - nausea, GI upset  DC Maxalt  Will trial Sumatriptan PRN, continue NSAIDs PRN  If no improvement, will trial Nurtec    Follow up: 3 months     Susan Lam MD    We discussed the expected course, resolution and complications of the diagnosis(es) in detail.  Medication risks, benefits, costs, interactions, and alternatives were discussed as indicated.  I advised her to contact the office if her condition worsens, changes or fails to improve as anticipated. She expressed understanding with the diagnosis(es) and plan.

## 2025-01-08 NOTE — TELEPHONE ENCOUNTER
Patient called stating that her  received a call on her behalf in regards to a form that was needing to be completed. Patient wasn't exactly sure what the form was for. Patient is requesting to be contacted on her mobile number at the earliest convenience.    Thanks!

## 2025-01-10 NOTE — TELEPHONE ENCOUNTER
Attempted to reach pt at mobile number provided but it goes straight to .     I reached out to the patient due to her not finishing the back of her Medicare form at visit. We will have her complete the form at her next visit.     - no further action needed.

## 2025-01-20 ENCOUNTER — HOSPITAL ENCOUNTER (OUTPATIENT)
Facility: HOSPITAL | Age: 70
Setting detail: RECURRING SERIES
Discharge: HOME | End: 2025-01-23
Payer: MEDICARE

## 2025-01-20 PROCEDURE — 97110 THERAPEUTIC EXERCISES: CPT | Performed by: PHYSICAL THERAPIST

## 2025-01-20 PROCEDURE — 97535 SELF CARE MNGMENT TRAINING: CPT | Performed by: PHYSICAL THERAPIST

## 2025-01-20 PROCEDURE — 97162 PT EVAL MOD COMPLEX 30 MIN: CPT | Performed by: PHYSICAL THERAPIST

## 2025-01-20 NOTE — THERAPY EVALUATION
Physical Therapy at [facility],   a part of [address], [address]  [place] [address]  Phone: [phone]  Fax: [phone]       PHYSICAL THERAPY - MEDICARE EVALUATION/PLAN OF CARE NOTE (updated 3/23)      Date: 2025          Patient Name:  Glenn Keyes :  1955   Medical   Diagnosis:  Other low back pain [M54.59] Treatment Diagnosis:  M54.2, G89.29  CHRONIC NECK PAIN, M54.31  SCIATICA, RIGHT SIDE, M54.41  LUMBAGO WITH SCIATICA, RIGHT SIDE, and M54.59, G89.29  CHRONIC LOWER BACK PAIN M62.81  GENERAL MUSCLE WEAKNESS and R26.89   Abnormalities of gait and mobility    Referral Source:  Catrachito Bautista MD Provider #:  [id number]                Insurance: Payor: [employer] MEDICARE / Plan: [employer] GOLD PLUS HMO / Product Type: *No Product type* /      Patient  verified yes     Visit #   Current  / Total 1 24   Time   In / Out 9:30a 10:30a   Total Treatment Time 60   Total Timed Codes 30   1:1 Treatment Time 30    [facility] BC Totals Reminder:  bill using total billable   min of TIMED therapeutic procedures and modalities.   8-22 min = 1 unit; 23-37 min = 2 units; 38-52 min = 3 units;  53-67 min = 4 units; 68-82 min = 5 units           SUBJECTIVE  Pain Level (0-10 scale): Today: 4 Lowest: 4 Worst: 10  []constant [x]intermittent []improving []worsening []no change since onset    Any medication changes, allergies to medications, adverse drug reactions, diagnosis change, or new procedure performed?: [x] No    [] Yes (see summary sheet for update)  Medications: Verified on Patient Summary List    Subjective functional status/changes:     Mrs. Keyes is a 69 year old female c/o chronic mid and lower back that has bothered her on and off for years    Start of Care: 2025  Onset Date: Chronic  Current symptoms/Complaints: Low back and mid back pain  Mechanism of Injury: chronic  PLOF: Ind with Adls,   Limitations to PLOF/Activity or Recreational

## 2025-02-06 DIAGNOSIS — G43.E09 CHRONIC MIGRAINE WITH AURA WITHOUT STATUS MIGRAINOSUS, NOT INTRACTABLE: ICD-10-CM

## 2025-02-06 RX ORDER — SUMATRIPTAN SUCCINATE 25 MG/1
25 TABLET ORAL
Qty: 9 TABLET | Refills: 1 | Status: SHIPPED | OUTPATIENT
Start: 2025-02-06 | End: 2025-02-06

## 2025-02-06 NOTE — TELEPHONE ENCOUNTER
Medication Refill Request    Glenn Keyes is requesting a refill of the following medication(s):   Requested Prescriptions     Pending Prescriptions Disp Refills    SUMAtriptan (IMITREX) 25 MG tablet [Pharmacy Med Name: SUMATRIPTAN SUCC 25 MG TABLET] 9 tablet 1     Sig: TAKE 1 TABLET BY MOUTH ONCE AS NEEDED FOR MIGRAINE        Listed PCP is Susan Lam MD   Last provider to prescribe medication: Genaro  Last Date of Medication Prescribed:  01/08/2025  Date of Last Office Visit at [facility]:  01/08/2025  FUTURE APPOINTMENT:   Future Appointments   Date Time Provider Department Center   2/7/2025  7:00 AM Julius Noriega, PT [facility]   4/22/2025  8:30 AM SPEC CDE LAB DIABENDO BS AMB   4/29/2025  8:30 AM Jen Dangelo MD DIABDIONO BS AMB       Please send refill to:    [id number] [place] [address] [phone] -  [phone]  [address] [place] [address]  Phone: [phone] Fax: [phone]      Please review request and approve or deny with recommendations.

## 2025-02-26 DIAGNOSIS — K21.9 GASTRO-ESOPHAGEAL REFLUX DISEASE WITHOUT ESOPHAGITIS: ICD-10-CM

## 2025-02-26 RX ORDER — PANTOPRAZOLE SODIUM 40 MG/1
40 TABLET, DELAYED RELEASE ORAL DAILY
Qty: 90 TABLET | Refills: 1 | Status: SHIPPED | OUTPATIENT
Start: 2025-02-26

## 2025-02-26 NOTE — TELEPHONE ENCOUNTER
Medication Refill Request    Glenn Keyes is requesting a refill of the following medication(s):   Requested Prescriptions     Pending Prescriptions Disp Refills    pantoprazole (PROTONIX) 40 MG tablet [Pharmacy Med Name: PANTOPRAZOLE SOD DR 40 MG TAB] 90 tablet 1     Sig: TAKE 1 TABLET BY MOUTH EVERY DAY        Listed PCP is Susan Lam MD   Last provider to prescribe medication: Dr. Lam on 09/06/2024  Date of Last Office Visit at [facility]: 1/8/2025 with Dr. Lam    FUTURE [facility] APPOINTMENT: Visit date not found    Please send refill to:    [id number] [place] [address] [phone] - F [phone]  [address] [place] [address]  Phone: [phone] Fax: [phone]      Please review request and approve or deny with recommendations within 48 hours.

## 2025-04-18 ENCOUNTER — HOSPITAL ENCOUNTER (OUTPATIENT)
Facility: HOSPITAL | Age: 70
Setting detail: OBSERVATION
Discharge: HOME | End: 2025-04-19
Attending: EMERGENCY MEDICINE | Admitting: FAMILY MEDICINE
Payer: MEDICARE

## 2025-04-18 ENCOUNTER — APPOINTMENT (OUTPATIENT)
Facility: HOSPITAL | Age: 70
End: 2025-04-18
Payer: MEDICARE

## 2025-04-18 DIAGNOSIS — I63.9 ACUTE CVA (CEREBROVASCULAR ACCIDENT) (HCC): ICD-10-CM

## 2025-04-18 DIAGNOSIS — I63.9 ACUTE ISCHEMIC STROKE (HCC): Primary | ICD-10-CM

## 2025-04-18 PROBLEM — R29.818 FOCAL NEUROLOGICAL DEFICIT: Status: ACTIVE | Noted: 2025-04-18

## 2025-04-18 LAB
ALBUMIN SERPL-MCNC: 3.3 G/DL (ref 3.5–5)
ALBUMIN/GLOB SERPL: 0.8 (ref 1.1–2.2)
ALP SERPL-CCNC: 80 U/L (ref 45–117)
ALT SERPL-CCNC: 6 U/L (ref 12–78)
ANION GAP SERPL CALC-SCNC: 10 MMOL/L (ref 2–12)
AST SERPL-CCNC: 18 U/L (ref 15–37)
BASOPHILS # BLD: 0.03 K/UL (ref 0–0.1)
BASOPHILS NFR BLD: 0.4 % (ref 0–1)
BILIRUB SERPL-MCNC: 0.4 MG/DL (ref 0.2–1)
BUN SERPL-MCNC: 12 MG/DL (ref 6–20)
BUN/CREAT SERPL: 15 (ref 12–20)
CALCIUM SERPL-MCNC: 9.7 MG/DL (ref 8.5–10.1)
CHLORIDE SERPL-SCNC: 105 MMOL/L (ref 97–108)
CO2 SERPL-SCNC: 24 MMOL/L (ref 21–32)
COMMENT:: NORMAL
CREAT SERPL-MCNC: 0.8 MG/DL (ref 0.55–1.02)
DIFFERENTIAL METHOD BLD: NORMAL
EKG ATRIAL RATE: 87 BPM
EKG DIAGNOSIS: NORMAL
EKG P AXIS: 57 DEGREES
EKG P-R INTERVAL: 138 MS
EKG Q-T INTERVAL: 382 MS
EKG QRS DURATION: 70 MS
EKG QTC CALCULATION (BAZETT): 459 MS
EKG R AXIS: 3 DEGREES
EKG T AXIS: 69 DEGREES
EKG VENTRICULAR RATE: 87 BPM
EOSINOPHIL # BLD: 0.12 K/UL (ref 0–0.4)
EOSINOPHIL NFR BLD: 1.8 % (ref 0–7)
ERYTHROCYTE [DISTWIDTH] IN BLOOD BY AUTOMATED COUNT: 13.5 % (ref 11.5–14.5)
GLOBULIN SER CALC-MCNC: 4.1 G/DL (ref 2–4)
GLUCOSE BLD STRIP.AUTO-MCNC: 96 MG/DL (ref 65–117)
GLUCOSE BLD STRIP.AUTO-MCNC: 98 MG/DL (ref 65–117)
GLUCOSE SERPL-MCNC: 98 MG/DL (ref 65–100)
HCT VFR BLD AUTO: 39.9 % (ref 35–47)
HGB BLD-MCNC: 12.5 G/DL (ref 11.5–16)
IMM GRANULOCYTES # BLD AUTO: 0.02 K/UL (ref 0–0.04)
IMM GRANULOCYTES NFR BLD AUTO: 0.3 % (ref 0–0.5)
INR PPP: 1 (ref 0.9–1.1)
LYMPHOCYTES # BLD: 1.68 K/UL (ref 0.8–3.5)
LYMPHOCYTES NFR BLD: 24.7 % (ref 12–49)
MCH RBC QN AUTO: 27.1 PG (ref 26–34)
MCHC RBC AUTO-ENTMCNC: 31.3 G/DL (ref 30–36.5)
MCV RBC AUTO: 86.6 FL (ref 80–99)
MONOCYTES # BLD: 0.56 K/UL (ref 0–1)
MONOCYTES NFR BLD: 8.2 % (ref 5–13)
NEUTS SEG # BLD: 4.38 K/UL (ref 1.8–8)
NEUTS SEG NFR BLD: 64.6 % (ref 32–75)
NRBC # BLD: 0 K/UL (ref 0–0.01)
NRBC BLD-RTO: 0 PER 100 WBC
PLATELET # BLD AUTO: 235 K/UL (ref 150–400)
PMV BLD AUTO: 11.6 FL (ref 8.9–12.9)
POTASSIUM SERPL-SCNC: 4.5 MMOL/L (ref 3.5–5.1)
PROT SERPL-MCNC: 7.4 G/DL (ref 6.4–8.2)
PROTHROMBIN TIME: 10 SEC (ref 9.2–11.2)
RBC # BLD AUTO: 4.61 M/UL (ref 3.8–5.2)
SERVICE CMNT-IMP: NORMAL
SERVICE CMNT-IMP: NORMAL
SODIUM SERPL-SCNC: 139 MMOL/L (ref 136–145)
SPECIMEN HOLD: NORMAL
TROPONIN I SERPL HS-MCNC: 5 NG/L (ref 0–51)
WBC # BLD AUTO: 6.8 K/UL (ref 3.6–11)

## 2025-04-18 PROCEDURE — 85610 PROTHROMBIN TIME: CPT

## 2025-04-18 PROCEDURE — 70450 CT HEAD/BRAIN W/O DYE: CPT

## 2025-04-18 PROCEDURE — G0378 HOSPITAL OBSERVATION PER HR: HCPCS

## 2025-04-18 PROCEDURE — 70496 CT ANGIOGRAPHY HEAD: CPT

## 2025-04-18 PROCEDURE — 96375 TX/PRO/DX INJ NEW DRUG ADDON: CPT

## 2025-04-18 PROCEDURE — 84484 ASSAY OF TROPONIN QUANT: CPT

## 2025-04-18 PROCEDURE — 6360000002 HC RX W HCPCS: Performed by: EMERGENCY MEDICINE

## 2025-04-18 PROCEDURE — 80053 COMPREHEN METABOLIC PANEL: CPT

## 2025-04-18 PROCEDURE — 82962 GLUCOSE BLOOD TEST: CPT

## 2025-04-18 PROCEDURE — 85025 COMPLETE CBC W/AUTO DIFF WBC: CPT

## 2025-04-18 PROCEDURE — 93005 ELECTROCARDIOGRAM TRACING: CPT | Performed by: EMERGENCY MEDICINE

## 2025-04-18 PROCEDURE — 0042T CT BRAIN PERFUSION: CPT

## 2025-04-18 PROCEDURE — 6360000004 HC RX CONTRAST MEDICATION: Performed by: EMERGENCY MEDICINE

## 2025-04-18 PROCEDURE — 93010 ELECTROCARDIOGRAM REPORT: CPT | Performed by: INTERNAL MEDICINE

## 2025-04-18 PROCEDURE — 99285 EMERGENCY DEPT VISIT HI MDM: CPT

## 2025-04-18 PROCEDURE — 96374 THER/PROPH/DIAG INJ IV PUSH: CPT

## 2025-04-18 RX ORDER — KETOROLAC TROMETHAMINE 30 MG/ML
15 INJECTION, SOLUTION INTRAMUSCULAR; INTRAVENOUS
Status: COMPLETED | OUTPATIENT
Start: 2025-04-18 | End: 2025-04-18

## 2025-04-18 RX ORDER — PROCHLORPERAZINE EDISYLATE 5 MG/ML
10 INJECTION INTRAMUSCULAR; INTRAVENOUS ONCE
Status: COMPLETED | OUTPATIENT
Start: 2025-04-18 | End: 2025-04-18

## 2025-04-18 RX ORDER — IOPAMIDOL 755 MG/ML
100 INJECTION, SOLUTION INTRAVASCULAR
Status: COMPLETED | OUTPATIENT
Start: 2025-04-18 | End: 2025-04-18

## 2025-04-18 RX ORDER — DIPHENHYDRAMINE HYDROCHLORIDE 50 MG/ML
50 INJECTION, SOLUTION INTRAMUSCULAR; INTRAVENOUS ONCE
Status: COMPLETED | OUTPATIENT
Start: 2025-04-18 | End: 2025-04-18

## 2025-04-18 RX ADMIN — IOPAMIDOL 100 ML: 755 INJECTION, SOLUTION INTRAVENOUS at 11:27

## 2025-04-18 RX ADMIN — DIPHENHYDRAMINE HYDROCHLORIDE 50 MG: 50 INJECTION INTRAMUSCULAR; INTRAVENOUS at 12:02

## 2025-04-18 RX ADMIN — PROCHLORPERAZINE EDISYLATE 10 MG: 5 INJECTION INTRAMUSCULAR; INTRAVENOUS at 12:03

## 2025-04-18 RX ADMIN — KETOROLAC TROMETHAMINE 15 MG: 30 INJECTION, SOLUTION INTRAMUSCULAR; INTRAVENOUS at 12:01

## 2025-04-18 ASSESSMENT — PAIN DESCRIPTION - LOCATION
LOCATION: HEAD

## 2025-04-18 ASSESSMENT — PAIN SCALES - GENERAL
PAINLEVEL_OUTOF10: 6
PAINLEVEL_OUTOF10: 8
PAINLEVEL_OUTOF10: 6

## 2025-04-18 NOTE — ED PROVIDER NOTES
[facility] DEPARTMENT  EMERGENCY DEPARTMENT ENCOUNTER      Patient Name: Glenn Keyes  MRN: [id number]  Birthdate 1955  Date of Evaluation: 2025  Physician: Santy Oliva MD    CHIEF COMPLAINT       Chief Complaint   Patient presents with    Numbness       HISTORY OF PRESENT ILLNESS   (Location/Symptom, Timing/Onset, Context/Setting, Quality, Duration, Modifying Factors, Severity)   Glenn Keyes, 69 y.o., female     69-year-old female presents with headache and right facial and right upper and lower extremity numbness.  Patient states she woke up with a headache this morning with a numbness started around 10 AM while she was at work.          Nursing Notes were reviewed.    REVIEW OF SYSTEMS    (Not required)   Review of Systems    Except as noted above the remainder of the review of systems was reviewed and negative.     PAST MEDICAL HISTORY     Past Medical History:   Diagnosis Date    Anxiety     Diabetes mellitus (HCC)     Dyslipidemia     Headache     Hypertension     Low back pain 2012    MRI 3/10/14 revealed mild multilevel disk and facet degenerative change     Lumbar disc disease 2014    Non Hodgkin's lymphoma 1985    Nodule removed from throat    Thyroid nodule     Followed by endocrine, Dr. Dangelo.      Ulcer        SURGICAL HISTORY       Past Surgical History:   Procedure Laterality Date    COLONOSCOPY      COLONOSCOPY N/A 2023    COLONOSCOPY performed by Dashawn Steele MD at [facility] ENDOSCOPY    DELIVERY       REMOVAL NODES, NECK,CERV CMPLT      THYROIDECTOMY N/A 10/11/2024    LEFT THYROID LOBECTOMY performed by Shaina Reyes MD at [facility] AMBULATORY OR    TONSILLECTOMY      Age 9    UPPER GASTROINTESTINAL ENDOSCOPY         CURRENT MEDICATIONS       Previous Medications    ACETAMINOPHEN (TYLENOL) 500 MG TABLET    Take 1 tablet by mouth every 6 hours as needed for Pain    FISH OIL-OMEGA-3 FATTY ACIDS 1000 MG CAPSULE    Take 2 capsules by mouth daily    HYDROXYZINE  deterioration in the patient's condition which required my urgent intervention.     CONSULTS:  IP CONSULT TO TELE-NEUROLOGY    PROCEDURES:  Unless otherwise noted below, none     Procedures    FINAL IMPRESSION      1. Acute ischemic stroke (HCC)          DISPOSITION/PLAN   DISPOSITION Decision To Admit 04/18/2025 11:24:52 AM    PATIENT REFERRED TO:  No follow-up provider specified.    DISCHARGE MEDICATIONS:  New Prescriptions    No medications on file     Controlled Substances Monitoring:          No data to display                (Please note that portions of this note were completed with a voice recognition program.  Efforts were made to edit the dictations but occasionally words are mis-transcribed.)    Santy Oliva MD (electronically signed)  Attending Emergency Physician            Santy Oliva MD  04/18/25 [id number]

## 2025-04-18 NOTE — H&P
History and Physical    Date of Service:  4/19/2025  Primary Care Provider: Susan Lam MD  Source of information: The patient and review of EMR    Chief Complaint: Numbness      History of Presenting Illness:   Glenn Keyes is a 69 y.o. female with past medical history significant for anxiety/depression, type 2 diabetes, dyslipidemia, hypertension, non-Hodgkin lymphoma, thyroid cancer with resultant thyroid surgery presented at the [facility] emergency room with headache and numbness involving right side of face as well as right upper and lower extremities.  Patient actually woke up with the headache which involve right side of the head, not throbbing, constant, dull ache, no known aggravating or relieving factors.  Patient went to work and at about 10 AM she developed associated numbness involving right side of the body.  Patient presented at the emergency room as code stroke alert.  Patient has no prior history of a TIA or CVA.  CT scan of the head done in the emergency room did not show acute pathology.  CTA of the head and neck was also negative.  She was referred to the hospitalist service for admission.         REVIEW OF SYSTEMS:  REVIEW OF SYSTEMS:  HEAD, EYES, EARS, NOSE AND THROAT: Positive for headache .  No dizziness.  No blurring of vision.  No photophobia.  RESPIRATORY SYSTEM:  No shortness of breath.  No cough.  No hemoptysis.  CARDIOVASCULAR SYSTEM:  No chest pain.  No orthopnea.  No palpitations.  GASTROINTESTINAL SYSTEM:  No nausea or vomiting.  No diarrhea.  No constipation.  GENITOURINARY SYSTEM:  No dysuria, no urgency, and no frequency.     All other systems are reviewed and they are negative.        Past Medical History:   Diagnosis Date    Anxiety     Diabetes mellitus (HCC)     Dyslipidemia     Headache     Hypertension     Low back pain 01/19/2012    MRI 3/10/14 revealed mild multilevel disk and facet degenerative change     Lumbar disc disease 03/2014       stenosis.   No suspicious intracranial or neck mass/enhancing lesion.            Electronically signed by JOSE RODRIGUEZ      MRI brain without contrast    (Results Pending)   XR CHEST 1 VIEW    (Results Pending)        ECG/ECHO:  [unfilled]       Notes reviewed from all clinical/nonclinical/nursing services involved in patient's clinical care. Care coordination discussions were held with appropriate clinical/nonclinical/ nursing providers based on care coordination needs.     Assessment:   Given the patient's current clinical presentation, there is a high level of concern for decompensation if discharged from the emergency department. Complex decision making was performed, which includes reviewing the patient's available past medical records, laboratory results, and imaging studies.    Principal Problem:    Acute CVA (cerebrovascular accident) (HCC)  Active Problems:    Focal neurological deficit  Resolved Problems:    * No resolved hospital problems. *      A/P   1.  Acute CVA POA: We will admit the patient for further evaluation and treatment.  Will obtain MRI of the brain and echocardiogram.  Will start the patient on aspirin and continue Crestor.  Will check A1c level and lipid panel.  Inpatient neurology consult will be requested to assist in further evaluation and treatment.  Will check C-reactive protein level to evaluate the patient for systemic inflammation.  Will check B12 and folate levels.    2.  Essential hypertension POA: We will hold antihypertensive medications to allow for permissive hypertension for the next 24 to 48 hours.  Will monitor blood pressure closely.    3.  Dyslipidemia POA: Crestor increased from 5 mg to 40 mg.    4.  Type 2 diabetes POA: Will place the patient on sliding scale with insulin coverage.  Will check A1c level    5.  Mood disorder POA: Will continue Effexor and Atarax.        DIET: ADULT DIET; Regular; 4 carb choices (60 gm/meal); Low Fat/Low Chol/High Fiber/2 gm

## 2025-04-18 NOTE — ED NOTES
Pt states that headache is much improved and hypersensitivity to face and leg is \"returning to normal\".

## 2025-04-18 NOTE — CODE DOCUMENTATION
Dr. Chandan Navarrete on tele neuro to assess pt but unable to hear him upon initial call @ 1110. Tele neuro then reactivated @ 1114 and neuro assessment/evaluation was performed at this time while pt remains on CT table after CT scan without. Pt immediately placed back in scanner following assessment for CTA/CTP. CTA in progress at 1124.

## 2025-04-18 NOTE — ED TRIAGE NOTES
Lucia arrives with c/o waking up with a headache, patient also reports that about an hour ago she was at work and started having right facial droop, dizziness, numbness and weakness to the right arm and leg. LTKW 10pm last night.

## 2025-04-19 ENCOUNTER — APPOINTMENT (OUTPATIENT)
Facility: HOSPITAL | Age: 70
End: 2025-04-19
Payer: MEDICARE

## 2025-04-19 VITALS
SYSTOLIC BLOOD PRESSURE: 159 MMHG | HEART RATE: 79 BPM | BODY MASS INDEX: 24.9 KG/M2 | RESPIRATION RATE: 17 BRPM | DIASTOLIC BLOOD PRESSURE: 83 MMHG | OXYGEN SATURATION: 98 % | TEMPERATURE: 98.5 F | WEIGHT: 135.3 LBS | HEIGHT: 62 IN

## 2025-04-19 PROBLEM — I63.9 ACUTE CVA (CEREBROVASCULAR ACCIDENT) (HCC): Status: ACTIVE | Noted: 2025-04-19

## 2025-04-19 LAB
ALBUMIN SERPL-MCNC: 3.4 G/DL (ref 3.5–5)
ALBUMIN/GLOB SERPL: 1 (ref 1.1–2.2)
ALP SERPL-CCNC: 78 U/L (ref 45–117)
ALT SERPL-CCNC: 16 U/L (ref 12–78)
AMPHET UR QL SCN: NEGATIVE
ANION GAP SERPL CALC-SCNC: 0 MMOL/L (ref 2–12)
AST SERPL-CCNC: 10 U/L (ref 15–37)
BARBITURATES UR QL SCN: NEGATIVE
BASOPHILS # BLD: 0.02 K/UL (ref 0–0.1)
BASOPHILS NFR BLD: 0.3 % (ref 0–1)
BENZODIAZ UR QL: NEGATIVE
BILIRUB SERPL-MCNC: 0.2 MG/DL (ref 0.2–1)
BUN SERPL-MCNC: 15 MG/DL (ref 6–20)
BUN/CREAT SERPL: 17 (ref 12–20)
CALCIUM SERPL-MCNC: 9.4 MG/DL (ref 8.5–10.1)
CANNABINOIDS UR QL SCN: NEGATIVE
CHLORIDE SERPL-SCNC: 110 MMOL/L (ref 97–108)
CHOLEST SERPL-MCNC: 154 MG/DL
CO2 SERPL-SCNC: 30 MMOL/L (ref 21–32)
COCAINE UR QL SCN: NEGATIVE
COMMENT:: NORMAL
CREAT SERPL-MCNC: 0.87 MG/DL (ref 0.55–1.02)
CRP SERPL-MCNC: <0.29 MG/DL (ref 0–0.3)
DIFFERENTIAL METHOD BLD: NORMAL
EOSINOPHIL # BLD: 0.16 K/UL (ref 0–0.4)
EOSINOPHIL NFR BLD: 2.5 % (ref 0–7)
ERYTHROCYTE [DISTWIDTH] IN BLOOD BY AUTOMATED COUNT: 13.3 % (ref 11.5–14.5)
EST. AVERAGE GLUCOSE BLD GHB EST-MCNC: 111 MG/DL
EST. AVERAGE GLUCOSE BLD GHB EST-MCNC: 114 MG/DL
FOLATE SERPL-MCNC: 21.7 NG/ML (ref 5–21)
GLOBULIN SER CALC-MCNC: 3.5 G/DL (ref 2–4)
GLUCOSE BLD STRIP.AUTO-MCNC: 125 MG/DL (ref 65–117)
GLUCOSE BLD STRIP.AUTO-MCNC: 85 MG/DL (ref 65–117)
GLUCOSE SERPL-MCNC: 86 MG/DL (ref 65–100)
HBA1C MFR BLD: 5.5 % (ref 4–5.6)
HBA1C MFR BLD: 5.6 % (ref 4–5.6)
HCT VFR BLD AUTO: 38.7 % (ref 35–47)
HDLC SERPL-MCNC: 73 MG/DL
HDLC SERPL: 2.1 (ref 0–5)
HGB BLD-MCNC: 12 G/DL (ref 11.5–16)
IMM GRANULOCYTES # BLD AUTO: 0.01 K/UL (ref 0–0.04)
IMM GRANULOCYTES NFR BLD AUTO: 0.2 % (ref 0–0.5)
LDLC SERPL CALC-MCNC: 66 MG/DL (ref 0–100)
LYMPHOCYTES # BLD: 2.09 K/UL (ref 0.8–3.5)
LYMPHOCYTES NFR BLD: 33.2 % (ref 12–49)
Lab: NORMAL
MAGNESIUM SERPL-MCNC: 1.9 MG/DL (ref 1.6–2.4)
MCH RBC QN AUTO: 26.7 PG (ref 26–34)
MCHC RBC AUTO-ENTMCNC: 31 G/DL (ref 30–36.5)
MCV RBC AUTO: 86 FL (ref 80–99)
METHADONE UR QL: NEGATIVE
MONOCYTES # BLD: 0.59 K/UL (ref 0–1)
MONOCYTES NFR BLD: 9.4 % (ref 5–13)
NEUTS SEG # BLD: 3.43 K/UL (ref 1.8–8)
NEUTS SEG NFR BLD: 54.4 % (ref 32–75)
NRBC # BLD: 0 K/UL (ref 0–0.01)
NRBC BLD-RTO: 0 PER 100 WBC
NT PRO BNP: 193 PG/ML
OPIATES UR QL: NEGATIVE
PCP UR QL: NEGATIVE
PHOSPHATE SERPL-MCNC: 3.4 MG/DL (ref 2.6–4.7)
PLATELET # BLD AUTO: 275 K/UL (ref 150–400)
PMV BLD AUTO: 10.7 FL (ref 8.9–12.9)
POTASSIUM SERPL-SCNC: 3.7 MMOL/L (ref 3.5–5.1)
PROT SERPL-MCNC: 6.9 G/DL (ref 6.4–8.2)
RBC # BLD AUTO: 4.5 M/UL (ref 3.8–5.2)
SERVICE CMNT-IMP: ABNORMAL
SERVICE CMNT-IMP: NORMAL
SODIUM SERPL-SCNC: 140 MMOL/L (ref 136–145)
SPECIMEN HOLD: NORMAL
TRIGL SERPL-MCNC: 75 MG/DL
TROPONIN I SERPL HS-MCNC: 7 NG/L (ref 0–51)
TROPONIN I SERPL HS-MCNC: 8 NG/L (ref 0–51)
TSH SERPL DL<=0.05 MIU/L-ACNC: 1.47 UIU/ML (ref 0.36–3.74)
VIT B12 SERPL-MCNC: 1379 PG/ML (ref 193–986)
VLDLC SERPL CALC-MCNC: 15 MG/DL
WBC # BLD AUTO: 6.3 K/UL (ref 3.6–11)

## 2025-04-19 PROCEDURE — 71045 X-RAY EXAM CHEST 1 VIEW: CPT

## 2025-04-19 PROCEDURE — G0378 HOSPITAL OBSERVATION PER HR: HCPCS

## 2025-04-19 PROCEDURE — 97161 PT EVAL LOW COMPLEX 20 MIN: CPT

## 2025-04-19 PROCEDURE — 83880 ASSAY OF NATRIURETIC PEPTIDE: CPT

## 2025-04-19 PROCEDURE — 84484 ASSAY OF TROPONIN QUANT: CPT

## 2025-04-19 PROCEDURE — 86140 C-REACTIVE PROTEIN: CPT

## 2025-04-19 PROCEDURE — 70551 MRI BRAIN STEM W/O DYE: CPT

## 2025-04-19 PROCEDURE — APPNB45 APP NON BILLABLE 31-45 MINUTES

## 2025-04-19 PROCEDURE — 97165 OT EVAL LOW COMPLEX 30 MIN: CPT

## 2025-04-19 PROCEDURE — 82746 ASSAY OF FOLIC ACID SERUM: CPT

## 2025-04-19 PROCEDURE — 82607 VITAMIN B-12: CPT

## 2025-04-19 PROCEDURE — 80053 COMPREHEN METABOLIC PANEL: CPT

## 2025-04-19 PROCEDURE — 85025 COMPLETE CBC W/AUTO DIFF WBC: CPT

## 2025-04-19 PROCEDURE — 2500000003 HC RX 250 WO HCPCS: Performed by: INTERNAL MEDICINE

## 2025-04-19 PROCEDURE — 84443 ASSAY THYROID STIM HORMONE: CPT

## 2025-04-19 PROCEDURE — 84100 ASSAY OF PHOSPHORUS: CPT

## 2025-04-19 PROCEDURE — 92610 EVALUATE SWALLOWING FUNCTION: CPT

## 2025-04-19 PROCEDURE — 80307 DRUG TEST PRSMV CHEM ANLYZR: CPT

## 2025-04-19 PROCEDURE — 2580000003 HC RX 258: Performed by: INTERNAL MEDICINE

## 2025-04-19 PROCEDURE — 83735 ASSAY OF MAGNESIUM: CPT

## 2025-04-19 PROCEDURE — 6370000000 HC RX 637 (ALT 250 FOR IP): Performed by: INTERNAL MEDICINE

## 2025-04-19 PROCEDURE — 83036 HEMOGLOBIN GLYCOSYLATED A1C: CPT

## 2025-04-19 PROCEDURE — 82962 GLUCOSE BLOOD TEST: CPT

## 2025-04-19 PROCEDURE — 80061 LIPID PANEL: CPT

## 2025-04-19 RX ORDER — ONDANSETRON 4 MG/1
4 TABLET, ORALLY DISINTEGRATING ORAL EVERY 8 HOURS PRN
Status: DISCONTINUED | OUTPATIENT
Start: 2025-04-19 | End: 2025-04-19 | Stop reason: HOSPADM

## 2025-04-19 RX ORDER — SODIUM CHLORIDE 9 MG/ML
INJECTION, SOLUTION INTRAVENOUS PRN
Status: DISCONTINUED | OUTPATIENT
Start: 2025-04-19 | End: 2025-04-19 | Stop reason: HOSPADM

## 2025-04-19 RX ORDER — POLYETHYLENE GLYCOL 3350 17 G/17G
17 POWDER, FOR SOLUTION ORAL DAILY PRN
Status: DISCONTINUED | OUTPATIENT
Start: 2025-04-19 | End: 2025-04-19 | Stop reason: HOSPADM

## 2025-04-19 RX ORDER — SODIUM CHLORIDE 9 MG/ML
INJECTION, SOLUTION INTRAVENOUS CONTINUOUS
Status: DISCONTINUED | OUTPATIENT
Start: 2025-04-19 | End: 2025-04-19 | Stop reason: HOSPADM

## 2025-04-19 RX ORDER — SODIUM CHLORIDE 0.9 % (FLUSH) 0.9 %
5-40 SYRINGE (ML) INJECTION EVERY 12 HOURS SCHEDULED
Status: DISCONTINUED | OUTPATIENT
Start: 2025-04-19 | End: 2025-04-19 | Stop reason: HOSPADM

## 2025-04-19 RX ORDER — HYDROXYZINE HYDROCHLORIDE 10 MG/1
10 TABLET, FILM COATED ORAL 3 TIMES DAILY PRN
Status: DISCONTINUED | OUTPATIENT
Start: 2025-04-19 | End: 2025-04-19 | Stop reason: HOSPADM

## 2025-04-19 RX ORDER — SODIUM CHLORIDE 0.9 % (FLUSH) 0.9 %
5-40 SYRINGE (ML) INJECTION PRN
Status: DISCONTINUED | OUTPATIENT
Start: 2025-04-19 | End: 2025-04-19 | Stop reason: HOSPADM

## 2025-04-19 RX ORDER — ASPIRIN 81 MG/1
81 TABLET, CHEWABLE ORAL DAILY
Qty: 30 TABLET | Refills: 3 | Status: SHIPPED | OUTPATIENT
Start: 2025-04-20

## 2025-04-19 RX ORDER — ASPIRIN 81 MG/1
81 TABLET, CHEWABLE ORAL DAILY
Status: DISCONTINUED | OUTPATIENT
Start: 2025-04-19 | End: 2025-04-19 | Stop reason: HOSPADM

## 2025-04-19 RX ORDER — ENOXAPARIN SODIUM 100 MG/ML
40 INJECTION SUBCUTANEOUS DAILY
Status: DISCONTINUED | OUTPATIENT
Start: 2025-04-19 | End: 2025-04-19 | Stop reason: HOSPADM

## 2025-04-19 RX ORDER — INSULIN LISPRO 100 [IU]/ML
0-8 INJECTION, SOLUTION INTRAVENOUS; SUBCUTANEOUS
Status: DISCONTINUED | OUTPATIENT
Start: 2025-04-19 | End: 2025-04-19 | Stop reason: HOSPADM

## 2025-04-19 RX ORDER — ASPIRIN 300 MG/1
300 SUPPOSITORY RECTAL DAILY
Status: DISCONTINUED | OUTPATIENT
Start: 2025-04-19 | End: 2025-04-19 | Stop reason: HOSPADM

## 2025-04-19 RX ORDER — ACETAMINOPHEN 325 MG/1
650 TABLET ORAL EVERY 4 HOURS PRN
Status: DISCONTINUED | OUTPATIENT
Start: 2025-04-19 | End: 2025-04-19 | Stop reason: HOSPADM

## 2025-04-19 RX ORDER — VENLAFAXINE HYDROCHLORIDE 150 MG/1
150 CAPSULE, EXTENDED RELEASE ORAL DAILY
Status: DISCONTINUED | OUTPATIENT
Start: 2025-04-19 | End: 2025-04-19 | Stop reason: HOSPADM

## 2025-04-19 RX ORDER — PANTOPRAZOLE SODIUM 40 MG/1
40 TABLET, DELAYED RELEASE ORAL DAILY
Status: DISCONTINUED | OUTPATIENT
Start: 2025-04-19 | End: 2025-04-19 | Stop reason: HOSPADM

## 2025-04-19 RX ORDER — OMEGA-3-ACID ETHYL ESTERS 1 G/1
2 CAPSULE, LIQUID FILLED ORAL DAILY
Status: DISCONTINUED | OUTPATIENT
Start: 2025-04-19 | End: 2025-04-19 | Stop reason: HOSPADM

## 2025-04-19 RX ORDER — ONDANSETRON 2 MG/ML
4 INJECTION INTRAMUSCULAR; INTRAVENOUS EVERY 6 HOURS PRN
Status: DISCONTINUED | OUTPATIENT
Start: 2025-04-19 | End: 2025-04-19 | Stop reason: HOSPADM

## 2025-04-19 RX ORDER — MORPHINE SULFATE 2 MG/ML
2 INJECTION, SOLUTION INTRAMUSCULAR; INTRAVENOUS EVERY 4 HOURS PRN
Refills: 0 | Status: DISCONTINUED | OUTPATIENT
Start: 2025-04-19 | End: 2025-04-19 | Stop reason: HOSPADM

## 2025-04-19 RX ORDER — DEXTROSE MONOHYDRATE 100 MG/ML
INJECTION, SOLUTION INTRAVENOUS CONTINUOUS PRN
Status: DISCONTINUED | OUTPATIENT
Start: 2025-04-19 | End: 2025-04-19 | Stop reason: HOSPADM

## 2025-04-19 RX ORDER — ROSUVASTATIN CALCIUM 40 MG/1
40 TABLET, COATED ORAL NIGHTLY
Status: DISCONTINUED | OUTPATIENT
Start: 2025-04-19 | End: 2025-04-19 | Stop reason: HOSPADM

## 2025-04-19 RX ORDER — OXYCODONE HYDROCHLORIDE 5 MG/1
5 TABLET ORAL EVERY 4 HOURS PRN
Refills: 0 | Status: DISCONTINUED | OUTPATIENT
Start: 2025-04-19 | End: 2025-04-19 | Stop reason: HOSPADM

## 2025-04-19 RX ADMIN — OMEGA-3-ACID ETHYL ESTERS CAPSULES 2 G: 1 CAPSULE, LIQUID FILLED ORAL at 08:54

## 2025-04-19 RX ADMIN — SODIUM CHLORIDE, PRESERVATIVE FREE 10 ML: 5 INJECTION INTRAVENOUS at 08:56

## 2025-04-19 RX ADMIN — VENLAFAXINE HYDROCHLORIDE 150 MG: 150 CAPSULE, EXTENDED RELEASE ORAL at 08:54

## 2025-04-19 RX ADMIN — ASPIRIN 81 MG CHEWABLE TABLET 81 MG: 81 TABLET CHEWABLE at 08:54

## 2025-04-19 RX ADMIN — SODIUM CHLORIDE: 0.9 INJECTION, SOLUTION INTRAVENOUS at 02:53

## 2025-04-19 RX ADMIN — PANTOPRAZOLE SODIUM 40 MG: 40 TABLET, DELAYED RELEASE ORAL at 08:54

## 2025-04-19 NOTE — DISCHARGE INSTRUCTIONS
Discharge Instructions       PATIENT ID: Glenn Keyes  MRN: [id number]   YOB: 1955    DATE OF ADMISSION: 4/18/2025   DATE OF DISCHARGE: 4/19/2025    PRIMARY CARE PROVIDER: Susan Lam     ATTENDING PHYSICIAN: Abdi Mazariegos MD   DISCHARGING PROVIDER: Abdi Mazariegos MD    To contact this individual call [phone] and ask the  to page.   If unavailable ask to be transferred the Adult Hospitalist Department.    DISCHARGE DIAGNOSES   You presented with numbness on the right side and you are admitted for observation and evaluation for stroke.  You underwent CAT scan of the brain which was negative and this was followed by MRI of the brain which did not show acute stroke or any other abnormality.  Your symptoms could have been from complex migraine.  Your cholesterol is optimal.  Continue to take low-dose aspirin, 81 mg daily and your cholesterol medicine.  Follow-up with neurology for your migraine.  If you develop strokelike symptoms, immediately return to the emergency room.    CONSULTATIONS neurologist    PROCEDURES/SURGERIES: * No surgery found *    PENDING TEST RESULTS:   At the time of discharge the following test results are still pending: None    FOLLOW UP APPOINTMENTS:   PCP  Neurology for headache/migraine management    ADDITIONAL CARE RECOMMENDATIONS:     DIET: regular diet    ACTIVITY: activity as tolerated          DISCHARGE MEDICATIONS:   See Medication Reconciliation Form    It is important that you take the medication exactly as they are prescribed.   Keep your medication in the bottles provided by the pharmacist and keep a list of the medication names, dosages, and times to be taken in your wallet.   Do not take other medications without consulting your doctor.       NOTIFY YOUR PHYSICIAN FOR ANY OF THE FOLLOWING:   Fever over 101 degrees for 24 hours.   Chest pain, shortness of breath, fever, chills, nausea, vomiting, diarrhea, change in mentation, falling,

## 2025-04-19 NOTE — PROGRESS NOTES
Speech LAnguage Pathology EVALUATION/DISCHARGE    Patient: Glenn Keyes (69 y.o. female)  Date: 2025  Primary Diagnosis: Acute ischemic stroke (HCC) [I63.9]  Focal neurological deficit [R29.818]       Precautions:                     ASSESSMENT :  Patient presents with grossly functional oropharyngeal swallow function at bedside in the absence of objective imaging characterized by no overt signs or symptoms of aspiration with any consistency tested at bedside. Suspect patient's current swallow function is baseline, therefore no further skilled intervention by SLP indicated at this time.     Patient will be discharged from skilled speech-language pathology services at this time.     PLAN :  Recommendations and Planned Interventions:  Diet: Regular and thin liquids       Acute SLP Services: No, patient will be discharged from acute skilled speech-language pathology at this time.  Discharge Recommendations: No, additional SLP treatment not indicated at discharge     SUBJECTIVE:   Patient stated, “Yes, let's get me home soon!”    OBJECTIVE:     Past Medical History:   Diagnosis Date    Anxiety     Diabetes mellitus (HCC)     Dyslipidemia     Headache     Hypertension     Low back pain 2012    MRI 3/10/14 revealed mild multilevel disk and facet degenerative change     Lumbar disc disease 2014    Non Hodgkin's lymphoma 1985    Nodule removed from throat    Thyroid nodule     Followed by endocrine, Dr. Dangelo.      Ulcer      Past Surgical History:   Procedure Laterality Date    COLONOSCOPY      COLONOSCOPY N/A 2023    COLONOSCOPY performed by Dashawn Steele MD at [facility] ENDOSCOPY    DELIVERY       REMOVAL NODES, NECK,CERV CMPLT      THYROIDECTOMY N/A 10/11/2024    LEFT THYROID LOBECTOMY performed by Shaina Reyes MD at [facility] AMBULATORY OR    TONSILLECTOMY      Age 9    UPPER GASTROINTESTINAL ENDOSCOPY       Prior Level of Function/Home Situation:   Social/Functional History  Lives With:

## 2025-04-19 NOTE — PLAN OF CARE
Problem: Chronic Conditions and Co-morbidities  Goal: Patient's chronic conditions and co-morbidity symptoms are monitored and maintained or improved  Outcome: Progressing  Flowsheets  Taken 4/18/2025 2000 by Janice Rhodes RN  Care Plan - Patient's Chronic Conditions and Co-Morbidity Symptoms are Monitored and Maintained or Improved:   Monitor and assess patient's chronic conditions and comorbid symptoms for stability, deterioration, or improvement   Collaborate with multidisciplinary team to address chronic and comorbid conditions and prevent exacerbation or deterioration   Update acute care plan with appropriate goals if chronic or comorbid symptoms are exacerbated and prevent overall improvement and discharge  Taken 4/18/2025 1640 by Mabel Obregon RN  Care Plan - Patient's Chronic Conditions and Co-Morbidity Symptoms are Monitored and Maintained or Improved: Monitor and assess patient's chronic conditions and comorbid symptoms for stability, deterioration, or improvement     Problem: Discharge Planning  Goal: Discharge to home or other facility with appropriate resources  Outcome: Progressing  Flowsheets  Taken 4/18/2025 2000 by Janice Rhodes RN  Discharge to home or other facility with appropriate resources:   Identify barriers to discharge with patient and caregiver   Arrange for needed discharge resources and transportation as appropriate   Identify discharge learning needs (meds, wound care, etc)  Taken 4/18/2025 1640 by Mabel Obregon RN  Discharge to home or other facility with appropriate resources: Identify barriers to discharge with patient and caregiver     Problem: Pain  Goal: Verbalizes/displays adequate comfort level or baseline comfort level  Outcome: Progressing  Flowsheets (Taken 4/18/2025 2141)  Verbalizes/displays adequate comfort level or baseline comfort level:   Encourage patient to monitor pain and request assistance   Implement non-pharmacological measures as

## 2025-04-19 NOTE — CONSULTS
NEUROLOGY CONSULT NOTE    Name Glenn eKyes Age 69 y.o.   MRN [id number]  1955     Consulting Physician: Karon Cotto MD      Chief Complaint:  R sided paraesthesia     Assessment:   68 yo female with pmhx of DM, HTN, HLD, GERD, migraine and anxiety admitted as transfer from [facility] after presenting at sending with headache and numbness to right side of face, right upper and lower extremities. LKW was the previous evening. Pt woke with headache and numbness started at approx 10am. /90. NIHSS 1 (sensation). CTH neg. CTA H/N without LVO, dissection or flow limiting stenosis. Pt transferred to [facility] for inpatient workup of CVA vs TIA.     Lives in [place] with spouse  No T/E/I  Recommendations:   - MRI brain ordered  - A1C, Lipid panel ordered  - Increase home dose Crestor to 40mg daily. Goal LDL <70  - Start Aspirin 81mg daily  - TTE w/PRN bubble ordered  - PT/OT/ST as indicated  - Stroke education    History of Present Illness:      This is a 69 y.o. female with pmhx DM, HTN, HLD, GERD, migraine and anxiety was LKW prior to going to bed on . Pt woke on  with headache and acute onset of R face, arm and leg numbness while at work. Hypertensive on arrival to free-standing /90. NIHSS 1. CT neuroimaging negative. Pt evaluated by Tele-Neuro and not a TNK candidate due to true LKW time and low NIHSS. She was transferred to inpatient neuro floor bed at [facility] for further stroke workup.     On assessment pt has resolution of symptoms. States headache improved with ha cocktail given at sending. States has hx of migraine but that this was not like her typical migraine. Takes Sumatriptan 25mg PRN.     No Known Allergies     Prior to Admission medications    Medication Sig Start Date End Date Taking? Authorizing Provider   pantoprazole (PROTONIX) 40 MG tablet TAKE 1 TABLET BY MOUTH EVERY DAY 25   Susan Lam MD   SUMAtriptan (IMITREX) 25 MG tablet TAKE 1 TABLET BY

## 2025-04-19 NOTE — PROGRESS NOTES
OCCUPATIONAL THERAPY EVALUATION/DISCHARGE  Patient: Glenn Keyes (69 y.o. female)  Date: 4/19/2025  Primary Diagnosis: Acute ischemic stroke (HCC) [I63.9]  Focal neurological deficit [R29.818]         Precautions:                    ASSESSMENT :  Based on the objective data below, the patient the patient presented with a headache, numbness on R side of face, arm and leg. Pt reported symptoms have resolved. Head CT negative for acute process and brain MRI pending at time of evaluation.  Pt presents at independent level with mobility and self-care.  No deficits with balance, sensation, vision or weakness. No further skilled acute OT needed.     Functional Outcome Measure:      [facility]                                                       Daily Activity Inpatient Short Form  How much help from another person does the patient currently need... Total; A Lot A Little None   1.  Putting on and taking off regular lower body clothing? []  1 []  2 []  3 [x]  4   2.  Bathing (including washing, rinsing, drying)? []  1 []  2 []  3 [x]  4   3.  Toileting, which includes using toilet, bedpan or urinal? [] 1 []  2 []  3 [x]  4   4.  Putting on and taking off regular upper body clothing? []  1 []  2 []  3 [x]  4   5.  Taking care of personal grooming such as brushing teeth? []  1 []  2 []  3 [x]  4   6.  Eating meals? []  1 []  2 []  3 [x]  4   © 2007, Trustees of [facility], under license to [employer]. All rights reserved     Score: 24/24     Interpretation of Tool:  Represents clinically-significant functional categories (i.e. Activities of daily living).    Cutoff score 39.4 (19) correlates to a good likelihood of discharging home versus a facility  Jahaira Vargas, Amanda Abreu, Alban Valles, Hedy Farah, Dima Kolb, Gary Vargas, AM-PAC “6-Clicks” Functional Assessment Scores Predict Acute Care Hospital Discharge Destination, Physical Therapy, Volume 94, Issue 9,

## 2025-04-19 NOTE — PROGRESS NOTES
PHYSICAL THERAPY EVALUATION/DISCHARGE    Patient: Glenn Keyes (69 y.o. female)  Date: 4/19/2025  Primary Diagnosis: Acute ischemic stroke (HCC) [I63.9]  Focal neurological deficit [R29.818]       Precautions:              ASSESSMENT AND RECOMMENDATIONS:  Based on the objective data below, the patient presented with a headache, numbness on R side of face, arm and leg. Pt reported symptoms have resolved. Head CT negative for acute process and brain MRI pending at time of evaluation. Pt demonstrated intact and equal bilateral LE strength and sensation. Pt mobilized at independent level for bed mobility, functional transfers, and gait training without LOB during head turns and obstacle navigation. Pt appears to be mobilizing at baseline mobility status with no further acute PT needs.    Functional Outcome Measure:  The patient scored 56/56 on the escobar balance outcome measure which is indicative of low risk for falls.      Further skilled acute physical therapy is not indicated at this time.       PLAN :  Recommendation for discharge: (in order for the patient to meet his/her long term goals):   No skilled physical therapy    Other factors to consider for discharge: no additional factors    IF patient discharges home will need the following DME: none       SUBJECTIVE:   Patient stated “I need to be able to get home for the Easter egg hunt tomorrow.”    OBJECTIVE DATA SUMMARY:     Past Medical History:   Diagnosis Date    Anxiety     Diabetes mellitus (HCC)     Dyslipidemia     Headache     Hypertension     Low back pain 01/19/2012    MRI 3/10/14 revealed mild multilevel disk and facet degenerative change     Lumbar disc disease 03/2014    Non Hodgkin's lymphoma 1985    Nodule removed from throat    Thyroid nodule     Followed by endocrine, Dr. Dangelo.      Ulcer      Past Surgical History:   Procedure Laterality Date    COLONOSCOPY      COLONOSCOPY N/A 4/17/2023    COLONOSCOPY performed by Dashawn Steele MD at [facility]

## 2025-04-19 NOTE — PLAN OF CARE
Problem: Chronic Conditions and Co-morbidities  Goal: Patient's chronic conditions and co-morbidity symptoms are monitored and maintained or improved  4/19/2025 1050 by Leticia Avalos RN  Outcome: Progressing  Flowsheets (Taken 4/19/2025 0800)  Care Plan - Patient's Chronic Conditions and Co-Morbidity Symptoms are Monitored and Maintained or Improved:   Monitor and assess patient's chronic conditions and comorbid symptoms for stability, deterioration, or improvement   Collaborate with multidisciplinary team to address chronic and comorbid conditions and prevent exacerbation or deterioration   Update acute care plan with appropriate goals if chronic or comorbid symptoms are exacerbated and prevent overall improvement and discharge  4/19/2025 0123 by Janice Rhodes RN  Outcome: Progressing  Flowsheets  Taken 4/18/2025 2000 by Janice Rhodes RN  Care Plan - Patient's Chronic Conditions and Co-Morbidity Symptoms are Monitored and Maintained or Improved:   Monitor and assess patient's chronic conditions and comorbid symptoms for stability, deterioration, or improvement   Collaborate with multidisciplinary team to address chronic and comorbid conditions and prevent exacerbation or deterioration   Update acute care plan with appropriate goals if chronic or comorbid symptoms are exacerbated and prevent overall improvement and discharge  Taken 4/18/2025 1640 by Mabel Obregon RN  Care Plan - Patient's Chronic Conditions and Co-Morbidity Symptoms are Monitored and Maintained or Improved: Monitor and assess patient's chronic conditions and comorbid symptoms for stability, deterioration, or improvement     Problem: Discharge Planning  Goal: Discharge to home or other facility with appropriate resources  4/19/2025 1050 by Leticia Avalos, RN  Outcome: Progressing  Flowsheets (Taken 4/19/2025 0800)  Discharge to home or other facility with appropriate resources:   Identify barriers to discharge with patient and

## 2025-04-19 NOTE — PLAN OF CARE
Problem: Chronic Conditions and Co-morbidities  Goal: Patient's chronic conditions and co-morbidity symptoms are monitored and maintained or improved  4/19/2025 1632 by Leticia Avalos RN  Outcome: Adequate for Discharge  4/19/2025 1050 by Leticia Avalos RN  Outcome: Progressing  Flowsheets (Taken 4/19/2025 0800)  Care Plan - Patient's Chronic Conditions and Co-Morbidity Symptoms are Monitored and Maintained or Improved:   Monitor and assess patient's chronic conditions and comorbid symptoms for stability, deterioration, or improvement   Collaborate with multidisciplinary team to address chronic and comorbid conditions and prevent exacerbation or deterioration   Update acute care plan with appropriate goals if chronic or comorbid symptoms are exacerbated and prevent overall improvement and discharge     Problem: Discharge Planning  Goal: Discharge to home or other facility with appropriate resources  4/19/2025 1632 by Leticia Avalos RN  Outcome: Adequate for Discharge  4/19/2025 1050 by Leticia Avalos RN  Outcome: Progressing  Flowsheets (Taken 4/19/2025 0800)  Discharge to home or other facility with appropriate resources:   Identify barriers to discharge with patient and caregiver   Arrange for needed discharge resources and transportation as appropriate   Identify discharge learning needs (meds, wound care, etc)   Refer to discharge planning if patient needs post-hospital services based on physician order or complex needs related to functional status, cognitive ability or social support system   Arrange for interpreters to assist at discharge as needed     Problem: Pain  Goal: Verbalizes/displays adequate comfort level or baseline comfort level  4/19/2025 1632 by Leticia Avalos RN  Outcome: Adequate for Discharge  4/19/2025 1050 by Leticia Avalos RN  Outcome: Progressing      <-- Click to add NO pertinent Past Medical History

## 2025-04-19 NOTE — PROGRESS NOTES
Hospitalist Progress Note  Abdi Mazariegos MD  Answering service: [phone]        Date of Service:  2025  NAME:  Glenn Keyes  :  1955  MRN:  [id number]      Admission Summary:   This is a 69-year-old female who [facility]  around 11 AM with headache, right facial, right upper and right lower extremity numbness.  She reported waking up with headache that morning and the numbness started around 10 AM, last known well 10 PM the previous night.  Pertinent PMH include hypertension, hyperlipidemia, diabetes, migraine headache, anxiety.  CT head was negative.  CTA H/N without LVO, dissection or flow-limiting stenosis.  NIH score was low, 1, she was felt to be not a candidate for TNK or endovascular intervention.  Interval history / Subjective:   She says her symptoms have completely resolved and she is eager to go  MRI brain completed, report pending.  Discussed with neurologist who advised to wait until the MRIs reported and okay for discharge if negative.     Assessment & Plan:     Acute ischemic stroke versus complex migraine.  -CT head negative.  CTA H/N without dissection, LVO or flow-limiting stenosis  -NIH and presentation low, 1 and deemed to be not a candidate for TNK or EVT  - LDL 66.  A1c 5.6.  Continue aspirin, rosuvastatin.  - MRI brain completed, report pending.  -Discussed with neurologist, okay for discharge if brain MRI is negative    History of migraine  -Manages breakthrough pain with sumatriptan    Hypertension  Dyslipidemia  Type II DM  Mood disorder            Code status: Full code  Prophylaxis: Enoxaparin  Care Plan discussed with: Patient, neurologist  Anticipated Disposition: Home today pending MRI result  Central Line:                Review of Systems:   All ROS negative.  Specifically denied headache, blurry vision, paresthesia or

## 2025-04-20 NOTE — DISCHARGE SUMMARY
Discharge Summary       PATIENT ID: Glenn Keyes  MRN: [id number]   YOB: 1955    DATE OF ADMISSION: 2025 10:54 AM    DATE OF DISCHARGE: 2025   PRIMARY CARE PROVIDER: Susan Lam MD     ATTENDING PHYSICIAN: Abdi Mazariegos MD    DISCHARGING PROVIDER: Abdi Mazariegos MD    To contact this individual call [phone] and ask the  to page.  If unavailable ask to be transferred the Adult Hospitalist Department.    CONSULTATIONS: IP CONSULT TO TELE-NEUROLOGY  IP CONSULT TO NEUROLOGY  IP CONSULT TO CASE MANAGEMENT    PROCEDURES/SURGERIES: * No surgery found *     ADMITTING DIAGNOSES & HOSPITAL COURSE:   This is a 69-year-old female who [facility] on  around 11 AM with headache, right facial, right upper and right lower extremity numbness.  She reported waking up with headache that morning and the numbness started around 10 AM, last known well 10 PM the previous night.  Pertinent PMH include hypertension, hyperlipidemia, diabetes, migraine headache, anxiety.  CT head was negative.  CTA H/N without LVO, dissection or flow-limiting stenosis.  NIH score was low, 1, she w        DISCHARGE DIAGNOSES / PLAN:                                                                                                                                                                Hospitalist Progress Note  Abdi Mazariegos MD  Answering service: [phone]         Date of Service:  2025  NAME:  Glenn Keyes  :  1955  MRN:  [id number]        Admission Summary:   This is a 69-year-old female who presented to the [facility] ED on  around 11 AM with headache, right facial, right upper and right lower extremity numbness.  She reported waking up with headache that morning and the numbness started around 10 AM, last known well 10 PM the previous night.  Pertinent PMH include hypertension, hyperlipidemia, diabetes, migraine headache,

## 2025-04-21 ENCOUNTER — OFFICE VISIT (OUTPATIENT)
Age: 70
End: 2025-04-21

## 2025-04-21 VITALS
RESPIRATION RATE: 18 BRPM | HEIGHT: 62 IN | SYSTOLIC BLOOD PRESSURE: 145 MMHG | DIASTOLIC BLOOD PRESSURE: 75 MMHG | WEIGHT: 134 LBS | HEART RATE: 78 BPM | TEMPERATURE: 98 F | BODY MASS INDEX: 24.66 KG/M2 | OXYGEN SATURATION: 96 %

## 2025-04-21 DIAGNOSIS — Z86.73 HISTORY OF STROKE: ICD-10-CM

## 2025-04-21 DIAGNOSIS — I10 HYPERTENSION, UNSPECIFIED TYPE: ICD-10-CM

## 2025-04-21 DIAGNOSIS — Z09 HOSPITAL DISCHARGE FOLLOW-UP: ICD-10-CM

## 2025-04-21 DIAGNOSIS — H02.401 PTOSIS OF RIGHT EYELID: Primary | ICD-10-CM

## 2025-04-21 RX ORDER — VALSARTAN AND HYDROCHLOROTHIAZIDE 80; 12.5 MG/1; MG/1
1 TABLET, FILM COATED ORAL DAILY
Qty: 30 TABLET | Refills: 0 | Status: SHIPPED | OUTPATIENT
Start: 2025-04-21

## 2025-04-21 ASSESSMENT — PATIENT HEALTH QUESTIONNAIRE - PHQ9
2. FEELING DOWN, DEPRESSED OR HOPELESS: NOT AT ALL
SUM OF ALL RESPONSES TO PHQ QUESTIONS 1-9: 0
1. LITTLE INTEREST OR PLEASURE IN DOING THINGS: NOT AT ALL
SUM OF ALL RESPONSES TO PHQ QUESTIONS 1-9: 0

## 2025-04-21 NOTE — PROGRESS NOTES
Identified pt with two pt identifiers(name and ). Reviewed record in preparation for visit and have obtained necessary documentation.  Chief Complaint   Patient presents with    Follow-Up from Hospital     Pt reports for F/U on recent stroke, not feeling well, BP check, right sided face heavyness        Health Maintenance Due   Topic    Respiratory Syncytial Virus (RSV) Pregnant or age 60 yrs+ (1 - Risk 60-74 years 1-dose series)    Diabetic retinal exam     Annual Wellness Visit (Medicare Advantage)     Diabetic Alb to Cr ratio (uACR) test     Diabetic foot exam        Vitals:    25 1438 25 1447   BP: (!) 159/84 (!) 145/75   BP Site: Left Upper Arm Right Upper Arm   Patient Position: Sitting Sitting   BP Cuff Size: Medium Adult Medium Adult   Pulse: 69 78   Resp: 18    Temp: 98 °F (36.7 °C)    TempSrc: Oral    SpO2: 96%    Weight: 60.8 kg (134 lb)    Height: 1.575 m (5' 2\")          \"Have you been to the ER, urgent care clinic since your last visit?  Hospitalized since your last visit?\"    YES - When: approximately 3 days ago.  Where and Why: stroke.    “Have you seen or consulted any other health care providers outside of [facility] since your last visit?”    NO            Click Here for Release of Records Request     This patient is accompanied in the office by her self.  I have received verbal consent from Glenn Keyes to discuss any/all medical information while they are present in the room.

## 2025-04-21 NOTE — PROGRESS NOTES
Post-Discharge Transitional Care  Follow Up      Glenn Keyes   YOB: 1955    Date of Office Visit:  4/21/2025  Date of Hospital Admission: 4/18/25  Date of Hospital Discharge: 4/19/25  Risk of hospital readmission (high >=14%. Medium >=10%) :No data recorded    Care management risk score Rising risk (score 2-5) and Complex Care (Scores >=6): No Risk Score On File     Non face to face  following discharge, date last encounter closed (first attempt may have been earlier): 04/21/2025    Call initiated 2 business days of discharge: Yes    ASSESSMENT/PLAN:   Ptosis of right eyelid  -     [facility] - Addy Rivas MD, Neurology, [place]  History of stroke  -     [facility] Addy Plummer MD, Neurology, [place]  Hypertension, unspecified type  -     valsartan-hydroCHLOROthiazide (DIOVAN-HCT) 80-12.5 MG per tablet; Take 1 tablet by mouth daily, Disp-30 tablet, R-0Normal  Patient with residual ptosis of her right eyelid after ER visit for facial assymetry and headache with normal neuroimaging results. Ddx for ptosis include CN3 palsy from vascular disease, Katie syndrome though less likely given no other symptoms. Referring to neurology for further assesment   Medical Decision Making: moderate complexity  No follow-ups on file.           Subjective:   HPI:  Follow up of Hospital problems/diagnosis(es):     TIA versus complex migraine  -CT head negative.  CTA H/N without dissection, LVO or flow-limiting stenosis  -NIH and presentation low, 1 and deemed to be not a candidate for TNK or EVT  - LDL 66.  A1c 5.6.  Continue aspirin, rosuvastatin.  - MRI brain negative.  -Discussed with neurologistanthony for discharge but brain MRI is negative     History of migraine  -Manages breakthrough pain with sumatriptan     Hypertension  Dyslipidemia  Type II DM  Mood disorder    Has thryoid cancer  Inpatient course: Discharge summary reviewed- see chart.    Interval history/Current status: Patient with history of migraines

## 2025-04-22 ENCOUNTER — LAB (OUTPATIENT)
Age: 70
End: 2025-04-22

## 2025-04-22 DIAGNOSIS — C73 THYROID CANCER (HCC): ICD-10-CM

## 2025-04-23 LAB
T4 FREE SERPL-MCNC: 1 NG/DL (ref 0.8–1.5)
TSH SERPL DL<=0.05 MIU/L-ACNC: 3.43 UIU/ML (ref 0.36–3.74)

## 2025-04-28 LAB
THYROGLOBULIN (ICMA): 30.3 NG/ML
THYROGLOBULIN (TG-RIA): NORMAL NG/ML
THYROGLOBULIN AB: <1 IU/ML

## 2025-04-29 ENCOUNTER — OFFICE VISIT (OUTPATIENT)
Age: 70
End: 2025-04-29
Payer: MEDICARE

## 2025-04-29 VITALS
HEART RATE: 83 BPM | OXYGEN SATURATION: 96 % | WEIGHT: 134 LBS | TEMPERATURE: 97.8 F | SYSTOLIC BLOOD PRESSURE: 137 MMHG | RESPIRATION RATE: 16 BRPM | BODY MASS INDEX: 24.66 KG/M2 | DIASTOLIC BLOOD PRESSURE: 84 MMHG | HEIGHT: 62 IN

## 2025-04-29 DIAGNOSIS — R53.83 FATIGUE, UNSPECIFIED TYPE: ICD-10-CM

## 2025-04-29 DIAGNOSIS — C73 THYROID CANCER (HCC): Primary | ICD-10-CM

## 2025-04-29 DIAGNOSIS — E04.1 RIGHT THYROID NODULE: ICD-10-CM

## 2025-04-29 PROCEDURE — 1090F PRES/ABSN URINE INCON ASSESS: CPT | Performed by: INTERNAL MEDICINE

## 2025-04-29 PROCEDURE — 1123F ACP DISCUSS/DSCN MKR DOCD: CPT | Performed by: INTERNAL MEDICINE

## 2025-04-29 PROCEDURE — 1160F RVW MEDS BY RX/DR IN RCRD: CPT | Performed by: INTERNAL MEDICINE

## 2025-04-29 PROCEDURE — G2211 COMPLEX E/M VISIT ADD ON: HCPCS | Performed by: INTERNAL MEDICINE

## 2025-04-29 PROCEDURE — 1036F TOBACCO NON-USER: CPT | Performed by: INTERNAL MEDICINE

## 2025-04-29 PROCEDURE — 1159F MED LIST DOCD IN RCRD: CPT | Performed by: INTERNAL MEDICINE

## 2025-04-29 PROCEDURE — 3075F SYST BP GE 130 - 139MM HG: CPT | Performed by: INTERNAL MEDICINE

## 2025-04-29 PROCEDURE — G8427 DOCREV CUR MEDS BY ELIG CLIN: HCPCS | Performed by: INTERNAL MEDICINE

## 2025-04-29 PROCEDURE — 3079F DIAST BP 80-89 MM HG: CPT | Performed by: INTERNAL MEDICINE

## 2025-04-29 PROCEDURE — G8399 PT W/DXA RESULTS DOCUMENT: HCPCS | Performed by: INTERNAL MEDICINE

## 2025-04-29 PROCEDURE — 3017F COLORECTAL CA SCREEN DOC REV: CPT | Performed by: INTERNAL MEDICINE

## 2025-04-29 PROCEDURE — G8420 CALC BMI NORM PARAMETERS: HCPCS | Performed by: INTERNAL MEDICINE

## 2025-04-29 PROCEDURE — 99215 OFFICE O/P EST HI 40 MIN: CPT | Performed by: INTERNAL MEDICINE

## 2025-04-29 PROCEDURE — 1126F AMNT PAIN NOTED NONE PRSNT: CPT | Performed by: INTERNAL MEDICINE

## 2025-04-29 NOTE — PROGRESS NOTES
Glenn Keyes is a 69 y.o. female here for   Chief Complaint   Patient presents with    Thyroid Problem       1. Have you been to the ER, urgent care clinic since your last visit?  Hospitalized since your last visit? -[facility] 4/18/25 Stroke    2. Have you seen or consulted any other health care providers outside of the [facility] since your last visit?  Include any pap smears or colon screening.-no

## 2025-04-29 NOTE — PATIENT INSTRUCTIONS
Get new BP machine    I have ordered scan/test and if you do not hear from the hospital in 3- 4 business days  please call the number [phone] to speak with the scheduling team directly.   
(1) body pink, extremities blue

## 2025-04-29 NOTE — PROGRESS NOTES
[facility] DIABETES AND ENDOCRINOLOGY               Jen Dangelo MD        Patient Information  Name : Glenn Keyes 69 y.o.     YOB: 1955         Referred by: Susan Lam MD       The patient (or guardian, if applicable) and other individuals in attendance with the patient were advised that Artificial Intelligence will be utilized during this visit to record, process the conversation to generate a clinical note, and support improvement of the AI technology. The patient (or guardian, if applicable) and other individuals in attendance at the appointment consented to the use of AI, including the recording.      Chief Complaint   Patient presents with    Thyroid Problem       History of present illness    Glenn Keyes is a 69 y.o. 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  - There have been no recent changes in medication.    Nausea  - She experiences nausea without emesis.  - Current medications include Rybelsus, which may contribute to her nausea,     Sleep Patterns  - She reports inadequate sleep patterns but denies any episodes of choking.    Weight Gain  - Since the surgery, she has experienced a weight gain of 20 pounds, with her weight fluctuating between 124 and 132 pounds.    Hospitalization  - She was hospitalized two weeks ago for a suspected cerebrovascular accident, which was subsequently ruled out by magnetic resonance imaging (MRI).    Supplemental information: None      Wt Readings from Last 3 Encounters:   04/21/25 60.8 kg (134 lb)   04/19/25 61.4 kg (135 lb 4.8 oz)   01/08/25 61 kg (134 lb 6.4

## 2025-05-07 ENCOUNTER — OFFICE VISIT (OUTPATIENT)
Age: 70
End: 2025-05-07
Payer: MEDICARE

## 2025-05-07 VITALS
TEMPERATURE: 98.2 F | OXYGEN SATURATION: 96 % | HEART RATE: 75 BPM | BODY MASS INDEX: 24.55 KG/M2 | SYSTOLIC BLOOD PRESSURE: 137 MMHG | HEIGHT: 62 IN | WEIGHT: 133.4 LBS | RESPIRATION RATE: 16 BRPM | DIASTOLIC BLOOD PRESSURE: 72 MMHG

## 2025-05-07 DIAGNOSIS — E11.40 TYPE 2 DIABETES MELLITUS WITH DIABETIC NEUROPATHY, WITHOUT LONG-TERM CURRENT USE OF INSULIN (HCC): ICD-10-CM

## 2025-05-07 DIAGNOSIS — M54.41 CHRONIC RIGHT-SIDED LOW BACK PAIN WITH RIGHT-SIDED SCIATICA: ICD-10-CM

## 2025-05-07 DIAGNOSIS — M54.16 LUMBAR RADICULOPATHY: ICD-10-CM

## 2025-05-07 DIAGNOSIS — G89.29 CHRONIC RIGHT-SIDED LOW BACK PAIN WITH RIGHT-SIDED SCIATICA: ICD-10-CM

## 2025-05-07 DIAGNOSIS — I10 PRIMARY HYPERTENSION: Primary | ICD-10-CM

## 2025-05-07 PROBLEM — I63.9 ACUTE CVA (CEREBROVASCULAR ACCIDENT) (HCC): Status: RESOLVED | Noted: 2025-04-19 | Resolved: 2025-05-07

## 2025-05-07 LAB
CREAT UR-MCNC: 83.1 MG/DL
MICROALBUMIN UR-MCNC: 0.64 MG/DL
MICROALBUMIN/CREAT UR-RTO: 8 MG/G (ref 0–30)

## 2025-05-07 PROCEDURE — 2022F DILAT RTA XM EVC RTNOPTHY: CPT | Performed by: FAMILY MEDICINE

## 2025-05-07 PROCEDURE — 3044F HG A1C LEVEL LT 7.0%: CPT | Performed by: FAMILY MEDICINE

## 2025-05-07 PROCEDURE — 3075F SYST BP GE 130 - 139MM HG: CPT | Performed by: FAMILY MEDICINE

## 2025-05-07 PROCEDURE — 1125F AMNT PAIN NOTED PAIN PRSNT: CPT | Performed by: FAMILY MEDICINE

## 2025-05-07 PROCEDURE — G8420 CALC BMI NORM PARAMETERS: HCPCS | Performed by: FAMILY MEDICINE

## 2025-05-07 PROCEDURE — G2211 COMPLEX E/M VISIT ADD ON: HCPCS | Performed by: FAMILY MEDICINE

## 2025-05-07 PROCEDURE — 99214 OFFICE O/P EST MOD 30 MIN: CPT | Performed by: FAMILY MEDICINE

## 2025-05-07 PROCEDURE — G8399 PT W/DXA RESULTS DOCUMENT: HCPCS | Performed by: FAMILY MEDICINE

## 2025-05-07 PROCEDURE — G8427 DOCREV CUR MEDS BY ELIG CLIN: HCPCS | Performed by: FAMILY MEDICINE

## 2025-05-07 PROCEDURE — 1123F ACP DISCUSS/DSCN MKR DOCD: CPT | Performed by: FAMILY MEDICINE

## 2025-05-07 PROCEDURE — 1159F MED LIST DOCD IN RCRD: CPT | Performed by: FAMILY MEDICINE

## 2025-05-07 PROCEDURE — 3078F DIAST BP <80 MM HG: CPT | Performed by: FAMILY MEDICINE

## 2025-05-07 PROCEDURE — 3017F COLORECTAL CA SCREEN DOC REV: CPT | Performed by: FAMILY MEDICINE

## 2025-05-07 PROCEDURE — 1036F TOBACCO NON-USER: CPT | Performed by: FAMILY MEDICINE

## 2025-05-07 PROCEDURE — 1090F PRES/ABSN URINE INCON ASSESS: CPT | Performed by: FAMILY MEDICINE

## 2025-05-07 RX ORDER — VALSARTAN AND HYDROCHLOROTHIAZIDE 160; 25 MG/1; MG/1
1 TABLET ORAL DAILY
Qty: 30 TABLET | Refills: 3
Start: 2025-05-07 | End: 2026-05-07

## 2025-05-07 ASSESSMENT — PATIENT HEALTH QUESTIONNAIRE - PHQ9
5. POOR APPETITE OR OVEREATING: NOT AT ALL
9. THOUGHTS THAT YOU WOULD BE BETTER OFF DEAD, OR OF HURTING YOURSELF: NOT AT ALL
4. FEELING TIRED OR HAVING LITTLE ENERGY: MORE THAN HALF THE DAYS
2. FEELING DOWN, DEPRESSED OR HOPELESS: NOT AT ALL
6. FEELING BAD ABOUT YOURSELF - OR THAT YOU ARE A FAILURE OR HAVE LET YOURSELF OR YOUR FAMILY DOWN: SEVERAL DAYS
1. LITTLE INTEREST OR PLEASURE IN DOING THINGS: NOT AT ALL
SUM OF ALL RESPONSES TO PHQ QUESTIONS 1-9: 5
7. TROUBLE CONCENTRATING ON THINGS, SUCH AS READING THE NEWSPAPER OR WATCHING TELEVISION: NOT AT ALL
SUM OF ALL RESPONSES TO PHQ QUESTIONS 1-9: 5
10. IF YOU CHECKED OFF ANY PROBLEMS, HOW DIFFICULT HAVE THESE PROBLEMS MADE IT FOR YOU TO DO YOUR WORK, TAKE CARE OF THINGS AT HOME, OR GET ALONG WITH OTHER PEOPLE: NOT DIFFICULT AT ALL
SUM OF ALL RESPONSES TO PHQ QUESTIONS 1-9: 5
SUM OF ALL RESPONSES TO PHQ QUESTIONS 1-9: 5
8. MOVING OR SPEAKING SO SLOWLY THAT OTHER PEOPLE COULD HAVE NOTICED. OR THE OPPOSITE, BEING SO FIGETY OR RESTLESS THAT YOU HAVE BEEN MOVING AROUND A LOT MORE THAN USUAL: NOT AT ALL
3. TROUBLE FALLING OR STAYING ASLEEP: MORE THAN HALF THE DAYS

## 2025-05-07 ASSESSMENT — ENCOUNTER SYMPTOMS: BACK PAIN: 1

## 2025-05-07 NOTE — PROGRESS NOTES
Glenn Keyes is a 69 y.o. female      Chief Complaint   Patient presents with    Follow-up Chronic Condition     - lower right side back pain   - not taking any OTC medication for pain        \"Have you been to the ER, urgent care clinic since your last visit?  Hospitalized since your last visit?\"    NO    “Have you seen or consulted any other health care providers outside of [facility] since your last visit?”    NO              Vitals:    05/07/25 0848   BP: 137/72   BP Site: Left Upper Arm   Patient Position: Sitting   BP Cuff Size: Medium Adult   Pulse: 75   Resp: 16   Temp: 98.2 °F (36.8 °C)   TempSrc: Temporal   SpO2: 96%   Weight: 60.5 kg (133 lb 6.4 oz)   Height: 1.575 m (5' 2\")            Health Maintenance Due   Topic Date Due    Respiratory Syncytial Virus (RSV) Pregnant or age 60 yrs+ (1 - Risk 60-74 years 1-dose series) Never done    Diabetic retinal exam  03/19/2019    Annual Wellness Visit (Medicare Advantage)  01/01/2025    Diabetic Alb to Cr ratio (uACR) test  02/13/2025    Diabetic foot exam  04/30/2025         Medication Reconciliation completed, changes noted.  Please  Update medication list.

## 2025-05-07 NOTE — PATIENT INSTRUCTIONS
[facility]rology [facility], [place]  Dr. Rivas  [address]  [address] [place] [address]  Tel: [phone]     [facility]  Dr. Preet Machado [facility]  [phone]

## 2025-05-07 NOTE — PROGRESS NOTES
History of Present Illness:     Chief Complaint   Patient presents with    Follow-up Chronic Condition     - lower right side back pain   - not taking any OTC medication for pain        Glenn Keyes is a 69 y.o. female     History of Present Illness  The patient presents for evaluation of back pain, hypertension, diabetes mellitus, and thyroid issues.    She has been experiencing persistent right-sided back pain that radiates down to her buttock since Monday. She has not been taking any anti-inflammatories for her back pain. She has previously undergone physical therapy but discontinued due to the severity of the pain. She is now considering a consultation with a back specialist.    Her home blood pressure readings have been inconsistent, ranging from 120s to 160s systolic and 80s to 90s diastolic, with the majority of diastolic readings in the 90s. She did not bring her blood pressure cuff to today's appointment. She was previously prescribed a low dose of Diovan by Dr. Gandhi, which she found ineffective. Consequently, she increased the dosage to 160/25 mg, which she reports as being more effective. She has a sufficient supply of this medication.    She is currently on Rybelsus for diabetes management and is inquiring about the duration of this treatment. She has expressed concerns about potential pancreatic side effects associated with this medication. She has experienced some dull abdominal pain, but it is not present at the moment. She is also taking pantoprazole.    She has been advised by her endocrinologist to start levothyroxine, but she is hesitant to add another medication to her regimen. She has been informed that her thyroid function appears to be normal.    She reports constant numbness and tingling in her feet, particularly on the right side, which intensifies at night.      PMH (REVIEWED):  Past Medical History:   Diagnosis Date    Anxiety     Diabetes mellitus (HCC)     Dyslipidemia     Headache

## 2025-05-12 DIAGNOSIS — I10 PRIMARY HYPERTENSION: ICD-10-CM

## 2025-05-14 RX ORDER — VALSARTAN AND HYDROCHLOROTHIAZIDE 160; 25 MG/1; MG/1
1 TABLET ORAL DAILY
Qty: 90 TABLET | Refills: 1 | Status: SHIPPED | OUTPATIENT
Start: 2025-05-14

## 2025-06-17 ENCOUNTER — OFFICE VISIT (OUTPATIENT)
Age: 70
End: 2025-06-17
Payer: MEDICARE

## 2025-06-17 VITALS — WEIGHT: 131.4 LBS | BODY MASS INDEX: 24.18 KG/M2 | HEIGHT: 62 IN

## 2025-06-17 DIAGNOSIS — M54.16 LUMBAR RADICULOPATHY: Primary | ICD-10-CM

## 2025-06-17 DIAGNOSIS — M54.50 LUMBAR BACK PAIN: ICD-10-CM

## 2025-06-17 PROCEDURE — 3017F COLORECTAL CA SCREEN DOC REV: CPT | Performed by: STUDENT IN AN ORGANIZED HEALTH CARE EDUCATION/TRAINING PROGRAM

## 2025-06-17 PROCEDURE — 1123F ACP DISCUSS/DSCN MKR DOCD: CPT | Performed by: STUDENT IN AN ORGANIZED HEALTH CARE EDUCATION/TRAINING PROGRAM

## 2025-06-17 PROCEDURE — 1036F TOBACCO NON-USER: CPT | Performed by: STUDENT IN AN ORGANIZED HEALTH CARE EDUCATION/TRAINING PROGRAM

## 2025-06-17 PROCEDURE — G8427 DOCREV CUR MEDS BY ELIG CLIN: HCPCS | Performed by: STUDENT IN AN ORGANIZED HEALTH CARE EDUCATION/TRAINING PROGRAM

## 2025-06-17 PROCEDURE — 1090F PRES/ABSN URINE INCON ASSESS: CPT | Performed by: STUDENT IN AN ORGANIZED HEALTH CARE EDUCATION/TRAINING PROGRAM

## 2025-06-17 PROCEDURE — G8420 CALC BMI NORM PARAMETERS: HCPCS | Performed by: STUDENT IN AN ORGANIZED HEALTH CARE EDUCATION/TRAINING PROGRAM

## 2025-06-17 PROCEDURE — 1125F AMNT PAIN NOTED PAIN PRSNT: CPT | Performed by: STUDENT IN AN ORGANIZED HEALTH CARE EDUCATION/TRAINING PROGRAM

## 2025-06-17 PROCEDURE — 1159F MED LIST DOCD IN RCRD: CPT | Performed by: STUDENT IN AN ORGANIZED HEALTH CARE EDUCATION/TRAINING PROGRAM

## 2025-06-17 PROCEDURE — G8399 PT W/DXA RESULTS DOCUMENT: HCPCS | Performed by: STUDENT IN AN ORGANIZED HEALTH CARE EDUCATION/TRAINING PROGRAM

## 2025-06-17 PROCEDURE — 99203 OFFICE O/P NEW LOW 30 MIN: CPT | Performed by: STUDENT IN AN ORGANIZED HEALTH CARE EDUCATION/TRAINING PROGRAM

## 2025-06-17 RX ORDER — MELOXICAM 15 MG/1
15 TABLET ORAL DAILY
Qty: 30 TABLET | Refills: 1 | Status: SHIPPED | OUTPATIENT
Start: 2025-06-17

## 2025-06-17 ASSESSMENT — PATIENT HEALTH QUESTIONNAIRE - PHQ9
1. LITTLE INTEREST OR PLEASURE IN DOING THINGS: NOT AT ALL
SUM OF ALL RESPONSES TO PHQ QUESTIONS 1-9: 0
2. FEELING DOWN, DEPRESSED OR HOPELESS: NOT AT ALL
SUM OF ALL RESPONSES TO PHQ QUESTIONS 1-9: 0

## 2025-06-17 NOTE — PROGRESS NOTES
Brief History:  55 yo M with multiple medical problems including CAD s/p PCI in 2024 s/p CABG x 3 on 24    1: Intubated for hypoxia & left lung white out s/p awake bronch with removal of copious mucopurulent secretions  : s/p IABP insertion, sepsis/septic shock due to E coli   ESBL pneumonia, collapsed Left Lung, s/p multiple bronch    tracheostomy tube placement    VRE E. Faecium Bcx   +HSV PCR BAL   tissue cx from back abscess - Serratia/MRSA  - - intermittent bradycardia/junctional episodes with hypotension  2/3: off , off theophylline. iHD 1L UF  : bronch for Left lung collapse wound vac, 2decho w/ moderate pericardial effusion - similar as before, US abd: small - moderate ascites - monitor at this time.  Wound vac placed for sacral wounds  : iHD-1L UF  : bronch today off positive pressure   : concern for left food drop   2/10 : no acute issues - CXR shows improving left sided aeration, pt subjectively reports having difficulty while lying flat  : s/p Paracentesis 3.5 L removed, leukocytosis   : Ascites fluid PMN < 250 (less likely SBP)   sniff test yesterday showed paradoxical diaphragmatic motion    : mucus plugging but able to clear airway with aggressive pulmonary toileting.   : no vent requirement overnight, able to mobilize secretion with pulm toileting  hyperkalemia post HD - workup for possible recirculation underway   : On TC X 48 hours - ambulating well, no mucus plugging events  : HFTC x24hrs (40L 30%)  : iHD 1.5L UF  : iHD 2L      ICU Vital Signs Last 24 Hrs  T(C): 36.6 (25 @ 00:00), Max: 37 (25 @ 12:00)  T(F): 97.9 (25 @ 00:00), Max: 98.6 (25 @ 12:00)  HR: 91 (25 @ 07:00) (54 - 101)  BP: 132/57 (25 @ 06:00) (101/49 - 162/83)  BP(mean): 82 (25 @ 06:00) (62 - 118)  ABP: --  ABP(mean): --  RR: 18 (25 @ 07:00) (10 - 29)  SpO2: 96% (25 @ 07:00) (92% - 100%)    I&O's Summary    2025 07:01  -  2025 07:00  --------------------------------------------------------  IN: 2495 mL / OUT: 2600 mL / NET: -105 mL                        7.6    8.78  )-----------( 266      ( 2025 23:40 )             24.2     2025 23:40    136    |  97     |  25     ----------------------------<  153    4.5     |  30     |  3.45     Ca    9.0        2025 23:40  Phos  3.5       2025 23:40  Mg     2.3       2025 23:40    TPro  6.7    /  Alb  2.9    /  TBili  0.3    /  DBili  x      /  AST  14     /  ALT  9      /  AlkPhos  101    2025 23:40    PT/INR - ( 2025 06:48 )   PT: 14.1 sec;   INR: 1.23 ratio       PTT - ( 2025 06:27 )  PTT:50.0 sec    MEDICATIONS  (STANDING):  aMIOdarone    Tablet 200 milliGRAM(s) Oral daily  ascorbic acid 500 milliGRAM(s) Oral daily  aspirin  chewable 81 milliGRAM(s) Oral daily  atorvastatin 80 milliGRAM(s) Oral at bedtime  chlorhexidine 2% Cloths 1 Application(s) Topical daily  chlorhexidine 4% Liquid 1 Application(s) Topical <User Schedule>  dextrose 50% Injectable 50 milliLiter(s) IV Push every 15 minutes  epoetin ignacia (EPOGEN) Injectable 53472 Unit(s) IV Push <User Schedule>  ferrous    sulfate Liquid 300 milliGRAM(s) Enteral Tube daily  heparin  Infusion 1300 Unit(s)/Hr IV Continuous <Continuous>  insulin lispro (ADMELOG) corrective regimen sliding scale   SubCutaneous every 6 hours  melatonin 5 milliGRAM(s) Oral at bedtime  methocarbamol 500 milliGRAM(s) Oral every 8 hours  metoprolol tartrate 25 milliGRAM(s) Oral two times a day  mirtazapine 7.5 milliGRAM(s) Oral at bedtime  Nephro-elicia 1 Tablet(s) Oral daily  pantoprazole  Injectable 40 milliGRAM(s) IV Push daily  sodium chloride 0.65% Nasal 1 Spray(s) Both Nostrils every 12 hours  sodium chloride 0.9%. 1000 milliLiter(s) IV Continuous <Continuous>  sodium zirconium cyclosilicate 10 Gram(s) Oral <User Schedule>  traZODone 100 milliGRAM(s) Oral at bedtime    Home Medications:  aspirin 81 mg oral delayed release tablet: 1 tab(s) orally once a day (23 Dec 2024 16:58)  calcium acetate 667 mg oral tablet: 1 tab(s) orally 3 times a day (23 Dec 2024 16:58)  carvedilol 12.5 mg oral tablet: 1 tab(s) orally every 12 hours (23 Dec 2024 16:56)  clopidogrel 75 mg oral tablet: 1 tab(s) orally once a day (23 Dec 2024 16:56)  furosemide 20 mg oral tablet: 1 tab(s) orally once a day (23 Dec 2024 16:58)  hydrALAZINE 25 mg oral tablet: 1 tab(s) orally 3 times a day (23 Dec 2024 16:58)  lisinopril 40 mg oral tablet: 1 tab(s) orally once a day (23 Dec 2024 16:58)  lovastatin 10 mg oral tablet: 1 tab(s) orally once a day (23 Dec 2024 16:56)  NIFEdipine 90 mg oral tablet, extended release: 1 tab(s) orally once a day (23 Dec 2024 16:58)  Emani-Elicia oral tablet: 1 tab(s) orally once a day (23 Dec 2024 16:58)  traZODone 100 mg oral tablet: 1 tab(s) orally once a day (at bedtime) (23 Dec 2024 16:56)    PHYSICAL EXAM:  Gen : no acute distress  Neck: + trach   Respiratory: decreased in the bases  Cardiovascular: AFlutter  Gastrointestinal: distended, soft   Extremities: No peripheral edema  Vascular: 2+ peripheral pulses    S/p CABG  Respiratory failure due to recurrent left lung plugging now s/p trach  vent weaning   ESRD on HD  Post op AFib   History of RV dysfunction   h/o Recurrent ascites   Acute blood loss anemia   Post operative pain   Sacral decub   Recurrent ascites likely from Chronic RV failure - s/p paracentesis   Left Diaphragm dysfunction     Neuro - Multimodal pain management  Continue PT    [facility] - AFlutter - PO Amio for afib prophylaxis, BB for rate control - anticoagulation   Chronic RV Dysfunction     Pulm - tolerated TC since   Needs aggressive pulm toileting   Empiric antibiotics for Tracheobronchitis     GI - TF   s/p Drainage of ascites on  (3.5 L removed)  GI Proph/Bowel regimen     - tolerated HD   Nephro elicia for renal support    Heme - acute on chronic anemia (post op)  ASA / Statin  FE/EPO  Anticoagulation with Heparin gtt for PAF    ID - completed antibiotics for skin and soft tissue infection  No current antibiotics    Skin - Sacral decub - wound care, Turning Q2   Optimize nutrition          Critical care time spent    min       Care plan discussed with the ICU care team.   Patient remains critical, at risk for life threatening decompensation.    I have spent 30 minutes providing critical care management to this patient.    By signing my name below, I, Baldemar Hernandez, attest that this documentation has been prepared under the direction and in the presence of Herminio Mendez MD.   Electronically signed: Baldemar Hernandez, 25 @ 07:45    I, Herminio Mendez, personally performed the services described in this documentation. All medical record entries made by the scribe were at my direction and in my presence. I have reviewed the chart and agree that the record reflects my personal performance and is accurate and complete  Electronically signed: Herminio Mendez MD.  Orthopedic Spine Surgery      Diagnosis:       ICD-10-CM    1. Lumbar radiculopathy  M54.16       2. Lumbar back pain  M54.50 XR LUMBAR SPINE (MIN 4 VIEWS)          Assessment / Plan:   69 y.o. female with complaints of chronic back and right greater than left leg pain consistent with degenerative disc disease with lumbar radiculopathy.  Patient has tried and failed physical therapy without durable relief.  Recommend a trial of nonsteroidal anti-inflammatory, MRI of the lumbar spine, and referral to pain management for consideration of possible epidural steroid injection.    History:     Chief Complaint:   Back pain, right greater than left leg pain    History of Present Illness:   Glenn Keyes is a 69 y.o. female who presents to clinic for evaluation of the above complaints. Symptoms began over the past several years and is slowly progressed.  She reports pain is equal between the back and the right-sided leg.  She notes numbness and tingling in the feet which is worse at night.  She does have a history of diabetes.  She believes her A1c is 7% and has been told that her diabetes is well-controlled.  She denies weakness in the legs.  She does feel like her symptoms are better if she is able to walk with a shopping cart.  She does have a history of injections these are most recently performed on the order of years ago.    Primary Care Provider:   Susan Lam MD    Referring Physician:   Susan Lam MD  [address] [place] [id number]    Physical Exam:     Ht 1.575 m (5' 2.01\")   Wt 59.6 kg (131 lb 6.4 oz)   BMI 24.03 kg/m²     General / Psychiatric: healthy, no distress, cooperative, AOx3, normal affect, appropriate  HEENT: NC/AT, EOMI, sclera anicteric    Cardio: RRR per peripheral pulses  Pulmonary: breathing quietly without use of accessory muscles  Back / Neck: pain on lumbar ROM  Bilateral Lower Extremity   Motor:       Right           Left        Iliopsoas (L2/3)                     Brief History:  55 yo M with multiple medical problems including CAD s/p PCI in 2024 s/p CABG x 3 on 24    1: Intubated for hypoxia & left lung white out s/p awake bronch with removal of copious mucopurulent secretions  : s/p IABP insertion, sepsis/septic shock due to E coli   ESBL pneumonia, collapsed Left Lung, s/p multiple bronch    tracheostomy tube placement    VRE E. Faecium Bcx   +HSV PCR BAL   tissue cx from back abscess - Serratia/MRSA  - - intermittent bradycardia/junctional episodes with hypotension  2/3: off , off theophylline. iHD 1L UF  : bronch for Left lung collapse wound vac, 2decho w/ moderate pericardial effusion - similar as before, US abd: small - moderate ascites - monitor at this time.  Wound vac placed for sacral wounds  : iHD-1L UF  : bronch today off positive pressure   : concern for left food drop   2/10 : no acute issues - CXR shows improving left sided aeration, pt subjectively reports having difficulty while lying flat  : s/p Paracentesis 3.5 L removed, leukocytosis   : Ascites fluid PMN < 250 (less likely SBP)   sniff test yesterday showed paradoxical diaphragmatic motion    : mucus plugging but able to clear airway with aggressive pulmonary toileting.   : no vent requirement overnight, able to mobilize secretion with pulm toileting  hyperkalemia post HD - workup for possible recirculation underway   : On TC X 48 hours - ambulating well, no mucus plugging events  : HFTC x24hrs (40L 30%)  : iHD 1.5L UF  : iHD 2L      ICU Vital Signs Last 24 Hrs  T(C): 36.6 (25 @ 00:00), Max: 37 (25 @ 12:00)  T(F): 97.9 (25 @ 00:00), Max: 98.6 (25 @ 12:00)  HR: 91 (25 @ 07:00) (54 - 101)  BP: 132/57 (25 @ 06:00) (101/49 - 162/83)  BP(mean): 82 (25 @ 06:00) (62 - 118)  ABP: --  ABP(mean): --  RR: 18 (25 @ 07:00) (10 - 29)  SpO2: 96% (25 @ 07:00) (92% - 100%)    I&O's Summary    2025 07:01  -  2025 07:00  --------------------------------------------------------  IN: 2495 mL / OUT: 2600 mL / NET: -105 mL                        7.6    8.78  )-----------( 266      ( 2025 23:40 )             24.2     2025 23:40    136    |  97     |  25     ----------------------------<  153    4.5     |  30     |  3.45     Ca    9.0        2025 23:40  Phos  3.5       2025 23:40  Mg     2.3       2025 23:40    TPro  6.7    /  Alb  2.9    /  TBili  0.3    /  DBili  x      /  AST  14     /  ALT  9      /  AlkPhos  101    2025 23:40    PT/INR - ( 2025 06:48 )   PT: 14.1 sec;   INR: 1.23 ratio       PTT - ( 2025 06:27 )  PTT:50.0 sec    MEDICATIONS  (STANDING):  aMIOdarone    Tablet 200 milliGRAM(s) Oral daily  ascorbic acid 500 milliGRAM(s) Oral daily  aspirin  chewable 81 milliGRAM(s) Oral daily  atorvastatin 80 milliGRAM(s) Oral at bedtime  chlorhexidine 2% Cloths 1 Application(s) Topical daily  chlorhexidine 4% Liquid 1 Application(s) Topical <User Schedule>  dextrose 50% Injectable 50 milliLiter(s) IV Push every 15 minutes  epoetin ignacia (EPOGEN) Injectable 05658 Unit(s) IV Push <User Schedule>  ferrous    sulfate Liquid 300 milliGRAM(s) Enteral Tube daily  heparin  Infusion 1300 Unit(s)/Hr IV Continuous <Continuous>  insulin lispro (ADMELOG) corrective regimen sliding scale   SubCutaneous every 6 hours  melatonin 5 milliGRAM(s) Oral at bedtime  methocarbamol 500 milliGRAM(s) Oral every 8 hours  metoprolol tartrate 25 milliGRAM(s) Oral two times a day  mirtazapine 7.5 milliGRAM(s) Oral at bedtime  Nephro-elicia 1 Tablet(s) Oral daily  pantoprazole  Injectable 40 milliGRAM(s) IV Push daily  sodium chloride 0.65% Nasal 1 Spray(s) Both Nostrils every 12 hours  sodium chloride 0.9%. 1000 milliLiter(s) IV Continuous <Continuous>  sodium zirconium cyclosilicate 10 Gram(s) Oral <User Schedule>  traZODone 100 milliGRAM(s) Oral at bedtime    Home Medications:  aspirin 81 mg oral delayed release tablet: 1 tab(s) orally once a day (23 Dec 2024 16:58)  calcium acetate 667 mg oral tablet: 1 tab(s) orally 3 times a day (23 Dec 2024 16:58)  carvedilol 12.5 mg oral tablet: 1 tab(s) orally every 12 hours (23 Dec 2024 16:56)  clopidogrel 75 mg oral tablet: 1 tab(s) orally once a day (23 Dec 2024 16:56)  furosemide 20 mg oral tablet: 1 tab(s) orally once a day (23 Dec 2024 16:58)  hydrALAZINE 25 mg oral tablet: 1 tab(s) orally 3 times a day (23 Dec 2024 16:58)  lisinopril 40 mg oral tablet: 1 tab(s) orally once a day (23 Dec 2024 16:58)  lovastatin 10 mg oral tablet: 1 tab(s) orally once a day (23 Dec 2024 16:56)  NIFEdipine 90 mg oral tablet, extended release: 1 tab(s) orally once a day (23 Dec 2024 16:58)  Emani-Elicia oral tablet: 1 tab(s) orally once a day (23 Dec 2024 16:58)  traZODone 100 mg oral tablet: 1 tab(s) orally once a day (at bedtime) (23 Dec 2024 16:56)    PHYSICAL EXAM:  Gen : no acute distress  Neck: + trach   Respiratory: decreased in the bases  Cardiovascular: AFlutter  Gastrointestinal: distended, soft   Extremities: No peripheral edema  Vascular: 2+ peripheral pulses    S/p CABG  Respiratory failure due to recurrent left lung plugging now s/p trach  ESRD on HD  Post op AFib   History of RV dysfunction   h/o Recurrent ascites   Post operative pain   Sacral decub   Recurrent ascites likely from Chronic RV failure - s/p paracentesis   Left Diaphragm dysfunction     s/p CABG in setting of biV  dysfunction.  Recovered from surgery current active issue has been       Care plan discussed with the ICU care team.   Patient remains critical, at risk for life threatening decompensation.    I have spent 30 minutes providing critical care management to this patient.    By signing my name below, I, Baldemar Hernandez, attest that this documentation has been prepared under the direction and in the presence of Herminio Mendez MD.   Electronically signed: Baldemar Hernandez, 25 @ 07:45    I, Herminio Mendez, personally performed the services described in this documentation. All medical record entries made by the scribe were at my direction and in my presence. I have reviewed the chart and agree that the record reflects my personal performance and is accurate and complete  Electronically signed: Herminio Mendez MD.  Brief History:  55 yo M with multiple medical problems including CAD s/p PCI in 2024 s/p CABG x 3 on 24    1: Intubated for hypoxia & left lung white out s/p awake bronch with removal of copious mucopurulent secretions  : s/p IABP insertion, sepsis/septic shock due to E coli   ESBL pneumonia, collapsed Left Lung, s/p multiple bronch    tracheostomy tube placement    VRE E. Faecium Bcx   +HSV PCR BAL   tissue cx from back abscess - Serratia/MRSA  - - intermittent bradycardia/junctional episodes with hypotension  2/3: off , off theophylline. iHD 1L UF  : bronch for Left lung collapse wound vac, 2decho w/ moderate pericardial effusion - similar as before, US abd: small - moderate ascites - monitor at this time.  Wound vac placed for sacral wounds  : iHD-1L UF  : bronch today off positive pressure   : concern for left food drop   2/10 : no acute issues - CXR shows improving left sided aeration, pt subjectively reports having difficulty while lying flat  : s/p Paracentesis 3.5 L removed, leukocytosis   : Ascites fluid PMN < 250 (less likely SBP)   sniff test yesterday showed paradoxical diaphragmatic motion    : mucus plugging but able to clear airway with aggressive pulmonary toileting.   : no vent requirement overnight, able to mobilize secretion with pulm toileting  hyperkalemia post HD - workup for possible recirculation underway   : On TC X 48 hours - ambulating well, no mucus plugging events  : HFTC x24hrs (40L 30%)  : iHD 1.5L UF  : iHD 2L      ICU Vital Signs Last 24 Hrs  T(C): 36.6 (25 @ 00:00), Max: 37 (25 @ 12:00)  T(F): 97.9 (25 @ 00:00), Max: 98.6 (25 @ 12:00)  HR: 91 (25 @ 07:00) (54 - 101)  BP: 132/57 (25 @ 06:00) (101/49 - 162/83)  BP(mean): 82 (25 @ 06:00) (62 - 118)  ABP: --  ABP(mean): --  RR: 18 (25 @ 07:00) (10 - 29)  SpO2: 96% (25 @ 07:00) (92% - 100%)    I&O's Summary    2025 07:01  -  2025 07:00  --------------------------------------------------------  IN: 2495 mL / OUT: 2600 mL / NET: -105 mL                        7.6    8.78  )-----------( 266      ( 2025 23:40 )             24.2     2025 23:40    136    |  97     |  25     ----------------------------<  153    4.5     |  30     |  3.45     Ca    9.0        2025 23:40  Phos  3.5       2025 23:40  Mg     2.3       2025 23:40    TPro  6.7    /  Alb  2.9    /  TBili  0.3    /  DBili  x      /  AST  14     /  ALT  9      /  AlkPhos  101    2025 23:40    PT/INR - ( 2025 06:48 )   PT: 14.1 sec;   INR: 1.23 ratio       PTT - ( 2025 06:27 )  PTT:50.0 sec    MEDICATIONS  (STANDING):  aMIOdarone    Tablet 200 milliGRAM(s) Oral daily  ascorbic acid 500 milliGRAM(s) Oral daily  aspirin  chewable 81 milliGRAM(s) Oral daily  atorvastatin 80 milliGRAM(s) Oral at bedtime  metoprolol tartrate 25 milliGRAM(s) Oral two times a day    epoetin ignacia (EPOGEN) Injectable 94575 Unit(s) IV Push <User Schedule>  ferrous    sulfate Liquid 300 milliGRAM(s) Enteral Tube daily  heparin  Infusion 1300 Unit(s)/Hr IV Continuous <Continuous>    insulin lispro (ADMELOG) corrective regimen sliding scale   SubCutaneous every 6 hours    methocarbamol 500 milliGRAM(s) Oral every 8 hours    metoprolol tartrate 25 milliGRAM(s) Oral two times a day  mirtazapine 7.5 milliGRAM(s) Oral at bedtime  Nephro-elicia 1 Tablet(s) Oral daily  pantoprazole  Injectable 40 milliGRAM(s) IV Push daily    sodium zirconium cyclosilicate 10 Gram(s) Oral <User Schedule>  traZODone 100 milliGRAM(s) Oral at bedtime    PHYSICAL EXAM:  Gen : no acute distress  Neck: + trach   Respiratory: decreased in the bases  Cardiovascular: AFlutter  Gastrointestinal: distended, soft   Extremities: No peripheral edema  Vascular: 2+ peripheral pulses    S/p CABG  Respiratory failure due to recurrent left lung plugging now s/p trach  ESRD on HD  Post op AFib   History of RV dysfunction   h/o Recurrent ascites   Post operative pain   Sacral decub   Recurrent ascites likely from Chronic RV failure - s/p paracentesis   Left Diaphragm dysfunction     s/p CABG in setting of biV  dysfunction.  Recovered from surgery current active issue has been recurrent left lung mucus plugging.  Pt also has left diaphragm dysfunction  s/p trach for secretion management.    continue current care  aggressive pulm toileting, chest percussion, suction as needed  Repeat CXR this PM is stable will downsize trach   continue Trach collar  HD per renal  history of Ascities - abdominal exam stable, no fluid shift or increased girth, no acute indication for paracenthesis  PT, walking.     Care plan discussed with the ICU care team.   Patient remains critical, at risk for life threatening decompensation.    I have spent 30 minutes providing critical care management to this patient.    By signing my name below, I, Baldemar Hernandez, attest that this documentation has been prepared under the direction and in the presence of Herminio Mendez MD.   Electronically signed: Baldemar Hernandez, 25 @ 07:45    I, Herminio Mendez, personally performed the services described in this documentation. All medical record entries made by the scribe were at my direction and in my presence. I have reviewed the chart and agree that the record reflects my personal performance and is accurate and complete  Electronically signed: Herminio Mendez MD.

## 2025-06-17 NOTE — PROGRESS NOTES
Identified pt with two pt identifiers (name and ). Reviewed chart in preparation for visit and have obtained necessary documentation.    Glenn Keyes is a 69 y.o. female New Patient (Glenn Keyes  is here for chronic lower back pain . Patient reports Numbness and tingling  lower right extremity . Patient has completed  physical therapy, medication management  , epidural steriod injections, for conservative treatment////)  .    Vitals:    25 1540   Weight: 59.6 kg (131 lb 6.4 oz)   Height: 1.575 m (5' 2.01\")          1. Have you been to the ER, urgent care clinic since your last visit?  Hospitalized since your last visit?  no     2. Have you seen or consulted any other health care providers outside of the [facility] since your last visit?  Include any pap smears or colon screening.  no

## 2025-06-19 DIAGNOSIS — I10 PRIMARY HYPERTENSION: ICD-10-CM

## 2025-06-24 RX ORDER — VALSARTAN AND HYDROCHLOROTHIAZIDE 160; 25 MG/1; MG/1
1 TABLET ORAL DAILY
Qty: 90 TABLET | Refills: 1 | OUTPATIENT
Start: 2025-06-24

## 2025-06-30 DIAGNOSIS — I10 PRIMARY HYPERTENSION: ICD-10-CM

## 2025-06-30 RX ORDER — VALSARTAN AND HYDROCHLOROTHIAZIDE 160; 25 MG/1; MG/1
1 TABLET ORAL DAILY
Qty: 90 TABLET | Refills: 1 | Status: SHIPPED | OUTPATIENT
Start: 2025-06-30

## 2025-07-11 ENCOUNTER — HOSPITAL ENCOUNTER (OUTPATIENT)
Facility: HOSPITAL | Age: 70
Discharge: HOME | End: 2025-07-14
Payer: MEDICARE

## 2025-07-11 DIAGNOSIS — C73 THYROID CANCER (HCC): ICD-10-CM

## 2025-07-11 DIAGNOSIS — E04.1 RIGHT THYROID NODULE: ICD-10-CM

## 2025-07-11 PROCEDURE — 76536 US EXAM OF HEAD AND NECK: CPT

## 2025-07-16 DIAGNOSIS — F43.22 ADJUSTMENT DISORDER WITH ANXIETY: ICD-10-CM

## 2025-07-16 DIAGNOSIS — F33.0 MAJOR DEPRESSIVE DISORDER, RECURRENT, MILD: ICD-10-CM

## 2025-07-21 RX ORDER — VENLAFAXINE HYDROCHLORIDE 150 MG/1
CAPSULE, EXTENDED RELEASE ORAL DAILY
Qty: 90 CAPSULE | Refills: 1 | Status: SHIPPED | OUTPATIENT
Start: 2025-07-21

## 2025-07-21 NOTE — TELEPHONE ENCOUNTER
Medication Refill Request    Glenn Keyes is requesting a refill of the following medication(s):   Requested Prescriptions     Pending Prescriptions Disp Refills    venlafaxine (EFFEXOR XR) 150 MG extended release capsule [Pharmacy Med Name: VENLAFAXINE HCL  MG CAP] 90 capsule 1     Sig: TAKE 1 CAPSULE BY MOUTH EVERY DAY        Listed PCP is Susan Lam MD   Last provider to prescribe medication: Susan Lam MD  Last Date of Medication Prescribed: 06.30.25   Date of Last Office Visit at [facility]: 05.07.25   FUTURE APPOINTMENT:   Future Appointments   Date Time Provider Department Center   10/29/2025  8:45 AM Jen Dangelo MD [facility]       Please send refill to:    [id number] [place] [address] [phone] - F [phone]  [address] [place] [id number]  Phone: [phone] Fax: [phone]      Please review request and approve or deny with recommendations.

## 2025-08-15 DIAGNOSIS — F43.22 ADJUSTMENT DISORDER WITH ANXIETY: ICD-10-CM

## 2025-08-15 DIAGNOSIS — F33.0 MAJOR DEPRESSIVE DISORDER, RECURRENT, MILD: ICD-10-CM

## 2025-08-15 DIAGNOSIS — K21.9 GASTRO-ESOPHAGEAL REFLUX DISEASE WITHOUT ESOPHAGITIS: ICD-10-CM

## 2025-08-18 RX ORDER — PANTOPRAZOLE SODIUM 40 MG/1
40 TABLET, DELAYED RELEASE ORAL DAILY
Qty: 90 TABLET | Refills: 1 | Status: SHIPPED | OUTPATIENT
Start: 2025-08-18

## 2025-08-18 RX ORDER — VENLAFAXINE HYDROCHLORIDE 37.5 MG/1
CAPSULE, EXTENDED RELEASE ORAL DAILY
Qty: 90 CAPSULE | Refills: 1 | Status: SHIPPED | OUTPATIENT
Start: 2025-08-18 | End: 2025-08-22

## 2025-08-20 DIAGNOSIS — M54.16 LUMBAR RADICULOPATHY: ICD-10-CM

## 2025-08-20 DIAGNOSIS — M54.50 LUMBAR BACK PAIN: ICD-10-CM

## 2025-08-21 RX ORDER — MELOXICAM 15 MG/1
15 TABLET ORAL DAILY
Qty: 30 TABLET | Refills: 1 | Status: SHIPPED | OUTPATIENT
Start: 2025-08-21

## 2025-08-22 ENCOUNTER — OFFICE VISIT (OUTPATIENT)
Age: 70
End: 2025-08-22
Payer: MEDICARE

## 2025-08-22 VITALS
RESPIRATION RATE: 16 BRPM | HEART RATE: 82 BPM | SYSTOLIC BLOOD PRESSURE: 119 MMHG | TEMPERATURE: 98 F | BODY MASS INDEX: 24.77 KG/M2 | WEIGHT: 134.6 LBS | HEIGHT: 62 IN | OXYGEN SATURATION: 96 % | DIASTOLIC BLOOD PRESSURE: 74 MMHG

## 2025-08-22 DIAGNOSIS — R10.84 GENERALIZED ABDOMINAL PAIN: ICD-10-CM

## 2025-08-22 DIAGNOSIS — R35.0 INCREASED URINARY FREQUENCY: ICD-10-CM

## 2025-08-22 DIAGNOSIS — I10 PRIMARY HYPERTENSION: ICD-10-CM

## 2025-08-22 DIAGNOSIS — F33.0 MAJOR DEPRESSIVE DISORDER, RECURRENT, MILD: Primary | ICD-10-CM

## 2025-08-22 DIAGNOSIS — R53.83 OTHER FATIGUE: ICD-10-CM

## 2025-08-22 DIAGNOSIS — E11.40 TYPE 2 DIABETES MELLITUS WITH DIABETIC NEUROPATHY, WITHOUT LONG-TERM CURRENT USE OF INSULIN (HCC): ICD-10-CM

## 2025-08-22 DIAGNOSIS — M54.16 LUMBAR RADICULOPATHY: ICD-10-CM

## 2025-08-22 LAB
BILIRUBIN, URINE, POC: NEGATIVE
BLOOD URINE, POC: NEGATIVE
GLUCOSE URINE, POC: NEGATIVE
KETONES, URINE, POC: ABNORMAL
LEUKOCYTE ESTERASE, URINE, POC: ABNORMAL
NITRITE, URINE, POC: NEGATIVE
PH, URINE, POC: 6 (ref 4.6–8)
PROTEIN,URINE, POC: NEGATIVE
SPECIFIC GRAVITY, URINE, POC: 1.01 (ref 1–1.03)
URINALYSIS CLARITY, POC: CLEAR
URINALYSIS COLOR, POC: YELLOW
UROBILINOGEN, POC: ABNORMAL

## 2025-08-22 PROCEDURE — 3074F SYST BP LT 130 MM HG: CPT | Performed by: FAMILY MEDICINE

## 2025-08-22 PROCEDURE — 99214 OFFICE O/P EST MOD 30 MIN: CPT | Performed by: FAMILY MEDICINE

## 2025-08-22 PROCEDURE — 3044F HG A1C LEVEL LT 7.0%: CPT | Performed by: FAMILY MEDICINE

## 2025-08-22 PROCEDURE — G8399 PT W/DXA RESULTS DOCUMENT: HCPCS | Performed by: FAMILY MEDICINE

## 2025-08-22 PROCEDURE — 3017F COLORECTAL CA SCREEN DOC REV: CPT | Performed by: FAMILY MEDICINE

## 2025-08-22 PROCEDURE — 1036F TOBACCO NON-USER: CPT | Performed by: FAMILY MEDICINE

## 2025-08-22 PROCEDURE — G8420 CALC BMI NORM PARAMETERS: HCPCS | Performed by: FAMILY MEDICINE

## 2025-08-22 PROCEDURE — 3078F DIAST BP <80 MM HG: CPT | Performed by: FAMILY MEDICINE

## 2025-08-22 PROCEDURE — 81003 URINALYSIS AUTO W/O SCOPE: CPT | Performed by: FAMILY MEDICINE

## 2025-08-22 PROCEDURE — 1123F ACP DISCUSS/DSCN MKR DOCD: CPT | Performed by: FAMILY MEDICINE

## 2025-08-22 PROCEDURE — 2022F DILAT RTA XM EVC RTNOPTHY: CPT | Performed by: FAMILY MEDICINE

## 2025-08-22 PROCEDURE — G8427 DOCREV CUR MEDS BY ELIG CLIN: HCPCS | Performed by: FAMILY MEDICINE

## 2025-08-22 PROCEDURE — PBSHW AMB POC URINALYSIS DIP STICK AUTO W/O MICRO: Performed by: FAMILY MEDICINE

## 2025-08-22 PROCEDURE — 1090F PRES/ABSN URINE INCON ASSESS: CPT | Performed by: FAMILY MEDICINE

## 2025-08-22 PROCEDURE — 1159F MED LIST DOCD IN RCRD: CPT | Performed by: FAMILY MEDICINE

## 2025-08-22 PROCEDURE — 1125F AMNT PAIN NOTED PAIN PRSNT: CPT | Performed by: FAMILY MEDICINE

## 2025-08-22 RX ORDER — BUPROPION HYDROCHLORIDE 150 MG/1
150 TABLET ORAL EVERY MORNING
Qty: 30 TABLET | Refills: 3 | Status: SHIPPED | OUTPATIENT
Start: 2025-08-22

## 2025-08-22 ASSESSMENT — ENCOUNTER SYMPTOMS
ANAL BLEEDING: 0
SHORTNESS OF BREATH: 0
VOMITING: 0
CONSTIPATION: 1
ABDOMINAL PAIN: 1
BLOOD IN STOOL: 0
NAUSEA: 1

## 2025-08-22 ASSESSMENT — PATIENT HEALTH QUESTIONNAIRE - PHQ9
1. LITTLE INTEREST OR PLEASURE IN DOING THINGS: MORE THAN HALF THE DAYS
3. TROUBLE FALLING OR STAYING ASLEEP: SEVERAL DAYS
SUM OF ALL RESPONSES TO PHQ QUESTIONS 1-9: 14
9. THOUGHTS THAT YOU WOULD BE BETTER OFF DEAD, OR OF HURTING YOURSELF: NOT AT ALL
SUM OF ALL RESPONSES TO PHQ QUESTIONS 1-9: 14
7. TROUBLE CONCENTRATING ON THINGS, SUCH AS READING THE NEWSPAPER OR WATCHING TELEVISION: MORE THAN HALF THE DAYS
5. POOR APPETITE OR OVEREATING: NOT AT ALL
2. FEELING DOWN, DEPRESSED OR HOPELESS: NEARLY EVERY DAY
4. FEELING TIRED OR HAVING LITTLE ENERGY: MORE THAN HALF THE DAYS
10. IF YOU CHECKED OFF ANY PROBLEMS, HOW DIFFICULT HAVE THESE PROBLEMS MADE IT FOR YOU TO DO YOUR WORK, TAKE CARE OF THINGS AT HOME, OR GET ALONG WITH OTHER PEOPLE: NOT DIFFICULT AT ALL
SUM OF ALL RESPONSES TO PHQ QUESTIONS 1-9: 14
8. MOVING OR SPEAKING SO SLOWLY THAT OTHER PEOPLE COULD HAVE NOTICED. OR THE OPPOSITE, BEING SO FIGETY OR RESTLESS THAT YOU HAVE BEEN MOVING AROUND A LOT MORE THAN USUAL: MORE THAN HALF THE DAYS
6. FEELING BAD ABOUT YOURSELF - OR THAT YOU ARE A FAILURE OR HAVE LET YOURSELF OR YOUR FAMILY DOWN: MORE THAN HALF THE DAYS
SUM OF ALL RESPONSES TO PHQ QUESTIONS 1-9: 14

## 2025-08-23 LAB
ALBUMIN SERPL-MCNC: 4.1 G/DL (ref 3.5–5.2)
ALBUMIN/GLOB SERPL: 1.3 (ref 1.1–2.2)
ALP SERPL-CCNC: 82 U/L (ref 35–104)
ALT SERPL-CCNC: 16 U/L (ref 10–35)
ANION GAP SERPL CALC-SCNC: 10 MMOL/L (ref 2–14)
AST SERPL-CCNC: 17 U/L (ref 10–35)
BACTERIA SPEC CULT: NORMAL
BILIRUB SERPL-MCNC: 0.2 MG/DL (ref 0–1.2)
BUN SERPL-MCNC: 13 MG/DL (ref 8–23)
BUN/CREAT SERPL: 15 (ref 12–20)
CALCIUM SERPL-MCNC: 10.4 MG/DL (ref 8.8–10.2)
CC UR VC: NORMAL
CHLORIDE SERPL-SCNC: 105 MMOL/L (ref 98–107)
CO2 SERPL-SCNC: 28 MMOL/L (ref 20–29)
CREAT SERPL-MCNC: 0.87 MG/DL (ref 0.6–1)
ERYTHROCYTE [DISTWIDTH] IN BLOOD BY AUTOMATED COUNT: 14 % (ref 11.5–14.5)
EST. AVERAGE GLUCOSE BLD GHB EST-MCNC: 121 MG/DL
GLOBULIN SER CALC-MCNC: 3.1 G/DL (ref 2–4)
GLUCOSE SERPL-MCNC: 107 MG/DL (ref 65–100)
HBA1C MFR BLD: 5.9 % (ref 4–5.6)
HCT VFR BLD AUTO: 41.5 % (ref 35–47)
HGB BLD-MCNC: 12.8 G/DL (ref 11.5–16)
MCH RBC QN AUTO: 27.5 PG (ref 26–34)
MCHC RBC AUTO-ENTMCNC: 30.8 G/DL (ref 30–36.5)
MCV RBC AUTO: 89.1 FL (ref 80–99)
NRBC # BLD: 0 K/UL (ref 0–0.01)
NRBC BLD-RTO: 0 PER 100 WBC
PLATELET # BLD AUTO: 315 K/UL (ref 150–400)
PMV BLD AUTO: 12.2 FL (ref 8.9–12.9)
POTASSIUM SERPL-SCNC: 4.9 MMOL/L (ref 3.5–5.1)
PROT SERPL-MCNC: 7.2 G/DL (ref 6.4–8.3)
RBC # BLD AUTO: 4.66 M/UL (ref 3.8–5.2)
SERVICE CMNT-IMP: NORMAL
SODIUM SERPL-SCNC: 143 MMOL/L (ref 136–145)
TSH, 3RD GENERATION: 3.11 UIU/ML (ref 0.27–4.2)
WBC # BLD AUTO: 6.7 K/UL (ref 3.6–11)

## (undated) DEVICE — SET GRAV CK VLV NEEDLESS ST 3 GANGED 4WAY STPCOCK HI FLO 10

## (undated) DEVICE — SENSOR OXMTR SPO2 ADLT NELLCOR DISP

## (undated) DEVICE — RESERVOIR,SUCTION,100CC,SILICONE: Brand: MEDLINE

## (undated) DEVICE — MINOR ENT-SFMCASU: Brand: MEDLINE INDUSTRIES, INC.

## (undated) DEVICE — ADHESIVE SKIN CLOSURE WND 8.661X1.5 IN 22 CM LIQUIBAND SECUR

## (undated) DEVICE — CANN NASAL O2 CAPNOGRAPHY AD -- FILTERLINE

## (undated) DEVICE — 1200 GUARD II KIT W/5MM TUBE W/O VAC TUBE: Brand: GUARDIAN

## (undated) DEVICE — BAG BELONG PT PERS CLEAR HANDL

## (undated) DEVICE — CATH IV AUTOGRD BC BLU 22GA 25 -- INSYTE

## (undated) DEVICE — PENCIL ES CRD L10FT HND SWCHING ROCK SWCH W/ EDGE COAT BLDE

## (undated) DEVICE — BLADE ES ELASTOMERIC COAT INSUL DURABLE BEND UPTO 90DEG

## (undated) DEVICE — GLOVE SURG SZ 6 THK91MIL LTX FREE SYN POLYISOPRENE ANTI

## (undated) DEVICE — DRAIN SURG FLAT W7MMXL20CM FULL PERF

## (undated) DEVICE — SUTURE MONOCRYL SZ 5-0 L18IN ABSRB UD L19MM PS-2 3/8 CIR PRIM Y495G

## (undated) DEVICE — SUTURE VICRYL SZ 4-0 L27IN ABSRB UD L26MM SH 1/2 CIR J415H

## (undated) DEVICE — APPLIER CLP L9.375IN APER 2.1MM CLS L3.8MM 20 SM TI CLP

## (undated) DEVICE — SPONGE,PEANUT,XRAY,ST,SM,3/8",5/CARD: Brand: MEDLINE INDUSTRIES, INC.

## (undated) DEVICE — AGENT HEMSTAT W4XL4IN OXIDIZED REGENERATED CELOS STRUCTURED

## (undated) DEVICE — KIT COLON W/ 1.1OZ LUB AND 2 END

## (undated) DEVICE — SEALER LAP SM L18.8CM OPN JAW HAND/FOOT SWCH FORCETRIAD

## (undated) DEVICE — SOLIDIFIER MEDC 1200ML -- CONVERT TO 356117

## (undated) DEVICE — SUTURE PERMAHAND SZ 2-0 L30IN NONABSORBABLE BLK SILK W/O A305H

## (undated) DEVICE — SUTURE VICRYL SZ 3-0 L18IN ABSRB UD L26MM SH 1/2 CIR J864D

## (undated) DEVICE — 3M™ TEGADERM™ TRANSPARENT FILM DRESSING FRAME STYLE, 1624W, 2-3/8 IN X 2-3/4 IN (6 CM X 7 CM), 100/CT 4CT/CASE: Brand: 3M™ TEGADERM™

## (undated) DEVICE — POUCH INSTR W6.75XL11.5IN FRST 2 PKT ADH FOR ORTH AND

## (undated) DEVICE — APPLIER CLP M L11IN TI MULT RNG HNDL 30 CLP STR LIGACLP

## (undated) DEVICE — SIMPLICITY FLUFF UNDERPAD 23X36, MODERATE: Brand: SIMPLICITY

## (undated) DEVICE — LIQUIBAND RAPID ADHESIVE 36/CS 0.8ML: Brand: MEDLINE

## (undated) DEVICE — ELECTRODE,RADIOTRANSLUCENT,FOAM,3PK: Brand: MEDLINE

## (undated) DEVICE — PROBE 8225101 5PK STD PRASS FL TIP ROHS

## (undated) DEVICE — SOLUTION IRRIG 1000ML STRL H2O USP PLAS POUR BTL

## (undated) DEVICE — SUTURE ETHILON SZ 2-0 L18IN NONABSORBABLE BLK L19MM PS-2 PRIM 593H

## (undated) DEVICE — Device

## (undated) DEVICE — CUFF RMFG BP INF SZ 11 DISP -- LAWSON OEM ITEM 238915